# Patient Record
Sex: FEMALE | Race: WHITE | NOT HISPANIC OR LATINO | Employment: OTHER | URBAN - METROPOLITAN AREA
[De-identification: names, ages, dates, MRNs, and addresses within clinical notes are randomized per-mention and may not be internally consistent; named-entity substitution may affect disease eponyms.]

---

## 2017-05-16 ENCOUNTER — HOSPITAL ENCOUNTER (OUTPATIENT)
Dept: RADIOLOGY | Facility: HOSPITAL | Age: 62
Discharge: HOME | End: 2017-05-16
Attending: OBSTETRICS & GYNECOLOGY
Payer: COMMERCIAL

## 2017-05-16 ENCOUNTER — OFFICE VISIT (OUTPATIENT)
Dept: OBSTETRICS AND GYNECOLOGY | Facility: CLINIC | Age: 62
End: 2017-05-16
Payer: COMMERCIAL

## 2017-05-16 VITALS
SYSTOLIC BLOOD PRESSURE: 136 MMHG | WEIGHT: 293 LBS | BODY MASS INDEX: 44.41 KG/M2 | HEIGHT: 68 IN | DIASTOLIC BLOOD PRESSURE: 76 MMHG

## 2017-05-16 DIAGNOSIS — Z12.31 VISIT FOR SCREENING MAMMOGRAM: ICD-10-CM

## 2017-05-16 DIAGNOSIS — Z12.31 VISIT FOR SCREENING MAMMOGRAM: Primary | ICD-10-CM

## 2017-05-16 PROCEDURE — 99396 PREV VISIT EST AGE 40-64: CPT | Mod: S$GLB,,, | Performed by: OBSTETRICS & GYNECOLOGY

## 2017-05-16 PROCEDURE — 77067 SCR MAMMO BI INCL CAD: CPT | Mod: TC

## 2017-05-16 PROCEDURE — 99999 PR PBB SHADOW E&M-EST. PATIENT-LVL III: CPT | Mod: PBBFAC,,, | Performed by: OBSTETRICS & GYNECOLOGY

## 2017-05-16 PROCEDURE — 77067 SCR MAMMO BI INCL CAD: CPT | Mod: 26,,, | Performed by: RADIOLOGY

## 2017-05-16 PROCEDURE — 77063 BREAST TOMOSYNTHESIS BI: CPT | Mod: 26,,, | Performed by: RADIOLOGY

## 2017-05-16 RX ORDER — DULAGLUTIDE 1.5 MG/.5ML
INJECTION, SOLUTION SUBCUTANEOUS
COMMUNITY
Start: 2017-04-05 | End: 2022-12-15 | Stop reason: ALTCHOICE

## 2017-05-16 RX ORDER — PERMETHRIN 50 MG/G
CREAM TOPICAL
COMMUNITY
Start: 2017-04-25 | End: 2018-09-17

## 2017-05-16 NOTE — PROGRESS NOTES
Chief Complaint   Patient presents with    Well Woman       History and Physical:  No LMP recorded. Patient has had a hysterectomy.       Gail Temple is a 62 y.o.  female who presents today for her routine annual GYN exam. The patient has no Gynecology complaints today. Moderate stress urinary incontinence - worse on fluid pills, counseled. Recommended kegals and follow. 40# weight loss reported, encouraged.      Allergies: Review of patient's allergies indicates:  No Known Allergies    Past Medical History:   Diagnosis Date    Angio-edema     Asthma     Eczema     Obesity     Urticaria        Past Surgical History:   Procedure Laterality Date    ADENOIDECTOMY      BONY PELVIS SURGERY       SECTION      HYSTERECTOMY      KNEE CARTILAGE SURGERY      TONSILLECTOMY      VAGINOPLASTY         MEDS:   Current Outpatient Prescriptions on File Prior to Visit   Medication Sig Dispense Refill    ferrous sulfate 325 (65 FE) MG EC tablet Take 325 mg by mouth 2 (two) times daily.      losartan (COZAAR) 50 MG tablet       meloxicam (MOBIC) 15 MG tablet       metformin (GLUCOPHAGE) 500 MG tablet       nystatin (MYCOSTATIN) powder       SYNTHROID 175 mcg tablet       epinephrine (EPIPEN) 0.3 mg/0.3 mL (1:1,000) AtIn Inject 0.6 mLs (0.6 mg total) into the muscle once. 2 each 0    triamterene-hydrochlorothiazide 37.5-25 mg (DYAZIDE) 37.5-25 mg per capsule        No current facility-administered medications on file prior to visit.        OB History      Para Term  AB TAB SAB Ectopic Multiple Living    2 2                  Social History     Social History    Marital status:      Spouse name: N/A    Number of children: N/A    Years of education: N/A     Occupational History    Not on file.     Social History Main Topics    Smoking status: Never Smoker    Smokeless tobacco: Not on file    Alcohol use Yes      Comment: seldom    Drug use: Not on file    Sexual  "activity: Not Currently     Partners: Male     Birth control/ protection: None     Other Topics Concern    Not on file     Social History Narrative       Family History   Problem Relation Age of Onset    Breast cancer Mother 44      at 59yo from breast ca    Asthma Maternal Uncle     Allergies Son     Allergic rhinitis Son     Eczema Son     Angioedema Daughter     Allergies Daughter     Immunodeficiency Neg Hx     Ovarian cancer Neg Hx          Past medical and surgical history reviewed.   I have reviewed the patient's medical history in detail and updated the computerized patient record.        Review of System:   General: no chills, fever, night sweats, weight gain or weight loss  Psychological: no depression or suicidal ideation  Breasts: no new or changing breast lumps, nipple discharge or masses.  Respiratory: no cough, shortness of breath, or wheezing  Cardiovascular: no chest pain or dyspnea on exertion  Gastrointestinal: no abdominal pain, change in bowel habits, or black or bloody stools  Genito-Urinary: no incontinence, urinary frequency/urgency or vulvar/vaginal symptoms, pelvic pain or abnormal vaginal bleeding.  Musculoskeletal: no gait disturbance or muscular weakness      Physical Exam:     /76  Ht 5' 8" (1.727 m)  Wt (!) 148.3 kg (326 lb 15.1 oz)  BMI 49.71 kg/m2  Constitutional: She is oriented to person, place, and time. She appears well-developed and well-nourished. No distress. Overweight.  HENT:   Head: Normocephalic and atraumatic.   Eyes: Conjunctivae and EOM are normal. No scleral icterus.   Neck: Normal range of motion. Neck supple. No tracheal deviation present.   Cardiovascular: Normal rate.    Pulmonary/Chest: Effort normal. No respiratory distress. She exhibits no tenderness.  Breasts: are symmetrical.   Right breast exhibits no inverted nipple, no mass, no nipple discharge, no skin change and no tenderness.   Left breast exhibits no inverted nipple, no mass, no " nipple discharge, no skin change and no tenderness.  Abdominal: Soft. She exhibits no distension and no mass. There is no tenderness. There is no rebound and no guarding.   Genitourinary:    External rectal exam shows no thrombosed external hemorrhoids.    Pelvic exam was performed with patient supine.   No labial fusion.   There is no rash, lesion or injury on the right labia.   There is no rash, lesion or injury on the left labia.   No bleeding and no signs of injury around the vaginal introitus, urethra is without lesions and well supported.    No vaginal discharge found.   No significant Cystocele, Enterocele or rectocele, and cuff well supported.   Bimanual exam:   The urethra and vagina are without palpable masses or tenderness.   Uterus and cervix are surgically absents, vaginal cuff is intact and well supported.   Right adnexum displays no mass and no tenderness.   Left adnexum displays no mass and no tenderness.  Musculoskeletal: Normal range of motion.   Lymphadenopathy: No inguinal adenopathy present.   Neurological: She is alert and oriented to person, place, and time. Coordination normal.   Skin: Skin is warm and dry. She is not diaphoretic.   Psychiatric: She has a normal mood and affect.        Assessment:   Normal annual GYN exam  1. Visit for screening mammogram  CANCELED: Mammo Digital Screening Bilat With CAD   mild stress urinary incontinence - counseled.   Weight loss, encouraged.     Plan:   PAP NOT NEEDED  Mammogram  Follow up in 1 year.

## 2017-05-16 NOTE — MR AVS SNAPSHOT
[facility]  [address], [address]  [place] [address]  Phone: [phone]  Fax: [phone]                  Gail Temple   2017 11:00 AM   Office Visit    Description:  Female : 1955   Provider:  Bernard Spence MD   Department:  [facility]           Reason for Visit     Well Woman           Diagnoses this Visit        Comments    Visit for screening mammogram    -  Primary            To Do List           Future Appointments        Provider Department Dept Phone    2017 11:00 AM Bernard Spence MD [facility] [phone]      Goals (5 Years of Data)     None      [facility]     [facility] Nurse Care Line -  Assistance  Unless otherwise directed by your provider, please contact [facility], our nurse care line that is available for  assistance.     Registered nurses in the [facility] provide: appointment scheduling, clinical advisement, health education, and other advisory services.  Call: [phone] (toll free)               Medications                Verify that the below list of medications is an accurate representation of the medications you are currently taking.  If none reported, the list may be blank. If incorrect, please contact your healthcare provider. Carry this list with you in case of emergency.           Current Medications     ferrous sulfate 325 (65 FE) MG EC tablet Take 325 mg by mouth 2 (two) times daily.    losartan (COZAAR) 50 MG tablet     meloxicam (MOBIC) 15 MG tablet     metformin (GLUCOPHAGE) 500 MG tablet     nystatin (MYCOSTATIN) powder     permethrin (ELIMITE) 5 % cream     SYNTHROID 175 mcg tablet     TRULICITY 1.5 mg/0.5 mL PnIj     epinephrine (EPIPEN) 0.3 mg/0.3 mL (1:1,000) AtIn Inject 0.6 mLs (0.6 mg total) into the muscle once.    triamterene-hydrochlorothiazide 37.5-25 mg (DYAZIDE) 37.5-25 mg per capsule            Clinical Reference  "Information           Your Vitals Were     BP Height Weight BMI       136/76 5' 8" (1.727 m) 148.3 kg (326 lb 15.1 oz) 49.71 kg/m2       Blood Pressure          Most Recent Value    BP  136/76      Allergies as of 5/16/2017     No Known Allergies      Immunizations Administered on Date of Encounter - 5/16/2017     None      Orders Placed During Today's Visit     Future Labs/Procedures Expected by Expires    Mammo Digital Screening Bilat With CAD  5/16/2017 7/16/2018      Language Assistance Services     ATTENTION: Language assistance services are available, free of charge. Please call [phone].      ATENCIÓN: Si habla español, tiene a wilder disposición servicios gratuitos de asistencia lingüística. Llame al [phone].     LEONIDES Ý: N?u b?n nói Ti?ng Vi?t, có các d?ch v? h? tr? ngôn ng? mi?n phí dành cho b?n. G?i s? [phone].         [facility] complies with applicable Federal civil rights laws and does not discriminate on the basis of race, color, national origin, age, disability, or sex.        "

## 2018-03-05 ENCOUNTER — OFFICE VISIT (OUTPATIENT)
Dept: ORTHOPEDICS | Facility: CLINIC | Age: 63
End: 2018-03-05
Payer: COMMERCIAL

## 2018-03-05 ENCOUNTER — HOSPITAL ENCOUNTER (OUTPATIENT)
Dept: RADIOLOGY | Facility: HOSPITAL | Age: 63
Discharge: HOME | End: 2018-03-05
Attending: ORTHOPAEDIC SURGERY
Payer: COMMERCIAL

## 2018-03-05 VITALS — BODY MASS INDEX: 44.41 KG/M2 | WEIGHT: 293 LBS | HEIGHT: 68 IN

## 2018-03-05 DIAGNOSIS — M25.562 LEFT KNEE PAIN, UNSPECIFIED CHRONICITY: ICD-10-CM

## 2018-03-05 DIAGNOSIS — M17.0 PRIMARY OSTEOARTHRITIS OF BOTH KNEES: Primary | ICD-10-CM

## 2018-03-05 DIAGNOSIS — M25.562 LEFT KNEE PAIN, UNSPECIFIED CHRONICITY: Primary | ICD-10-CM

## 2018-03-05 PROCEDURE — 73564 X-RAY EXAM KNEE 4 OR MORE: CPT | Mod: 26,LT,, | Performed by: RADIOLOGY

## 2018-03-05 PROCEDURE — 99203 OFFICE O/P NEW LOW 30 MIN: CPT | Mod: 25,S$GLB,, | Performed by: ORTHOPAEDIC SURGERY

## 2018-03-05 PROCEDURE — 99999 PR PBB SHADOW E&M-EST. PATIENT-LVL II: CPT | Mod: PBBFAC,,, | Performed by: ORTHOPAEDIC SURGERY

## 2018-03-05 PROCEDURE — 20610 DRAIN/INJ JOINT/BURSA W/O US: CPT | Mod: RT,S$GLB,, | Performed by: ORTHOPAEDIC SURGERY

## 2018-03-05 PROCEDURE — 73562 X-RAY EXAM OF KNEE 3: CPT | Mod: TC,PN,RT

## 2018-03-05 PROCEDURE — 73562 X-RAY EXAM OF KNEE 3: CPT | Mod: 26,59,RT, | Performed by: RADIOLOGY

## 2018-03-05 PROCEDURE — 20610 DRAIN/INJ JOINT/BURSA W/O US: CPT | Mod: 59,LT,S$GLB, | Performed by: ORTHOPAEDIC SURGERY

## 2018-03-05 RX ORDER — TRIAMCINOLONE ACETONIDE 40 MG/ML
40 INJECTION, SUSPENSION INTRA-ARTICULAR; INTRAMUSCULAR
Status: DISCONTINUED | OUTPATIENT
Start: 2018-03-05 | End: 2018-03-05 | Stop reason: HOSPADM

## 2018-03-05 RX ADMIN — TRIAMCINOLONE ACETONIDE 40 MG: 40 INJECTION, SUSPENSION INTRA-ARTICULAR; INTRAMUSCULAR at 01:03

## 2018-03-05 NOTE — PROGRESS NOTES
Past Medical History:   Diagnosis Date    Angio-edema     Asthma     Eczema     Obesity     Urticaria        Past Surgical History:   Procedure Laterality Date    ADENOIDECTOMY      BONY PELVIS SURGERY       SECTION      HYSTERECTOMY      KNEE CARTILAGE SURGERY      TONSILLECTOMY      VAGINOPLASTY         Current Outpatient Prescriptions   Medication Sig    ferrous sulfate 325 (65 FE) MG EC tablet Take 325 mg by mouth 2 (two) times daily.    losartan (COZAAR) 50 MG tablet     meloxicam (MOBIC) 15 MG tablet     metformin (GLUCOPHAGE) 500 MG tablet     nystatin (MYCOSTATIN) powder     permethrin (ELIMITE) 5 % cream     SYNTHROID 175 mcg tablet     triamterene-hydrochlorothiazide 37.5-25 mg (DYAZIDE) 37.5-25 mg per capsule     TRULICITY 1.5 mg/0.5 mL PnIj     epinephrine (EPIPEN) 0.3 mg/0.3 mL (1:1,000) AtIn Inject 0.6 mLs (0.6 mg total) into the muscle once.     No current facility-administered medications for this visit.        Review of patient's allergies indicates:  No Known Allergies    Family History   Problem Relation Age of Onset    Breast cancer Mother 44      at 59yo from breast ca    Asthma Maternal Uncle     Allergies Son     Allergic rhinitis Son     Eczema Son     Angioedema Daughter     Allergies Daughter     Immunodeficiency Neg Hx     Ovarian cancer Neg Hx        Social History     Social History    Marital status:      Spouse name: N/A    Number of children: N/A    Years of education: N/A     Occupational History    Not on file.     Social History Main Topics    Smoking status: Never Smoker    Smokeless tobacco: Not on file    Alcohol use Yes      Comment: seldom    Drug use: Unknown    Sexual activity: Not Currently     Partners: Male     Birth control/ protection: None     Other Topics Concern    Not on file     Social History Narrative    No narrative on file       Chief Complaint:   Chief Complaint   Patient presents with    Left Knee  - Pain       History of present illness: Is a 63-year-old female seen for bilateral knee pain.  Patient's had pain for years.  Patient's had injections physical therapy and even meniscal surgery.  No treatment over the last year.  Left knee is the worst of the 2.  Pain with walking standing or getting up from sitting.  Pain as a 4 out of 10.  Currently on meloxicam and Osteo Bi-Flex.      Review of Systems:    Constitution: Negative for chills, fever, and sweats.  Negative for unexplained weight loss.    HENT:  Negative for headaches and blurry vision.    Cardiovascular:Negative for chest pain or irregular heart beat. Negative for hypertension.    Respiratory:  Negative for cough and shortness of breath.    Gastrointestinal: Negative for abdominal pain, heartburn, melena, nausea, and vomitting.    Genitourinary:  Negative bladder incontinence and dysuria.    Musculoskeletal:  See HPI    Neurological: Negative for numbness.    Psychiatric/Behavioral: Negative for depression.  The patient is not nervous/anxious.      Endocrine: Negative for polyuria    Hematologic/Lymphatic: Negative for bleeding problem.  Does not bruise/bleed easily.    Skin: Negative for poor would healing and rash      Physical Examination:    Vital Signs:  There were no vitals filed for this visit.    Body mass index is 49.57 kg/m².    This a well-developed, well nourished patient in no acute distress.  They are alert and oriented and cooperative to examination.  Pt. walks without an antalgic gait.      Examination of bilateral knees shows no rashes or erythema. There are no masses ecchymosis or effusion. Patient has full range of motion from 0-130°. Patient is nontender to palpation over lateral joint line and mildly tender to palpation over the medial joint line. Patient has a - Lachman exam, - anterior drawer exam, and - posterior drawer exam. - Kay's exam. Knee is stable to varus and valgus stress. 5 out of 5 motor strength. Palpable  distal pulses. Intact light touch sensation.  Moderate Patellofemoral crepitus      X-rays: X-rays left knee are ordered and reviewed which show some moderate left knee arthritis and more mild to moderate right knee arthritis     Assessment:: Bilateral knee arthritis    Plan:  I reviewed the findings with her today.  Patient may she needs to lose some weight.  We talked about treatment options and she agreed to try cortisone injections today.  She is about to go on a cruise soon.  We will get authorization and Euflexxa after she returns.    This note was created using Dragon voice recognition software that occasionally misinterpreted phrases or words.    Consult note is delivered via Epic messaging service.

## 2018-03-05 NOTE — PROCEDURES
Large Joint Aspiration/Injection  Date/Time: 3/5/2018 1:23 PM  Performed by: BISMARK GENTILE  Authorized by: BISMARK GENTILE     Consent Done?:  Yes (Verbal)  Indications:  Pain  Procedure site marked: Yes    Timeout: Prior to procedure the correct patient, procedure, and site was verified      Location:  Knee  Site:  R knee and L knee  Prep: Patient was prepped and draped in usual sterile fashion    Needle size:  20 G  Approach:  Anterolateral  Medications:  40 mg triamcinolone acetonide 40 mg/mL; 40 mg triamcinolone acetonide 40 mg/mL  Patient tolerance:  Patient tolerated the procedure well with no immediate complications

## 2018-03-06 DIAGNOSIS — M17.0 BILATERAL PRIMARY OSTEOARTHRITIS OF KNEE: Primary | ICD-10-CM

## 2018-03-26 ENCOUNTER — OFFICE VISIT (OUTPATIENT)
Dept: ORTHOPEDICS | Facility: CLINIC | Age: 63
End: 2018-03-26
Payer: COMMERCIAL

## 2018-03-26 VITALS — HEIGHT: 68 IN | WEIGHT: 293 LBS | BODY MASS INDEX: 44.41 KG/M2

## 2018-03-26 DIAGNOSIS — M17.0 PRIMARY OSTEOARTHRITIS OF BOTH KNEES: Primary | ICD-10-CM

## 2018-03-26 PROCEDURE — 99499 UNLISTED E&M SERVICE: CPT | Mod: S$GLB,,, | Performed by: ORTHOPAEDIC SURGERY

## 2018-03-26 PROCEDURE — 20610 DRAIN/INJ JOINT/BURSA W/O US: CPT | Mod: LT,S$GLB,, | Performed by: ORTHOPAEDIC SURGERY

## 2018-03-26 PROCEDURE — 20610 DRAIN/INJ JOINT/BURSA W/O US: CPT | Mod: 51,RT,S$GLB, | Performed by: ORTHOPAEDIC SURGERY

## 2018-03-26 PROCEDURE — 99999 PR PBB SHADOW E&M-EST. PATIENT-LVL II: CPT | Mod: PBBFAC,,, | Performed by: ORTHOPAEDIC SURGERY

## 2018-03-26 RX ORDER — HYALURONATE SODIUM 20 MG/2 ML
20 SYRINGE (ML) INTRAARTICULAR
Status: DISCONTINUED | OUTPATIENT
Start: 2018-03-26 | End: 2018-03-26 | Stop reason: HOSPADM

## 2018-03-26 RX ADMIN — Medication 20 MG: at 12:03

## 2018-03-26 NOTE — PROCEDURES
Large Joint Aspiration/Injection  Date/Time: 3/26/2018 12:52 PM  Performed by: BISMARK GENTILE  Authorized by: BISMARK GENTILE     Consent Done?:  Yes (Verbal)  Indications:  Pain  Procedure site marked: Yes    Timeout: Prior to procedure the correct patient, procedure, and site was verified      Location:  Knee  Site:  R knee and L knee  Prep: Patient was prepped and draped in usual sterile fashion    Needle size:  20 G  Approach:  Anterolateral  Medications:  20 mg EUFLEXXA 10 mg/mL(mw 2.4 -3.6 million); 20 mg EUFLEXXA 10 mg/mL(mw 2.4 -3.6 million)  Patient tolerance:  Patient tolerated the procedure well with no immediate complications

## 2018-03-26 NOTE — PROGRESS NOTES
Past Medical History:   Diagnosis Date    Angio-edema     Asthma     Eczema     Obesity     Urticaria        Past Surgical History:   Procedure Laterality Date    ADENOIDECTOMY      BONY PELVIS SURGERY       SECTION      HYSTERECTOMY      KNEE CARTILAGE SURGERY      TONSILLECTOMY      VAGINOPLASTY         Current Outpatient Prescriptions   Medication Sig    ferrous sulfate 325 (65 FE) MG EC tablet Take 325 mg by mouth 2 (two) times daily.    losartan (COZAAR) 50 MG tablet     meloxicam (MOBIC) 15 MG tablet     metformin (GLUCOPHAGE) 500 MG tablet     nystatin (MYCOSTATIN) powder     permethrin (ELIMITE) 5 % cream     SYNTHROID 175 mcg tablet     triamterene-hydrochlorothiazide 37.5-25 mg (DYAZIDE) 37.5-25 mg per capsule     TRULICITY 1.5 mg/0.5 mL PnIj     epinephrine (EPIPEN) 0.3 mg/0.3 mL (1:1,000) AtIn Inject 0.6 mLs (0.6 mg total) into the muscle once.     No current facility-administered medications for this visit.        Review of patient's allergies indicates:  No Known Allergies    Family History   Problem Relation Age of Onset    Breast cancer Mother 44      at 61yo from breast ca    Asthma Maternal Uncle     Allergies Son     Allergic rhinitis Son     Eczema Son     Angioedema Daughter     Allergies Daughter     Immunodeficiency Neg Hx     Ovarian cancer Neg Hx        Social History     Social History    Marital status:      Spouse name: N/A    Number of children: N/A    Years of education: N/A     Occupational History    Not on file.     Social History Main Topics    Smoking status: Never Smoker    Smokeless tobacco: Not on file    Alcohol use Yes      Comment: seldom    Drug use: Unknown    Sexual activity: Not Currently     Partners: Male     Birth control/ protection: None     Other Topics Concern    Not on file     Social History Narrative    No narrative on file       Chief Complaint:   Chief Complaint   Patient presents with    Knee Pain      bilateral knee-Euflexxa 1/3        History of present illness: Is a 63-year-old female seen for bilateral knee pain.  Patient's had pain for years.  Patient's had injections physical therapy and even meniscal surgery.  No treatment over the last year.  Left knee is the worst of the 2.  Pain with walking standing or getting up from sitting.  Pain as a 4 out of 10.  Currently on meloxicam and Osteo Bi-Flex.      Review of Systems:    Constitution: Negative for chills, fever, and sweats.  Negative for unexplained weight loss.    HENT:  Negative for headaches and blurry vision.    Cardiovascular:Negative for chest pain or irregular heart beat. Negative for hypertension.    Respiratory:  Negative for cough and shortness of breath.    Gastrointestinal: Negative for abdominal pain, heartburn, melena, nausea, and vomitting.    Genitourinary:  Negative bladder incontinence and dysuria.    Musculoskeletal:  See HPI    Neurological: Negative for numbness.    Psychiatric/Behavioral: Negative for depression.  The patient is not nervous/anxious.      Endocrine: Negative for polyuria    Hematologic/Lymphatic: Negative for bleeding problem.  Does not bruise/bleed easily.    Skin: Negative for poor would healing and rash      Physical Examination:    Vital Signs:  There were no vitals filed for this visit.    Body mass index is 49.57 kg/m².    This a well-developed, well nourished patient in no acute distress.  They are alert and oriented and cooperative to examination.  Pt. walks without an antalgic gait.      Examination of bilateral knees shows no rashes or erythema. There are no masses ecchymosis or effusion. Patient has full range of motion from 0-130°. Patient is nontender to palpation over lateral joint line and mildly tender to palpation over the medial joint line. Patient has a - Lachman exam, - anterior drawer exam, and - posterior drawer exam. - Kay's exam. Knee is stable to varus and valgus stress. 5 out of 5  motor strength. Palpable distal pulses. Intact light touch sensation.  Moderate Patellofemoral crepitus      X-rays: X-rays left knee are reviewed which show some moderate left knee arthritis and more mild to moderate right knee arthritis     Assessment:: Bilateral knee arthritis    Plan:  I injected both knees with Euflexxa 1 of 3.  Follow-up next week.    This note was created using Dragon voice recognition software that occasionally misinterpreted phrases or words.    Consult note is delivered via Epic messaging service.

## 2018-04-09 ENCOUNTER — OFFICE VISIT (OUTPATIENT)
Dept: ORTHOPEDICS | Facility: CLINIC | Age: 63
End: 2018-04-09
Payer: COMMERCIAL

## 2018-04-09 DIAGNOSIS — M17.0 PRIMARY OSTEOARTHRITIS OF BOTH KNEES: Primary | ICD-10-CM

## 2018-04-09 PROCEDURE — 20610 DRAIN/INJ JOINT/BURSA W/O US: CPT | Mod: LT,S$GLB,, | Performed by: ORTHOPAEDIC SURGERY

## 2018-04-09 PROCEDURE — 99499 UNLISTED E&M SERVICE: CPT | Mod: S$GLB,,, | Performed by: ORTHOPAEDIC SURGERY

## 2018-04-09 PROCEDURE — 20610 DRAIN/INJ JOINT/BURSA W/O US: CPT | Mod: 51,RT,S$GLB, | Performed by: ORTHOPAEDIC SURGERY

## 2018-04-09 PROCEDURE — 99999 PR PBB SHADOW E&M-EST. PATIENT-LVL II: CPT | Mod: PBBFAC,,, | Performed by: ORTHOPAEDIC SURGERY

## 2018-04-09 RX ORDER — HYALURONATE SODIUM 20 MG/2 ML
20 SYRINGE (ML) INTRAARTICULAR
Status: DISCONTINUED | OUTPATIENT
Start: 2018-04-09 | End: 2018-04-09 | Stop reason: HOSPADM

## 2018-04-09 RX ADMIN — Medication 20 MG: at 12:04

## 2018-04-09 NOTE — PROCEDURES
Large Joint Aspiration/Injection  Date/Time: 4/9/2018 12:44 PM  Performed by: BISMARK GENTILE  Authorized by: BISMARK GENTILE     Consent Done?:  Yes (Verbal)  Indications:  Pain  Procedure site marked: Yes    Timeout: Prior to procedure the correct patient, procedure, and site was verified      Location:  Knee  Site:  R knee and L knee  Prep: Patient was prepped and draped in usual sterile fashion    Needle size:  20 G  Approach:  Anterolateral  Medications:  20 mg EUFLEXXA 10 mg/mL(mw 2.4 -3.6 million); 20 mg EUFLEXXA 10 mg/mL(mw 2.4 -3.6 million)  Patient tolerance:  Patient tolerated the procedure well with no immediate complications

## 2018-04-09 NOTE — PROGRESS NOTES
Past Medical History:   Diagnosis Date    Angio-edema     Asthma     Eczema     Obesity     Urticaria        Past Surgical History:   Procedure Laterality Date    ADENOIDECTOMY      BONY PELVIS SURGERY       SECTION      HYSTERECTOMY      KNEE CARTILAGE SURGERY      TONSILLECTOMY      VAGINOPLASTY         Current Outpatient Prescriptions   Medication Sig    ferrous sulfate 325 (65 FE) MG EC tablet Take 325 mg by mouth 2 (two) times daily.    losartan (COZAAR) 50 MG tablet     meloxicam (MOBIC) 15 MG tablet     metformin (GLUCOPHAGE) 500 MG tablet     nystatin (MYCOSTATIN) powder     permethrin (ELIMITE) 5 % cream     SYNTHROID 175 mcg tablet     triamterene-hydrochlorothiazide 37.5-25 mg (DYAZIDE) 37.5-25 mg per capsule     TRULICITY 1.5 mg/0.5 mL PnIj     epinephrine (EPIPEN) 0.3 mg/0.3 mL (1:1,000) AtIn Inject 0.6 mLs (0.6 mg total) into the muscle once.     No current facility-administered medications for this visit.        Review of patient's allergies indicates:  No Known Allergies    Family History   Problem Relation Age of Onset    Breast cancer Mother 44      at 59yo from breast ca    Asthma Maternal Uncle     Allergies Son     Allergic rhinitis Son     Eczema Son     Angioedema Daughter     Allergies Daughter     Immunodeficiency Neg Hx     Ovarian cancer Neg Hx        Social History     Social History    Marital status:      Spouse name: N/A    Number of children: N/A    Years of education: N/A     Occupational History    Not on file.     Social History Main Topics    Smoking status: Never Smoker    Smokeless tobacco: Not on file    Alcohol use Yes      Comment: seldom    Drug use: Unknown    Sexual activity: Not Currently     Partners: Male     Birth control/ protection: None     Other Topics Concern    Not on file     Social History Narrative    No narrative on file       Chief Complaint:   Chief Complaint   Patient presents with    Knee Pain      B euflexxa 2/3       History of present illness: Is a 63-year-old female seen for bilateral knee pain.  Patient's had pain for years.  Patient's had injections physical therapy and even meniscal surgery.  No treatment over the last year.  Left knee is the worst of the 2.  Pain with walking standing or getting up from sitting.  Pain as a 4 out of 10.  Currently on meloxicam and Osteo Bi-Flex.      Review of Systems:    Constitution: Negative for chills, fever, and sweats.  Negative for unexplained weight loss.    HENT:  Negative for headaches and blurry vision.    Cardiovascular:Negative for chest pain or irregular heart beat. Negative for hypertension.    Respiratory:  Negative for cough and shortness of breath.    Gastrointestinal: Negative for abdominal pain, heartburn, melena, nausea, and vomitting.    Genitourinary:  Negative bladder incontinence and dysuria.    Musculoskeletal:  See HPI    Neurological: Negative for numbness.    Psychiatric/Behavioral: Negative for depression.  The patient is not nervous/anxious.      Endocrine: Negative for polyuria    Hematologic/Lymphatic: Negative for bleeding problem.  Does not bruise/bleed easily.    Skin: Negative for poor would healing and rash      Physical Examination:    Vital Signs:  There were no vitals filed for this visit.    There is no height or weight on file to calculate BMI.    This a well-developed, well nourished patient in no acute distress.  They are alert and oriented and cooperative to examination.  Pt. walks without an antalgic gait.      Examination of bilateral knees shows no rashes or erythema. There are no masses ecchymosis or effusion. Patient has full range of motion from 0-130°. Patient is nontender to palpation over lateral joint line and mildly tender to palpation over the medial joint line. Patient has a - Lachman exam, - anterior drawer exam, and - posterior drawer exam. - Kay's exam. Knee is stable to varus and valgus stress. 5  out of 5 motor strength. Palpable distal pulses. Intact light touch sensation.  Moderate Patellofemoral crepitus      X-rays: X-rays left knee are reviewed which show some moderate left knee arthritis and more mild to moderate right knee arthritis     Assessment:: Bilateral knee arthritis    Plan:  I injected both knees with Euflexxa 2 of 3.  Follow-up next week.    This note was created using Dragon voice recognition software that occasionally misinterpreted phrases or words.    Consult note is delivered via Epic messaging service.

## 2018-04-16 ENCOUNTER — OFFICE VISIT (OUTPATIENT)
Dept: ORTHOPEDICS | Facility: CLINIC | Age: 63
End: 2018-04-16
Payer: COMMERCIAL

## 2018-04-16 DIAGNOSIS — M17.0 PRIMARY OSTEOARTHRITIS OF BOTH KNEES: Primary | ICD-10-CM

## 2018-04-16 PROCEDURE — 20610 DRAIN/INJ JOINT/BURSA W/O US: CPT | Mod: LT,S$GLB,, | Performed by: ORTHOPAEDIC SURGERY

## 2018-04-16 PROCEDURE — 99999 PR PBB SHADOW E&M-EST. PATIENT-LVL II: CPT | Mod: PBBFAC,,, | Performed by: ORTHOPAEDIC SURGERY

## 2018-04-16 PROCEDURE — 99499 UNLISTED E&M SERVICE: CPT | Mod: S$GLB,,, | Performed by: ORTHOPAEDIC SURGERY

## 2018-04-16 PROCEDURE — 20610 DRAIN/INJ JOINT/BURSA W/O US: CPT | Mod: 51,RT,S$GLB, | Performed by: ORTHOPAEDIC SURGERY

## 2018-04-16 RX ORDER — HYALURONATE SODIUM 20 MG/2 ML
20 SYRINGE (ML) INTRAARTICULAR
Status: DISCONTINUED | OUTPATIENT
Start: 2018-04-16 | End: 2018-04-16 | Stop reason: HOSPADM

## 2018-04-16 RX ADMIN — Medication 20 MG: at 11:04

## 2018-04-16 NOTE — PROGRESS NOTES
Past Medical History:   Diagnosis Date    Angio-edema     Asthma     Eczema     Obesity     Urticaria        Past Surgical History:   Procedure Laterality Date    ADENOIDECTOMY      BONY PELVIS SURGERY       SECTION      HYSTERECTOMY      KNEE CARTILAGE SURGERY      TONSILLECTOMY      VAGINOPLASTY         Current Outpatient Prescriptions   Medication Sig    ferrous sulfate 325 (65 FE) MG EC tablet Take 325 mg by mouth 2 (two) times daily.    losartan (COZAAR) 50 MG tablet     meloxicam (MOBIC) 15 MG tablet     metformin (GLUCOPHAGE) 500 MG tablet     nystatin (MYCOSTATIN) powder     permethrin (ELIMITE) 5 % cream     SYNTHROID 175 mcg tablet     triamterene-hydrochlorothiazide 37.5-25 mg (DYAZIDE) 37.5-25 mg per capsule     TRULICITY 1.5 mg/0.5 mL PnIj     epinephrine (EPIPEN) 0.3 mg/0.3 mL (1:1,000) AtIn Inject 0.6 mLs (0.6 mg total) into the muscle once.     No current facility-administered medications for this visit.        Review of patient's allergies indicates:  No Known Allergies    Family History   Problem Relation Age of Onset    Breast cancer Mother 44      at 61yo from breast ca    Asthma Maternal Uncle     Allergies Son     Allergic rhinitis Son     Eczema Son     Angioedema Daughter     Allergies Daughter     Immunodeficiency Neg Hx     Ovarian cancer Neg Hx        Social History     Social History    Marital status:      Spouse name: N/A    Number of children: N/A    Years of education: N/A     Occupational History    Not on file.     Social History Main Topics    Smoking status: Never Smoker    Smokeless tobacco: Not on file    Alcohol use Yes      Comment: seldom    Drug use: Unknown    Sexual activity: Not Currently     Partners: Male     Birth control/ protection: None     Other Topics Concern    Not on file     Social History Narrative    No narrative on file       Chief Complaint:   Chief Complaint   Patient presents with    Knee Pain      B euflexxa 3/3       History of present illness: Is a 63-year-old female seen for bilateral knee pain.  Patient's had pain for years.  Patient's had injections physical therapy and even meniscal surgery.  No treatment over the last year.  Left knee is the worst of the 2.  Pain with walking standing or getting up from sitting.  Pain as a 4 out of 10.  Currently on meloxicam and Osteo Bi-Flex.      Review of Systems:    Constitution: Negative for chills, fever, and sweats.  Negative for unexplained weight loss.    HENT:  Negative for headaches and blurry vision.    Cardiovascular:Negative for chest pain or irregular heart beat. Negative for hypertension.    Respiratory:  Negative for cough and shortness of breath.    Gastrointestinal: Negative for abdominal pain, heartburn, melena, nausea, and vomitting.    Genitourinary:  Negative bladder incontinence and dysuria.    Musculoskeletal:  See HPI    Neurological: Negative for numbness.    Psychiatric/Behavioral: Negative for depression.  The patient is not nervous/anxious.      Endocrine: Negative for polyuria    Hematologic/Lymphatic: Negative for bleeding problem.  Does not bruise/bleed easily.    Skin: Negative for poor would healing and rash      Physical Examination:    Vital Signs:  There were no vitals filed for this visit.    There is no height or weight on file to calculate BMI.    This a well-developed, well nourished patient in no acute distress.  They are alert and oriented and cooperative to examination.  Pt. walks without an antalgic gait.      Examination of bilateral knees shows no rashes or erythema. There are no masses ecchymosis or effusion. Patient has full range of motion from 0-130°. Patient is nontender to palpation over lateral joint line and mildly tender to palpation over the medial joint line.  Knee is stable to varus and valgus stress. 5 out of 5 motor strength. Palpable distal pulses. Intact light touch sensation.  Moderate Patellofemoral  crepitus      X-rays: X-rays left knee are reviewed which show some moderate left knee arthritis and more mild to moderate right knee arthritis     Assessment:: Bilateral knee arthritis    Plan:  I injected both knees with Euflexxa 3 of 3.  Follow-up in about 6 weeks.    This note was created using Dragon voice recognition software that occasionally misinterpreted phrases or words.    Consult note is delivered via Epic messaging service.

## 2018-04-16 NOTE — PROCEDURES
Large Joint Aspiration/Injection  Date/Time: 4/16/2018 11:42 AM  Performed by: BISMARK GENTILE  Authorized by: BISMARK GENTILE     Consent Done?:  Yes (Verbal)  Indications:  Pain  Procedure site marked: Yes    Timeout: Prior to procedure the correct patient, procedure, and site was verified      Location:  Knee  Site:  L knee and R knee  Prep: Patient was prepped and draped in usual sterile fashion    Needle size:  20 G  Approach:  Anterolateral  Medications:  20 mg EUFLEXXA 10 mg/mL(mw 2.4 -3.6 million); 20 mg EUFLEXXA 10 mg/mL(mw 2.4 -3.6 million)  Patient tolerance:  Patient tolerated the procedure well with no immediate complications

## 2018-08-29 ENCOUNTER — HOSPITAL ENCOUNTER (OUTPATIENT)
Dept: RADIOLOGY | Facility: HOSPITAL | Age: 63
Discharge: HOME | End: 2018-08-29
Attending: OBSTETRICS & GYNECOLOGY
Payer: COMMERCIAL

## 2018-08-29 ENCOUNTER — OFFICE VISIT (OUTPATIENT)
Dept: OBSTETRICS AND GYNECOLOGY | Facility: CLINIC | Age: 63
End: 2018-08-29
Payer: COMMERCIAL

## 2018-08-29 VITALS — HEIGHT: 68 IN | BODY MASS INDEX: 44.41 KG/M2 | WEIGHT: 293 LBS

## 2018-08-29 VITALS — DIASTOLIC BLOOD PRESSURE: 88 MMHG | SYSTOLIC BLOOD PRESSURE: 138 MMHG | BODY MASS INDEX: 49.28 KG/M2 | WEIGHT: 293 LBS

## 2018-08-29 DIAGNOSIS — Z12.39 SCREENING FOR MALIGNANT NEOPLASM OF BREAST: Primary | ICD-10-CM

## 2018-08-29 DIAGNOSIS — Z12.39 SCREENING FOR MALIGNANT NEOPLASM OF BREAST: ICD-10-CM

## 2018-08-29 DIAGNOSIS — Z01.419 VISIT FOR GYNECOLOGIC EXAMINATION: ICD-10-CM

## 2018-08-29 PROCEDURE — 77067 SCR MAMMO BI INCL CAD: CPT | Mod: 26,,, | Performed by: RADIOLOGY

## 2018-08-29 PROCEDURE — 99999 PR PBB SHADOW E&M-EST. PATIENT-LVL III: CPT | Mod: PBBFAC,,, | Performed by: OBSTETRICS & GYNECOLOGY

## 2018-08-29 PROCEDURE — 77063 BREAST TOMOSYNTHESIS BI: CPT | Mod: 26,,, | Performed by: RADIOLOGY

## 2018-08-29 PROCEDURE — 77067 SCR MAMMO BI INCL CAD: CPT | Mod: TC,PN

## 2018-08-29 PROCEDURE — 99396 PREV VISIT EST AGE 40-64: CPT | Mod: S$GLB,,, | Performed by: OBSTETRICS & GYNECOLOGY

## 2018-08-29 NOTE — PROGRESS NOTES
Chief Complaint   Patient presents with    Well Woman       History and Physical:  No LMP recorded. Patient has had a hysterectomy.       Gail Temple is a 63 y.o.  female who presents today for her routine annual GYN exam. The patient has no Gynecology complaints today. Leaking urine , with full bladder- none with cough / sneeze.       Allergies:   Review of patient's allergies indicates:   Allergen Reactions    Levothyroxine      GENERIC ONLY / PT CAN TAKE SYNTHROID       Past Medical History:   Diagnosis Date    Angio-edema     Asthma     Eczema     Obesity     Urticaria        Past Surgical History:   Procedure Laterality Date    ADENOIDECTOMY      BONY PELVIS SURGERY       SECTION      HYSTERECTOMY      KNEE CARTILAGE SURGERY      TONSILLECTOMY      VAGINOPLASTY         MEDS:   Current Outpatient Medications on File Prior to Visit   Medication Sig Dispense Refill    losartan (COZAAR) 50 MG tablet       meloxicam (MOBIC) 15 MG tablet       metformin (GLUCOPHAGE) 500 MG tablet       SYNTHROID 175 mcg tablet       triamterene-hydrochlorothiazide 37.5-25 mg (DYAZIDE) 37.5-25 mg per capsule       TRULICITY 1.5 mg/0.5 mL PnIj       epinephrine (EPIPEN) 0.3 mg/0.3 mL (1:1,000) AtIn Inject 0.6 mLs (0.6 mg total) into the muscle once. 2 each 0    ferrous sulfate 325 (65 FE) MG EC tablet Take 325 mg by mouth 2 (two) times daily.      nystatin (MYCOSTATIN) powder       permethrin (ELIMITE) 5 % cream        No current facility-administered medications on file prior to visit.        OB History      Para Term  AB Living    2 2            SAB TAB Ectopic Multiple Live Births                       Social History     Socioeconomic History    Marital status:      Spouse name: Not on file    Number of children: Not on file    Years of education: Not on file    Highest education level: Not on file   Social Needs    Financial resource strain: Not on file     Food insecurity - worry: Not on file    Food insecurity - inability: Not on file    Transportation needs - medical: Not on file    Transportation needs - non-medical: Not on file   Occupational History    Not on file   Tobacco Use    Smoking status: Never Smoker   Substance and Sexual Activity    Alcohol use: Yes     Comment: seldom    Drug use: Not on file    Sexual activity: Not Currently     Partners: Male     Birth control/protection: None   Other Topics Concern    Not on file   Social History Narrative    Not on file       Family History   Problem Relation Age of Onset    Breast cancer Mother 44         at 61yo from breast ca    Asthma Maternal Uncle     Allergies Son     Allergic rhinitis Son     Eczema Son     Angioedema Daughter     Allergies Daughter     Immunodeficiency Neg Hx     Ovarian cancer Neg Hx          Past medical and surgical history reviewed.   I have reviewed the patient's medical history in detail and updated the computerized patient record.        Review of System:  General: no chills, fever, night sweats, weight gain or weight loss  Psychological: no depression or suicidal ideation  Breasts: no new or changing breast lumps, nipple discharge or masses.  Respiratory: no cough, shortness of breath, or wheezing  Cardiovascular: no chest pain or dyspnea on exertion  Gastrointestinal: no abdominal pain, change in bowel habits, or black or bloody stools  Genito-Urinary: no incontinence, urinary frequency/urgency or vulvar/vaginal symptoms, pelvic pain or abnormal vaginal bleeding.  Musculoskeletal: no gait disturbance or muscular weakness      Physical Exam:   /88   Wt (!) 147 kg (324 lb 1.2 oz)   BMI 49.28 kg/m²   Constitutional: She is oriented to person, place, and time. She appears well-developed and well-nourished. No distress.   HENT:   Head: Normocephalic and atraumatic.   Eyes: Conjunctivae and EOM are normal. No scleral icterus.   Neck: Normal range of  motion. Neck supple. No tracheal deviation present.   Cardiovascular: Normal rate.    Pulmonary/Chest: Effort normal. No respiratory distress. She exhibits no tenderness.  Breasts: are symmetrical.   Right breast exhibits no inverted nipple, no mass, no nipple discharge, no skin change and no tenderness.   Left breast exhibits no inverted nipple, no mass, no nipple discharge, no skin change and no tenderness.  Abdominal: Soft. She exhibits no distension and no mass. There is no tenderness. There is no rebound and no guarding.   Genitourinary:    External rectal exam shows no thrombosed external hemorrhoids.    Pelvic exam was performed with patient supine.   No labial fusion.   There is no rash, lesion or injury on the right labia.   There is no rash, lesion or injury on the left labia.   No bleeding and no signs of injury around the vaginal introitus, urethra is without lesions and well supported.    No vaginal discharge found.    No significant Cystocele, Enterocele or rectocele, and cuff well supported.   Bimanual exam:   The urethra and vagina are without palpable masses or tenderness.   Uterus and cervix are surgically absents, vaginal cuff is intact and well supported.   Right adnexum displays no mass and no tenderness.   Left adnexum displays no mass and no tenderness.  Musculoskeletal: Normal range of motion.   Lymphadenopathy: No inguinal adenopathy present.   Neurological: She is alert and oriented to person, place, and time. Coordination normal.   Skin: Skin is warm and dry. She is not diaphoretic.   Psychiatric: She has a normal mood and affect.        Assessment:   Normal annual GYN exam  1. Screening for malignant neoplasm of breast  CANCELED: Mammo Digital Screening Bilat With CAD   2. Visit for gynecologic examination         Plan:   PAP not needed  Mammogram  Texxiiq 50qd  Follow up in 1 year.

## 2018-09-06 ENCOUNTER — PATIENT MESSAGE (OUTPATIENT)
Dept: OBSTETRICS AND GYNECOLOGY | Facility: CLINIC | Age: 63
End: 2018-09-06

## 2018-09-06 DIAGNOSIS — N39.41 URGE INCONTINENCE: Primary | ICD-10-CM

## 2018-09-06 NOTE — TELEPHONE ENCOUNTER
Please call mybetriq  RX into [facility] pharmacy. Reports good results and would like a prescription.

## 2018-09-17 ENCOUNTER — INITIAL CONSULT (OUTPATIENT)
Dept: SPINE | Facility: CLINIC | Age: 63
End: 2018-09-17
Payer: COMMERCIAL

## 2018-09-17 ENCOUNTER — HOSPITAL ENCOUNTER (OUTPATIENT)
Dept: RADIOLOGY | Facility: HOSPITAL | Age: 63
Discharge: HOME | End: 2018-09-17
Attending: PHYSICAL MEDICINE & REHABILITATION
Payer: COMMERCIAL

## 2018-09-17 ENCOUNTER — TELEPHONE (OUTPATIENT)
Dept: ORTHOPEDICS | Facility: CLINIC | Age: 63
End: 2018-09-17

## 2018-09-17 VITALS — HEIGHT: 68 IN | BODY MASS INDEX: 44.41 KG/M2 | WEIGHT: 293 LBS

## 2018-09-17 DIAGNOSIS — M54.50 ACUTE MIDLINE LOW BACK PAIN WITHOUT SCIATICA: Primary | ICD-10-CM

## 2018-09-17 DIAGNOSIS — M54.50 ACUTE MIDLINE LOW BACK PAIN WITHOUT SCIATICA: ICD-10-CM

## 2018-09-17 PROCEDURE — 99204 OFFICE O/P NEW MOD 45 MIN: CPT | Mod: 25,,, | Performed by: PHYSICAL MEDICINE & REHABILITATION

## 2018-09-17 PROCEDURE — 72110 X-RAY EXAM L-2 SPINE 4/>VWS: CPT | Mod: 26,,, | Performed by: RADIOLOGY

## 2018-09-17 PROCEDURE — 3008F BODY MASS INDEX DOCD: CPT | Mod: ,,, | Performed by: PHYSICAL MEDICINE & REHABILITATION

## 2018-09-17 PROCEDURE — 72110 X-RAY EXAM L-2 SPINE 4/>VWS: CPT | Mod: TC,FY

## 2018-09-17 PROCEDURE — 96372 THER/PROPH/DIAG INJ SC/IM: CPT | Mod: ,,, | Performed by: PHYSICAL MEDICINE & REHABILITATION

## 2018-09-17 RX ORDER — TRAMADOL HYDROCHLORIDE 50 MG/1
50 TABLET ORAL EVERY 6 HOURS PRN
Qty: 21 TABLET | Refills: 0 | Status: SHIPPED | OUTPATIENT
Start: 2018-09-17 | End: 2018-09-26 | Stop reason: ALTCHOICE

## 2018-09-17 RX ORDER — KETOROLAC TROMETHAMINE 30 MG/ML
30 INJECTION, SOLUTION INTRAMUSCULAR; INTRAVENOUS ONCE
Status: COMPLETED | OUTPATIENT
Start: 2018-09-17 | End: 2018-09-17

## 2018-09-17 RX ORDER — KETOROLAC TROMETHAMINE 30 MG/ML
30 INJECTION, SOLUTION INTRAMUSCULAR; INTRAVENOUS ONCE
Status: DISCONTINUED | OUTPATIENT
Start: 2018-09-17 | End: 2018-09-17

## 2018-09-17 RX ORDER — METHYLPREDNISOLONE 4 MG/1
TABLET ORAL
Qty: 1 PACKAGE | Refills: 0 | Status: SHIPPED | OUTPATIENT
Start: 2018-09-17 | End: 2018-09-27

## 2018-09-17 RX ORDER — METHYLPREDNISOLONE ACETATE 40 MG/ML
40 INJECTION, SUSPENSION INTRA-ARTICULAR; INTRALESIONAL; INTRAMUSCULAR; SOFT TISSUE
Status: COMPLETED | OUTPATIENT
Start: 2018-09-17 | End: 2018-09-17

## 2018-09-17 RX ADMIN — KETOROLAC TROMETHAMINE 30 MG: 30 INJECTION, SOLUTION INTRAMUSCULAR; INTRAVENOUS at 02:09

## 2018-09-17 RX ADMIN — METHYLPREDNISOLONE ACETATE 40 MG: 40 INJECTION, SUSPENSION INTRA-ARTICULAR; INTRALESIONAL; INTRAMUSCULAR; SOFT TISSUE at 02:09

## 2018-09-17 NOTE — H&P (VIEW-ONLY)
SUBJECTIVE:    Patient ID: Gail Temple is a 63 y.o. female.    Chief Complaint: Back Pain (lowe back pain due fall on 9/13/18)    This is a 63-year-old woman who sees Darby Mckoy nurse practitioner for her primary care. She has a history of hypertension and mild diabetes mellitus but no other chronic major medical problems.  No history of cancer and no known history of osteoporosis.  No prior history of back problems.  She slipped on a child stool way on laminate vickie last Thursday a few days ago landing pretty much flat on her back and since then has had rather severe centralized low back pain at the lumbosacral junction.  The pain is worse on transitional movements especially going from sitting to standing and transitioning from lying in bed to standing.  She denies any leg pain or weakness.  She denies bowel or bladder dysfunction fever chills sweats or unexpected weight loss.  She has been taking ibuprofen 800 mg for pain and that seems to take the edge off.  Her current pain level is 7/10 but gets as bad as 10/10 at times.          Past Medical History:   Diagnosis Date    Angio-edema     Asthma     Eczema     Obesity     Urticaria      Social History     Socioeconomic History    Marital status:      Spouse name: Not on file    Number of children: Not on file    Years of education: Not on file    Highest education level: Not on file   Social Needs    Financial resource strain: Not on file    Food insecurity - worry: Not on file    Food insecurity - inability: Not on file    Transportation needs - medical: Not on file    Transportation needs - non-medical: Not on file   Occupational History    Not on file   Tobacco Use    Smoking status: Never Smoker   Substance and Sexual Activity    Alcohol use: Yes     Comment: seldom    Drug use: Not on file    Sexual activity: Not Currently     Partners: Male     Birth control/protection: None   Other Topics Concern    Not on  "file   Social History Narrative    Not on file     Past Surgical History:   Procedure Laterality Date    ADENOIDECTOMY      BONY PELVIS SURGERY       SECTION      HYSTERECTOMY      KNEE CARTILAGE SURGERY      TONSILLECTOMY      VAGINOPLASTY       Family History   Problem Relation Age of Onset    Breast cancer Mother 44         at 59yo from breast ca    Asthma Maternal Uncle     Allergies Son     Allergic rhinitis Son     Eczema Son     Angioedema Daughter     Allergies Daughter     Immunodeficiency Neg Hx     Ovarian cancer Neg Hx      Vitals:    18 1330   Weight: (!) 147 kg (324 lb 1.2 oz)   Height: 5' 8" (1.727 m)       Review of Systems   Constitutional: Negative for chills, diaphoresis, fatigue, fever and unexpected weight change.   HENT: Negative for trouble swallowing.    Eyes: Negative for visual disturbance.   Respiratory: Negative for shortness of breath.    Cardiovascular: Negative for chest pain.   Gastrointestinal: Negative for abdominal pain, constipation, nausea and vomiting.   Genitourinary: Negative for difficulty urinating.   Musculoskeletal: Negative for arthralgias, back pain, gait problem, joint swelling, myalgias, neck pain and neck stiffness.   Neurological: Negative for dizziness, speech difficulty, weakness, light-headedness, numbness and headaches.          Objective:      Physical Exam   Constitutional: She is oriented to person, place, and time. She appears well-developed and well-nourished.   Neurological: She is alert and oriented to person, place, and time.   She is awake and in no acute distress  No point tenderness to palpation or percussion over the lumbar spine.  No palpable masses  Deep tendon reflexes are trace at both knees and both ankles  Strength is normal in both lower extremities  Straight leg raising is negative bilaterally  GALDINO testing reproduces low back pain bilaterally right worse than left           Assessment:       1. Acute " midline low back pain without sciatica           Plan:     she has a nonfocal examination from a neurological standpoint and no historical red flags.  With her history of trauma we need to make sure she does not have a fracture.  I have a low index of that clinically.  We are going to give her shot of Depo-Medrol and Toradol here in the office and then provided she does not have a fracture she can start on a Medrol Dosepak tomorrow.  I gave her tramadol for pain as needed.  I can follow up with her in about 1 week or after she finishes the Medrol the      Acute midline low back pain without sciatica  -     X-Ray Lumbar Complete With Flex And Ext; Future; Expected date: 09/17/2018    Other orders  -     methylPREDNISolone acetate injection 40 mg; Inject 1 mL (40 mg total) into the muscle one time.  -     ketorolac injection 30 mg; Inject 1 mL (30 mg total) into the vein once.  -     methylPREDNISolone (MEDROL DOSEPACK) 4 mg tablet; use as directed  Dispense: 1 Package; Refill: 0  -     traMADol (ULTRAM) 50 mg tablet; Take 1 tablet (50 mg total) by mouth every 6 (six) hours as needed for Pain.  Dispense: 21 tablet; Refill: 0

## 2018-09-17 NOTE — TELEPHONE ENCOUNTER
----- Message from Sharita Dong sent at 9/17/2018  8:22 AM CDT -----  Type: Needs Medical Advice    Who Called:  Patient  Best Call Back Number: [phone]  Additional Information: Patient slipped on toy/stated flipped, fell and hurt back/needs advice/please call back to advise.

## 2018-09-18 ENCOUNTER — TELEPHONE (OUTPATIENT)
Dept: SPINE | Facility: CLINIC | Age: 63
End: 2018-09-18

## 2018-09-18 DIAGNOSIS — S32.009A LUMBAR VERTERBRAL FRACTURE, TRAUMATIC: ICD-10-CM

## 2018-09-18 DIAGNOSIS — S32.010A CLOSED COMPRESSION FRACTURE OF FIRST LUMBAR VERTEBRA, INITIAL ENCOUNTER: Primary | ICD-10-CM

## 2018-09-18 NOTE — PROGRESS NOTES
Has a mild compression fracture (broken bone). Not an emergency. She should get an MRI then follow up with me. Activity as tolerated. Hold the steroids

## 2018-09-18 NOTE — TELEPHONE ENCOUNTER
----- Message from Primo Vazquez MD sent at 9/18/2018  8:01 AM CDT -----  Has a mild compression fracture (broken bone). Not an emergency. She should get an MRI then follow up with me. Activity as tolerated. Hold the steroids

## 2018-09-18 NOTE — TELEPHONE ENCOUNTER
Spoke to pt. Informed her of results and to not take steroids. Informed pt MRI has been ordered. Pt stated she was nervous about MRI. Informed her I would send order to [facility] imaging since they have a bigger MRI machine. Pt v/u and agreement. MRI order sent via [facility] to [facility] imaging center.

## 2018-09-20 ENCOUNTER — TELEPHONE (OUTPATIENT)
Dept: SPINE | Facility: CLINIC | Age: 63
End: 2018-09-20

## 2018-09-20 NOTE — TELEPHONE ENCOUNTER
----- Message from Stacey M Lefort sent at 9/20/2018  2:11 PM CDT -----  Pt called and would like Dr Vazquez to call in some 800mg ibuprofen. She had some from a previous doctor and Dr Vazquez told her to go ahead and use those in conjunction with the tramadol he prescribed. The tramadol does not help but the ibuprofen does - and she is down to her last 2 pills. She would like a call back at [phone].  Thank you.

## 2018-09-21 ENCOUNTER — TELEPHONE (OUTPATIENT)
Dept: SPINE | Facility: CLINIC | Age: 63
End: 2018-09-21

## 2018-09-21 ENCOUNTER — PATIENT MESSAGE (OUTPATIENT)
Dept: SPINE | Facility: CLINIC | Age: 63
End: 2018-09-21

## 2018-09-21 RX ORDER — IBUPROFEN 800 MG/1
800 TABLET ORAL 3 TIMES DAILY
Qty: 60 TABLET | Refills: 1 | Status: SHIPPED | OUTPATIENT
Start: 2018-09-21 | End: 2021-01-28 | Stop reason: ALTCHOICE

## 2018-09-26 ENCOUNTER — TELEPHONE (OUTPATIENT)
Dept: PAIN MEDICINE | Facility: CLINIC | Age: 63
End: 2018-09-26

## 2018-09-26 ENCOUNTER — OFFICE VISIT (OUTPATIENT)
Dept: SPINE | Facility: CLINIC | Age: 63
End: 2018-09-26
Payer: COMMERCIAL

## 2018-09-26 VITALS — BODY MASS INDEX: 44.41 KG/M2 | HEIGHT: 68 IN | WEIGHT: 293 LBS

## 2018-09-26 DIAGNOSIS — S32.009A LUMBAR VERTERBRAL FRACTURE, TRAUMATIC: Primary | ICD-10-CM

## 2018-09-26 PROCEDURE — 99213 OFFICE O/P EST LOW 20 MIN: CPT | Mod: ,,, | Performed by: PHYSICAL MEDICINE & REHABILITATION

## 2018-09-26 PROCEDURE — 3008F BODY MASS INDEX DOCD: CPT | Mod: ,,, | Performed by: PHYSICAL MEDICINE & REHABILITATION

## 2018-09-26 RX ORDER — HYDROCODONE BITARTRATE AND ACETAMINOPHEN 7.5; 325 MG/1; MG/1
1 TABLET ORAL EVERY 6 HOURS PRN
Qty: 30 TABLET | Refills: 0 | Status: SHIPPED | OUTPATIENT
Start: 2018-09-26 | End: 2021-01-28 | Stop reason: ALTCHOICE

## 2018-09-26 NOTE — PROGRESS NOTES
SUBJECTIVE:    Patient ID: Gail Temple is a 63 y.o. female.    Chief Complaint: Results (MRI )    She is here to review her lumbar MRI which was done to evaluate her complaint of low back pain following a fall.  Initial plain films suggested an L1 compression fracture.  The MRI confirms the presence of an acute L1 compression fracture about 20% loss of height.  She also has multilevel degenerative disc disease.  Clinically she continues to complain of pain at the lumbosacral junction.  Tramadol did not help for pain.  She has no new or progressive problems          Past Medical History:   Diagnosis Date    Angio-edema     Asthma     Eczema     Obesity     Urticaria      Social History     Socioeconomic History    Marital status:      Spouse name: Not on file    Number of children: Not on file    Years of education: Not on file    Highest education level: Not on file   Social Needs    Financial resource strain: Not on file    Food insecurity - worry: Not on file    Food insecurity - inability: Not on file    Transportation needs - medical: Not on file    Transportation needs - non-medical: Not on file   Occupational History    Not on file   Tobacco Use    Smoking status: Never Smoker   Substance and Sexual Activity    Alcohol use: Yes     Comment: seldom    Drug use: Not on file    Sexual activity: Not Currently     Partners: Male     Birth control/protection: None   Other Topics Concern    Not on file   Social History Narrative    Not on file     Past Surgical History:   Procedure Laterality Date    ADENOIDECTOMY      BONY PELVIS SURGERY       SECTION      HYSTERECTOMY      KNEE CARTILAGE SURGERY      TONSILLECTOMY      VAGINOPLASTY       Family History   Problem Relation Age of Onset    Breast cancer Mother 44         at 59yo from breast ca    Asthma Maternal Uncle     Allergies Son     Allergic rhinitis Son     Eczema Son     Angioedema Daughter      "Allergies Daughter     Immunodeficiency Neg Hx     Ovarian cancer Neg Hx      Vitals:    09/26/18 1024   Weight: (!) 147 kg (324 lb 1.2 oz)   Height: 5' 8" (1.727 m)       Review of Systems   Constitutional: Negative for chills, diaphoresis, fatigue, fever and unexpected weight change.   HENT: Negative for trouble swallowing.    Eyes: Negative for visual disturbance.   Respiratory: Negative for shortness of breath.    Cardiovascular: Negative for chest pain.   Gastrointestinal: Negative for abdominal pain, constipation, nausea and vomiting.   Genitourinary: Negative for difficulty urinating.   Musculoskeletal: Negative for arthralgias, back pain, gait problem, joint swelling, myalgias, neck pain and neck stiffness.   Neurological: Negative for dizziness, speech difficulty, weakness, light-headedness, numbness and headaches.          Objective:      Physical Exam   Constitutional: She appears well-developed and well-nourished.   No change           Assessment:       1. Lumbar verterbral fracture, traumatic           Plan:     will schedule for kyphoplasty at L1.  [place] 7.5 for pain.  Follow up with me after the procedure is done.  I did advise her that because of her degenerative disc disease she may continue to have discomfort following the kyphoplasty and we may need to address that with epidural steroid injection      Lumbar verterbral fracture, traumatic        "

## 2018-09-26 NOTE — TELEPHONE ENCOUNTER
----- Message from Primo Vazquez MD sent at 9/26/2018 10:38 AM CDT -----  Please schedule for L1 kyphoplasty

## 2018-09-27 ENCOUNTER — ANESTHESIA EVENT (OUTPATIENT)
Dept: SURGERY | Facility: HOSPITAL | Age: 63
End: 2018-09-27
Payer: COMMERCIAL

## 2018-09-28 ENCOUNTER — ANESTHESIA (OUTPATIENT)
Dept: SURGERY | Facility: HOSPITAL | Age: 63
End: 2018-09-28
Payer: COMMERCIAL

## 2018-09-28 ENCOUNTER — HOSPITAL ENCOUNTER (OUTPATIENT)
Facility: HOSPITAL | Age: 63
Discharge: HOME | End: 2018-09-28
Attending: ANESTHESIOLOGY | Admitting: ANESTHESIOLOGY
Payer: COMMERCIAL

## 2018-09-28 DIAGNOSIS — S22.000A COMPRESSION FRACTURE OF BODY OF THORACIC VERTEBRA: ICD-10-CM

## 2018-09-28 DIAGNOSIS — Z01.818 PRE-OP EXAM: ICD-10-CM

## 2018-09-28 DIAGNOSIS — S32.009A LUMBAR VERTERBRAL FRACTURE, TRAUMATIC: Primary | ICD-10-CM

## 2018-09-28 LAB
ALBUMIN SERPL BCP-MCNC: 3.8 G/DL
ALP SERPL-CCNC: 92 U/L
ALT SERPL W/O P-5'-P-CCNC: 14 U/L
ANION GAP SERPL CALC-SCNC: 11 MMOL/L
AST SERPL-CCNC: 12 U/L
BASOPHILS # BLD AUTO: 0.1 K/UL
BASOPHILS NFR BLD: 0.7 %
BILIRUB SERPL-MCNC: 0.3 MG/DL
BUN SERPL-MCNC: 24 MG/DL
CALCIUM SERPL-MCNC: 9.6 MG/DL
CHLORIDE SERPL-SCNC: 99 MMOL/L
CO2 SERPL-SCNC: 28 MMOL/L
CREAT SERPL-MCNC: 1 MG/DL
DIFFERENTIAL METHOD: ABNORMAL
EOSINOPHIL # BLD AUTO: 0.3 K/UL
EOSINOPHIL NFR BLD: 3.4 %
ERYTHROCYTE [DISTWIDTH] IN BLOOD BY AUTOMATED COUNT: 15.5 %
EST. GFR  (AFRICAN AMERICAN): >60 ML/MIN/1.73 M^2
EST. GFR  (NON AFRICAN AMERICAN): >60 ML/MIN/1.73 M^2
GLUCOSE SERPL-MCNC: 97 MG/DL
HCT VFR BLD AUTO: 35.8 %
HGB BLD-MCNC: 11.7 G/DL
LYMPHOCYTES # BLD AUTO: 2.5 K/UL
LYMPHOCYTES NFR BLD: 24 %
MCH RBC QN AUTO: 27.3 PG
MCHC RBC AUTO-ENTMCNC: 32.8 G/DL
MCV RBC AUTO: 83 FL
MONOCYTES # BLD AUTO: 0.5 K/UL
MONOCYTES NFR BLD: 4.8 %
NEUTROPHILS # BLD AUTO: 7 K/UL
NEUTROPHILS NFR BLD: 67.1 %
PLATELET # BLD AUTO: 346 K/UL
PMV BLD AUTO: 8.3 FL
POTASSIUM SERPL-SCNC: 3.9 MMOL/L
PROT SERPL-MCNC: 7.9 G/DL
RBC # BLD AUTO: 4.29 M/UL
SODIUM SERPL-SCNC: 138 MMOL/L
WBC # BLD AUTO: 10.4 K/UL

## 2018-09-28 PROCEDURE — 71000033 HC RECOVERY, INTIAL HOUR: Performed by: ANESTHESIOLOGY

## 2018-09-28 PROCEDURE — 36000706: Performed by: ANESTHESIOLOGY

## 2018-09-28 PROCEDURE — 36000707: Performed by: ANESTHESIOLOGY

## 2018-09-28 PROCEDURE — 71000015 HC POSTOP RECOV 1ST HR: Performed by: ANESTHESIOLOGY

## 2018-09-28 PROCEDURE — 25000003 PHARM REV CODE 250: Performed by: NURSE ANESTHETIST, CERTIFIED REGISTERED

## 2018-09-28 PROCEDURE — 27201423 OPTIME MED/SURG SUP & DEVICES STERILE SUPPLY: Performed by: ANESTHESIOLOGY

## 2018-09-28 PROCEDURE — 37000009 HC ANESTHESIA EA ADD 15 MINS: Performed by: ANESTHESIOLOGY

## 2018-09-28 PROCEDURE — 85025 COMPLETE CBC W/AUTO DIFF WBC: CPT

## 2018-09-28 PROCEDURE — 25000003 PHARM REV CODE 250: Performed by: ANESTHESIOLOGY

## 2018-09-28 PROCEDURE — 63600175 PHARM REV CODE 636 W HCPCS: Performed by: NURSE ANESTHETIST, CERTIFIED REGISTERED

## 2018-09-28 PROCEDURE — 71000016 HC POSTOP RECOV ADDL HR: Performed by: ANESTHESIOLOGY

## 2018-09-28 PROCEDURE — 63600175 PHARM REV CODE 636 W HCPCS: Performed by: ANESTHESIOLOGY

## 2018-09-28 PROCEDURE — 93010 ELECTROCARDIOGRAM REPORT: CPT | Mod: ,,, | Performed by: INTERNAL MEDICINE

## 2018-09-28 PROCEDURE — D9220A PRA ANESTHESIA: Mod: ANES,,, | Performed by: ANESTHESIOLOGY

## 2018-09-28 PROCEDURE — 22514 PERQ VERTEBRAL AUGMENTATION: CPT | Mod: ,,, | Performed by: ANESTHESIOLOGY

## 2018-09-28 PROCEDURE — 93005 ELECTROCARDIOGRAM TRACING: CPT | Mod: 59

## 2018-09-28 PROCEDURE — 25500020 PHARM REV CODE 255: Performed by: ANESTHESIOLOGY

## 2018-09-28 PROCEDURE — 37000008 HC ANESTHESIA 1ST 15 MINUTES: Performed by: ANESTHESIOLOGY

## 2018-09-28 PROCEDURE — 80053 COMPREHEN METABOLIC PANEL: CPT

## 2018-09-28 PROCEDURE — 99900103 DSU ONLY-NO CHARGE-INITIAL HR (STAT): Performed by: ANESTHESIOLOGY

## 2018-09-28 PROCEDURE — D9220A PRA ANESTHESIA: Mod: CRNA,,, | Performed by: NURSE ANESTHETIST, CERTIFIED REGISTERED

## 2018-09-28 PROCEDURE — 99900104 DSU ONLY-NO CHARGE-EA ADD'L HR (STAT): Performed by: ANESTHESIOLOGY

## 2018-09-28 PROCEDURE — 27800903 OPTIME MED/SURG SUP & DEVICES OTHER IMPLANTS: Performed by: ANESTHESIOLOGY

## 2018-09-28 PROCEDURE — 36415 COLL VENOUS BLD VENIPUNCTURE: CPT

## 2018-09-28 RX ORDER — FENTANYL CITRATE 50 UG/ML
25 INJECTION, SOLUTION INTRAMUSCULAR; INTRAVENOUS EVERY 5 MIN PRN
Status: DISCONTINUED | OUTPATIENT
Start: 2018-09-28 | End: 2018-09-28 | Stop reason: HOSPADM

## 2018-09-28 RX ORDER — DIPHENHYDRAMINE HYDROCHLORIDE 50 MG/ML
25 INJECTION INTRAMUSCULAR; INTRAVENOUS EVERY 6 HOURS PRN
Status: DISCONTINUED | OUTPATIENT
Start: 2018-09-28 | End: 2018-09-28 | Stop reason: HOSPADM

## 2018-09-28 RX ORDER — SCOLOPAMINE TRANSDERMAL SYSTEM 1 MG/1
PATCH, EXTENDED RELEASE TRANSDERMAL
Status: DISCONTINUED
Start: 2018-09-28 | End: 2018-09-28 | Stop reason: HOSPADM

## 2018-09-28 RX ORDER — SODIUM CHLORIDE, SODIUM LACTATE, POTASSIUM CHLORIDE, CALCIUM CHLORIDE 600; 310; 30; 20 MG/100ML; MG/100ML; MG/100ML; MG/100ML
INJECTION, SOLUTION INTRAVENOUS CONTINUOUS
Status: DISCONTINUED | OUTPATIENT
Start: 2018-09-28 | End: 2018-09-28 | Stop reason: HOSPADM

## 2018-09-28 RX ORDER — SODIUM CHLORIDE 0.9 % (FLUSH) 0.9 %
3 SYRINGE (ML) INJECTION
Status: DISCONTINUED | OUTPATIENT
Start: 2018-09-28 | End: 2018-09-28 | Stop reason: HOSPADM

## 2018-09-28 RX ORDER — KETAMINE HYDROCHLORIDE 100 MG/ML
INJECTION, SOLUTION INTRAMUSCULAR; INTRAVENOUS
Status: DISCONTINUED | OUTPATIENT
Start: 2018-09-28 | End: 2018-09-28

## 2018-09-28 RX ORDER — LIDOCAINE HYDROCHLORIDE 10 MG/ML
INJECTION, SOLUTION EPIDURAL; INFILTRATION; INTRACAUDAL; PERINEURAL
Status: DISCONTINUED | OUTPATIENT
Start: 2018-09-28 | End: 2018-09-28 | Stop reason: HOSPADM

## 2018-09-28 RX ORDER — OXYCODONE HYDROCHLORIDE 5 MG/1
5 TABLET ORAL
Status: DISCONTINUED | OUTPATIENT
Start: 2018-09-28 | End: 2018-09-28 | Stop reason: HOSPADM

## 2018-09-28 RX ORDER — MIDAZOLAM HYDROCHLORIDE 1 MG/ML
INJECTION, SOLUTION INTRAMUSCULAR; INTRAVENOUS
Status: DISCONTINUED | OUTPATIENT
Start: 2018-09-28 | End: 2018-09-28

## 2018-09-28 RX ORDER — BUPIVACAINE HYDROCHLORIDE AND EPINEPHRINE 2.5; 5 MG/ML; UG/ML
INJECTION, SOLUTION EPIDURAL; INFILTRATION; INTRACAUDAL; PERINEURAL
Status: DISCONTINUED | OUTPATIENT
Start: 2018-09-28 | End: 2018-09-28 | Stop reason: HOSPADM

## 2018-09-28 RX ORDER — SODIUM CHLORIDE, SODIUM LACTATE, POTASSIUM CHLORIDE, CALCIUM CHLORIDE 600; 310; 30; 20 MG/100ML; MG/100ML; MG/100ML; MG/100ML
75 INJECTION, SOLUTION INTRAVENOUS CONTINUOUS
Status: DISCONTINUED | OUTPATIENT
Start: 2018-09-28 | End: 2018-09-28 | Stop reason: HOSPADM

## 2018-09-28 RX ORDER — ATORVASTATIN CALCIUM 10 MG/1
10 TABLET, FILM COATED ORAL DAILY
COMMUNITY

## 2018-09-28 RX ORDER — CEFAZOLIN SODIUM 2 G/50ML
2 SOLUTION INTRAVENOUS
Status: COMPLETED | OUTPATIENT
Start: 2018-09-28 | End: 2018-09-28

## 2018-09-28 RX ORDER — SCOLOPAMINE TRANSDERMAL SYSTEM 1 MG/1
1 PATCH, EXTENDED RELEASE TRANSDERMAL
Status: DISCONTINUED | OUTPATIENT
Start: 2018-09-28 | End: 2018-09-28 | Stop reason: HOSPADM

## 2018-09-28 RX ORDER — PROPOFOL 10 MG/ML
VIAL (ML) INTRAVENOUS CONTINUOUS PRN
Status: DISCONTINUED | OUTPATIENT
Start: 2018-09-28 | End: 2018-09-28

## 2018-09-28 RX ORDER — LIDOCAINE HYDROCHLORIDE 10 MG/ML
0.5 INJECTION, SOLUTION EPIDURAL; INFILTRATION; INTRACAUDAL; PERINEURAL ONCE
Status: COMPLETED | OUTPATIENT
Start: 2018-09-28 | End: 2018-09-28

## 2018-09-28 RX ORDER — ONDANSETRON 2 MG/ML
4 INJECTION INTRAMUSCULAR; INTRAVENOUS ONCE
Status: COMPLETED | OUTPATIENT
Start: 2018-09-28 | End: 2018-09-28

## 2018-09-28 RX ORDER — HYDROMORPHONE HYDROCHLORIDE 2 MG/ML
0.2 INJECTION, SOLUTION INTRAMUSCULAR; INTRAVENOUS; SUBCUTANEOUS EVERY 5 MIN PRN
Status: DISCONTINUED | OUTPATIENT
Start: 2018-09-28 | End: 2018-09-28 | Stop reason: HOSPADM

## 2018-09-28 RX ORDER — MEPERIDINE HYDROCHLORIDE 50 MG/ML
12.5 INJECTION INTRAMUSCULAR; INTRAVENOUS; SUBCUTANEOUS ONCE AS NEEDED
Status: DISCONTINUED | OUTPATIENT
Start: 2018-09-28 | End: 2018-09-28 | Stop reason: HOSPADM

## 2018-09-28 RX ORDER — PROPOFOL 10 MG/ML
VIAL (ML) INTRAVENOUS
Status: DISCONTINUED | OUTPATIENT
Start: 2018-09-28 | End: 2018-09-28

## 2018-09-28 RX ADMIN — PROPOFOL 20 MG: 10 INJECTION, EMULSION INTRAVENOUS at 07:09

## 2018-09-28 RX ADMIN — ONDANSETRON 4 MG: 2 INJECTION INTRAMUSCULAR; INTRAVENOUS at 09:09

## 2018-09-28 RX ADMIN — OXYCODONE HYDROCHLORIDE 5 MG: 5 TABLET ORAL at 08:09

## 2018-09-28 RX ADMIN — LIDOCAINE HYDROCHLORIDE: 10 INJECTION, SOLUTION EPIDURAL; INFILTRATION; INTRACAUDAL; PERINEURAL at 06:09

## 2018-09-28 RX ADMIN — CEFAZOLIN SODIUM 2 G: 2 SOLUTION INTRAVENOUS at 07:09

## 2018-09-28 RX ADMIN — MIDAZOLAM 2 MG: 1 INJECTION INTRAMUSCULAR; INTRAVENOUS at 06:09

## 2018-09-28 RX ADMIN — SCOPALAMINE 1 PATCH: 1 PATCH, EXTENDED RELEASE TRANSDERMAL at 06:09

## 2018-09-28 RX ADMIN — SODIUM CHLORIDE, SODIUM LACTATE, POTASSIUM CHLORIDE, AND CALCIUM CHLORIDE: .6; .31; .03; .02 INJECTION, SOLUTION INTRAVENOUS at 06:09

## 2018-09-28 RX ADMIN — KETAMINE HYDROCHLORIDE 20 MG: 100 INJECTION, SOLUTION, CONCENTRATE INTRAMUSCULAR; INTRAVENOUS at 07:09

## 2018-09-28 RX ADMIN — KETAMINE HYDROCHLORIDE 30 MG: 100 INJECTION, SOLUTION, CONCENTRATE INTRAMUSCULAR; INTRAVENOUS at 07:09

## 2018-09-28 RX ADMIN — FENTANYL CITRATE 25 MCG: 50 INJECTION, SOLUTION INTRAMUSCULAR; INTRAVENOUS at 08:09

## 2018-09-28 RX ADMIN — PROPOFOL 50 MCG/KG/MIN: 10 INJECTION, EMULSION INTRAVENOUS at 07:09

## 2018-09-28 NOTE — INTERVAL H&P NOTE
The patient has been examined and the H&P has been reviewed:    I concur with the findings and no changes have occurred since H&P was written.   This patient has been cleared for surgery in an ambulatory surgical facility    ASA 3,  Mallampatti Score 3  No history of anesthetic complications  Plan for RN IV sedation      Anesthesia/Surgery risks, benefits and alternative options discussed and understood by patient/family.          Active Hospital Problems    Diagnosis  POA    Compression fracture of body of thoracic vertebra [M48.54XA]  Yes      Resolved Hospital Problems   No resolved problems to display.

## 2018-09-28 NOTE — PLAN OF CARE
Meets criteria for discharge. Discharged to home with  and daughter. Denies nausea. Tolerating fluids. Mild pain. Steady gait. Voiding without difficulty. Discharge instructions reviewed and printed handout given. Verbalized understanding.

## 2018-09-28 NOTE — ANESTHESIA PREPROCEDURE EVALUATION
09/28/2018  Gail Temple is a 63 y.o., female.    Anesthesia Evaluation    I have reviewed the Patient Summary Reports.    I have reviewed the Nursing Notes.   I have reviewed the Medications.     Review of Systems  Anesthesia Hx:  No problems with previous Anesthesia    Musculoskeletal:   Arthritis         Physical Exam  General:  Morbid Obesity    Airway/Jaw/Neck:  Airway Findings: Mouth Opening: Normal Tongue: Normal  General Airway Assessment: Adult, Good  Mallampati: II  Improves to II with phonation.  TM Distance: 4-6 cm      Dental:  Dental Findings: In tact   Chest/Lungs:  Chest/Lungs Findings: Clear to auscultation, Normal Respiratory Rate     Heart/Vascular:  Heart Findings: Rate: Normal  Rhythm: Regular Rhythm  Sounds: Normal  Heart murmur: negative       Mental Status:  Mental Status Findings:  Cooperative, Alert and Oriented         Anesthesia Plan  Type of Anesthesia, risks & benefits discussed:  Anesthesia Type:  MAC  Patient's Preference:   Intra-op Monitoring Plan: standard ASA monitors  Intra-op Monitoring Plan Comments:   Post Op Pain Control Plan:   Post Op Pain Control Plan Comments:   Induction:    Beta Blocker:  Patient is not currently on a Beta-Blocker (No further documentation required).       Informed Consent: Patient understands risks and agrees with Anesthesia plan.  Questions answered. Anesthesia consent signed with patient.  ASA Score: 3     Day of Surgery Review of History & Physical: I have interviewed and examined the patient. I have reviewed the patient's H&P dated:  There are no significant changes.          Ready For Surgery From Anesthesia Perspective.

## 2018-09-28 NOTE — ANESTHESIA POSTPROCEDURE EVALUATION
"Anesthesia Post Evaluation    Patient: Gail Temple    Procedure(s) Performed: Procedure(s) (LRB):  Kyphoplasty (N/A)    Final Anesthesia Type: general  Patient location during evaluation: PACU  Patient participation: Yes- Able to Participate  Level of consciousness: awake and alert and oriented  Post-procedure vital signs: reviewed and stable  Pain management: adequate  Airway patency: patent  PONV status at discharge: No PONV  Anesthetic complications: no      Cardiovascular status: blood pressure returned to baseline  Respiratory status: unassisted, spontaneous ventilation and room air  Hydration status: euvolemic  Follow-up not needed.        Visit Vitals  /69   Pulse 66   Temp 36.3 °C (97.3 °F) (Skin)   Resp 17   Ht 5' 8" (1.727 m)   Wt (!) 147 kg (324 lb)   SpO2 100%   Breastfeeding? No   BMI 49.26 kg/m²       Pain/Moon Score: Pain Assessment Performed: Yes (9/28/2018  8:05 AM)  Presence of Pain: complains of pain/discomfort (9/28/2018  8:05 AM)  Pain Rating Prior to Med Admin: 7 (9/28/2018  8:07 AM)  Moon Score: 10 (9/28/2018  8:05 AM)        "

## 2018-09-28 NOTE — TRANSFER OF CARE
"Anesthesia Transfer of Care Note    Patient: Gail Temple    Procedure(s) Performed: Procedure(s) (LRB):  Kyphoplasty (N/A)    Patient location: PACU    Anesthesia Type: MAC    Transport from OR: Transported from OR on 2-3 L/min O2 by NC with adequate spontaneous ventilation    Post pain: adequate analgesia    Post assessment: no apparent anesthetic complications    Post vital signs: stable    Level of consciousness: awake    Nausea/Vomiting: no nausea/vomiting    Complications: none    Transfer of care protocol was followed      Last vitals:   Visit Vitals  BP (!) 143/89 (BP Location: Left arm, Patient Position: Lying)   Pulse 70   Temp 36.2 °C (97.2 °F) (Skin)   Resp 18   Ht 5' 8" (1.727 m)   Wt (!) 147 kg (324 lb)   SpO2 99%   Breastfeeding? No   BMI 49.26 kg/m²     "

## 2018-09-28 NOTE — DISCHARGE INSTRUCTIONS
Discharge Instructions for Kyphoplasty  Fractures in the bones of the spine (vertebrae) can cause severe back pain and loss of movement. You had a procedure called kyphoplasty to cement the fractures in your spine, restore the height of the vertebrae, and help relieve pain. Using image-guided X-rays, your doctor made 1 or 2 small cuts (incisions) in your back for each vertebra treated. The doctor put a balloon on each side of the broken vertebra and inflated the balloons until they expanded the right amount. Then the balloons were removed. The spaces created by the balloons were filled with orthopedic cement. This gave strength and stability to your vertebra. The following are instructions to help you care for your back when you are at home.  Home care  · Take your medicine exactly as directed.  · Remove the small bandages on your incision 24 to 48 hours after the surgery.  · Dont shower or soak in a bathtub for 1 to 2 days after the surgery.  · Use an ice pack or bag of frozen peas--or something similar--wrapped in a thin towel to reduce the swelling and pain around incision sites. Put the ice pack on the area for 20 minutes, then remove it for 20 minutes. Repeat as needed.  · Wear your brace, if you were told to do so by your doctor. And to help stay flexible, bend as much as the brace allows you to.  · For the first 1 to 2 days after the surgery, keep your head raised up when you are lying down.  · Take short walks. Start by walking for 5 minutes at a time. Then gradually build up your time and distance.  · Dont drive for 2 days after surgery. And never drive while taking opioid pain medicine.  · Ask your healthcare provider when you can begin lifting objects again. Ask him or her about any weight limits for lifting.  Follow-up  Make a follow-up appointment as directed by your doctor.     When to seek medical attention  Call 911 right away if you have any of the following:  · Chest pain  · Shortness of  breath  Otherwise, call your doctor right away if you have any of the following:  · Increased redness, swelling, drainage, or warmth around the incision sites  · Severe pain at the incision site  · Weakness, numbness, or tingling in your legs  · Fever above 100.4°F  (38.0°C) or shaking chills   Date Last Reviewed: 11/16/2015 © 2000-2017 [email or web address] [address] [place] [address], [place] [address]. All rights reserved. This information is not intended as a substitute for professional medical care. Always follow your healthcare professional's instructions.          General Information:    1.  Do not drink alcoholic beverages including beer for 24 hours or as long as you are on pain medication..  2.  Do not drive a motor vehicle, operate machinery or power tools, or signs legal papers for 24 hours or as long as you are on pain medication.   3.  You may experience light-headedness, dizziness, and sleepiness following surgery. Please do not stay alone. A responsible adult should be with you for this 24 hour period.  4.  Go home and rest.    5. Progress slowly to a normal diet unless instructed.  Otherwise, begin with liquids such as soft drinks, then soup and crackers working up to solid foods. Drink plenty of nonalcoholic fluids.  6.  Certain anesthetics and pain medications produce nausea and vomiting in certain       individuals. If nausea becomes a problem at home, call you doctor.    7. A nurse will be calling you sometime after surgery. Do not be alarmed. This is our way of finding out how you are doing.    8. Several times every hour while you are awake, take 2-3 deep breaths and cough. If you had stomach surgery hold a pillow or rolled towel firmly against your stomach before you cough. This will help with any pain the cough might cause.  9. Several times every hour while you are awake, pump and flex your feet 5-6 times and do foot circles. This will help prevent blood clots.    10.Call your doctor for  severe pain, bleeding, fever, or signs or symptoms of infection (pain, swelling, redness, foul odor, drainage).    Post op instructions for prevention of DVT  What is deep vein thrombosis?  Deep vein thrombosis (DVT) is the medical term for blood clots in the deep veins of the leg.  These blood clots can be dangerous.  A DVT can block a blood vessel and keep blood from getting where it needs to go.  Another problem is that the clot can travel to other parts of the body such as the lungs.  A clot that travels to the lungs is called a pulmonary embolus (PE) and can cause serious problems with breathing which can lead to death.  Am I at risk for DVT/PE?  If you are not very active, you are at risk of DVT.  Anyone confined to bed, sitting for long periods of time, recovering from surgery, etc. increases the risk of DVT.  Other risk factors are cancer diagnosis, certain medications, estrogen replacement in any form,older age, obesity, pregnancy, smoking, history of clotting disorders, and dehydration.  How will I know if I have a DVT?   Swelling in the lower leg   Pain   Warmth, redness, hardness or bulging of the vein  If you have any of these symptoms, call your doctors office right away.  Some people will not have any symptoms until the clot moves to the lungs.  What are the symptoms of a PE?   Panting, shortness of breath, or trouble breathing   Sharp, knife-like chest pain when you breathe   Coughing or coughing up blood   Rapid heartbeat  If you have any of these symptoms or get worse quickly, call 911 for emergency treatment.  How can I prevent a DVT?   Avoid long periods of inactivity and dont cross your legs--get up and walk around every hour or so.   Stay active--walking after surgery is highly encouraged.  This means you should get out of the house and walk in the neighborhood.  Going up and down stairs will not impair healing (unless advised against such activity by your doctor).     Drink plenty  "of noncaffeinated, nonalcoholic fluids each day to prevent dehydration.   Wear special support stockings, if they have been advised by your doctor.   If you travel, stop at least once an hour and walk around.   Avoid smoking (assistance with stopping is available through your healthcare provider)  Always notify your doctor if you are not able to follow the post operative instructions that are given to you at the time of discharge.  It may be necessary to prescribe one of the medications available to prevent DVT.    Discharge Instructions: After Your Surgery/Procedure  Youve just had surgery. During surgery you were given medicine called anesthesia to keep you relaxed and free of pain. After surgery you may have some pain or nausea. This is common. Here are some tips for feeling better and getting well after surgery.     Stay on schedule with your medication.   Going home  Your doctor or nurse will show you how to take care of yourself when you go home. He or she will also answer your questions. Have an adult family member or friend drive you home.      For your safety we recommend these precaution for the first 24 hours after your procedure:  · Do not drive or use heavy equipment.  · Do not make important decisions or sign legal papers.  · Do not drink alcohol.  · Have someone stay with you, if needed. He or she can watch for problems and help keep you safe.  · Your concentration, balance, coordination, and judgement may be impaired for many hours after anesthesia.  Use caution when ambulating or standing up.     · You may feel weak and "washed out" after anesthesia and surgery.      Subtle residual effects of general anesthesia or sedation with regional / local anesthesia can last more than 24 hours.  Rest for the remainder of the day or longer if your Doctor/Surgeon has advised you to do so.  Although you may feel normal within the first 24 hours, your reflexes and mental ability may be impaired without you " realizing it.  You may feel dizzy, lightheaded or sleepy for 24 hours or longer.      Be sure to go to all follow-up visits with your doctor. And rest after your surgery for as long as your doctor tells you to.  Coping with pain  If you have pain after surgery, pain medicine will help you feel better. Take it as told, before pain becomes severe. Also, ask your doctor or pharmacist about other ways to control pain. This might be with heat, ice, or relaxation. And follow any other instructions your surgeon or nurse gives you.  Tips for taking pain medicine  To get the best relief possible, remember these points:  · Pain medicines can upset your stomach. Taking them with a little food may help.  · Most pain relievers taken by mouth need at least 20 to 30 minutes to start to work.  · Taking medicine on a schedule can help you remember to take it. Try to time your medicine so that you can take it before starting an activity. This might be before you get dressed, go for a walk, or sit down for dinner.  · Constipation is a common side effect of pain medicines. Call your doctor before taking any medicines such as laxatives or stool softeners to help ease constipation. Also ask if you should skip any foods. Drinking lots of fluids and eating foods such as fruits and vegetables that are high in fiber can also help. Remember, do not take laxatives unless your surgeon has prescribed them.  · Drinking alcohol and taking pain medicine can cause dizziness and slow your breathing. It can even be deadly. Do not drink alcohol while taking pain medicine.  · Pain medicine can make you react more slowly to things. Do not drive or run machinery while taking pain medicine.  Your health care provider may tell you to take acetaminophen to help ease your pain. Ask him or her how much you are supposed to take each day. Acetaminophen or other pain relievers may interact with your prescription medicines or other over-the-counter (OTC) drugs.  Some prescription medicines have acetaminophen and other ingredients. Using both prescription and OTC acetaminophen for pain can cause you to overdose. Read the labels on your OTC medicines with care. This will help you to clearly know the list of ingredients, how much to take, and any warnings. It may also help you not take too much acetaminophen. If you have questions or do not understand the information, ask your pharmacist or health care provider to explain it to you before you take the OTC medicine.  Managing nausea  Some people have an upset stomach after surgery. This is often because of anesthesia, pain, or pain medicine, or the stress of surgery. These tips will help you handle nausea and eat healthy foods as you get better. If you were on a special food plan before surgery, ask your doctor if you should follow it while you get better. These tips may help:  · Do not push yourself to eat. Your body will tell you when to eat and how much.  · Start off with clear liquids and soup. They are easier to digest.  · Next try semi-solid foods, such as mashed potatoes, applesauce, and gelatin, as you feel ready.  · Slowly move to solid foods. Dont eat fatty, rich, or spicy foods at first.  · Do not force yourself to have 3 large meals a day. Instead eat smaller amounts more often.  · Take pain medicines with a small amount of solid food, such as crackers or toast, to avoid nausea.     Call your surgeon if  · You still have pain an hour after taking medicine. The medicine may not be strong enough.  · You feel too sleepy, dizzy, or groggy. The medicine may be too strong.  · You have side effects like nausea, vomiting, or skin changes, such as rash, itching, or hives.       If you have obstructive sleep apnea  You were given anesthesia medicine during surgery to keep you comfortable and free of pain. After surgery, you may have more apnea spells because of this medicine and other medicines you were given. The spells  may last longer than usual.   At home:  · Keep using the continuous positive airway pressure (CPAP) device when you sleep. Unless your health care provider tells you not to, use it when you sleep, day or night. CPAP is a common device used to treat obstructive sleep apnea.  · Talk with your provider before taking any pain medicine, muscle relaxants, or sedatives. Your provider will tell you about the possible dangers of taking these medicines.  © 2181-9034 [employer]. [address] [place] [address]. All rights reserved. This information is not intended as a substitute for professional medical care. Always follow your healthcare professional's instructions.      Using Opioids for Pain Management     Your doctor has given instructions for you to take an opioid.  This is a drug for bad pain.  It helps control pain without causing bleeding and kidney problems.  Common opioid names are morphine, hydromorphone, oxycodone, and methadone. These drugs are called narcotics.    There are several safety concerns you need to know.     · It is against the law to give or sell this drug to another person.  You must keep this medicine safely locked.    · You may have side effects from taking this medication.  These include nausea, itching, sweating, sleepiness, a change in your ability to breathe, and depression.  · Do not take alcohol or sleeping pills opioids.    · Long-term opoid use may no longer giver you relief from pain.  It can cause you stomach pain, mental anxiety, and headaches.  Long-term opoid use can potentially lead to unlawful street drug abuse and reduce your ability to stay employed.    · Your body may become opioid tolerant if you need to take more to get relief.    · You must stop taking opioids if you begin having more pain as a result of the medicine.    · Opioid withdrawal occurs when you have to stop taking the drug.  It can cause you to have nausea, vomiting, diarrhea, stomach pain,  anxiety, and dilated pupils in your eyes. This condition means you are opioid dependent.    · Addiction is a drug induced brain disease. It means there are changes in how your brain is working.  Children, teens, and young adults under 25 years old are more likely to get addicted to opioids.      · Addiction can happen with repeated opioid use.  It does not happen with short-term use of two weeks or less.       For more information, please speak with your doctor or pharmacist.

## 2018-09-28 NOTE — OP NOTE
DATE: 9/28/2018      PROCEDURE PERFORMED: Transpedicular Kyphoplasty at the L1 level(s)                                                                               PREPROCEDURE DIAGNOSIS:                                                      1.  Compression fracture of the lumbar vertebra at the L1 level(s).         2.  Pain at these levels of compression fracture.                            3.  No myelopathy or retropulsed fragments.                                                                                                             POSTPROCEDURE DIAGNOSIS:   Same    SURGEON:  Martinez Morejon MD     Assistant:  None                                                                                                                                                                               Anesthesia: Local MAC per anesthesia                                                                                                              LOCAL ANESTHETIC USED:  Lidocaine 1% at each side of vertebra, 5 ml at each level                                                                               ESTIMATED BLOOD LOSS: 50ml                                                                                                                        COMPLICATIONS: None                                                                                                                                 BONE MARROW BIOPSY:  N/A                                                                               TECHNIQUE: A time out was taken to identify patient, procedure and side, and allergies were reviewed. With the patient lying in the prone position and after receiving the intravenous sedation, the area was prepped and draped using the usual sterile fashion, using ChloraPrep and a body fenestrated drape. The area of the compression fracture(s) was determined under fluoroscopic guidance using the AP and lateral views.  Local anesthetic was  given with a 25-gauge 1.5 inch needle.  That was followed with completing the local anesthetic injection with a 3.5inch 22-gauge spinal needle going down all the way to the periosteum of the desired pedicle. A 2-mm longitudinal incision was made around the 22-gauge spinal needle to allow introduction of the 10 gauge trocar and cannula to the vertebral body.  Through a transpedicular approach, the trocar and cannula were introduced and advanced slowly.  Once in the posterior one-third of the vertebral body, a curve trocar was placed and advanced to the anterior one-third of the vertebral body. This was performed on the right at L1 level.  Kyphon balloons were then placed in each trochar and inflated under live fluoroscopy until noted to be filling the vertebral body, no height change noted, no movement of the end plates noted. The methylmethacrylate resin was mixed and approximately 5ml. Minimal escape was observed while introducing the cement and this was done under live fluoroscopy.  The cannula was applied to each trocar under live fluoroscopy as well.  Each trocar and cannula was removed under live fluoroscopy in a very slow fashion to observe the contrast-impregnated cement. After the trocars were removed, the sites were cleaned and dried. Dermabond was then used to close the incisions.                                                                                The patient layed supine for 60min after the procedure. The patient was monitored after the procedure with no change in neuromuscular status. The patient was given post-procedure and discharge instructions at home.  Instruction regarding bandage were given.  The patient was advised to call if developing any severe pain neurologic changes. Otherwise the patient will follow up with us in 1 week at our clinic.

## 2018-09-28 NOTE — DISCHARGE SUMMARY
[facility]  Discharge Note  Short Stay    Admit Date: 9/28/2018    Discharge Date and Time: 9/28/2018    Attending Physician: Martinez Morejon MD     Discharge Provider: Martinez Morejon    Diagnoses:  Active Hospital Problems    Diagnosis  POA    *Compression fracture of body of thoracic vertebra [M48.54XA]  Yes      Resolved Hospital Problems   No resolved problems to display.       Hospital Course: Kyphoplasty  Discharged Condition: Good    Final Diagnoses:   Active Hospital Problems    Diagnosis  POA    *Compression fracture of body of thoracic vertebra [M48.54XA]  Yes      Resolved Hospital Problems   No resolved problems to display.       Disposition: Home or Self Care    Follow up/Patient Instructions:    Medications:  Reconciled Home Medications:      Medication List      CONTINUE taking these medications    atorvastatin 10 MG tablet  Commonly known as:  LIPITOR  Take 10 mg by mouth once daily.     EPINEPHrine 0.3 mg/0.3 mL Atin  Commonly known as:  EPIPEN  Inject 0.6 mLs (0.6 mg total) into the muscle once.     HYDROcodone-acetaminophen 7.5-325 mg per tablet  Commonly known as:  NORCO  Take 1 tablet by mouth every 6 (six) hours as needed for Pain.     ibuprofen 800 MG tablet  Commonly known as:  ADVIL,MOTRIN  Take 1 tablet (800 mg total) by mouth 3 (three) times daily.     losartan 50 MG tablet  Commonly known as:  COZAAR     metFORMIN 500 MG tablet  Commonly known as:  GLUCOPHAGE     mirabegron 25 mg Tb24 ER tablet  Commonly known as:  MYRBETRIQ  Take 1 tablet (25 mg total) by mouth once daily.     nystatin powder  Commonly known as:  MYCOSTATIN     SYNTHROID 175 MCG tablet  Generic drug:  levothyroxine     triamterene-hydrochlorothiazide 37.5-25 mg 37.5-25 mg per capsule  Commonly known as:  DYAZIDE     TRULICITY 1.5 mg/0.5 mL Pnij  Generic drug:  dulaglutide          Discharge Procedure Orders   Call MD for:  temperature >100.4     Call MD for:  persistent nausea and vomiting or diarrhea     Call MD  for:  severe uncontrolled pain     Call MD for:  redness, tenderness, or signs of infection (pain, swelling, redness, odor or green/yellow discharge around incision site)     Call MD for:  difficulty breathing or increased cough     Call MD for:  severe persistent headache        Follow up with MD in 2-3 weeks    Discharge Procedure Orders (must include Diet, Follow-up, Activity):   Discharge Procedure Orders (must include Diet, Follow-up, Activity)   Call MD for:  temperature >100.4     Call MD for:  persistent nausea and vomiting or diarrhea     Call MD for:  severe uncontrolled pain     Call MD for:  redness, tenderness, or signs of infection (pain, swelling, redness, odor or green/yellow discharge around incision site)     Call MD for:  difficulty breathing or increased cough     Call MD for:  severe persistent headache

## 2018-09-28 NOTE — OR NURSING
Pt states that she is a little queasy but doesn't want anything to make her sleepy. States she does feel like she needs to belch. She does not feel that she is ready to stand.

## 2018-10-01 VITALS
HEART RATE: 68 BPM | RESPIRATION RATE: 18 BRPM | SYSTOLIC BLOOD PRESSURE: 115 MMHG | HEIGHT: 68 IN | WEIGHT: 293 LBS | BODY MASS INDEX: 44.41 KG/M2 | OXYGEN SATURATION: 100 % | TEMPERATURE: 98 F | DIASTOLIC BLOOD PRESSURE: 65 MMHG

## 2018-10-05 ENCOUNTER — PATIENT MESSAGE (OUTPATIENT)
Dept: SPINE | Facility: CLINIC | Age: 63
End: 2018-10-05

## 2018-10-08 NOTE — TELEPHONE ENCOUNTER
Spoke to pt informed her pain is to be expected post procedure. Pt v/u and asked to reschedule appt to a earlier time. Provided pt with appt of 10/11/18 @ 10:30 per her schedule.

## 2018-10-08 NOTE — TELEPHONE ENCOUNTER
I agree this is probably expected post-procedure pain but I'll be glad to see her earlier if she's concerned

## 2018-10-11 ENCOUNTER — OFFICE VISIT (OUTPATIENT)
Dept: SPINE | Facility: CLINIC | Age: 63
End: 2018-10-11
Payer: COMMERCIAL

## 2018-10-11 VITALS — HEIGHT: 68 IN | BODY MASS INDEX: 44.41 KG/M2 | WEIGHT: 293 LBS

## 2018-10-11 DIAGNOSIS — M54.50 ACUTE MIDLINE LOW BACK PAIN WITHOUT SCIATICA: Primary | ICD-10-CM

## 2018-10-11 PROCEDURE — 3008F BODY MASS INDEX DOCD: CPT | Mod: ,,, | Performed by: PHYSICAL MEDICINE & REHABILITATION

## 2018-10-11 PROCEDURE — 99213 OFFICE O/P EST LOW 20 MIN: CPT | Mod: ,,, | Performed by: PHYSICAL MEDICINE & REHABILITATION

## 2018-10-11 RX ORDER — ALBUTEROL SULFATE 90 UG/1
AEROSOL, METERED RESPIRATORY (INHALATION)
COMMUNITY
Start: 2018-10-10 | End: 2021-01-28 | Stop reason: ALTCHOICE

## 2018-10-11 RX ORDER — MELOXICAM 15 MG/1
TABLET ORAL
COMMUNITY
End: 2021-01-28 | Stop reason: ALTCHOICE

## 2018-10-11 RX ORDER — AZITHROMYCIN 500 MG/1
TABLET, FILM COATED ORAL
COMMUNITY
Start: 2018-10-10 | End: 2021-01-28 | Stop reason: ALTCHOICE

## 2018-10-11 NOTE — PROGRESS NOTES
SUBJECTIVE:    Patient ID: Gail Temple is a 63 y.o. female.    Chief Complaint: Follow-up (kypho with )    She is here to follow-up status post L1 kyphoplasty on 2018 by Dr. Morejon.  She was concerned because after the procedure she developed a sharp shooting discomfort on the right side at the lumbosacral junction.  There were no distal radicular symptoms or weakness.  Bowel and bladder remain intact.  And actually she has been getting better for the past couple of days.  Her primary care provider noticed a slight decrease in her GFR they think may be related to ibuprofen use.  I reviewed those labs and AR not too far off of her last reading.  Plan is to just follow that.  In the meantime the patient can avoid nonsteroidal drugs in favor of Tylenol          Past Medical History:   Diagnosis Date    Angio-edema     Asthma     Diabetes mellitus     Eczema     Obesity     Urticaria      Social History     Socioeconomic History    Marital status:      Spouse name: Not on file    Number of children: Not on file    Years of education: Not on file    Highest education level: Not on file   Social Needs    Financial resource strain: Not on file    Food insecurity - worry: Not on file    Food insecurity - inability: Not on file    Transportation needs - medical: Not on file    Transportation needs - non-medical: Not on file   Occupational History    Not on file   Tobacco Use    Smoking status: Never Smoker   Substance and Sexual Activity    Alcohol use: Yes     Comment: seldom    Drug use: Not on file    Sexual activity: Not Currently     Partners: Male     Birth control/protection: None   Other Topics Concern    Not on file   Social History Narrative    Not on file     Past Surgical History:   Procedure Laterality Date    ADENOIDECTOMY      BONY PELVIS SURGERY       SECTION      FIXATION KYPHOPLASTY N/A 2018    Procedure: Kyphoplasty;  Surgeon: Martinez Morejon MD;   "Location: [facility] OR;  Service: Pain Management;  Laterality: N/A;  L1     HYSTERECTOMY      KNEE CARTILAGE SURGERY      Kyphoplasty N/A 2018    Performed by Martinez Morejon MD at [facility] OR    TONSILLECTOMY      VAGINOPLASTY       Family History   Problem Relation Age of Onset    Breast cancer Mother 44         at 59yo from breast ca    Asthma Maternal Uncle     Allergies Son     Allergic rhinitis Son     Eczema Son     Angioedema Daughter     Allergies Daughter     Immunodeficiency Neg Hx     Ovarian cancer Neg Hx      Vitals:    10/11/18 1037   Weight: (!) 147 kg (324 lb 1.2 oz)   Height: 5' 8" (1.727 m)       Review of Systems   Constitutional: Negative for chills, diaphoresis, fatigue, fever and unexpected weight change.   HENT: Negative for trouble swallowing.    Eyes: Negative for visual disturbance.   Respiratory: Negative for shortness of breath.    Cardiovascular: Negative for chest pain.   Gastrointestinal: Negative for abdominal pain, constipation, nausea and vomiting.   Genitourinary: Negative for difficulty urinating.   Musculoskeletal: Negative for arthralgias, back pain, gait problem, joint swelling, myalgias, neck pain and neck stiffness.   Neurological: Negative for dizziness, speech difficulty, weakness, light-headedness, numbness and headaches.          Objective:      Physical Exam   Constitutional: She appears well-developed and well-nourished.   Examination today is benign.  She has no point tenderness about the lumbar spine.  Range of motion is good with minimal pain.  GALDINO testing is negative bilaterally    We reviewed her lumbar MRI and I did point out to her again that she does have degeneration of the lower lumbar intervertebral discs and that may be cause of recurrence or persistent discomfort           Assessment:       1. Acute midline low back pain without sciatica           Plan:     I think she is recovering as expected status post L1 kyphoplasty.  I have no reason to " suspect a new acute injury.  I told her that I expect for symptoms to continue to resolve over the next 6-8 weeks.  If she gets to that point and is not satisfied with quality of life we need to consider physical therapy or epidural steroid injections.  She can follow up with me at that time by phone or as needed      Acute midline low back pain without sciatica

## 2018-11-07 ENCOUNTER — PATIENT MESSAGE (OUTPATIENT)
Dept: SPINE | Facility: CLINIC | Age: 63
End: 2018-11-07

## 2018-11-15 ENCOUNTER — TELEPHONE (OUTPATIENT)
Dept: OBSTETRICS AND GYNECOLOGY | Facility: CLINIC | Age: 63
End: 2018-11-15

## 2018-11-15 DIAGNOSIS — N39.41 URGE INCONTINENCE: ICD-10-CM

## 2018-11-15 NOTE — TELEPHONE ENCOUNTER
Pt requesting new Rx for 50 mg Myrbetriq to be sent to [facility] pharmacy.  Pt states she spoke with you regarding this at her daughters OB appointment.

## 2018-11-15 NOTE — TELEPHONE ENCOUNTER
----- Message from Taqueria Subramanian sent at 11/15/2018 10:39 AM CST -----  Contact: Pt   States she's calling rg myrbetriq 50mg were to be called in and is following up on that and can be reached at [phone]     [facility] - [place] - [id number]  [address]  [place] [address]  Phone: [phone] Fax: [phone]

## 2018-11-19 ENCOUNTER — OFFICE VISIT (OUTPATIENT)
Dept: ORTHOPEDICS | Facility: CLINIC | Age: 63
End: 2018-11-19
Payer: COMMERCIAL

## 2018-11-19 VITALS
SYSTOLIC BLOOD PRESSURE: 156 MMHG | WEIGHT: 293 LBS | HEIGHT: 68 IN | HEART RATE: 73 BPM | BODY MASS INDEX: 44.41 KG/M2 | DIASTOLIC BLOOD PRESSURE: 69 MMHG

## 2018-11-19 DIAGNOSIS — M17.0 PRIMARY OSTEOARTHRITIS OF BOTH KNEES: Primary | ICD-10-CM

## 2018-11-19 PROCEDURE — 99999 PR PBB SHADOW E&M-EST. PATIENT-LVL III: CPT | Mod: PBBFAC,,, | Performed by: ORTHOPAEDIC SURGERY

## 2018-11-19 PROCEDURE — 99213 OFFICE O/P EST LOW 20 MIN: CPT | Mod: 25,S$GLB,, | Performed by: ORTHOPAEDIC SURGERY

## 2018-11-19 PROCEDURE — 20610 DRAIN/INJ JOINT/BURSA W/O US: CPT | Mod: 51,LT,S$GLB, | Performed by: ORTHOPAEDIC SURGERY

## 2018-11-19 PROCEDURE — 20610 DRAIN/INJ JOINT/BURSA W/O US: CPT | Mod: RT,S$GLB,, | Performed by: ORTHOPAEDIC SURGERY

## 2018-11-19 PROCEDURE — 3008F BODY MASS INDEX DOCD: CPT | Mod: CPTII,S$GLB,, | Performed by: ORTHOPAEDIC SURGERY

## 2018-11-19 RX ORDER — TRIAMCINOLONE ACETONIDE 40 MG/ML
40 INJECTION, SUSPENSION INTRA-ARTICULAR; INTRAMUSCULAR
Status: DISCONTINUED | OUTPATIENT
Start: 2018-11-19 | End: 2018-11-19 | Stop reason: HOSPADM

## 2018-11-19 RX ADMIN — TRIAMCINOLONE ACETONIDE 40 MG: 40 INJECTION, SUSPENSION INTRA-ARTICULAR; INTRAMUSCULAR at 01:11

## 2018-11-19 NOTE — PROCEDURES
Large Joint Aspiration/Injection: R knee, L knee  Date/Time: 11/19/2018 1:12 PM  Performed by: Estevan Monteiro MD  Authorized by: Estevan Monteiro MD     Consent Done?:  Yes (Verbal)  Indications:  Pain  Procedure site marked: Yes    Timeout: Prior to procedure the correct patient, procedure, and site was verified      Location:  Knee  Site:  R knee and L knee  Prep: Patient was prepped and draped in usual sterile fashion    Needle size:  20 G  Approach:  Anterolateral  Medications:  40 mg triamcinolone acetonide 40 mg/mL; 40 mg triamcinolone acetonide 40 mg/mL  Patient tolerance:  Patient tolerated the procedure well with no immediate complications

## 2018-11-19 NOTE — PROGRESS NOTES
Past Medical History:   Diagnosis Date    Angio-edema     Asthma     Diabetes mellitus     Eczema     Obesity     Urticaria        Past Surgical History:   Procedure Laterality Date    ADENOIDECTOMY      BONY PELVIS SURGERY       SECTION      FIXATION KYPHOPLASTY N/A 2018    Procedure: Kyphoplasty;  Surgeon: Martinez Morejon MD;  Location: [facility] OR;  Service: Pain Management;  Laterality: N/A;  L1     HYSTERECTOMY      KNEE CARTILAGE SURGERY      Kyphoplasty N/A 2018    Performed by Martinez Morejon MD at [facility] OR    TONSILLECTOMY      VAGINOPLASTY         Current Outpatient Medications   Medication Sig    atorvastatin (LIPITOR) 10 MG tablet Take 10 mg by mouth once daily.    azithromycin (ZITHROMAX) 500 MG tablet     CHERATUSSIN AC  mg/5 mL syrup     HYDROcodone-acetaminophen (NORCO) 7.5-325 mg per tablet Take 1 tablet by mouth every 6 (six) hours as needed for Pain.    ibuprofen (ADVIL,MOTRIN) 800 MG tablet Take 1 tablet (800 mg total) by mouth 3 (three) times daily.    losartan (COZAAR) 50 MG tablet     meloxicam (MOBIC) 15 MG tablet Mobic 15 mg tablet   Take 1 tablet every day by oral route.    metformin (GLUCOPHAGE) 500 MG tablet     mirabegron (MYRBETRIQ) 25 mg Tb24 ER tablet Take 1 tablet (25 mg total) by mouth once daily.    mirabegron (MYRBETRIQ) 50 mg Tb24 Take 1 tablet (50 mg total) by mouth once daily.    nystatin (MYCOSTATIN) powder     PROAIR HFA 90 mcg/actuation inhaler     SYNTHROID 175 mcg tablet     triamterene-hydrochlorothiazide 37.5-25 mg (DYAZIDE) 37.5-25 mg per capsule     TRULICITY 1.5 mg/0.5 mL PnIj     epinephrine (EPIPEN) 0.3 mg/0.3 mL (1:1,000) AtIn Inject 0.6 mLs (0.6 mg total) into the muscle once.     No current facility-administered medications for this visit.        Review of patient's allergies indicates:  No Known Allergies    Family History   Problem Relation Age of Onset    Breast cancer Mother 44         at 61yo from breast ca     Asthma Maternal Uncle     Allergies Son     Allergic rhinitis Son     Eczema Son     Angioedema Daughter     Allergies Daughter     Immunodeficiency Neg Hx     Ovarian cancer Neg Hx        Social History     Socioeconomic History    Marital status:      Spouse name: Not on file    Number of children: Not on file    Years of education: Not on file    Highest education level: Not on file   Social Needs    Financial resource strain: Not on file    Food insecurity - worry: Not on file    Food insecurity - inability: Not on file    Transportation needs - medical: Not on file    Transportation needs - non-medical: Not on file   Occupational History    Not on file   Tobacco Use    Smoking status: Never Smoker   Substance and Sexual Activity    Alcohol use: Yes     Comment: seldom    Drug use: Not on file    Sexual activity: Not Currently     Partners: Male     Birth control/protection: None   Other Topics Concern    Not on file   Social History Narrative    Not on file       Chief Complaint:   Chief Complaint   Patient presents with    Knee Pain     B knee       History of present illness: Is a 63-year-old female seen for bilateral knee pain.  Patient's had pain for years.  Patient's had injections physical therapy and even meniscal surgery.  No treatment over the last year.  Left knee is the worst of the 2.  Pain with walking standing or getting up from sitting.  Pain as a 4 out of 10.  Currently on meloxicam and Osteo Bi-Flex.  I injected her with Euflexxa back in April.      Review of Systems:  Musculoskeletal:  See HPI    Physical Examination:    Vital Signs:  There were no vitals filed for this visit.    Body mass index is 49.26 kg/m².    This a well-developed, well nourished patient in no acute distress.  They are alert and oriented and cooperative to examination.  Pt. walks without an antalgic gait.      Examination of bilateral knees shows no rashes or erythema. There are no masses  ecchymosis or effusion. Patient has full range of motion from 0-130°. Patient is nontender to palpation over lateral joint line and mildly tender to palpation over the medial joint line.  Knee is stable to varus and valgus stress. 5 out of 5 motor strength. Palpable distal pulses. Intact light touch sensation.  Moderate Patellofemoral crepitus      X-rays: X-rays left knee are reviewed which show some moderate left knee arthritis and more mild to moderate right knee arthritis     Assessment:: Bilateral knee arthritis    Plan:  We reviewed the findings.  Patient did great from her prior Euflexxa injections.  We agreed to inject both knees with cortisone today in follow-up in 2 weeks with another Euflexxa series.    This note was created using M [employer] voice recognition software that occasionally misinterpreted phrases or words.    Consult note is delivered via Epic messaging service.

## 2018-11-20 DIAGNOSIS — M17.0 BILATERAL PRIMARY OSTEOARTHRITIS OF KNEE: Primary | ICD-10-CM

## 2018-12-03 ENCOUNTER — OFFICE VISIT (OUTPATIENT)
Dept: ORTHOPEDICS | Facility: CLINIC | Age: 63
End: 2018-12-03
Payer: COMMERCIAL

## 2018-12-03 DIAGNOSIS — M17.0 PRIMARY OSTEOARTHRITIS OF BOTH KNEES: Primary | ICD-10-CM

## 2018-12-03 PROCEDURE — 99999 PR PBB SHADOW E&M-EST. PATIENT-LVL II: CPT | Mod: PBBFAC,,, | Performed by: ORTHOPAEDIC SURGERY

## 2018-12-03 PROCEDURE — 20610 DRAIN/INJ JOINT/BURSA W/O US: CPT | Mod: RT,S$GLB,, | Performed by: ORTHOPAEDIC SURGERY

## 2018-12-03 PROCEDURE — 20610 DRAIN/INJ JOINT/BURSA W/O US: CPT | Mod: 59,LT,S$GLB, | Performed by: ORTHOPAEDIC SURGERY

## 2018-12-03 PROCEDURE — 99499 UNLISTED E&M SERVICE: CPT | Mod: S$GLB,,, | Performed by: ORTHOPAEDIC SURGERY

## 2018-12-03 NOTE — PROGRESS NOTES
Past Medical History:   Diagnosis Date    Angio-edema     Asthma     Diabetes mellitus     Eczema     Obesity     Urticaria        Past Surgical History:   Procedure Laterality Date    ADENOIDECTOMY      BONY PELVIS SURGERY       SECTION      FIXATION KYPHOPLASTY N/A 2018    Procedure: Kyphoplasty;  Surgeon: Martinez Morejon MD;  Location: [facility] OR;  Service: Pain Management;  Laterality: N/A;  L1     HYSTERECTOMY      KNEE CARTILAGE SURGERY      Kyphoplasty N/A 2018    Performed by Martinez Morejon MD at [facility] OR    TONSILLECTOMY      VAGINOPLASTY         Current Outpatient Medications   Medication Sig    atorvastatin (LIPITOR) 10 MG tablet Take 10 mg by mouth once daily.    azithromycin (ZITHROMAX) 500 MG tablet     CHERATUSSIN AC  mg/5 mL syrup     HYDROcodone-acetaminophen (NORCO) 7.5-325 mg per tablet Take 1 tablet by mouth every 6 (six) hours as needed for Pain.    ibuprofen (ADVIL,MOTRIN) 800 MG tablet Take 1 tablet (800 mg total) by mouth 3 (three) times daily.    losartan (COZAAR) 50 MG tablet     meloxicam (MOBIC) 15 MG tablet Mobic 15 mg tablet   Take 1 tablet every day by oral route.    metformin (GLUCOPHAGE) 500 MG tablet     mirabegron (MYRBETRIQ) 25 mg Tb24 ER tablet Take 1 tablet (25 mg total) by mouth once daily.    mirabegron (MYRBETRIQ) 50 mg Tb24 Take 1 tablet (50 mg total) by mouth once daily.    nystatin (MYCOSTATIN) powder     PROAIR HFA 90 mcg/actuation inhaler     SYNTHROID 175 mcg tablet     triamterene-hydrochlorothiazide 37.5-25 mg (DYAZIDE) 37.5-25 mg per capsule     TRULICITY 1.5 mg/0.5 mL PnIj     epinephrine (EPIPEN) 0.3 mg/0.3 mL (1:1,000) AtIn Inject 0.6 mLs (0.6 mg total) into the muscle once.     No current facility-administered medications for this visit.        Review of patient's allergies indicates:  No Known Allergies    Family History   Problem Relation Age of Onset    Breast cancer Mother 44         at 61yo from breast ca     Asthma Maternal Uncle     Allergies Son     Allergic rhinitis Son     Eczema Son     Angioedema Daughter     Allergies Daughter     Immunodeficiency Neg Hx     Ovarian cancer Neg Hx        Social History     Socioeconomic History    Marital status:      Spouse name: Not on file    Number of children: Not on file    Years of education: Not on file    Highest education level: Not on file   Social Needs    Financial resource strain: Not on file    Food insecurity - worry: Not on file    Food insecurity - inability: Not on file    Transportation needs - medical: Not on file    Transportation needs - non-medical: Not on file   Occupational History    Not on file   Tobacco Use    Smoking status: Never Smoker   Substance and Sexual Activity    Alcohol use: Yes     Comment: seldom    Drug use: Not on file    Sexual activity: Not Currently     Partners: Male     Birth control/protection: None   Other Topics Concern    Not on file   Social History Narrative    Not on file       Chief Complaint:   Chief Complaint   Patient presents with    Knee Pain     B euflexxa 1/3       History of present illness: Is a 63-year-old female seen for bilateral knee pain.  Patient's had pain for years.  Patient's had injections physical therapy and even meniscal surgery.  No treatment over the last year.  Left knee is the worst of the 2.  Pain with walking standing or getting up from sitting.  Pain as a 4 out of 10.  Currently on meloxicam and Osteo Bi-Flex.  I injected her with Euflexxa back in April.      Review of Systems:  Musculoskeletal:  See HPI    Physical Examination:    Vital Signs:  There were no vitals filed for this visit.    There is no height or weight on file to calculate BMI.    This a well-developed, well nourished patient in no acute distress.  They are alert and oriented and cooperative to examination.  Pt. walks without an antalgic gait.      Examination of bilateral knees shows no rashes or  erythema. There are no masses ecchymosis or effusion. Patient has full range of motion from 0-130°. Patient is nontender to palpation over lateral joint line and mildly tender to palpation over the medial joint line.  Knee is stable to varus and valgus stress. 5 out of 5 motor strength. Palpable distal pulses. Intact light touch sensation.  Moderate Patellofemoral crepitus      X-rays: X-rays left knee are reviewed which show some moderate left knee arthritis and more mild to moderate right knee arthritis     Assessment:: Bilateral knee arthritis    Plan:  I injected both knees with Euflexxa 1 of 3.  Follow up next week.    This note was created using [employer] voice recognition software that occasionally misinterpreted phrases or words.    Consult note is delivered via Epic messaging service.

## 2018-12-03 NOTE — PROCEDURES
Large Joint Aspiration/Injection: R knee, L knee  Date/Time: 12/3/2018 3:00 PM  Performed by: Estevan Monteiro MD  Authorized by: Estevan Monteiro MD     Consent Done?:  Yes (Verbal)  Indications:  Pain  Procedure site marked: Yes    Timeout: Prior to procedure the correct patient, procedure, and site was verified      Location:  Knee  Site:  R knee and L knee  Prep: Patient was prepped and draped in usual sterile fashion    Needle size:  20 G  Approach:  Anterolateral  Medications:  20 mg sodium hyaluronate (EUFLEXXA) 10 mg/mL(mw 2.4 -3.6 million); 20 mg sodium hyaluronate (EUFLEXXA) 10 mg/mL(mw 2.4 -3.6 million)  Patient tolerance:  Patient tolerated the procedure well with no immediate complications

## 2018-12-17 ENCOUNTER — OFFICE VISIT (OUTPATIENT)
Dept: ORTHOPEDICS | Facility: CLINIC | Age: 63
End: 2018-12-17
Payer: COMMERCIAL

## 2018-12-17 VITALS — WEIGHT: 293 LBS | BODY MASS INDEX: 44.41 KG/M2 | HEIGHT: 68 IN

## 2018-12-17 DIAGNOSIS — M17.0 PRIMARY OSTEOARTHRITIS OF BOTH KNEES: Primary | ICD-10-CM

## 2018-12-17 PROCEDURE — 99999 PR PBB SHADOW E&M-EST. PATIENT-LVL II: CPT | Mod: PBBFAC,,, | Performed by: ORTHOPAEDIC SURGERY

## 2018-12-17 PROCEDURE — 20610 DRAIN/INJ JOINT/BURSA W/O US: CPT | Mod: 59,LT,S$GLB, | Performed by: ORTHOPAEDIC SURGERY

## 2018-12-17 PROCEDURE — 20610 DRAIN/INJ JOINT/BURSA W/O US: CPT | Mod: RT,S$GLB,, | Performed by: ORTHOPAEDIC SURGERY

## 2018-12-17 PROCEDURE — 99499 UNLISTED E&M SERVICE: CPT | Mod: S$GLB,,, | Performed by: ORTHOPAEDIC SURGERY

## 2018-12-18 ENCOUNTER — TELEPHONE (OUTPATIENT)
Dept: ORTHOPEDICS | Facility: CLINIC | Age: 63
End: 2018-12-18

## 2018-12-18 NOTE — TELEPHONE ENCOUNTER
----- Message from Laura Osei sent at 12/18/2018  2:50 PM CST -----  Contact: Patient  Type: Needs Medical Advice    Who Called: Patient   Symptoms (please be specific):  na  How long has patient had these symptoms:  yarelis  Pharmacy name and phone #:  na  Best Call Back Number:   Additional Information: Calling to reschedule her appt on 12/26/18 with Dr Panda. She only wants to see Dr Monteiro. Please advise.

## 2018-12-27 ENCOUNTER — OFFICE VISIT (OUTPATIENT)
Dept: ORTHOPEDICS | Facility: CLINIC | Age: 63
End: 2018-12-27
Payer: COMMERCIAL

## 2018-12-27 DIAGNOSIS — M17.0 PRIMARY OSTEOARTHRITIS OF BOTH KNEES: Primary | ICD-10-CM

## 2018-12-27 PROCEDURE — 99999 PR PBB SHADOW E&M-EST. PATIENT-LVL II: CPT | Mod: PBBFAC,,, | Performed by: ORTHOPAEDIC SURGERY

## 2018-12-27 PROCEDURE — 20610 DRAIN/INJ JOINT/BURSA W/O US: CPT | Mod: RT,S$GLB,, | Performed by: ORTHOPAEDIC SURGERY

## 2018-12-27 PROCEDURE — 20610 DRAIN/INJ JOINT/BURSA W/O US: CPT | Mod: 59,LT,S$GLB, | Performed by: ORTHOPAEDIC SURGERY

## 2018-12-27 PROCEDURE — 99499 UNLISTED E&M SERVICE: CPT | Mod: S$GLB,,, | Performed by: ORTHOPAEDIC SURGERY

## 2018-12-27 NOTE — PROGRESS NOTES
Past Medical History:   Diagnosis Date    Angio-edema     Asthma     Diabetes mellitus     Eczema     Obesity     Urticaria        Past Surgical History:   Procedure Laterality Date    ADENOIDECTOMY      BONY PELVIS SURGERY       SECTION      HYSTERECTOMY      KNEE CARTILAGE SURGERY      Kyphoplasty N/A 2018    [facility] by [facility]    TONSILLECTOMY      VAGINOPLASTY         Current Outpatient Medications   Medication Sig    atorvastatin (LIPITOR) 10 MG tablet Take 10 mg by mouth once daily.    azithromycin (ZITHROMAX) 500 MG tablet     CHERATUSSIN AC  mg/5 mL syrup     HYDROcodone-acetaminophen (NORCO) 7.5-325 mg per tablet Take 1 tablet by mouth every 6 (six) hours as needed for Pain.    ibuprofen (ADVIL,MOTRIN) 800 MG tablet Take 1 tablet (800 mg total) by mouth 3 (three) times daily.    losartan (COZAAR) 50 MG tablet     meloxicam (MOBIC) 15 MG tablet Mobic 15 mg tablet   Take 1 tablet every day by oral route.    metformin (GLUCOPHAGE) 500 MG tablet     mirabegron (MYRBETRIQ) 25 mg Tb24 ER tablet Take 1 tablet (25 mg total) by mouth once daily.    mirabegron (MYRBETRIQ) 50 mg Tb24 Take 1 tablet (50 mg total) by mouth once daily.    nystatin (MYCOSTATIN) powder     PROAIR HFA 90 mcg/actuation inhaler     SYNTHROID 175 mcg tablet     triamterene-hydrochlorothiazide 37.5-25 mg (DYAZIDE) 37.5-25 mg per capsule     TRULICITY 1.5 mg/0.5 mL PnIj     epinephrine (EPIPEN) 0.3 mg/0.3 mL (1:1,000) AtIn Inject 0.6 mLs (0.6 mg total) into the muscle once.     No current facility-administered medications for this visit.        Review of patient's allergies indicates:  No Known Allergies    Family History   Problem Relation Age of Onset    Breast cancer Mother 44         at 61yo from breast ca    Asthma Maternal Uncle     Allergies Son     Allergic rhinitis Son     Eczema Son     Angioedema Daughter     Allergies Daughter     Immunodeficiency Neg Hx      Ovarian cancer Neg Hx        Social History     Socioeconomic History    Marital status:      Spouse name: Not on file    Number of children: Not on file    Years of education: Not on file    Highest education level: Not on file   Social Needs    Financial resource strain: Not on file    Food insecurity - worry: Not on file    Food insecurity - inability: Not on file    Transportation needs - medical: Not on file    Transportation needs - non-medical: Not on file   Occupational History    Not on file   Tobacco Use    Smoking status: Never Smoker   Substance and Sexual Activity    Alcohol use: Yes     Comment: seldom    Drug use: Not on file    Sexual activity: Not Currently     Partners: Male     Birth control/protection: None   Other Topics Concern    Not on file   Social History Narrative    Not on file       Chief Complaint:   Chief Complaint   Patient presents with    Knee Pain     bilateral euflexxa 3/3       History of present illness: Is a 63-year-old female seen for bilateral knee pain.  Patient's had pain for years.  Patient's had injections physical therapy and even meniscal surgery.  No treatment over the last year.  Left knee is the worst of the 2.  Pain with walking standing or getting up from sitting.  Pain as a 4 out of 10.  Currently on meloxicam and Osteo Bi-Flex.  I injected her with Euflexxa back in April.      Review of Systems:  Musculoskeletal:  See HPI    Physical Examination:    Vital Signs:  There were no vitals filed for this visit.    There is no height or weight on file to calculate BMI.    This a well-developed, well nourished patient in no acute distress.  They are alert and oriented and cooperative to examination.  Pt. walks without an antalgic gait.      Examination of bilateral knees shows no rashes or erythema. There are no masses ecchymosis or effusion. Patient has full range of motion from 0-130°. Patient is nontender to palpation over lateral joint  line and mildly tender to palpation over the medial joint line.  Knee is stable to varus and valgus stress. 5 out of 5 motor strength. Palpable distal pulses. Intact light touch sensation.  Moderate Patellofemoral crepitus      X-rays: X-rays left knee are reviewed which show some moderate left knee arthritis and more mild to moderate right knee arthritis     Assessment:: Bilateral knee arthritis    Plan:  I injected both knees with Euflexxa 3 of 3.  Follow up in 6-12 weeks.    This note was created using [employer] voice recognition software that occasionally misinterpreted phrases or words.    Consult note is delivered via Epic messaging service.

## 2018-12-27 NOTE — PROCEDURES
Large Joint Aspiration/Injection: R knee, L knee  Date/Time: 12/27/2018 10:53 AM  Performed by: Estevan Monteiro MD  Authorized by: Estevan Monteiro MD     Consent Done?:  Yes (Verbal)  Indications:  Pain  Procedure site marked: Yes    Timeout: Prior to procedure the correct patient, procedure, and site was verified      Location:  Knee  Site:  R knee and L knee  Prep: Patient was prepped and draped in usual sterile fashion    Needle size:  20 G  Approach:  Anterolateral  Medications:  20 mg sodium hyaluronate (EUFLEXXA) 10 mg/mL(mw 2.4 -3.6 million); 20 mg sodium hyaluronate (EUFLEXXA) 10 mg/mL(mw 2.4 -3.6 million)  Patient tolerance:  Patient tolerated the procedure well with no immediate complications

## 2018-12-27 NOTE — PROCEDURES
Large Joint Aspiration/Injection: R knee, L knee  Date/Time: 12/17/2018 9:13 PM  Performed by: Ori Panda MD  Authorized by: Ori Panda MD     Consent Done?:  Yes (Verbal)  Indications:  Pain  Timeout: Prior to procedure the correct patient, procedure, and site was verified      Location:  Knee  Site:  R knee and L knee  Prep: Patient was prepped and draped in usual sterile fashion    Approach:  Anteromedial  Medications:  20 mg sodium hyaluronate (EUFLEXXA) 10 mg/mL(mw 2.4 -3.6 million); 20 mg sodium hyaluronate (EUFLEXXA) 10 mg/mL(mw 2.4 -3.6 million)

## 2018-12-27 NOTE — PROGRESS NOTES
Past Medical History:   Diagnosis Date    Angio-edema     Asthma     Diabetes mellitus     Eczema     Obesity     Urticaria        Past Surgical History:   Procedure Laterality Date    ADENOIDECTOMY      BONY PELVIS SURGERY       SECTION      HYSTERECTOMY      KNEE CARTILAGE SURGERY      Kyphoplasty N/A 2018    [facility]    TONSILLECTOMY      VAGINOPLASTY         Current Outpatient Medications   Medication Sig    atorvastatin (LIPITOR) 10 MG tablet Take 10 mg by mouth once daily.    azithromycin (ZITHROMAX) 500 MG tablet     CHERATUSSIN AC  mg/5 mL syrup     epinephrine (EPIPEN) 0.3 mg/0.3 mL (1:1,000) AtIn Inject 0.6 mLs (0.6 mg total) into the muscle once.    HYDROcodone-acetaminophen (NORCO) 7.5-325 mg per tablet Take 1 tablet by mouth every 6 (six) hours as needed for Pain.    ibuprofen (ADVIL,MOTRIN) 800 MG tablet Take 1 tablet (800 mg total) by mouth 3 (three) times daily.    losartan (COZAAR) 50 MG tablet     meloxicam (MOBIC) 15 MG tablet Mobic 15 mg tablet   Take 1 tablet every day by oral route.    metformin (GLUCOPHAGE) 500 MG tablet     mirabegron (MYRBETRIQ) 25 mg Tb24 ER tablet Take 1 tablet (25 mg total) by mouth once daily.    mirabegron (MYRBETRIQ) 50 mg Tb24 Take 1 tablet (50 mg total) by mouth once daily.    nystatin (MYCOSTATIN) powder     PROAIR HFA 90 mcg/actuation inhaler     SYNTHROID 175 mcg tablet     triamterene-hydrochlorothiazide 37.5-25 mg (DYAZIDE) 37.5-25 mg per capsule     TRULICITY 1.5 mg/0.5 mL PnIj      No current facility-administered medications for this visit.        Review of patient's allergies indicates:  No Known Allergies    Family History   Problem Relation Age of Onset    Breast cancer Mother 44         at 59yo from breast ca    Asthma Maternal Uncle     Allergies Son     Allergic rhinitis Son     Eczema Son     Angioedema Daughter     Allergies Daughter     Immunodeficiency Neg Hx      Ovarian cancer Neg Hx        Social History     Socioeconomic History    Marital status:      Spouse name: Not on file    Number of children: Not on file    Years of education: Not on file    Highest education level: Not on file   Social Needs    Financial resource strain: Not on file    Food insecurity - worry: Not on file    Food insecurity - inability: Not on file    Transportation needs - medical: Not on file    Transportation needs - non-medical: Not on file   Occupational History    Not on file   Tobacco Use    Smoking status: Never Smoker   Substance and Sexual Activity    Alcohol use: Yes     Comment: seldom    Drug use: Not on file    Sexual activity: Not Currently     Partners: Male     Birth control/protection: None   Other Topics Concern    Not on file   Social History Narrative    Not on file       Chief Complaint:   Chief Complaint   Patient presents with    Knee Pain     bilat Euflexxa 2/3       History of present illness: Is a 63-year-old female seen for bilateral knee pain.  Patient's had pain for years.  Patient's had injections physical therapy and even meniscal surgery.  No treatment over the last year.  Left knee is the worst of the 2.  Pain with walking standing or getting up from sitting.  Pain as a 4 out of 10.  Currently on meloxicam and Osteo Bi-Flex.  I injected her with Euflexxa back in April.      Review of Systems:  Musculoskeletal:  See HPI    Physical Examination:    Vital Signs:  There were no vitals filed for this visit.    Body mass index is 49.28 kg/m².    This a well-developed, well nourished patient in no acute distress.  They are alert and oriented and cooperative to examination.  Pt. walks without an antalgic gait.      Examination of bilateral knees shows no rashes or erythema. There are no masses ecchymosis or effusion. Patient has full range of motion from 0-130°. Patient is nontender to palpation over lateral joint line and mildly tender to  palpation over the medial joint line.  Knee is stable to varus and valgus stress. 5 out of 5 motor strength. Palpable distal pulses. Intact light touch sensation.  Moderate Patellofemoral crepitus      X-rays: X-rays left knee are reviewed which show some moderate left knee arthritis and more mild to moderate right knee arthritis     Assessment:: Bilateral knee arthritis    Plan:  Euflexxa    This note was created using [employer] voice recognition software that occasionally misinterpreted phrases or words.    Consult note is delivered via Epic messaging service.

## 2019-08-02 DIAGNOSIS — M17.0 BILATERAL PRIMARY OSTEOARTHRITIS OF KNEE: Primary | ICD-10-CM

## 2019-08-22 ENCOUNTER — OFFICE VISIT (OUTPATIENT)
Dept: ORTHOPEDICS | Facility: CLINIC | Age: 64
End: 2019-08-22
Payer: COMMERCIAL

## 2019-08-22 DIAGNOSIS — M17.0 PRIMARY OSTEOARTHRITIS OF BOTH KNEES: Primary | ICD-10-CM

## 2019-08-22 PROCEDURE — 20610 DRAIN/INJ JOINT/BURSA W/O US: CPT | Mod: RT,S$GLB,, | Performed by: ORTHOPAEDIC SURGERY

## 2019-08-22 PROCEDURE — 99999 PR PBB SHADOW E&M-EST. PATIENT-LVL II: ICD-10-PCS | Mod: PBBFAC,,, | Performed by: ORTHOPAEDIC SURGERY

## 2019-08-22 PROCEDURE — 99999 PR PBB SHADOW E&M-EST. PATIENT-LVL II: CPT | Mod: PBBFAC,,, | Performed by: ORTHOPAEDIC SURGERY

## 2019-08-22 PROCEDURE — 20610 DRAIN/INJ JOINT/BURSA W/O US: CPT | Mod: 51,LT,S$GLB, | Performed by: ORTHOPAEDIC SURGERY

## 2019-08-22 PROCEDURE — 99499 NO LOS: ICD-10-PCS | Mod: S$GLB,,, | Performed by: ORTHOPAEDIC SURGERY

## 2019-08-22 PROCEDURE — 20610 LARGE JOINT ASPIRATION/INJECTION: R KNEE, L KNEE: ICD-10-PCS | Mod: RT,S$GLB,, | Performed by: ORTHOPAEDIC SURGERY

## 2019-08-22 PROCEDURE — 99499 UNLISTED E&M SERVICE: CPT | Mod: S$GLB,,, | Performed by: ORTHOPAEDIC SURGERY

## 2019-08-26 NOTE — PROCEDURES
Large Joint Aspiration/Injection: R knee, L knee  Date/Time: 8/22/2019 7:23 AM  Performed by: Estevan Monteiro MD  Authorized by: Estevan Monteiro MD     Consent Done?:  Yes (Verbal)  Indications:  Pain  Procedure site marked: Yes    Timeout: Prior to procedure the correct patient, procedure, and site was verified      Location:  Knee  Site:  R knee and L knee  Prep: Patient was prepped and draped in usual sterile fashion    Needle size:  20 G  Approach:  Anterolateral  Medications:  20 mg sodium hyaluronate (EUFLEXXA) 10 mg/mL(mw 2.4 -3.6 million); 20 mg sodium hyaluronate (EUFLEXXA) 10 mg/mL(mw 2.4 -3.6 million)  Patient tolerance:  Patient tolerated the procedure well with no immediate complications

## 2019-08-26 NOTE — PROGRESS NOTES
Past Medical History:   Diagnosis Date    Angio-edema     Asthma     Diabetes mellitus     Eczema     Obesity     Urticaria        Past Surgical History:   Procedure Laterality Date    ADENOIDECTOMY      BONY PELVIS SURGERY       SECTION      HYSTERECTOMY      KNEE CARTILAGE SURGERY      Kyphoplasty N/A 2018    [facility] OR    TONSILLECTOMY      VAGINOPLASTY         Current Outpatient Medications   Medication Sig    atorvastatin (LIPITOR) 10 MG tablet Take 10 mg by mouth once daily.    azithromycin (ZITHROMAX) 500 MG tablet     CHERATUSSIN AC  mg/5 mL syrup     HYDROcodone-acetaminophen (NORCO) 7.5-325 mg per tablet Take 1 tablet by mouth every 6 (six) hours as needed for Pain.    ibuprofen (ADVIL,MOTRIN) 800 MG tablet Take 1 tablet (800 mg total) by mouth 3 (three) times daily.    losartan (COZAAR) 50 MG tablet     meloxicam (MOBIC) 15 MG tablet Mobic 15 mg tablet   Take 1 tablet every day by oral route.    metformin (GLUCOPHAGE) 500 MG tablet     mirabegron (MYRBETRIQ) 25 mg Tb24 ER tablet Take 1 tablet (25 mg total) by mouth once daily.    mirabegron (MYRBETRIQ) 50 mg Tb24 Take 1 tablet (50 mg total) by mouth once daily.    nystatin (MYCOSTATIN) powder     PROAIR HFA 90 mcg/actuation inhaler     SYNTHROID 175 mcg tablet     triamterene-hydrochlorothiazide 37.5-25 mg (DYAZIDE) 37.5-25 mg per capsule     TRULICITY 1.5 mg/0.5 mL PnIj     epinephrine (EPIPEN) 0.3 mg/0.3 mL (1:1,000) AtIn Inject 0.6 mLs (0.6 mg total) into the muscle once.     No current facility-administered medications for this visit.        Review of patient's allergies indicates:  No Known Allergies    Family History   Problem Relation Age of Onset    Breast cancer Mother 44         at 61yo from breast ca    Asthma Maternal Uncle     Allergies Son     Allergic rhinitis Son     Eczema Son     Angioedema Daughter     Allergies Daughter     Immunodeficiency Neg Hx      Ovarian cancer Neg Hx        Social History     Socioeconomic History    Marital status:      Spouse name: Not on file    Number of children: Not on file    Years of education: Not on file    Highest education level: Not on file   Occupational History    Not on file   Social Needs    Financial resource strain: Not on file    Food insecurity:     Worry: Not on file     Inability: Not on file    Transportation needs:     Medical: Not on file     Non-medical: Not on file   Tobacco Use    Smoking status: Never Smoker   Substance and Sexual Activity    Alcohol use: Yes     Comment: seldom    Drug use: Not on file    Sexual activity: Not Currently     Partners: Male     Birth control/protection: None   Lifestyle    Physical activity:     Days per week: Not on file     Minutes per session: Not on file    Stress: Not on file   Relationships    Social connections:     Talks on phone: Not on file     Gets together: Not on file     Attends Zoroastrianism service: Not on file     Active member of club or organization: Not on file     Attends meetings of clubs or organizations: Not on file     Relationship status: Not on file   Other Topics Concern    Not on file   Social History Narrative    Not on file       Chief Complaint:   Chief Complaint   Patient presents with    Knee Pain     B eufelxxa 1/3       History of present illness: Is a 63-year-old female seen for bilateral knee pain.  Patient's had pain for years.  Patient's had injections physical therapy and even meniscal surgery.  No treatment over the last year.  Left knee is the worst of the 2.  Pain with walking standing or getting up from sitting.  Pain as a 4 out of 10.  Currently on meloxicam and Osteo Bi-Flex.  I injected her with Euflexxa back in April.      Review of Systems:  Musculoskeletal:  See HPI    Physical Examination:    Vital Signs:  There were no vitals filed for this visit.    There is no height or weight on file to calculate  BMI.    This a well-developed, well nourished patient in no acute distress.  They are alert and oriented and cooperative to examination.  Pt. walks without an antalgic gait.      Examination of bilateral knees shows no rashes or erythema. There are no masses ecchymosis or effusion. Patient has full range of motion from 0-130°. Patient is nontender to palpation over lateral joint line and mildly tender to palpation over the medial joint line.  Knee is stable to varus and valgus stress. 5 out of 5 motor strength. Palpable distal pulses. Intact light touch sensation.  Moderate Patellofemoral crepitus      X-rays: X-rays left knee are reviewed which show some moderate left knee arthritis and more mild to moderate right knee arthritis     Assessment:: Bilateral knee arthritis    Plan:  I injected both knees with Euflexxa 1 of 3.  Follow up in 1 weeks.    This note was created using [employer] voice recognition software that occasionally misinterpreted phrases or words.    Consult note is delivered via Epic messaging service.

## 2019-08-29 ENCOUNTER — OFFICE VISIT (OUTPATIENT)
Dept: ORTHOPEDICS | Facility: CLINIC | Age: 64
End: 2019-08-29
Payer: COMMERCIAL

## 2019-08-29 VITALS — BODY MASS INDEX: 44.41 KG/M2 | WEIGHT: 293 LBS | HEIGHT: 68 IN

## 2019-08-29 DIAGNOSIS — M17.0 PRIMARY OSTEOARTHRITIS OF BOTH KNEES: Primary | ICD-10-CM

## 2019-08-29 PROCEDURE — 99499 UNLISTED E&M SERVICE: CPT | Mod: S$GLB,,, | Performed by: ORTHOPAEDIC SURGERY

## 2019-08-29 PROCEDURE — 20610 DRAIN/INJ JOINT/BURSA W/O US: CPT | Mod: RT,S$GLB,, | Performed by: ORTHOPAEDIC SURGERY

## 2019-08-29 PROCEDURE — 20610 LARGE JOINT ASPIRATION/INJECTION: R KNEE, L KNEE: ICD-10-PCS | Mod: RT,S$GLB,, | Performed by: ORTHOPAEDIC SURGERY

## 2019-08-29 PROCEDURE — 99499 NO LOS: ICD-10-PCS | Mod: S$GLB,,, | Performed by: ORTHOPAEDIC SURGERY

## 2019-08-29 PROCEDURE — 99999 PR PBB SHADOW E&M-EST. PATIENT-LVL II: ICD-10-PCS | Mod: PBBFAC,,, | Performed by: ORTHOPAEDIC SURGERY

## 2019-08-29 PROCEDURE — 20610 DRAIN/INJ JOINT/BURSA W/O US: CPT | Mod: 51,LT,S$GLB, | Performed by: ORTHOPAEDIC SURGERY

## 2019-08-29 PROCEDURE — 99999 PR PBB SHADOW E&M-EST. PATIENT-LVL II: CPT | Mod: PBBFAC,,, | Performed by: ORTHOPAEDIC SURGERY

## 2019-08-29 NOTE — PROGRESS NOTES
Past Medical History:   Diagnosis Date    Angio-edema     Asthma     Diabetes mellitus     Eczema     Obesity     Urticaria        Past Surgical History:   Procedure Laterality Date    ADENOIDECTOMY      BONY PELVIS SURGERY       SECTION      HYSTERECTOMY      KNEE CARTILAGE SURGERY      Kyphoplasty N/A 2018    [facility]    TONSILLECTOMY      VAGINOPLASTY         Current Outpatient Medications   Medication Sig    atorvastatin (LIPITOR) 10 MG tablet Take 10 mg by mouth once daily.    azithromycin (ZITHROMAX) 500 MG tablet     CHERATUSSIN AC  mg/5 mL syrup     HYDROcodone-acetaminophen (NORCO) 7.5-325 mg per tablet Take 1 tablet by mouth every 6 (six) hours as needed for Pain.    ibuprofen (ADVIL,MOTRIN) 800 MG tablet Take 1 tablet (800 mg total) by mouth 3 (three) times daily.    losartan (COZAAR) 50 MG tablet     meloxicam (MOBIC) 15 MG tablet Mobic 15 mg tablet   Take 1 tablet every day by oral route.    metformin (GLUCOPHAGE) 500 MG tablet     mirabegron (MYRBETRIQ) 25 mg Tb24 ER tablet Take 1 tablet (25 mg total) by mouth once daily.    mirabegron (MYRBETRIQ) 50 mg Tb24 Take 1 tablet (50 mg total) by mouth once daily.    nystatin (MYCOSTATIN) powder     PROAIR HFA 90 mcg/actuation inhaler     SYNTHROID 175 mcg tablet     triamterene-hydrochlorothiazide 37.5-25 mg (DYAZIDE) 37.5-25 mg per capsule     TRULICITY 1.5 mg/0.5 mL PnIj     epinephrine (EPIPEN) 0.3 mg/0.3 mL (1:1,000) AtIn Inject 0.6 mLs (0.6 mg total) into the muscle once.     No current facility-administered medications for this visit.        Review of patient's allergies indicates:  No Known Allergies    Family History   Problem Relation Age of Onset    Breast cancer Mother 44         at 61yo from breast ca    Asthma Maternal Uncle     Allergies Son     Allergic rhinitis Son     Eczema Son     Angioedema Daughter     Allergies Daughter     Immunodeficiency Neg Hx      Ovarian cancer Neg Hx        Social History     Socioeconomic History    Marital status:      Spouse name: Not on file    Number of children: Not on file    Years of education: Not on file    Highest education level: Not on file   Occupational History    Not on file   Social Needs    Financial resource strain: Not on file    Food insecurity:     Worry: Not on file     Inability: Not on file    Transportation needs:     Medical: Not on file     Non-medical: Not on file   Tobacco Use    Smoking status: Never Smoker   Substance and Sexual Activity    Alcohol use: Yes     Comment: seldom    Drug use: Not on file    Sexual activity: Not Currently     Partners: Male     Birth control/protection: None   Lifestyle    Physical activity:     Days per week: Not on file     Minutes per session: Not on file    Stress: Not on file   Relationships    Social connections:     Talks on phone: Not on file     Gets together: Not on file     Attends [employer] service: Not on file     Active member of club or organization: Not on file     Attends meetings of clubs or organizations: Not on file     Relationship status: Not on file   Other Topics Concern    Not on file   Social History Narrative    Not on file       Chief Complaint:   Chief Complaint   Patient presents with    Knee Pain     bilateral knee-Euflexxa 2/3        History of present illness: Is a 63-year-old female seen for bilateral knee pain.  Patient's had pain for years.  Patient's had injections physical therapy and even meniscal surgery.  No treatment over the last year.  Left knee is the worst of the 2.  Pain with walking standing or getting up from sitting.  Pain as a 4 out of 10.  Currently on meloxicam and Osteo Bi-Flex.  I injected her with Euflexxa back in April.      Review of Systems:  Musculoskeletal:  See HPI    Physical Examination:    Vital Signs:  There were no vitals filed for this visit.    Body mass index is 49.26 kg/m².    This  a well-developed, well nourished patient in no acute distress.  They are alert and oriented and cooperative to examination.  Pt. walks without an antalgic gait.      Examination of bilateral knees shows no rashes or erythema. There are no masses ecchymosis or effusion. Patient has full range of motion from 0-130°. Patient is nontender to palpation over lateral joint line and mildly tender to palpation over the medial joint line.  Knee is stable to varus and valgus stress. 5 out of 5 motor strength. Palpable distal pulses. Intact light touch sensation.  Moderate Patellofemoral crepitus      X-rays: X-rays left knee are reviewed which show some moderate left knee arthritis and more mild to moderate right knee arthritis     Assessment:: Bilateral knee arthritis    Plan:  I injected both knees with Euflexxa 2 of 3.  Follow up in 1 weeks.    This note was created using [employer] voice recognition software that occasionally misinterpreted phrases or words.    Consult note is delivered via Epic messaging service.

## 2019-08-29 NOTE — PROCEDURES
Large Joint Aspiration/Injection: R knee, L knee  Date/Time: 8/29/2019 1:02 PM  Performed by: Estevan Monteiro MD  Authorized by: Estevan Monteiro MD     Consent Done?:  Yes (Verbal)  Indications:  Pain  Procedure site marked: Yes    Timeout: Prior to procedure the correct patient, procedure, and site was verified      Location:  Knee  Site:  R knee and L knee  Prep: Patient was prepped and draped in usual sterile fashion    Needle size:  20 G  Approach:  Anterolateral  Medications:  20 mg sodium hyaluronate (EUFLEXXA) 10 mg/mL(mw 2.4 -3.6 million); 20 mg sodium hyaluronate (EUFLEXXA) 10 mg/mL(mw 2.4 -3.6 million)  Patient tolerance:  Patient tolerated the procedure well with no immediate complications

## 2019-09-05 ENCOUNTER — OFFICE VISIT (OUTPATIENT)
Dept: ORTHOPEDICS | Facility: CLINIC | Age: 64
End: 2019-09-05
Payer: COMMERCIAL

## 2019-09-05 DIAGNOSIS — M17.0 PRIMARY OSTEOARTHRITIS OF BOTH KNEES: Primary | ICD-10-CM

## 2019-09-05 PROCEDURE — 99499 NO LOS: ICD-10-PCS | Mod: S$GLB,,, | Performed by: ORTHOPAEDIC SURGERY

## 2019-09-05 PROCEDURE — 99499 UNLISTED E&M SERVICE: CPT | Mod: S$GLB,,, | Performed by: ORTHOPAEDIC SURGERY

## 2019-09-05 PROCEDURE — 20610 DRAIN/INJ JOINT/BURSA W/O US: CPT | Mod: LT,S$GLB,, | Performed by: ORTHOPAEDIC SURGERY

## 2019-09-05 PROCEDURE — 20610 LARGE JOINT ASPIRATION/INJECTION: R KNEE, L KNEE: ICD-10-PCS | Mod: LT,S$GLB,, | Performed by: ORTHOPAEDIC SURGERY

## 2019-09-05 PROCEDURE — 99999 PR PBB SHADOW E&M-EST. PATIENT-LVL II: CPT | Mod: PBBFAC,,, | Performed by: ORTHOPAEDIC SURGERY

## 2019-09-05 PROCEDURE — 20610 DRAIN/INJ JOINT/BURSA W/O US: CPT | Mod: 51,RT,S$GLB, | Performed by: ORTHOPAEDIC SURGERY

## 2019-09-05 PROCEDURE — 99999 PR PBB SHADOW E&M-EST. PATIENT-LVL II: ICD-10-PCS | Mod: PBBFAC,,, | Performed by: ORTHOPAEDIC SURGERY

## 2019-09-05 NOTE — PROCEDURES
Large Joint Aspiration/Injection: R knee, L knee  Date/Time: 9/5/2019 10:30 AM  Performed by: Estevan Monteior MD  Authorized by: Estevan Monteiro MD     Consent Done?:  Yes (Verbal)  Indications:  Pain  Procedure site marked: Yes    Timeout: Prior to procedure the correct patient, procedure, and site was verified      Location:  Knee  Site:  R knee and L knee  Prep: Patient was prepped and draped in usual sterile fashion    Needle size:  20 G  Approach:  Anterolateral  Medications:  20 mg sodium hyaluronate (EUFLEXXA) 10 mg/mL(mw 2.4 -3.6 million); 20 mg sodium hyaluronate (EUFLEXXA) 10 mg/mL(mw 2.4 -3.6 million)  Patient tolerance:  Patient tolerated the procedure well with no immediate complications

## 2019-09-05 NOTE — PROGRESS NOTES
Past Medical History:   Diagnosis Date    Angio-edema     Asthma     Diabetes mellitus     Eczema     Obesity     Urticaria        Past Surgical History:   Procedure Laterality Date    ADENOIDECTOMY      BONY PELVIS SURGERY       SECTION      HYSTERECTOMY      KNEE CARTILAGE SURGERY      Kyphoplasty N/A 2018    Performed by Martinez Morejon MD at [facility] OR    TONSILLECTOMY      VAGINOPLASTY         Current Outpatient Medications   Medication Sig    atorvastatin (LIPITOR) 10 MG tablet Take 10 mg by mouth once daily.    azithromycin (ZITHROMAX) 500 MG tablet     CHERATUSSIN AC  mg/5 mL syrup     HYDROcodone-acetaminophen (NORCO) 7.5-325 mg per tablet Take 1 tablet by mouth every 6 (six) hours as needed for Pain.    ibuprofen (ADVIL,MOTRIN) 800 MG tablet Take 1 tablet (800 mg total) by mouth 3 (three) times daily.    losartan (COZAAR) 50 MG tablet     meloxicam (MOBIC) 15 MG tablet Mobic 15 mg tablet   Take 1 tablet every day by oral route.    metformin (GLUCOPHAGE) 500 MG tablet     mirabegron (MYRBETRIQ) 25 mg Tb24 ER tablet Take 1 tablet (25 mg total) by mouth once daily.    mirabegron (MYRBETRIQ) 50 mg Tb24 Take 1 tablet (50 mg total) by mouth once daily.    nystatin (MYCOSTATIN) powder     PROAIR HFA 90 mcg/actuation inhaler     SYNTHROID 175 mcg tablet     triamterene-hydrochlorothiazide 37.5-25 mg (DYAZIDE) 37.5-25 mg per capsule     TRULICITY 1.5 mg/0.5 mL PnIj     epinephrine (EPIPEN) 0.3 mg/0.3 mL (1:1,000) AtIn Inject 0.6 mLs (0.6 mg total) into the muscle once.     No current facility-administered medications for this visit.        Review of patient's allergies indicates:  No Known Allergies    Family History   Problem Relation Age of Onset    Breast cancer Mother 44         at 59yo from breast ca    Asthma Maternal Uncle     Allergies Son     Allergic rhinitis Son     Eczema Son     Angioedema Daughter     Allergies Daughter     Immunodeficiency Neg Hx      Ovarian cancer Neg Hx        Social History     Socioeconomic History    Marital status:      Spouse name: Not on file    Number of children: Not on file    Years of education: Not on file    Highest education level: Not on file   Occupational History    Not on file   Social Needs    Financial resource strain: Not on file    Food insecurity:     Worry: Not on file     Inability: Not on file    Transportation needs:     Medical: Not on file     Non-medical: Not on file   Tobacco Use    Smoking status: Never Smoker   Substance and Sexual Activity    Alcohol use: Yes     Comment: seldom    Drug use: Not on file    Sexual activity: Not Currently     Partners: Male     Birth control/protection: None   Lifestyle    Physical activity:     Days per week: Not on file     Minutes per session: Not on file    Stress: Not on file   Relationships    Social connections:     Talks on phone: Not on file     Gets together: Not on file     Attends Oriental orthodox service: Not on file     Active member of club or organization: Not on file     Attends meetings of clubs or organizations: Not on file     Relationship status: Not on file   Other Topics Concern    Not on file   Social History Narrative    Not on file       Chief Complaint:   Chief Complaint   Patient presents with    Knee Pain     B euflexxa 3/3       History of present illness: Is a 63-year-old female seen for bilateral knee pain.  Patient's had pain for years.  Patient's had injections physical therapy and even meniscal surgery.  No treatment over the last year.  Left knee is the worst of the 2.  Pain with walking standing or getting up from sitting.  Pain as a 4 out of 10.  Currently on meloxicam and Osteo Bi-Flex.  I injected her with Euflexxa back in April.      Review of Systems:  Musculoskeletal:  See HPI    Physical Examination:    Vital Signs:  There were no vitals filed for this visit.    There is no height or weight on file to calculate  BMI.    This a well-developed, well nourished patient in no acute distress.  They are alert and oriented and cooperative to examination.  Pt. walks without an antalgic gait.      Examination of bilateral knees shows no rashes or erythema. There are no masses ecchymosis or effusion. Patient has full range of motion from 0-130°. Patient is nontender to palpation over lateral joint line and mildly tender to palpation over the medial joint line.  Knee is stable to varus and valgus stress. 5 out of 5 motor strength. Palpable distal pulses. Intact light touch sensation.  Moderate Patellofemoral crepitus      X-rays: X-rays left knee are reviewed which show some moderate left knee arthritis and more mild to moderate right knee arthritis     Assessment:: Bilateral knee arthritis    Plan:  I injected both knees with Euflexxa 3 of 3.  Follow up in 6 weeks.    This note was created using M modal voice recognition software that occasionally misinterpreted phrases or words.    Consult note is delivered via Epic messaging service.

## 2019-10-10 ENCOUNTER — HOSPITAL ENCOUNTER (OUTPATIENT)
Dept: RADIOLOGY | Facility: HOSPITAL | Age: 64
Discharge: HOME | End: 2019-10-10
Attending: ORTHOPAEDIC SURGERY
Payer: COMMERCIAL

## 2019-10-10 ENCOUNTER — OFFICE VISIT (OUTPATIENT)
Dept: ORTHOPEDICS | Facility: CLINIC | Age: 64
End: 2019-10-10
Payer: COMMERCIAL

## 2019-10-10 VITALS
DIASTOLIC BLOOD PRESSURE: 77 MMHG | SYSTOLIC BLOOD PRESSURE: 138 MMHG | HEIGHT: 68 IN | BODY MASS INDEX: 44.41 KG/M2 | WEIGHT: 293 LBS | HEART RATE: 81 BPM

## 2019-10-10 DIAGNOSIS — M25.562 PAIN IN BOTH KNEES, UNSPECIFIED CHRONICITY: ICD-10-CM

## 2019-10-10 DIAGNOSIS — M25.561 PAIN IN BOTH KNEES, UNSPECIFIED CHRONICITY: ICD-10-CM

## 2019-10-10 DIAGNOSIS — M25.561 PAIN IN BOTH KNEES, UNSPECIFIED CHRONICITY: Primary | ICD-10-CM

## 2019-10-10 DIAGNOSIS — M25.562 PAIN IN BOTH KNEES, UNSPECIFIED CHRONICITY: Primary | ICD-10-CM

## 2019-10-10 DIAGNOSIS — M17.0 PRIMARY OSTEOARTHRITIS OF BOTH KNEES: Primary | ICD-10-CM

## 2019-10-10 PROCEDURE — 20610 DRAIN/INJ JOINT/BURSA W/O US: CPT | Mod: LT,S$GLB,, | Performed by: ORTHOPAEDIC SURGERY

## 2019-10-10 PROCEDURE — 99999 PR PBB SHADOW E&M-EST. PATIENT-LVL III: ICD-10-PCS | Mod: PBBFAC,,, | Performed by: ORTHOPAEDIC SURGERY

## 2019-10-10 PROCEDURE — 99213 OFFICE O/P EST LOW 20 MIN: CPT | Mod: 25,S$GLB,, | Performed by: ORTHOPAEDIC SURGERY

## 2019-10-10 PROCEDURE — 20610 DRAIN/INJ JOINT/BURSA W/O US: CPT | Mod: 51,RT,S$GLB, | Performed by: ORTHOPAEDIC SURGERY

## 2019-10-10 PROCEDURE — 3008F BODY MASS INDEX DOCD: CPT | Mod: CPTII,S$GLB,, | Performed by: ORTHOPAEDIC SURGERY

## 2019-10-10 PROCEDURE — 3008F PR BODY MASS INDEX (BMI) DOCUMENTED: ICD-10-PCS | Mod: CPTII,S$GLB,, | Performed by: ORTHOPAEDIC SURGERY

## 2019-10-10 PROCEDURE — 20610 PR DRAIN/INJECT LARGE JOINT/BURSA: ICD-10-PCS | Mod: 51,RT,S$GLB, | Performed by: ORTHOPAEDIC SURGERY

## 2019-10-10 PROCEDURE — 99213 PR OFFICE/OUTPT VISIT, EST, LEVL III, 20-29 MIN: ICD-10-PCS | Mod: 25,S$GLB,, | Performed by: ORTHOPAEDIC SURGERY

## 2019-10-10 PROCEDURE — 99999 PR PBB SHADOW E&M-EST. PATIENT-LVL III: CPT | Mod: PBBFAC,,, | Performed by: ORTHOPAEDIC SURGERY

## 2019-10-10 RX ORDER — TRIAMCINOLONE ACETONIDE 40 MG/ML
40 INJECTION, SUSPENSION INTRA-ARTICULAR; INTRAMUSCULAR
Status: DISCONTINUED | OUTPATIENT
Start: 2019-10-10 | End: 2019-10-10 | Stop reason: HOSPADM

## 2019-10-10 RX ADMIN — TRIAMCINOLONE ACETONIDE 40 MG: 40 INJECTION, SUSPENSION INTRA-ARTICULAR; INTRAMUSCULAR at 03:10

## 2019-10-10 NOTE — PROGRESS NOTES
Past Medical History:   Diagnosis Date    Angio-edema     Asthma     Diabetes mellitus     Eczema     Obesity     Urticaria        Past Surgical History:   Procedure Laterality Date    ADENOIDECTOMY      BONY PELVIS SURGERY       SECTION      FIXATION KYPHOPLASTY N/A 2018    Procedure: Kyphoplasty;  Surgeon: Martinez Morejon MD;  Location: [facility];  Service: Pain Management;  Laterality: N/A;  L1     HYSTERECTOMY      KNEE CARTILAGE SURGERY      TONSILLECTOMY      VAGINOPLASTY         Current Outpatient Medications   Medication Sig    atorvastatin (LIPITOR) 10 MG tablet Take 10 mg by mouth once daily.    azithromycin (ZITHROMAX) 500 MG tablet     CHERATUSSIN AC  mg/5 mL syrup     HYDROcodone-acetaminophen (NORCO) 7.5-325 mg per tablet Take 1 tablet by mouth every 6 (six) hours as needed for Pain.    ibuprofen (ADVIL,MOTRIN) 800 MG tablet Take 1 tablet (800 mg total) by mouth 3 (three) times daily.    losartan (COZAAR) 50 MG tablet     meloxicam (MOBIC) 15 MG tablet Mobic 15 mg tablet   Take 1 tablet every day by oral route.    metformin (GLUCOPHAGE) 500 MG tablet     mirabegron (MYRBETRIQ) 50 mg Tb24 Take 1 tablet (50 mg total) by mouth once daily.    nystatin (MYCOSTATIN) powder     PROAIR HFA 90 mcg/actuation inhaler     SYNTHROID 175 mcg tablet     triamterene-hydrochlorothiazide 37.5-25 mg (DYAZIDE) 37.5-25 mg per capsule     TRULICITY 1.5 mg/0.5 mL PnIj     epinephrine (EPIPEN) 0.3 mg/0.3 mL (1:1,000) AtIn Inject 0.6 mLs (0.6 mg total) into the muscle once.     No current facility-administered medications for this visit.        Review of patient's allergies indicates:  No Known Allergies    Family History   Problem Relation Age of Onset    Breast cancer Mother 44         at 59yo from breast ca    Asthma Maternal Uncle     Allergies Son     Allergic rhinitis Son     Eczema Son     Angioedema Daughter     Allergies Daughter     Immunodeficiency Neg Hx      Ovarian cancer Neg Hx        Social History     Socioeconomic History    Marital status:      Spouse name: Not on file    Number of children: Not on file    Years of education: Not on file    Highest education level: Not on file   Occupational History    Not on file   Social Needs    Financial resource strain: Not on file    Food insecurity:     Worry: Not on file     Inability: Not on file    Transportation needs:     Medical: Not on file     Non-medical: Not on file   Tobacco Use    Smoking status: Never Smoker   Substance and Sexual Activity    Alcohol use: Yes     Comment: seldom    Drug use: Not on file    Sexual activity: Not Currently     Partners: Male     Birth control/protection: None   Lifestyle    Physical activity:     Days per week: Not on file     Minutes per session: Not on file    Stress: Not on file   Relationships    Social connections:     Talks on phone: Not on file     Gets together: Not on file     Attends Hinduism service: Not on file     Active member of club or organization: Not on file     Attends meetings of clubs or organizations: Not on file     Relationship status: Not on file   Other Topics Concern    Not on file   Social History Narrative    Not on file       Chief Complaint:   Chief Complaint   Patient presents with    Knee Pain     bilateral knee pain last inj 9/519 euflexxa 3/3       History of present illness: Is a 64-year-old female seen for bilateral knee pain.  Patient's had pain for years.  Patient's had injections, physical therapy, and even meniscal surgery. Left knee is the worst of the 2.  Pain with walking standing or getting up from sitting.  We tried to repeat the Euflexxa series but it did not seem to help as much.  Pain is an 8/10 today.  Hurts getting up from a chair.  The right is really bothering her lot at night.  They both her very stiff.    Review of Systems:  Musculoskeletal:  See HPI    Physical Examination:    Vital Signs:  There  were no vitals filed for this visit.    Body mass index is 49.26 kg/m².    This a well-developed, well nourished patient in no acute distress.  They are alert and oriented and cooperative to examination.  Pt. walks without an antalgic gait.      Examination of bilateral knees shows no rashes or erythema. There are no masses ecchymosis or effusion. Patient has full range of motion from 0-130°. Patient is nontender to palpation over lateral joint line and mildly tender to palpation over the medial joint line.  Knee is stable to varus and valgus stress. 5 out of 5 motor strength. Palpable distal pulses. Intact light touch sensation.  Moderate Patellofemoral crepitus      X-rays: X-rays left knee are reviewed which show some moderate left knee arthritis and more mild to moderate right knee arthritis     Assessment:: Bilateral knee arthritis    Plan:  I reviewed the findings with her today.  We talked about treatment options. She agreed to just try some cortisone injections today.  Hopefully this will calm the knees down and left the Euflexxa work.    This note was created using MeMeMe voice recognition software that occasionally misinterpreted phrases or words.    Consult note is delivered via Epic messaging service.

## 2019-10-10 NOTE — PROCEDURES
Large Joint Aspiration/Injection: R knee, L knee  Date/Time: 10/10/2019 3:45 PM  Performed by: Estevan Monteiro MD  Authorized by: Estevan Monteiro MD     Consent Done?:  Yes (Verbal)  Indications:  Pain  Procedure site marked: Yes    Timeout: Prior to procedure the correct patient, procedure, and site was verified    Anesthesia    Anesthetic: lidocaine 1% without epinephrine and bupivacaine 0.25% without epinephrine  Anesthetic total: 6mL    Location:  Knee  Site:  R knee and L knee  Prep: Patient was prepped and draped in usual sterile fashion    Needle size:  20 G  Approach:  Anterolateral  Medications:  40 mg triamcinolone acetonide 40 mg/mL; 40 mg triamcinolone acetonide 40 mg/mL  Patient tolerance:  Patient tolerated the procedure well with no immediate complications

## 2019-11-04 ENCOUNTER — HOSPITAL ENCOUNTER (OUTPATIENT)
Dept: RADIOLOGY | Facility: HOSPITAL | Age: 64
Discharge: HOME | End: 2019-11-04
Attending: OBSTETRICS & GYNECOLOGY
Payer: COMMERCIAL

## 2019-11-04 ENCOUNTER — OFFICE VISIT (OUTPATIENT)
Dept: OBSTETRICS AND GYNECOLOGY | Facility: CLINIC | Age: 64
End: 2019-11-04
Payer: COMMERCIAL

## 2019-11-04 VITALS — BODY MASS INDEX: 44.41 KG/M2 | WEIGHT: 293 LBS | HEIGHT: 68 IN

## 2019-11-04 VITALS
SYSTOLIC BLOOD PRESSURE: 126 MMHG | DIASTOLIC BLOOD PRESSURE: 76 MMHG | HEIGHT: 68 IN | BODY MASS INDEX: 44.41 KG/M2 | RESPIRATION RATE: 18 BRPM | WEIGHT: 293 LBS

## 2019-11-04 DIAGNOSIS — Z12.31 ENCOUNTER FOR SCREENING MAMMOGRAM FOR BREAST CANCER: ICD-10-CM

## 2019-11-04 DIAGNOSIS — Z12.31 ENCOUNTER FOR SCREENING MAMMOGRAM FOR BREAST CANCER: Primary | ICD-10-CM

## 2019-11-04 PROCEDURE — 77063 BREAST TOMOSYNTHESIS BI: CPT | Mod: 26,,, | Performed by: RADIOLOGY

## 2019-11-04 PROCEDURE — 99999 PR PBB SHADOW E&M-EST. PATIENT-LVL III: CPT | Mod: PBBFAC,,, | Performed by: OBSTETRICS & GYNECOLOGY

## 2019-11-04 PROCEDURE — 77067 MAMMO DIGITAL SCREENING BILAT WITH TOMOSYNTHESIS_CAD: ICD-10-PCS | Mod: 26,,, | Performed by: RADIOLOGY

## 2019-11-04 PROCEDURE — 99396 PREV VISIT EST AGE 40-64: CPT | Mod: S$GLB,,, | Performed by: OBSTETRICS & GYNECOLOGY

## 2019-11-04 PROCEDURE — 99396 PR PREVENTIVE VISIT,EST,40-64: ICD-10-PCS | Mod: S$GLB,,, | Performed by: OBSTETRICS & GYNECOLOGY

## 2019-11-04 PROCEDURE — 77067 SCR MAMMO BI INCL CAD: CPT | Mod: TC,PN

## 2019-11-04 PROCEDURE — 77063 MAMMO DIGITAL SCREENING BILAT WITH TOMOSYNTHESIS_CAD: ICD-10-PCS | Mod: 26,,, | Performed by: RADIOLOGY

## 2019-11-04 PROCEDURE — 99999 PR PBB SHADOW E&M-EST. PATIENT-LVL III: ICD-10-PCS | Mod: PBBFAC,,, | Performed by: OBSTETRICS & GYNECOLOGY

## 2019-11-04 PROCEDURE — 77067 SCR MAMMO BI INCL CAD: CPT | Mod: 26,,, | Performed by: RADIOLOGY

## 2019-11-04 RX ORDER — OLMESARTAN MEDOXOMIL 20 MG/1
TABLET ORAL
COMMUNITY
Start: 2019-10-04

## 2019-11-04 NOTE — PROGRESS NOTES
Chief Complaint   Patient presents with    Well Woman       History and Physical:  No LMP recorded. Patient has had a hysterectomy.       Gail Temple is a 64 y.o.  female who presents today for her routine annual GYN exam. The patient has no Gynecology complaints today.no bowel or bladder complaints.        Allergies:   Review of patient's allergies indicates:   Allergen Reactions    Levothyroxine      GENERIC ONLY / PT CAN TAKE SYNTHROID       Past Medical History:   Diagnosis Date    Angio-edema     Asthma     Diabetes mellitus     Eczema     Obesity     Urticaria        Past Surgical History:   Procedure Laterality Date    ADENOIDECTOMY      BONY PELVIS SURGERY       SECTION      FIXATION KYPHOPLASTY N/A 2018    Procedure: Kyphoplasty;  Surgeon: Martinez Morejon MD;  Location: [facility];  Service: Pain Management;  Laterality: N/A;  L1     HYSTERECTOMY      KNEE CARTILAGE SURGERY      TONSILLECTOMY      VAGINOPLASTY         MEDS:   Current Outpatient Medications on File Prior to Visit   Medication Sig Dispense Refill    atorvastatin (LIPITOR) 10 MG tablet Take 10 mg by mouth once daily.      azithromycin (ZITHROMAX) 500 MG tablet       CHERATUSSIN AC  mg/5 mL syrup       epinephrine (EPIPEN) 0.3 mg/0.3 mL (1:1,000) AtIn Inject 0.6 mLs (0.6 mg total) into the muscle once. 2 each 0    ferrous sulfate 134 mg (27 mg iron) Tab ferrous sulfate      HYDROcodone-acetaminophen (NORCO) 7.5-325 mg per tablet Take 1 tablet by mouth every 6 (six) hours as needed for Pain. 30 tablet 0    ibuprofen (ADVIL,MOTRIN) 800 MG tablet Take 1 tablet (800 mg total) by mouth 3 (three) times daily. 60 tablet 1    losartan (COZAAR) 50 MG tablet       meloxicam (MOBIC) 15 MG tablet Mobic 15 mg tablet   Take 1 tablet every day by oral route.      metformin (GLUCOPHAGE) 500 MG tablet       mirabegron (MYRBETRIQ) 50 mg Tb24 Take 1 tablet (50 mg total) by mouth once daily. 30 tablet 6     nystatin (MYCOSTATIN) powder       olmesartan (BENICAR) 20 MG tablet       PROAIR HFA 90 mcg/actuation inhaler       SYNTHROID 175 mcg tablet       triamterene-hydrochlorothiazide 37.5-25 mg (DYAZIDE) 37.5-25 mg per capsule       TRULICITY 1.5 mg/0.5 mL PnIj        No current facility-administered medications on file prior to visit.        OB History        4    Para   4    Term   2            AB        Living           SAB        TAB        Ectopic        Multiple        Live Births                     Social History     Socioeconomic History    Marital status:      Spouse name: Not on file    Number of children: Not on file    Years of education: Not on file    Highest education level: Not on file   Occupational History    Not on file   Social Needs    Financial resource strain: Not on file    Food insecurity:     Worry: Not on file     Inability: Not on file    Transportation needs:     Medical: Not on file     Non-medical: Not on file   Tobacco Use    Smoking status: Never Smoker   Substance and Sexual Activity    Alcohol use: Yes     Comment: seldom    Drug use: Not on file    Sexual activity: Not Currently     Partners: Male     Birth control/protection: None   Lifestyle    Physical activity:     Days per week: Not on file     Minutes per session: Not on file    Stress: Not on file   Relationships    Social connections:     Talks on phone: Not on file     Gets together: Not on file     Attends Cheondoism service: Not on file     Active member of club or organization: Not on file     Attends meetings of clubs or organizations: Not on file     Relationship status: Not on file   Other Topics Concern    Not on file   Social History Narrative    Not on file       Family History   Problem Relation Age of Onset    Breast cancer Mother 44         at 61yo from breast ca    Asthma Maternal Uncle     Allergies Son     Allergic rhinitis Son     Eczema Son     Angioedema  "Daughter     Allergies Daughter     Immunodeficiency Neg Hx     Ovarian cancer Neg Hx          Past medical and surgical history reviewed.   I have reviewed the patient's medical history in detail and updated the computerized patient record.        Review of System:  General: no chills, fever, night sweats, weight gain or weight loss  Psychological: no depression or suicidal ideation  Breasts: no new or changing breast lumps, nipple discharge or masses.  Respiratory: no cough, shortness of breath, or wheezing  Cardiovascular: no chest pain or dyspnea on exertion  Gastrointestinal: no abdominal pain, change in bowel habits, or black or bloody stools  Genito-Urinary: no incontinence, urinary frequency/urgency or vulvar/vaginal symptoms, pelvic pain or abnormal vaginal bleeding.  Musculoskeletal: no gait disturbance or muscular weakness      Physical Exam:   /76   Resp 18   Ht 5' 8" (1.727 m)   Wt (!) 140 kg (308 lb 10.3 oz)   BMI 46.93 kg/m²   Constitutional: She is oriented to person, place, and time. She appears well-developed and well-nourished. No distress.   HENT:   Head: Normocephalic and atraumatic.   Eyes: Conjunctivae and EOM are normal. No scleral icterus.   Neck: Normal range of motion. Neck supple. No tracheal deviation present.   Cardiovascular: Normal rate.    Pulmonary/Chest: Effort normal. No respiratory distress. She exhibits no tenderness.  Breasts: are symmetrical.   Right breast exhibits no inverted nipple, no mass, no nipple discharge, no skin change and no tenderness.   Left breast exhibits no inverted nipple, no mass, no nipple discharge, no skin change and no tenderness.  Abdominal: Soft. She exhibits no distension and no mass. There is no tenderness. There is no rebound and no guarding.   Genitourinary:    External rectal exam shows no thrombosed external hemorrhoids.    Pelvic exam was performed with patient supine.   No labial fusion.   There is no rash, lesion or injury on the " right labia.   There is no rash, lesion or injury on the left labia.   No bleeding and no signs of injury around the vaginal introitus, urethra is without lesions and well supported.    No vaginal discharge found.    No significant Cystocele, Enterocele or rectocele, and cuff well supported.   Bimanual exam:   The urethra and vagina are without palpable masses or tenderness.   Uterus and cervix are surgically absents, vaginal cuff is intact and well supported.   Right adnexum displays no mass and no tenderness.   Left adnexum displays no mass and no tenderness.  Musculoskeletal: Normal range of motion.   Lymphadenopathy: No inguinal adenopathy present.   Neurological: She is alert and oriented to person, place, and time. Coordination normal.   Skin: Skin is warm and dry. She is not diaphoretic.   Psychiatric: She has a normal mood and affect.        Assessment:   Normal annual GYN exam  1. Encounter for screening mammogram for breast cancer  CANCELED: Mammo Digital Screening Bilat With CAD       Plan:   PAP not needed  Mammogram  Follow up in 1 year.

## 2019-11-27 RX ORDER — MIRABEGRON 50 MG/1
TABLET, FILM COATED, EXTENDED RELEASE ORAL
Qty: 30 TABLET | Refills: 6 | Status: SHIPPED | OUTPATIENT
Start: 2019-11-27 | End: 2021-01-28 | Stop reason: ALTCHOICE

## 2020-01-15 ENCOUNTER — TELEPHONE (OUTPATIENT)
Dept: ORTHOPEDICS | Facility: CLINIC | Age: 65
End: 2020-01-15

## 2020-01-15 NOTE — TELEPHONE ENCOUNTER
----- Message from Stephani Coles sent at 1/15/2020  3:17 PM CST -----  Contact: self [phone]  Type:  Sooner Apoointment Request    Caller is requesting a sooner appointment.    Caller is requesting a message be sent to doctor.    Name of Caller:  KASSY REGALADO [[id number]]  When is the first available appointment? 1/27/2020  Symptoms: bilateral knee pain   Best Call Back Number: [phone]

## 2020-01-20 ENCOUNTER — OFFICE VISIT (OUTPATIENT)
Dept: ORTHOPEDICS | Facility: CLINIC | Age: 65
End: 2020-01-20
Payer: COMMERCIAL

## 2020-01-20 VITALS
RESPIRATION RATE: 13 BRPM | DIASTOLIC BLOOD PRESSURE: 60 MMHG | BODY MASS INDEX: 44.41 KG/M2 | HEIGHT: 68 IN | HEART RATE: 84 BPM | SYSTOLIC BLOOD PRESSURE: 131 MMHG | WEIGHT: 293 LBS

## 2020-01-20 DIAGNOSIS — M17.0 PRIMARY OSTEOARTHRITIS OF BOTH KNEES: Primary | ICD-10-CM

## 2020-01-20 PROCEDURE — 3008F BODY MASS INDEX DOCD: CPT | Mod: CPTII,S$GLB,, | Performed by: ORTHOPAEDIC SURGERY

## 2020-01-20 PROCEDURE — 20610 DRAIN/INJ JOINT/BURSA W/O US: CPT | Mod: 51,LT,S$GLB, | Performed by: ORTHOPAEDIC SURGERY

## 2020-01-20 PROCEDURE — 99213 OFFICE O/P EST LOW 20 MIN: CPT | Mod: 25,S$GLB,, | Performed by: ORTHOPAEDIC SURGERY

## 2020-01-20 PROCEDURE — 3008F PR BODY MASS INDEX (BMI) DOCUMENTED: ICD-10-PCS | Mod: CPTII,S$GLB,, | Performed by: ORTHOPAEDIC SURGERY

## 2020-01-20 PROCEDURE — 99999 PR PBB SHADOW E&M-EST. PATIENT-LVL III: ICD-10-PCS | Mod: PBBFAC,,, | Performed by: ORTHOPAEDIC SURGERY

## 2020-01-20 PROCEDURE — 20610 LARGE JOINT ASPIRATION/INJECTION: R KNEE, L KNEE: ICD-10-PCS | Mod: RT,S$GLB,, | Performed by: ORTHOPAEDIC SURGERY

## 2020-01-20 PROCEDURE — 99213 PR OFFICE/OUTPT VISIT, EST, LEVL III, 20-29 MIN: ICD-10-PCS | Mod: 25,S$GLB,, | Performed by: ORTHOPAEDIC SURGERY

## 2020-01-20 PROCEDURE — 99999 PR PBB SHADOW E&M-EST. PATIENT-LVL III: CPT | Mod: PBBFAC,,, | Performed by: ORTHOPAEDIC SURGERY

## 2020-01-20 PROCEDURE — 20610 DRAIN/INJ JOINT/BURSA W/O US: CPT | Mod: RT,S$GLB,, | Performed by: ORTHOPAEDIC SURGERY

## 2020-01-20 RX ORDER — TRIAMCINOLONE ACETONIDE 40 MG/ML
40 INJECTION, SUSPENSION INTRA-ARTICULAR; INTRAMUSCULAR
Status: DISCONTINUED | OUTPATIENT
Start: 2020-01-20 | End: 2020-01-20 | Stop reason: HOSPADM

## 2020-01-20 RX ADMIN — TRIAMCINOLONE ACETONIDE 40 MG: 40 INJECTION, SUSPENSION INTRA-ARTICULAR; INTRAMUSCULAR at 10:01

## 2020-01-20 NOTE — PROCEDURES
Large Joint Aspiration/Injection: R knee, L knee  Date/Time: 1/20/2020 10:45 AM  Performed by: Estevan Monteiro MD  Authorized by: Estevan Monteiro MD     Consent Done?:  Yes (Verbal)  Indications:  Pain  Procedure site marked: Yes    Timeout: Prior to procedure the correct patient, procedure, and site was verified    Anesthesia    Anesthetic: lidocaine 1% without epinephrine and bupivacaine 0.25% without epinephrine  Anesthetic total: 6mL    Location:  Knee  Site:  R knee and L knee  Prep: Patient was prepped and draped in usual sterile fashion    Needle size:  20 G  Approach:  Anterolateral  Medications:  40 mg triamcinolone acetonide 40 mg/mL; 40 mg triamcinolone acetonide 40 mg/mL  Patient tolerance:  Patient tolerated the procedure well with no immediate complications

## 2020-01-20 NOTE — PROGRESS NOTES
Past Medical History:   Diagnosis Date    Angio-edema     Asthma     Diabetes mellitus     Eczema     Obesity     Urticaria        Past Surgical History:   Procedure Laterality Date    ADENOIDECTOMY      BONY PELVIS SURGERY       SECTION      FIXATION KYPHOPLASTY N/A 2018    Procedure: Kyphoplasty;  Surgeon: Martinez Morejon MD;  Location: [facility];  Service: Pain Management;  Laterality: N/A;  L1     HYSTERECTOMY      KNEE CARTILAGE SURGERY      TONSILLECTOMY      VAGINOPLASTY         Current Outpatient Medications   Medication Sig    atorvastatin (LIPITOR) 10 MG tablet Take 10 mg by mouth once daily.    ferrous sulfate 134 mg (27 mg iron) Tab ferrous sulfate    metformin (GLUCOPHAGE) 500 MG tablet     MYRBETRIQ 50 mg Tb24 TAKE 1 TABLET BY MOUTH ONCE DAILY    olmesartan (BENICAR) 20 MG tablet     PROAIR HFA 90 mcg/actuation inhaler     SYNTHROID 175 mcg tablet     triamterene-hydrochlorothiazide 37.5-25 mg (DYAZIDE) 37.5-25 mg per capsule     TRULICITY 1.5 mg/0.5 mL PnIj     azithromycin (ZITHROMAX) 500 MG tablet     CHERATUSSIN AC  mg/5 mL syrup     epinephrine (EPIPEN) 0.3 mg/0.3 mL (1:1,000) AtIn Inject 0.6 mLs (0.6 mg total) into the muscle once.    HYDROcodone-acetaminophen (NORCO) 7.5-325 mg per tablet Take 1 tablet by mouth every 6 (six) hours as needed for Pain. (Patient not taking: Reported on 2020)    ibuprofen (ADVIL,MOTRIN) 800 MG tablet Take 1 tablet (800 mg total) by mouth 3 (three) times daily. (Patient not taking: Reported on 2020)    losartan (COZAAR) 50 MG tablet     meloxicam (MOBIC) 15 MG tablet Mobic 15 mg tablet   Take 1 tablet every day by oral route.    nystatin (MYCOSTATIN) powder      No current facility-administered medications for this visit.        Review of patient's allergies indicates:  No Known Allergies    Family History   Problem Relation Age of Onset    Breast cancer Mother 42         at 59yo from breast ca    Asthma  Maternal Uncle     Allergies Son     Allergic rhinitis Son     Eczema Son     Angioedema Daughter     Allergies Daughter     Immunodeficiency Neg Hx     Ovarian cancer Neg Hx        Social History     Socioeconomic History    Marital status:      Spouse name: Not on file    Number of children: Not on file    Years of education: Not on file    Highest education level: Not on file   Occupational History    Not on file   Social Needs    Financial resource strain: Not on file    Food insecurity:     Worry: Not on file     Inability: Not on file    Transportation needs:     Medical: Not on file     Non-medical: Not on file   Tobacco Use    Smoking status: Never Smoker   Substance and Sexual Activity    Alcohol use: Yes     Comment: seldom    Drug use: Not on file    Sexual activity: Not Currently     Partners: Male     Birth control/protection: None   Lifestyle    Physical activity:     Days per week: Not on file     Minutes per session: Not on file    Stress: Not on file   Relationships    Social connections:     Talks on phone: Not on file     Gets together: Not on file     Attends [employer] service: Not on file     Active member of club or organization: Not on file     Attends meetings of clubs or organizations: Not on file     Relationship status: Not on file   Other Topics Concern    Not on file   Social History Narrative    Not on file       Chief Complaint:   Chief Complaint   Patient presents with    Right Knee - Pain    Left Knee - Pain       History of present illness: Is a 64-year-old female seen for bilateral knee pain.  Patient's had pain for years.  Patient's had injections, physical therapy, and even meniscal surgery. Left knee is the worst of the 2.  Pain with walking standing or getting up from sitting.  We tried to repeat the Euflexxa series but it did not seem to help as much.  Pain is an 8/10 today.  Hurts getting up from a chair.  The right is really bothering her lot  at night.  They both her very stiff.  Last injection was about 3 months ago.    Review of Systems:  Musculoskeletal:  See HPI    Physical Examination:    Vital Signs:    Vitals:    01/20/20 1027   BP: 131/60   Pulse: 84   Resp: 13       Body mass index is 49.28 kg/m².    This a well-developed, well nourished patient in no acute distress.  They are alert and oriented and cooperative to examination.  Pt. walks without an antalgic gait.      Examination of bilateral knees shows no rashes or erythema. There are no masses ecchymosis or effusion. Patient has full range of motion from 0-130°. Patient is nontender to palpation over lateral joint line and mildly tender to palpation over the medial joint line.  Knee is stable to varus and valgus stress. 5 out of 5 motor strength. Palpable distal pulses. Intact light touch sensation.  Moderate Patellofemoral crepitus      X-rays: X-rays left knee are reviewed which show some moderate left knee arthritis and more mild to moderate right knee arthritis     Assessment:: Bilateral knee arthritis    Plan:  I reviewed the findings with her today.  We talked about treatment options. She agreed to just try some cortisone injections today.  Hopefully this will calm the knees down     This note was created using [employer] voice recognition software that occasionally misinterpreted phrases or words.    Consult note is delivered via Epic messaging service.

## 2020-02-28 DIAGNOSIS — M17.0 BILATERAL PRIMARY OSTEOARTHRITIS OF KNEE: Primary | ICD-10-CM

## 2020-03-02 ENCOUNTER — OFFICE VISIT (OUTPATIENT)
Dept: ORTHOPEDICS | Facility: CLINIC | Age: 65
End: 2020-03-02
Payer: COMMERCIAL

## 2020-03-02 DIAGNOSIS — M17.0 PRIMARY OSTEOARTHRITIS OF BOTH KNEES: Primary | ICD-10-CM

## 2020-03-02 PROCEDURE — 20610 PR DRAIN/INJECT LARGE JOINT/BURSA: ICD-10-PCS | Mod: 51,RT,S$GLB, | Performed by: ORTHOPAEDIC SURGERY

## 2020-03-02 PROCEDURE — 99499 NO LOS: ICD-10-PCS | Mod: S$GLB,,, | Performed by: ORTHOPAEDIC SURGERY

## 2020-03-02 PROCEDURE — 20610 DRAIN/INJ JOINT/BURSA W/O US: CPT | Mod: LT,S$GLB,, | Performed by: ORTHOPAEDIC SURGERY

## 2020-03-02 PROCEDURE — 20610 DRAIN/INJ JOINT/BURSA W/O US: CPT | Mod: 51,RT,S$GLB, | Performed by: ORTHOPAEDIC SURGERY

## 2020-03-02 PROCEDURE — 99999 PR PBB SHADOW E&M-EST. PATIENT-LVL II: CPT | Mod: PBBFAC,,, | Performed by: ORTHOPAEDIC SURGERY

## 2020-03-02 PROCEDURE — 99499 UNLISTED E&M SERVICE: CPT | Mod: S$GLB,,, | Performed by: ORTHOPAEDIC SURGERY

## 2020-03-02 PROCEDURE — 99999 PR PBB SHADOW E&M-EST. PATIENT-LVL II: ICD-10-PCS | Mod: PBBFAC,,, | Performed by: ORTHOPAEDIC SURGERY

## 2020-03-02 NOTE — PROCEDURES
Large Joint Aspiration/Injection: bilateral knee  Performed by: Estevan Monteiro MD  Authorized by: Estevan Monteiro MD  Date/Time: 3/2/2020 9:15 AM    Consent Done?:  Yes (Verbal)  Indications:  pain  Timeout: Immediately prior to procedure a time out was called to verify the correct patient, procedure, equipment, support staff and site/side marked as required.  Procedure site marked: Yes     Details:   Needle size: 20 G   Approach: anterolateral  Location:  Knee  Site:  Bilateral knee    Medications (Right): 20 mg sodium hyaluronate (EUFLEXXA) 10 mg/mL(mw 2.4 -3.6 million)  Medications (Left): 20 mg sodium hyaluronate (EUFLEXXA) 10 mg/mL(mw 2.4 -3.6 million)  Patient tolerance:  patient tolerated the procedure well with no immediate complications

## 2020-03-02 NOTE — PROGRESS NOTES
Past Medical History:   Diagnosis Date    Angio-edema     Asthma     Diabetes mellitus     Eczema     Obesity     Urticaria        Past Surgical History:   Procedure Laterality Date    ADENOIDECTOMY      BONY PELVIS SURGERY       SECTION      FIXATION KYPHOPLASTY N/A 2018    Procedure: Kyphoplasty;  Surgeon: Martinez Morejon MD;  Location: [facility];  Service: Pain Management;  Laterality: N/A;  L1     HYSTERECTOMY      KNEE CARTILAGE SURGERY      TONSILLECTOMY      VAGINOPLASTY         Current Outpatient Medications   Medication Sig    atorvastatin (LIPITOR) 10 MG tablet Take 10 mg by mouth once daily.    azithromycin (ZITHROMAX) 500 MG tablet     CHERATUSSIN AC  mg/5 mL syrup     ferrous sulfate 134 mg (27 mg iron) Tab ferrous sulfate    HYDROcodone-acetaminophen (NORCO) 7.5-325 mg per tablet Take 1 tablet by mouth every 6 (six) hours as needed for Pain.    ibuprofen (ADVIL,MOTRIN) 800 MG tablet Take 1 tablet (800 mg total) by mouth 3 (three) times daily.    losartan (COZAAR) 50 MG tablet     meloxicam (MOBIC) 15 MG tablet Mobic 15 mg tablet   Take 1 tablet every day by oral route.    metformin (GLUCOPHAGE) 500 MG tablet     MYRBETRIQ 50 mg Tb24 TAKE 1 TABLET BY MOUTH ONCE DAILY    nystatin (MYCOSTATIN) powder     olmesartan (BENICAR) 20 MG tablet     PROAIR HFA 90 mcg/actuation inhaler     SYNTHROID 175 mcg tablet     triamterene-hydrochlorothiazide 37.5-25 mg (DYAZIDE) 37.5-25 mg per capsule     TRULICITY 1.5 mg/0.5 mL PnIj     epinephrine (EPIPEN) 0.3 mg/0.3 mL (1:1,000) AtIn Inject 0.6 mLs (0.6 mg total) into the muscle once.     No current facility-administered medications for this visit.        Review of patient's allergies indicates:  No Known Allergies    Family History   Problem Relation Age of Onset    Breast cancer Mother 42         at 59yo from breast ca    Asthma Maternal Uncle     Allergies Son     Allergic rhinitis Son     Eczema Son      Angioedema Daughter     Allergies Daughter     Immunodeficiency Neg Hx     Ovarian cancer Neg Hx        Social History     Socioeconomic History    Marital status:      Spouse name: Not on file    Number of children: Not on file    Years of education: Not on file    Highest education level: Not on file   Occupational History    Not on file   Social Needs    Financial resource strain: Not on file    Food insecurity:     Worry: Not on file     Inability: Not on file    Transportation needs:     Medical: Not on file     Non-medical: Not on file   Tobacco Use    Smoking status: Never Smoker   Substance and Sexual Activity    Alcohol use: Yes     Comment: seldom    Drug use: Not on file    Sexual activity: Not Currently     Partners: Male     Birth control/protection: None   Lifestyle    Physical activity:     Days per week: Not on file     Minutes per session: Not on file    Stress: Not on file   Relationships    Social connections:     Talks on phone: Not on file     Gets together: Not on file     Attends [employer] service: Not on file     Active member of club or organization: Not on file     Attends meetings of clubs or organizations: Not on file     Relationship status: Not on file   Other Topics Concern    Not on file   Social History Narrative    Not on file       Chief Complaint:   Chief Complaint   Patient presents with    Knee Pain     B euflexxa 1/3       History of present illness: Is a 64-year-old female seen for bilateral knee pain.  Patient's had pain for years.  Patient's had injections, physical therapy, and even meniscal surgery. Left knee is the worst of the 2.  Pain with walking standing or getting up from sitting.  We tried to repeat the Euflexxa series but it did not seem to help as much.  Pain is an 8/10 today.  Hurts getting up from a chair.  The right is really bothering her lot at night.  They both her very stiff.  Last injection was about 3 months ago.    Review of  Systems:  Musculoskeletal:  See HPI    Physical Examination:    Vital Signs:    There were no vitals filed for this visit.    There is no height or weight on file to calculate BMI.    This a well-developed, well nourished patient in no acute distress.  They are alert and oriented and cooperative to examination.  Pt. walks without an antalgic gait.      Examination of bilateral knees shows no rashes or erythema. There are no masses ecchymosis or effusion. Patient has full range of motion from 0-130°. Patient is nontender to palpation over lateral joint line and mildly tender to palpation over the medial joint line.  Knee is stable to varus and valgus stress. 5 out of 5 motor strength. Palpable distal pulses. Intact light touch sensation.  Moderate Patellofemoral crepitus      X-rays: X-rays left knee are reviewed which show some moderate left knee arthritis and more mild to moderate right knee arthritis     Assessment:: Bilateral knee arthritis    Plan:  I reviewed the findings with her today. I injected both of her knees with Euflexxa 1/3. Follow up next week.      This note was created using [employer] voice recognition software that occasionally misinterpreted phrases or words.    Consult note is delivered via Epic messaging service.

## 2020-03-16 ENCOUNTER — OFFICE VISIT (OUTPATIENT)
Dept: ORTHOPEDICS | Facility: CLINIC | Age: 65
End: 2020-03-16
Payer: COMMERCIAL

## 2020-03-16 VITALS — RESPIRATION RATE: 17 BRPM | BODY MASS INDEX: 44.41 KG/M2 | HEIGHT: 68 IN | WEIGHT: 293 LBS

## 2020-03-16 DIAGNOSIS — M17.0 PRIMARY OSTEOARTHRITIS OF BOTH KNEES: Primary | ICD-10-CM

## 2020-03-16 PROCEDURE — 20610 DRAIN/INJ JOINT/BURSA W/O US: CPT | Mod: 51,RT,S$GLB, | Performed by: ORTHOPAEDIC SURGERY

## 2020-03-16 PROCEDURE — 20610 DRAIN/INJ JOINT/BURSA W/O US: CPT | Mod: LT,S$GLB,, | Performed by: ORTHOPAEDIC SURGERY

## 2020-03-16 PROCEDURE — 99999 PR PBB SHADOW E&M-EST. PATIENT-LVL III: ICD-10-PCS | Mod: PBBFAC,,, | Performed by: ORTHOPAEDIC SURGERY

## 2020-03-16 PROCEDURE — 99499 NO LOS: ICD-10-PCS | Mod: S$GLB,,, | Performed by: ORTHOPAEDIC SURGERY

## 2020-03-16 PROCEDURE — 20610 LARGE JOINT ASPIRATION/INJECTION: BILATERAL KNEE: ICD-10-PCS | Mod: LT,S$GLB,, | Performed by: ORTHOPAEDIC SURGERY

## 2020-03-16 PROCEDURE — 99999 PR PBB SHADOW E&M-EST. PATIENT-LVL III: CPT | Mod: PBBFAC,,, | Performed by: ORTHOPAEDIC SURGERY

## 2020-03-16 PROCEDURE — 99499 UNLISTED E&M SERVICE: CPT | Mod: S$GLB,,, | Performed by: ORTHOPAEDIC SURGERY

## 2020-03-16 NOTE — PROCEDURES
Large Joint Aspiration/Injection: bilateral knee  Performed by: Estevan Monteiro MD  Authorized by: Estevan Monteiro MD  Date/Time: 3/16/2020 9:45 AM    Consent Done?:  Yes (Verbal)  Indications:  pain  Timeout: Immediately prior to procedure a time out was called to verify the correct patient, procedure, equipment, support staff and site/side marked as required.  Procedure site marked: Yes     Details:   Needle size: 20 G   Approach: anterolateral  Location:  Knee  Site:  Bilateral knee    Medications (Right): 20 mg sodium hyaluronate (EUFLEXXA) 10 mg/mL(mw 2.4 -3.6 million)  Medications (Left): 20 mg sodium hyaluronate (EUFLEXXA) 10 mg/mL(mw 2.4 -3.6 million)  Patient tolerance:  patient tolerated the procedure well with no immediate complications

## 2020-03-16 NOTE — PROGRESS NOTES
Past Medical History:   Diagnosis Date    Angio-edema     Asthma     Diabetes mellitus     Eczema     Obesity     Urticaria        Past Surgical History:   Procedure Laterality Date    ADENOIDECTOMY      BONY PELVIS SURGERY       SECTION      FIXATION KYPHOPLASTY N/A 2018    Procedure: Kyphoplasty;  Surgeon: Martinez Morejon MD;  Location: [facility];  Service: Pain Management;  Laterality: N/A;  L1     HYSTERECTOMY      KNEE CARTILAGE SURGERY      TONSILLECTOMY      VAGINOPLASTY         Current Outpatient Medications   Medication Sig    atorvastatin (LIPITOR) 10 MG tablet Take 10 mg by mouth once daily.    azithromycin (ZITHROMAX) 500 MG tablet     CHERATUSSIN AC  mg/5 mL syrup     epinephrine (EPIPEN) 0.3 mg/0.3 mL (1:1,000) AtIn Inject 0.6 mLs (0.6 mg total) into the muscle once.    ferrous sulfate 134 mg (27 mg iron) Tab ferrous sulfate    HYDROcodone-acetaminophen (NORCO) 7.5-325 mg per tablet Take 1 tablet by mouth every 6 (six) hours as needed for Pain.    ibuprofen (ADVIL,MOTRIN) 800 MG tablet Take 1 tablet (800 mg total) by mouth 3 (three) times daily.    losartan (COZAAR) 50 MG tablet     meloxicam (MOBIC) 15 MG tablet Mobic 15 mg tablet   Take 1 tablet every day by oral route.    metformin (GLUCOPHAGE) 500 MG tablet     MYRBETRIQ 50 mg Tb24 TAKE 1 TABLET BY MOUTH ONCE DAILY    nystatin (MYCOSTATIN) powder     olmesartan (BENICAR) 20 MG tablet     PROAIR HFA 90 mcg/actuation inhaler     SYNTHROID 175 mcg tablet     triamterene-hydrochlorothiazide 37.5-25 mg (DYAZIDE) 37.5-25 mg per capsule     TRULICITY 1.5 mg/0.5 mL PnIj      No current facility-administered medications for this visit.        Review of patient's allergies indicates:  No Known Allergies    Family History   Problem Relation Age of Onset    Breast cancer Mother 42         at 61yo from breast ca    Asthma Maternal Uncle     Allergies Son     Allergic rhinitis Son     Eczema Son      Angioedema Daughter     Allergies Daughter     Immunodeficiency Neg Hx     Ovarian cancer Neg Hx        Social History     Socioeconomic History    Marital status:      Spouse name: Not on file    Number of children: Not on file    Years of education: Not on file    Highest education level: Not on file   Occupational History    Not on file   Social Needs    Financial resource strain: Not on file    Food insecurity:     Worry: Not on file     Inability: Not on file    Transportation needs:     Medical: Not on file     Non-medical: Not on file   Tobacco Use    Smoking status: Never Smoker   Substance and Sexual Activity    Alcohol use: Yes     Comment: seldom    Drug use: Not on file    Sexual activity: Not Currently     Partners: Male     Birth control/protection: None   Lifestyle    Physical activity:     Days per week: Not on file     Minutes per session: Not on file    Stress: Not on file   Relationships    Social connections:     Talks on phone: Not on file     Gets together: Not on file     Attends Orthodoxy service: Not on file     Active member of club or organization: Not on file     Attends meetings of clubs or organizations: Not on file     Relationship status: Not on file   Other Topics Concern    Not on file   Social History Narrative    Not on file       Chief Complaint:   Chief Complaint   Patient presents with    Left Knee - Injections, Pain    Right Knee - Injections, Pain       History of present illness: Is a 64-year-old female seen for bilateral knee pain.  Patient's had pain for years.  Patient's had injections, physical therapy, and even meniscal surgery. Left knee is the worst of the 2.  Pain with walking standing or getting up from sitting.  We tried to repeat the Euflexxa series but it did not seem to help as much.  Pain is an 8/10 today.  Hurts getting up from a chair.  The right is really bothering her lot at night.  They both her very stiff.  Last injection was  about 3 months ago.    Review of Systems:  Musculoskeletal:  See HPI    Physical Examination:    Vital Signs:    Vitals:    03/16/20 0952   Resp: 17       Body mass index is 49.28 kg/m².    This a well-developed, well nourished patient in no acute distress.  They are alert and oriented and cooperative to examination.  Pt. walks without an antalgic gait.      Examination of bilateral knees shows no rashes or erythema. There are no masses ecchymosis or effusion. Patient has full range of motion from 0-130°. Patient is nontender to palpation over lateral joint line and mildly tender to palpation over the medial joint line.  Knee is stable to varus and valgus stress. 5 out of 5 motor strength. Palpable distal pulses. Intact light touch sensation.  Moderate Patellofemoral crepitus      X-rays: X-rays left knee are reviewed which show some moderate left knee arthritis and more mild to moderate right knee arthritis     Assessment:: Bilateral knee arthritis    Plan:  I reviewed the findings with her today. I injected both of her knees with Euflexxa 2/3. Follow up next week.      This note was created using [employer] voice recognition software that occasionally misinterpreted phrases or words.    Consult note is delivered via Epic messaging service.

## 2020-03-23 ENCOUNTER — OFFICE VISIT (OUTPATIENT)
Dept: ORTHOPEDICS | Facility: CLINIC | Age: 65
End: 2020-03-23
Payer: COMMERCIAL

## 2020-03-23 DIAGNOSIS — M17.0 PRIMARY OSTEOARTHRITIS OF BOTH KNEES: Primary | ICD-10-CM

## 2020-03-23 PROCEDURE — 20610 LARGE JOINT ASPIRATION/INJECTION: BILATERAL KNEE: ICD-10-PCS | Mod: LT,S$GLB,, | Performed by: ORTHOPAEDIC SURGERY

## 2020-03-23 PROCEDURE — 99999 PR PBB SHADOW E&M-EST. PATIENT-LVL II: CPT | Mod: PBBFAC,,, | Performed by: ORTHOPAEDIC SURGERY

## 2020-03-23 PROCEDURE — 20610 DRAIN/INJ JOINT/BURSA W/O US: CPT | Mod: LT,S$GLB,, | Performed by: ORTHOPAEDIC SURGERY

## 2020-03-23 PROCEDURE — 99499 NO LOS: ICD-10-PCS | Mod: S$GLB,,, | Performed by: ORTHOPAEDIC SURGERY

## 2020-03-23 PROCEDURE — 99999 PR PBB SHADOW E&M-EST. PATIENT-LVL II: ICD-10-PCS | Mod: PBBFAC,,, | Performed by: ORTHOPAEDIC SURGERY

## 2020-03-23 PROCEDURE — 99499 UNLISTED E&M SERVICE: CPT | Mod: S$GLB,,, | Performed by: ORTHOPAEDIC SURGERY

## 2020-03-23 PROCEDURE — 20610 DRAIN/INJ JOINT/BURSA W/O US: CPT | Mod: 51,RT,S$GLB, | Performed by: ORTHOPAEDIC SURGERY

## 2020-03-23 NOTE — PROGRESS NOTES
Past Medical History:   Diagnosis Date    Angio-edema     Asthma     Diabetes mellitus     Eczema     Obesity     Urticaria        Past Surgical History:   Procedure Laterality Date    ADENOIDECTOMY      BONY PELVIS SURGERY       SECTION      FIXATION KYPHOPLASTY N/A 2018    Procedure: Kyphoplasty;  Surgeon: Martinez Morejon MD;  Location: [facility];  Service: Pain Management;  Laterality: N/A;  L1     HYSTERECTOMY      KNEE CARTILAGE SURGERY      TONSILLECTOMY      VAGINOPLASTY         Current Outpatient Medications   Medication Sig    atorvastatin (LIPITOR) 10 MG tablet Take 10 mg by mouth once daily.    azithromycin (ZITHROMAX) 500 MG tablet     CHERATUSSIN AC  mg/5 mL syrup     ferrous sulfate 134 mg (27 mg iron) Tab ferrous sulfate    HYDROcodone-acetaminophen (NORCO) 7.5-325 mg per tablet Take 1 tablet by mouth every 6 (six) hours as needed for Pain.    ibuprofen (ADVIL,MOTRIN) 800 MG tablet Take 1 tablet (800 mg total) by mouth 3 (three) times daily.    losartan (COZAAR) 50 MG tablet     meloxicam (MOBIC) 15 MG tablet Mobic 15 mg tablet   Take 1 tablet every day by oral route.    metformin (GLUCOPHAGE) 500 MG tablet     MYRBETRIQ 50 mg Tb24 TAKE 1 TABLET BY MOUTH ONCE DAILY    nystatin (MYCOSTATIN) powder     olmesartan (BENICAR) 20 MG tablet     PROAIR HFA 90 mcg/actuation inhaler     SYNTHROID 175 mcg tablet     triamterene-hydrochlorothiazide 37.5-25 mg (DYAZIDE) 37.5-25 mg per capsule     TRULICITY 1.5 mg/0.5 mL PnIj     epinephrine (EPIPEN) 0.3 mg/0.3 mL (1:1,000) AtIn Inject 0.6 mLs (0.6 mg total) into the muscle once.     No current facility-administered medications for this visit.        Review of patient's allergies indicates:  No Known Allergies    Family History   Problem Relation Age of Onset    Breast cancer Mother 42         at 61yo from breast ca    Asthma Maternal Uncle     Allergies Son     Allergic rhinitis Son     Eczema Son      Angioedema Daughter     Allergies Daughter     Immunodeficiency Neg Hx     Ovarian cancer Neg Hx        Social History     Socioeconomic History    Marital status:      Spouse name: Not on file    Number of children: Not on file    Years of education: Not on file    Highest education level: Not on file   Occupational History    Not on file   Social Needs    Financial resource strain: Not on file    Food insecurity:     Worry: Not on file     Inability: Not on file    Transportation needs:     Medical: Not on file     Non-medical: Not on file   Tobacco Use    Smoking status: Never Smoker   Substance and Sexual Activity    Alcohol use: Yes     Comment: seldom    Drug use: Not on file    Sexual activity: Not Currently     Partners: Male     Birth control/protection: None   Lifestyle    Physical activity:     Days per week: Not on file     Minutes per session: Not on file    Stress: Not on file   Relationships    Social connections:     Talks on phone: Not on file     Gets together: Not on file     Attends Pentecostal service: Not on file     Active member of club or organization: Not on file     Attends meetings of clubs or organizations: Not on file     Relationship status: Not on file   Other Topics Concern    Not on file   Social History Narrative    Not on file       Chief Complaint:   Chief Complaint   Patient presents with    Knee Pain     bilateral knee-Euflexxa 3/3        History of present illness: Is a 64-year-old female seen for bilateral knee pain.  Patient's had pain for years.  Patient's had injections, physical therapy, and even meniscal surgery. Left knee is the worst of the 2.  Pain with walking standing or getting up from sitting.  We tried to repeat the Euflexxa series but it did not seem to help as much.  Pain is an 8/10 today.  Hurts getting up from a chair.  The right is really bothering her lot at night.  They both her very stiff.  Last injection was about 3 months  ago.    Review of Systems:  Musculoskeletal:  See HPI    Physical Examination:    Vital Signs:    There were no vitals filed for this visit.    There is no height or weight on file to calculate BMI.    This a well-developed, well nourished patient in no acute distress.  They are alert and oriented and cooperative to examination.  Pt. walks without an antalgic gait.      Examination of bilateral knees shows no rashes or erythema. There are no masses ecchymosis or effusion. Patient has full range of motion from 0-130°. Patient is nontender to palpation over lateral joint line and mildly tender to palpation over the medial joint line.  Knee is stable to varus and valgus stress. 5 out of 5 motor strength. Palpable distal pulses. Intact light touch sensation.  Moderate Patellofemoral crepitus      X-rays: X-rays left knee are reviewed which show some moderate left knee arthritis and more mild to moderate right knee arthritis     Assessment:: Bilateral knee arthritis    Plan:  I reviewed the findings with her today. I injected both of her knees with Euflexxa 3/3. Follow up as needed.    This note was created using [employer] voice recognition software that occasionally misinterpreted phrases or words.    Consult note is delivered via Epic messaging service.

## 2020-03-23 NOTE — PROCEDURES
Large Joint Aspiration/Injection: bilateral knee  Date/Time: 3/23/2020 10:15 AM  Performed by: Estevan Monteiro MD  Authorized by: Estevan Monteiro MD     Consent Done?:  Yes (Verbal)  Indications:  Pain  Site marked: the procedure site was marked    Timeout: prior to procedure the correct patient, procedure, and site was verified      Details:  Needle Size:  20 G  Approach:  Anterolateral  Location:  Knee  Laterality:  Bilateral  Site:  Bilateral knee  Medications (Right):  20 mg sodium hyaluronate (EUFLEXXA) 10 mg/mL(mw 2.4 -3.6 million)  Medications (Left):  20 mg sodium hyaluronate (EUFLEXXA) 10 mg/mL(mw 2.4 -3.6 million)  Patient tolerance:  Patient tolerated the procedure well with no immediate complications

## 2020-05-21 ENCOUNTER — OFFICE VISIT (OUTPATIENT)
Dept: ORTHOPEDICS | Facility: CLINIC | Age: 65
End: 2020-05-21
Payer: COMMERCIAL

## 2020-05-21 VITALS — BODY MASS INDEX: 44.41 KG/M2 | WEIGHT: 293 LBS | HEIGHT: 68 IN | RESPIRATION RATE: 16 BRPM

## 2020-05-21 DIAGNOSIS — M17.0 PRIMARY OSTEOARTHRITIS OF BOTH KNEES: Primary | ICD-10-CM

## 2020-05-21 PROCEDURE — 99999 PR PBB SHADOW E&M-EST. PATIENT-LVL III: ICD-10-PCS | Mod: PBBFAC,,, | Performed by: ORTHOPAEDIC SURGERY

## 2020-05-21 PROCEDURE — 20610 DRAIN/INJ JOINT/BURSA W/O US: CPT | Mod: 51,LT,S$GLB, | Performed by: ORTHOPAEDIC SURGERY

## 2020-05-21 PROCEDURE — 20610 LARGE JOINT ASPIRATION/INJECTION: BILATERAL KNEE: ICD-10-PCS | Mod: RT,S$GLB,, | Performed by: ORTHOPAEDIC SURGERY

## 2020-05-21 PROCEDURE — 99999 PR PBB SHADOW E&M-EST. PATIENT-LVL III: CPT | Mod: PBBFAC,,, | Performed by: ORTHOPAEDIC SURGERY

## 2020-05-21 PROCEDURE — 3008F PR BODY MASS INDEX (BMI) DOCUMENTED: ICD-10-PCS | Mod: CPTII,S$GLB,, | Performed by: ORTHOPAEDIC SURGERY

## 2020-05-21 PROCEDURE — 99213 OFFICE O/P EST LOW 20 MIN: CPT | Mod: 25,S$GLB,, | Performed by: ORTHOPAEDIC SURGERY

## 2020-05-21 PROCEDURE — 3008F BODY MASS INDEX DOCD: CPT | Mod: CPTII,S$GLB,, | Performed by: ORTHOPAEDIC SURGERY

## 2020-05-21 PROCEDURE — 20610 DRAIN/INJ JOINT/BURSA W/O US: CPT | Mod: RT,S$GLB,, | Performed by: ORTHOPAEDIC SURGERY

## 2020-05-21 PROCEDURE — 99213 PR OFFICE/OUTPT VISIT, EST, LEVL III, 20-29 MIN: ICD-10-PCS | Mod: 25,S$GLB,, | Performed by: ORTHOPAEDIC SURGERY

## 2020-05-21 RX ORDER — TRIAMCINOLONE ACETONIDE 40 MG/ML
40 INJECTION, SUSPENSION INTRA-ARTICULAR; INTRAMUSCULAR
Status: DISCONTINUED | OUTPATIENT
Start: 2020-05-21 | End: 2020-05-21 | Stop reason: HOSPADM

## 2020-05-21 RX ORDER — DICLOFENAC SODIUM 10 MG/G
2 GEL TOPICAL 4 TIMES DAILY
Qty: 1 TUBE | Refills: 0 | Status: SHIPPED | OUTPATIENT
Start: 2020-05-21 | End: 2022-12-15

## 2020-05-21 RX ADMIN — TRIAMCINOLONE ACETONIDE 40 MG: 40 INJECTION, SUSPENSION INTRA-ARTICULAR; INTRAMUSCULAR at 08:05

## 2020-05-21 NOTE — PROCEDURES
Large Joint Aspiration/Injection: bilateral knee  Date/Time: 5/21/2020 8:00 AM  Performed by: Estevan Monteiro MD  Authorized by: Estevan Monteiro MD     Consent Done?:  Yes (Verbal)  Indications:  Pain  Site marked: the procedure site was marked    Timeout: prior to procedure the correct patient, procedure, and site was verified    Prep: patient was prepped and draped in usual sterile fashion      Local anesthesia used?: Yes    Anesthesia:  Local infiltration  Local anesthetic:  Bupivacaine 0.25% without epinephrine and lidocaine 2% without epinephrine  Anesthetic total (ml):  6      Details:  Needle Size:  22 G  Ultrasonic Guidance for needle placement?: No    Approach:  Anterolateral  Location:  Knee  Laterality:  Bilateral  Site:  Bilateral knee  Medications (Right):  40 mg triamcinolone acetonide 40 mg/mL  Medications (Left):  40 mg triamcinolone acetonide 40 mg/mL  Patient tolerance:  Patient tolerated the procedure well with no immediate complications

## 2020-05-21 NOTE — PROGRESS NOTES
Past Medical History:   Diagnosis Date    Angio-edema     Asthma     Diabetes mellitus     Eczema     Obesity     Urticaria        Past Surgical History:   Procedure Laterality Date    ADENOIDECTOMY      BONY PELVIS SURGERY       SECTION      FIXATION KYPHOPLASTY N/A 2018    Procedure: Kyphoplasty;  Surgeon: Martinez Morejon MD;  [facility];  Service: Pain Management;  Laterality: N/A;  L1     HYSTERECTOMY      KNEE CARTILAGE SURGERY      TONSILLECTOMY      VAGINOPLASTY         Current Outpatient Medications   Medication Sig    atorvastatin (LIPITOR) 10 MG tablet Take 10 mg by mouth once daily.    azithromycin (ZITHROMAX) 500 MG tablet     CHERATUSSIN AC  mg/5 mL syrup     epinephrine (EPIPEN) 0.3 mg/0.3 mL (1:1,000) AtIn Inject 0.6 mLs (0.6 mg total) into the muscle once.    ferrous sulfate 134 mg (27 mg iron) Tab ferrous sulfate    HYDROcodone-acetaminophen (NORCO) 7.5-325 mg per tablet Take 1 tablet by mouth every 6 (six) hours as needed for Pain.    ibuprofen (ADVIL,MOTRIN) 800 MG tablet Take 1 tablet (800 mg total) by mouth 3 (three) times daily.    losartan (COZAAR) 50 MG tablet     meloxicam (MOBIC) 15 MG tablet Mobic 15 mg tablet   Take 1 tablet every day by oral route.    metformin (GLUCOPHAGE) 500 MG tablet     MYRBETRIQ 50 mg Tb24 TAKE 1 TABLET BY MOUTH ONCE DAILY    nystatin (MYCOSTATIN) powder     olmesartan (BENICAR) 20 MG tablet     PROAIR HFA 90 mcg/actuation inhaler     SYNTHROID 175 mcg tablet     triamterene-hydrochlorothiazide 37.5-25 mg (DYAZIDE) 37.5-25 mg per capsule     TRULICITY 1.5 mg/0.5 mL PnIj      No current facility-administered medications for this visit.        Review of patient's allergies indicates:  No Known Allergies    Family History   Problem Relation Age of Onset    Breast cancer Mother 42         at 61yo from breast ca    Asthma Maternal Uncle     Allergies Son     Allergic rhinitis Son     Eczema Son      Angioedema Daughter     Allergies Daughter     Immunodeficiency Neg Hx     Ovarian cancer Neg Hx        Social History     Socioeconomic History    Marital status:      Spouse name: Not on file    Number of children: Not on file    Years of education: Not on file    Highest education level: Not on file   Occupational History    Not on file   Social Needs    Financial resource strain: Not on file    Food insecurity:     Worry: Not on file     Inability: Not on file    Transportation needs:     Medical: Not on file     Non-medical: Not on file   Tobacco Use    Smoking status: Never Smoker   Substance and Sexual Activity    Alcohol use: Yes     Comment: seldom    Drug use: Not on file    Sexual activity: Not Currently     Partners: Male     Birth control/protection: None   Lifestyle    Physical activity:     Days per week: Not on file     Minutes per session: Not on file    Stress: Not on file   Relationships    Social connections:     Talks on phone: Not on file     Gets together: Not on file     Attends Mandaen service: Not on file     Active member of club or organization: Not on file     Attends meetings of clubs or organizations: Not on file     Relationship status: Not on file   Other Topics Concern    Not on file   Social History Narrative    Not on file       Chief Complaint:   Chief Complaint   Patient presents with    Left Knee - Pain    Right Knee - Pain       History of present illness: Is a 64-year-old female seen for bilateral knee pain.  Patient's had pain for years.  Patient's had injections, physical therapy, and even meniscal surgery. Left knee is the worst of the 2.  Pain with walking standing or getting up from sitting.  Euflexxa works pretty well for her..  Pain is an 4/10 today.  Hurts getting up from a chair.  The right is really bothering her lot at night.  They both her very stiff.  Last injection was about 3 months ago.    Review of Systems:  Musculoskeletal:   See HPI    Physical Examination:    Vital Signs:    Vitals:    05/21/20 0811   Resp: 16       Body mass index is 49.28 kg/m².    This a well-developed, well nourished patient in no acute distress.  They are alert and oriented and cooperative to examination.  Pt. walks without an antalgic gait.      Examination of bilateral knees shows no rashes or erythema. There are no masses ecchymosis or effusion. Patient has full range of motion from 0-130°. Patient is nontender to palpation over lateral joint line and mildly tender to palpation over the medial joint line.  Knee is stable to varus and valgus stress. 5 out of 5 motor strength. Palpable distal pulses. Intact light touch sensation.  Moderate Patellofemoral crepitus      X-rays: X-rays left knee are reviewed which show some moderate left knee arthritis and more mild to moderate right knee arthritis     Assessment:: Bilateral knee arthritis    Plan:  I reviewed the findings with her today. I injected both of her knees with cortisone today.  She would like to repeat the Euflexxa in September.  We will also get her prescription of Voltaren gel since she cannot take NSAIDs.    This note was created using [employer] voice recognition software that occasionally misinterpreted phrases or words.    Consult note is delivered via Epic messaging service.

## 2020-09-16 DIAGNOSIS — M17.0 PRIMARY OSTEOARTHRITIS OF BOTH KNEES: Primary | ICD-10-CM

## 2020-09-21 ENCOUNTER — OFFICE VISIT (OUTPATIENT)
Dept: ORTHOPEDICS | Facility: CLINIC | Age: 65
End: 2020-09-21
Payer: COMMERCIAL

## 2020-09-21 VITALS — BODY MASS INDEX: 44.41 KG/M2 | WEIGHT: 293 LBS | RESPIRATION RATE: 16 BRPM | HEIGHT: 68 IN

## 2020-09-21 DIAGNOSIS — M17.0 PRIMARY OSTEOARTHRITIS OF BOTH KNEES: Primary | ICD-10-CM

## 2020-09-21 PROCEDURE — 99999 PR PBB SHADOW E&M-EST. PATIENT-LVL III: CPT | Mod: PBBFAC,,, | Performed by: ORTHOPAEDIC SURGERY

## 2020-09-21 PROCEDURE — 20610 PR DRAIN/INJECT LARGE JOINT/BURSA: ICD-10-PCS | Mod: RT,S$GLB,, | Performed by: ORTHOPAEDIC SURGERY

## 2020-09-21 PROCEDURE — 99499 LARGE JOINT ASPIRATION/INJECTION: BILATERAL KNEE: ICD-10-PCS | Mod: S$GLB,,, | Performed by: ORTHOPAEDIC SURGERY

## 2020-09-21 PROCEDURE — 20610 DRAIN/INJ JOINT/BURSA W/O US: CPT | Mod: RT,S$GLB,, | Performed by: ORTHOPAEDIC SURGERY

## 2020-09-21 PROCEDURE — 99499 UNLISTED E&M SERVICE: CPT | Mod: S$GLB,,, | Performed by: ORTHOPAEDIC SURGERY

## 2020-09-21 PROCEDURE — 99999 PR PBB SHADOW E&M-EST. PATIENT-LVL III: ICD-10-PCS | Mod: PBBFAC,,, | Performed by: ORTHOPAEDIC SURGERY

## 2020-09-21 NOTE — PROGRESS NOTES
Past Medical History:   Diagnosis Date    Angio-edema     Asthma     Diabetes mellitus     Eczema     Obesity     Urticaria        Past Surgical History:   Procedure Laterality Date    ADENOIDECTOMY      BONY PELVIS SURGERY       SECTION      FIXATION KYPHOPLASTY N/A 2018    Procedure: Kyphoplasty;  Surgeon: Martinez Morejon MD;  Location: [facility];  Service: Pain Management;  Laterality: N/A;  L1     HYSTERECTOMY      KNEE CARTILAGE SURGERY      TONSILLECTOMY      VAGINOPLASTY         Current Outpatient Medications   Medication Sig    atorvastatin (LIPITOR) 10 MG tablet Take 10 mg by mouth once daily.    azithromycin (ZITHROMAX) 500 MG tablet     CHERATUSSIN AC  mg/5 mL syrup     diclofenac sodium (VOLTAREN) 1 % Gel Apply 2 g topically 4 (four) times daily.    epinephrine (EPIPEN) 0.3 mg/0.3 mL (1:1,000) AtIn Inject 0.6 mLs (0.6 mg total) into the muscle once.    ferrous sulfate 134 mg (27 mg iron) Tab ferrous sulfate    HYDROcodone-acetaminophen (NORCO) 7.5-325 mg per tablet Take 1 tablet by mouth every 6 (six) hours as needed for Pain.    ibuprofen (ADVIL,MOTRIN) 800 MG tablet Take 1 tablet (800 mg total) by mouth 3 (three) times daily.    losartan (COZAAR) 50 MG tablet     meloxicam (MOBIC) 15 MG tablet Mobic 15 mg tablet   Take 1 tablet every day by oral route.    metformin (GLUCOPHAGE) 500 MG tablet     MYRBETRIQ 50 mg Tb24 TAKE 1 TABLET BY MOUTH ONCE DAILY    nystatin (MYCOSTATIN) powder     olmesartan (BENICAR) 20 MG tablet     PROAIR HFA 90 mcg/actuation inhaler     SYNTHROID 175 mcg tablet     triamterene-hydrochlorothiazide 37.5-25 mg (DYAZIDE) 37.5-25 mg per capsule     TRULICITY 1.5 mg/0.5 mL PnIj      No current facility-administered medications for this visit.        Review of patient's allergies indicates:  No Known Allergies    Family History   Problem Relation Age of Onset    Breast cancer Mother 42         at 59yo from breast ca    Asthma Maternal  Uncle     Allergies Son     Allergic rhinitis Son     Eczema Son     Angioedema Daughter     Allergies Daughter     Immunodeficiency Neg Hx     Ovarian cancer Neg Hx        Social History     Socioeconomic History    Marital status:      Spouse name: Not on file    Number of children: Not on file    Years of education: Not on file    Highest education level: Not on file   Occupational History    Not on file   Social Needs    Financial resource strain: Not on file    Food insecurity     Worry: Not on file     Inability: Not on file    Transportation needs     Medical: Not on file     Non-medical: Not on file   Tobacco Use    Smoking status: Never Smoker   Substance and Sexual Activity    Alcohol use: Yes     Comment: seldom    Drug use: Not on file    Sexual activity: Not Currently     Partners: Male     Birth control/protection: None   Lifestyle    Physical activity     Days per week: Not on file     Minutes per session: Not on file    Stress: Not on file   Relationships    Social connections     Talks on phone: Not on file     Gets together: Not on file     Attends [employer]an service: Not on file     Active member of club or organization: Not on file     Attends meetings of clubs or organizations: Not on file     Relationship status: Not on file   Other Topics Concern    Not on file   Social History Narrative    Not on file       Chief Complaint:   Chief Complaint   Patient presents with    Right Knee - Injections     EUFLEXXA 1/3 TO BILATERAL KNEES     Left Knee - Injections       History of present illness: Is a 64-year-old female seen for bilateral knee pain.  Patient's had pain for years.  Patient's had injections, physical therapy, and even meniscal surgery. Left knee is the worst of the 2.  Pain with walking standing or getting up from sitting.  Euflexxa works pretty well for her..  Pain is an 4/10 today.  Hurts getting up from a chair.  The right is really bothering her lot at  night.  They both her very stiff.  Last injection was about 3 months ago.    Review of Systems:  Musculoskeletal:  See HPI    Physical Examination:    Vital Signs:    Vitals:    09/21/20 1032   Resp: 16       Body mass index is 49.28 kg/m².    This a well-developed, well nourished patient in no acute distress.  They are alert and oriented and cooperative to examination.  Pt. walks without an antalgic gait.      Examination of bilateral knees shows no rashes or erythema. There are no masses ecchymosis or effusion. Patient has full range of motion from 0-130°. Patient is nontender to palpation over lateral joint line and mildly tender to palpation over the medial joint line.  Knee is stable to varus and valgus stress. 5 out of 5 motor strength. Palpable distal pulses. Intact light touch sensation.  Moderate Patellofemoral crepitus      X-rays: X-rays left knee are reviewed which show some moderate left knee arthritis and more mild to moderate right knee arthritis     Assessment:: Bilateral knee arthritis    Plan:  I injected both knees with Euflexxa 1 of 3.  Follow up next week.    This note was created using [employer] voice recognition software that occasionally misinterpreted phrases or words.    Consult note is delivered via Epic messaging service.

## 2020-09-21 NOTE — PROCEDURES
Large Joint Aspiration/Injection: bilateral knee    Date/Time: 9/21/2020 10:45 AM  Performed by: Estevan Monteiro MD  Authorized by: Estevan Monteiro MD     Consent Done?:  Yes (Verbal)  Indications:  Pain  Site marked: the procedure site was marked    Timeout: prior to procedure the correct patient, procedure, and site was verified      Details:  Needle Size:  20 G  Approach:  Anterolateral  Location:  Knee  Laterality:  Bilateral  Site:  Bilateral knee  Medications (Right):  20 mg sodium hyaluronate (EUFLEXXA) 10 mg/mL(mw 2.4 -3.6 million)  Medications (Left):  20 mg sodium hyaluronate (EUFLEXXA) 10 mg/mL(mw 2.4 -3.6 million)  Patient tolerance:  Patient tolerated the procedure well with no immediate complications

## 2020-09-28 ENCOUNTER — OFFICE VISIT (OUTPATIENT)
Dept: ORTHOPEDICS | Facility: CLINIC | Age: 65
End: 2020-09-28
Payer: COMMERCIAL

## 2020-09-28 VITALS — BODY MASS INDEX: 44.41 KG/M2 | WEIGHT: 293 LBS | HEIGHT: 68 IN | RESPIRATION RATE: 15 BRPM

## 2020-09-28 DIAGNOSIS — M17.0 PRIMARY OSTEOARTHRITIS OF BOTH KNEES: Primary | ICD-10-CM

## 2020-09-28 PROCEDURE — 99499 NO LOS: ICD-10-PCS | Mod: S$GLB,,, | Performed by: ORTHOPAEDIC SURGERY

## 2020-09-28 PROCEDURE — 99499 UNLISTED E&M SERVICE: CPT | Mod: S$GLB,,, | Performed by: ORTHOPAEDIC SURGERY

## 2020-09-28 PROCEDURE — 20610 DRAIN/INJ JOINT/BURSA W/O US: CPT | Mod: RT,S$GLB,, | Performed by: ORTHOPAEDIC SURGERY

## 2020-09-28 PROCEDURE — 99999 PR PBB SHADOW E&M-EST. PATIENT-LVL IV: CPT | Mod: PBBFAC,,, | Performed by: ORTHOPAEDIC SURGERY

## 2020-09-28 PROCEDURE — 20610 DRAIN/INJ JOINT/BURSA W/O US: CPT | Mod: 51,LT,S$GLB, | Performed by: ORTHOPAEDIC SURGERY

## 2020-09-28 PROCEDURE — 20610 PR DRAIN/INJECT LARGE JOINT/BURSA: ICD-10-PCS | Mod: 51,LT,S$GLB, | Performed by: ORTHOPAEDIC SURGERY

## 2020-09-28 PROCEDURE — 99999 PR PBB SHADOW E&M-EST. PATIENT-LVL IV: ICD-10-PCS | Mod: PBBFAC,,, | Performed by: ORTHOPAEDIC SURGERY

## 2020-09-28 RX ORDER — LEVOTHYROXINE SODIUM 150 MCG
TABLET ORAL
COMMUNITY
Start: 2020-08-24

## 2020-09-28 NOTE — PROGRESS NOTES
Past Medical History:   Diagnosis Date    Angio-edema     Asthma     Diabetes mellitus     Eczema     Obesity     Urticaria        Past Surgical History:   Procedure Laterality Date    ADENOIDECTOMY      BONY PELVIS SURGERY       SECTION      FIXATION KYPHOPLASTY N/A 2018    Procedure: Kyphoplasty;  Surgeon: Martinez Morejon MD;  Location: [facility];  Service: Pain Management;  Laterality: N/A;  L1     HYSTERECTOMY      KNEE CARTILAGE SURGERY      TONSILLECTOMY      VAGINOPLASTY         Current Outpatient Medications   Medication Sig    atorvastatin (LIPITOR) 10 MG tablet Take 10 mg by mouth once daily.    azithromycin (ZITHROMAX) 500 MG tablet     CHERATUSSIN AC  mg/5 mL syrup     diclofenac sodium (VOLTAREN) 1 % Gel Apply 2 g topically 4 (four) times daily.    epinephrine (EPIPEN) 0.3 mg/0.3 mL (1:1,000) AtIn Inject 0.6 mLs (0.6 mg total) into the muscle once.    ferrous sulfate 134 mg (27 mg iron) Tab ferrous sulfate    HYDROcodone-acetaminophen (NORCO) 7.5-325 mg per tablet Take 1 tablet by mouth every 6 (six) hours as needed for Pain.    ibuprofen (ADVIL,MOTRIN) 800 MG tablet Take 1 tablet (800 mg total) by mouth 3 (three) times daily.    losartan (COZAAR) 50 MG tablet     meloxicam (MOBIC) 15 MG tablet Mobic 15 mg tablet   Take 1 tablet every day by oral route.    metformin (GLUCOPHAGE) 500 MG tablet     MYRBETRIQ 50 mg Tb24 TAKE 1 TABLET BY MOUTH ONCE DAILY    nystatin (MYCOSTATIN) powder     olmesartan (BENICAR) 20 MG tablet     PROAIR HFA 90 mcg/actuation inhaler     SYNTHROID 150 mcg tablet TAKE 1 TABLET BY MOUTH ONCE DAILY ON AN EMPTY STOMACH IN THE MORNING FOR 90 DAYS    SYNTHROID 175 mcg tablet     triamterene-hydrochlorothiazide 37.5-25 mg (DYAZIDE) 37.5-25 mg per capsule     TRULICITY 1.5 mg/0.5 mL PnIj      No current facility-administered medications for this visit.        Review of patient's allergies indicates:  No Known Allergies    Family History    Problem Relation Age of Onset    Breast cancer Mother 42         at 61yo from breast ca    Asthma Maternal Uncle     Allergies Son     Allergic rhinitis Son     Eczema Son     Angioedema Daughter     Allergies Daughter     Immunodeficiency Neg Hx     Ovarian cancer Neg Hx        Social History     Socioeconomic History    Marital status:      Spouse name: Not on file    Number of children: Not on file    Years of education: Not on file    Highest education level: Not on file   Occupational History    Not on file   Social Needs    Financial resource strain: Not on file    Food insecurity     Worry: Not on file     Inability: Not on file    Transportation needs     Medical: Not on file     Non-medical: Not on file   Tobacco Use    Smoking status: Never Smoker   Substance and Sexual Activity    Alcohol use: Yes     Comment: seldom    Drug use: Not on file    Sexual activity: Not Currently     Partners: Male     Birth control/protection: None   Lifestyle    Physical activity     Days per week: Not on file     Minutes per session: Not on file    Stress: Not on file   Relationships    Social connections     Talks on phone: Not on file     Gets together: Not on file     Attends [employer] service: Not on file     Active member of club or organization: Not on file     Attends meetings of clubs or organizations: Not on file     Relationship status: Not on file   Other Topics Concern    Not on file   Social History Narrative    Not on file       Chief Complaint:   Chief Complaint   Patient presents with    Right Knee - Pain    Left Knee - Pain       History of present illness: Is a 64-year-old female seen for bilateral knee pain.  Patient's had pain for years.  Patient's had injections, physical therapy, and even meniscal surgery. Left knee is the worst of the 2.  Pain with walking standing or getting up from sitting.  Euflexxa works pretty well for her..  Pain is an 4/10 today.  Hurts  getting up from a chair.  The right is really bothering her lot at night.  They both her very stiff.  Last injection was about 3 months ago.    Review of Systems:  Musculoskeletal:  See HPI    Physical Examination:    Vital Signs:    Vitals:    09/28/20 1015   Resp: 15       Body mass index is 49.28 kg/m².    This a well-developed, well nourished patient in no acute distress.  They are alert and oriented and cooperative to examination.  Pt. walks without an antalgic gait.      Examination of bilateral knees shows no rashes or erythema. There are no masses ecchymosis or effusion. Patient has full range of motion from 0-130°. Patient is nontender to palpation over lateral joint line and mildly tender to palpation over the medial joint line.  Knee is stable to varus and valgus stress. 5 out of 5 motor strength. Palpable distal pulses. Intact light touch sensation.  Moderate Patellofemoral crepitus      X-rays: X-rays left knee are reviewed which show some moderate left knee arthritis and more mild to moderate right knee arthritis     Assessment:: Bilateral knee arthritis    Plan:  I injected both knees with Euflexxa 2 of 3.  Follow up next week.    This note was created using [employer] voice recognition software that occasionally misinterpreted phrases or words.    Consult note is delivered via Epic messaging service.

## 2020-09-28 NOTE — PROCEDURES
Large Joint Aspiration/Injection: bilateral knee    Date/Time: 9/28/2020 10:45 AM  Performed by: Estevan Monteiro MD  Authorized by: Estevan Monteiro MD     Consent Done?:  Yes (Verbal)  Indications:  Pain  Site marked: the procedure site was marked    Timeout: prior to procedure the correct patient, procedure, and site was verified      Details:  Needle Size:  20 G  Approach:  Anterolateral  Location:  Knee  Laterality:  Bilateral  Site:  Bilateral knee  Medications (Right):  20 mg sodium hyaluronate (EUFLEXXA) 10 mg/mL(mw 2.4 -3.6 million)  Medications (Left):  20 mg sodium hyaluronate (EUFLEXXA) 10 mg/mL(mw 2.4 -3.6 million)  Patient tolerance:  Patient tolerated the procedure well with no immediate complications

## 2020-10-05 ENCOUNTER — OFFICE VISIT (OUTPATIENT)
Dept: ORTHOPEDICS | Facility: CLINIC | Age: 65
End: 2020-10-05
Payer: COMMERCIAL

## 2020-10-05 VITALS — WEIGHT: 293 LBS | HEIGHT: 68 IN | BODY MASS INDEX: 44.41 KG/M2 | RESPIRATION RATE: 16 BRPM

## 2020-10-05 DIAGNOSIS — M17.0 PRIMARY OSTEOARTHRITIS OF BOTH KNEES: Primary | ICD-10-CM

## 2020-10-05 PROCEDURE — 99499 UNLISTED E&M SERVICE: CPT | Mod: S$GLB,,, | Performed by: ORTHOPAEDIC SURGERY

## 2020-10-05 PROCEDURE — 99999 PR PBB SHADOW E&M-EST. PATIENT-LVL IV: ICD-10-PCS | Mod: PBBFAC,,, | Performed by: ORTHOPAEDIC SURGERY

## 2020-10-05 PROCEDURE — 20610 DRAIN/INJ JOINT/BURSA W/O US: CPT | Mod: LT,S$GLB,, | Performed by: ORTHOPAEDIC SURGERY

## 2020-10-05 PROCEDURE — 99999 PR PBB SHADOW E&M-EST. PATIENT-LVL IV: CPT | Mod: PBBFAC,,, | Performed by: ORTHOPAEDIC SURGERY

## 2020-10-05 PROCEDURE — 99499 NO LOS: ICD-10-PCS | Mod: S$GLB,,, | Performed by: ORTHOPAEDIC SURGERY

## 2020-10-05 PROCEDURE — 20610 PR DRAIN/INJECT LARGE JOINT/BURSA: ICD-10-PCS | Mod: 51,RT,S$GLB, | Performed by: ORTHOPAEDIC SURGERY

## 2020-10-05 PROCEDURE — 20610 DRAIN/INJ JOINT/BURSA W/O US: CPT | Mod: 51,RT,S$GLB, | Performed by: ORTHOPAEDIC SURGERY

## 2020-10-05 NOTE — PROGRESS NOTES
Past Medical History:   Diagnosis Date    Angio-edema     Asthma     Diabetes mellitus     Eczema     Obesity     Urticaria        Past Surgical History:   Procedure Laterality Date    ADENOIDECTOMY      BONY PELVIS SURGERY       SECTION      FIXATION KYPHOPLASTY N/A 2018    Procedure: Kyphoplasty;  Surgeon: Martinez Morejon MD;  Location: [facility];  Service: Pain Management;  Laterality: N/A;  L1     HYSTERECTOMY      KNEE CARTILAGE SURGERY      TONSILLECTOMY      VAGINOPLASTY         Current Outpatient Medications   Medication Sig    atorvastatin (LIPITOR) 10 MG tablet Take 10 mg by mouth once daily.    azithromycin (ZITHROMAX) 500 MG tablet     CHERATUSSIN AC  mg/5 mL syrup     diclofenac sodium (VOLTAREN) 1 % Gel Apply 2 g topically 4 (four) times daily.    epinephrine (EPIPEN) 0.3 mg/0.3 mL (1:1,000) AtIn Inject 0.6 mLs (0.6 mg total) into the muscle once.    ferrous sulfate 134 mg (27 mg iron) Tab ferrous sulfate    HYDROcodone-acetaminophen (NORCO) 7.5-325 mg per tablet Take 1 tablet by mouth every 6 (six) hours as needed for Pain.    ibuprofen (ADVIL,MOTRIN) 800 MG tablet Take 1 tablet (800 mg total) by mouth 3 (three) times daily.    losartan (COZAAR) 50 MG tablet     meloxicam (MOBIC) 15 MG tablet Mobic 15 mg tablet   Take 1 tablet every day by oral route.    metformin (GLUCOPHAGE) 500 MG tablet     MYRBETRIQ 50 mg Tb24 TAKE 1 TABLET BY MOUTH ONCE DAILY    nystatin (MYCOSTATIN) powder     olmesartan (BENICAR) 20 MG tablet     PROAIR HFA 90 mcg/actuation inhaler     SYNTHROID 150 mcg tablet TAKE 1 TABLET BY MOUTH ONCE DAILY ON AN EMPTY STOMACH IN THE MORNING FOR 90 DAYS    SYNTHROID 175 mcg tablet     triamterene-hydrochlorothiazide 37.5-25 mg (DYAZIDE) 37.5-25 mg per capsule     TRULICITY 1.5 mg/0.5 mL PnIj      No current facility-administered medications for this visit.        Review of patient's allergies indicates:  No Known Allergies    Family History    Problem Relation Age of Onset    Breast cancer Mother 42         at 59yo from breast ca    Asthma Maternal Uncle     Allergies Son     Allergic rhinitis Son     Eczema Son     Angioedema Daughter     Allergies Daughter     Immunodeficiency Neg Hx     Ovarian cancer Neg Hx        Social History     Socioeconomic History    Marital status:      Spouse name: Not on file    Number of children: Not on file    Years of education: Not on file    Highest education level: Not on file   Occupational History    Not on file   Social Needs    Financial resource strain: Not on file    Food insecurity     Worry: Not on file     Inability: Not on file    Transportation needs     Medical: Not on file     Non-medical: Not on file   Tobacco Use    Smoking status: Never Smoker   Substance and Sexual Activity    Alcohol use: Yes     Comment: seldom    Drug use: Not on file    Sexual activity: Not Currently     Partners: Male     Birth control/protection: None   Lifestyle    Physical activity     Days per week: Not on file     Minutes per session: Not on file    Stress: Not on file   Relationships    Social connections     Talks on phone: Not on file     Gets together: Not on file     Attends Alevism service: Not on file     Active member of club or organization: Not on file     Attends meetings of clubs or organizations: Not on file     Relationship status: Not on file   Other Topics Concern    Not on file   Social History Narrative    Not on file       Chief Complaint:   No chief complaint on file.      History of present illness: Is a 64-year-old female seen for bilateral knee pain.  Patient's had pain for years.  Patient's had injections, physical therapy, and even meniscal surgery. Left knee is the worst of the 2.  Pain with walking standing or getting up from sitting.  Euflexxa works pretty well for her..  Pain is an 4/10 today.  Hurts getting up from a chair.  The right is really bothering  her lot at night.  They both her very stiff.  Last injection was about 3 months ago.    Review of Systems:  Musculoskeletal:  See HPI    Physical Examination:    Vital Signs:    There were no vitals filed for this visit.    There is no height or weight on file to calculate BMI.    This a well-developed, well nourished patient in no acute distress.  They are alert and oriented and cooperative to examination.  Pt. walks without an antalgic gait.      Examination of bilateral knees shows no rashes or erythema. There are no masses ecchymosis or effusion. Patient has full range of motion from 0-130°. Patient is nontender to palpation over lateral joint line and mildly tender to palpation over the medial joint line.  Knee is stable to varus and valgus stress. 5 out of 5 motor strength. Palpable distal pulses. Intact light touch sensation.  Moderate Patellofemoral crepitus      X-rays: X-rays left knee are reviewed which show some moderate left knee arthritis and more mild to moderate right knee arthritis     Assessment:: Bilateral knee arthritis    Plan:  I injected both knees with Euflexxa 3 of 3.  Follow up as needed.    This note was created using [employer] voice recognition software that occasionally misinterpreted phrases or words.    Consult note is delivered via Epic messaging service.

## 2020-10-05 NOTE — PROCEDURES
Large Joint Aspiration/Injection: bilateral knee    Date/Time: 10/5/2020 10:45 AM  Performed by: Estevan Monteiro MD  Authorized by: Estevan Monteiro MD     Consent Done?:  Yes (Verbal)  Indications:  Pain  Site marked: the procedure site was marked    Timeout: prior to procedure the correct patient, procedure, and site was verified      Details:  Needle Size:  20 G  Approach:  Anterolateral  Location:  Knee  Laterality:  Bilateral  Site:  Bilateral knee  Medications (Right):  20 mg sodium hyaluronate (EUFLEXXA) 10 mg/mL(mw 2.4 -3.6 million)  Medications (Left):  20 mg sodium hyaluronate (EUFLEXXA) 10 mg/mL(mw 2.4 -3.6 million)  Patient tolerance:  Patient tolerated the procedure well with no immediate complications

## 2020-10-16 DIAGNOSIS — M25.569 KNEE PAIN, UNSPECIFIED CHRONICITY, UNSPECIFIED LATERALITY: Primary | ICD-10-CM

## 2020-10-19 ENCOUNTER — OFFICE VISIT (OUTPATIENT)
Dept: ORTHOPEDICS | Facility: CLINIC | Age: 65
End: 2020-10-19
Payer: COMMERCIAL

## 2020-10-19 ENCOUNTER — HOSPITAL ENCOUNTER (OUTPATIENT)
Dept: RADIOLOGY | Facility: HOSPITAL | Age: 65
Discharge: HOME | End: 2020-10-19
Attending: ORTHOPAEDIC SURGERY
Payer: COMMERCIAL

## 2020-10-19 VITALS — BODY MASS INDEX: 44.41 KG/M2 | HEIGHT: 68 IN | RESPIRATION RATE: 16 BRPM | WEIGHT: 293 LBS

## 2020-10-19 DIAGNOSIS — M25.569 KNEE PAIN, UNSPECIFIED CHRONICITY, UNSPECIFIED LATERALITY: Primary | ICD-10-CM

## 2020-10-19 DIAGNOSIS — M17.0 PRIMARY OSTEOARTHRITIS OF BOTH KNEES: ICD-10-CM

## 2020-10-19 DIAGNOSIS — M25.569 KNEE PAIN, UNSPECIFIED CHRONICITY, UNSPECIFIED LATERALITY: ICD-10-CM

## 2020-10-19 PROCEDURE — 3008F PR BODY MASS INDEX (BMI) DOCUMENTED: ICD-10-PCS | Mod: CPTII,S$GLB,, | Performed by: ORTHOPAEDIC SURGERY

## 2020-10-19 PROCEDURE — 20610 DRAIN/INJ JOINT/BURSA W/O US: CPT | Mod: LT,S$GLB,, | Performed by: ORTHOPAEDIC SURGERY

## 2020-10-19 PROCEDURE — 99213 PR OFFICE/OUTPT VISIT, EST, LEVL III, 20-29 MIN: ICD-10-PCS | Mod: 25,S$GLB,, | Performed by: ORTHOPAEDIC SURGERY

## 2020-10-19 PROCEDURE — 3008F BODY MASS INDEX DOCD: CPT | Mod: CPTII,S$GLB,, | Performed by: ORTHOPAEDIC SURGERY

## 2020-10-19 PROCEDURE — 99999 PR PBB SHADOW E&M-EST. PATIENT-LVL IV: CPT | Mod: PBBFAC,,, | Performed by: ORTHOPAEDIC SURGERY

## 2020-10-19 PROCEDURE — 99999 PR PBB SHADOW E&M-EST. PATIENT-LVL IV: ICD-10-PCS | Mod: PBBFAC,,, | Performed by: ORTHOPAEDIC SURGERY

## 2020-10-19 PROCEDURE — 73564 X-RAY EXAM KNEE 4 OR MORE: CPT | Mod: 26,RT,, | Performed by: RADIOLOGY

## 2020-10-19 PROCEDURE — 20610 PR DRAIN/INJECT LARGE JOINT/BURSA: ICD-10-PCS | Mod: 51,RT,S$GLB, | Performed by: ORTHOPAEDIC SURGERY

## 2020-10-19 PROCEDURE — 73564 X-RAY EXAM KNEE 4 OR MORE: CPT | Mod: 26,LT,, | Performed by: RADIOLOGY

## 2020-10-19 PROCEDURE — 99213 OFFICE O/P EST LOW 20 MIN: CPT | Mod: 25,S$GLB,, | Performed by: ORTHOPAEDIC SURGERY

## 2020-10-19 PROCEDURE — 73564 XR KNEE ORTHO BILAT WITH FLEXION: ICD-10-PCS | Mod: 26,RT,, | Performed by: RADIOLOGY

## 2020-10-19 PROCEDURE — 73564 X-RAY EXAM KNEE 4 OR MORE: CPT | Mod: TC,50,PN

## 2020-10-19 PROCEDURE — 20610 DRAIN/INJ JOINT/BURSA W/O US: CPT | Mod: 51,RT,S$GLB, | Performed by: ORTHOPAEDIC SURGERY

## 2020-10-19 RX ORDER — TRIAMCINOLONE ACETONIDE 40 MG/ML
40 INJECTION, SUSPENSION INTRA-ARTICULAR; INTRAMUSCULAR
Status: DISCONTINUED | OUTPATIENT
Start: 2020-10-19 | End: 2020-10-19 | Stop reason: HOSPADM

## 2020-10-19 RX ADMIN — TRIAMCINOLONE ACETONIDE 40 MG: 40 INJECTION, SUSPENSION INTRA-ARTICULAR; INTRAMUSCULAR at 10:10

## 2020-10-19 NOTE — PROGRESS NOTES
Past Medical History:   Diagnosis Date    Angio-edema     Asthma     Diabetes mellitus     Eczema     Obesity     Urticaria        Past Surgical History:   Procedure Laterality Date    ADENOIDECTOMY      BONY PELVIS SURGERY       SECTION      FIXATION KYPHOPLASTY N/A 2018    Procedure: Kyphoplasty;  Surgeon: Martinez Morejon MD;  Location: [facility];  Service: Pain Management;  Laterality: N/A;  L1     HYSTERECTOMY      KNEE CARTILAGE SURGERY      TONSILLECTOMY      VAGINOPLASTY         Current Outpatient Medications   Medication Sig    atorvastatin (LIPITOR) 10 MG tablet Take 10 mg by mouth once daily.    azithromycin (ZITHROMAX) 500 MG tablet     CHERATUSSIN AC  mg/5 mL syrup     diclofenac sodium (VOLTAREN) 1 % Gel Apply 2 g topically 4 (four) times daily.    epinephrine (EPIPEN) 0.3 mg/0.3 mL (1:1,000) AtIn Inject 0.6 mLs (0.6 mg total) into the muscle once.    ferrous sulfate 134 mg (27 mg iron) Tab ferrous sulfate    HYDROcodone-acetaminophen (NORCO) 7.5-325 mg per tablet Take 1 tablet by mouth every 6 (six) hours as needed for Pain.    ibuprofen (ADVIL,MOTRIN) 800 MG tablet Take 1 tablet (800 mg total) by mouth 3 (three) times daily.    losartan (COZAAR) 50 MG tablet     meloxicam (MOBIC) 15 MG tablet Mobic 15 mg tablet   Take 1 tablet every day by oral route.    metformin (GLUCOPHAGE) 500 MG tablet     MYRBETRIQ 50 mg Tb24 TAKE 1 TABLET BY MOUTH ONCE DAILY    nystatin (MYCOSTATIN) powder     olmesartan (BENICAR) 20 MG tablet     PROAIR HFA 90 mcg/actuation inhaler     SYNTHROID 150 mcg tablet TAKE 1 TABLET BY MOUTH ONCE DAILY ON AN EMPTY STOMACH IN THE MORNING FOR 90 DAYS    SYNTHROID 175 mcg tablet     triamterene-hydrochlorothiazide 37.5-25 mg (DYAZIDE) 37.5-25 mg per capsule     TRULICITY 1.5 mg/0.5 mL PnIj      No current facility-administered medications for this visit.        Review of patient's allergies indicates:  No Known Allergies    Family History    Problem Relation Age of Onset    Breast cancer Mother 42         at 59yo from breast ca    Asthma Maternal Uncle     Allergies Son     Allergic rhinitis Son     Eczema Son     Angioedema Daughter     Allergies Daughter     Immunodeficiency Neg Hx     Ovarian cancer Neg Hx        Social History     Socioeconomic History    Marital status:      Spouse name: Not on file    Number of children: Not on file    Years of education: Not on file    Highest education level: Not on file   Occupational History    Not on file   Social Needs    Financial resource strain: Not on file    Food insecurity     Worry: Not on file     Inability: Not on file    Transportation needs     Medical: Not on file     Non-medical: Not on file   Tobacco Use    Smoking status: Never Smoker   Substance and Sexual Activity    Alcohol use: Yes     Comment: seldom    Drug use: Not on file    Sexual activity: Not Currently     Partners: Male     Birth control/protection: None   Lifestyle    Physical activity     Days per week: Not on file     Minutes per session: Not on file    Stress: Not on file   Relationships    Social connections     Talks on phone: Not on file     Gets together: Not on file     Attends Religion service: Not on file     Active member of club or organization: Not on file     Attends meetings of clubs or organizations: Not on file     Relationship status: Not on file   Other Topics Concern    Not on file   Social History Narrative    Not on file       Chief Complaint:   Chief Complaint   Patient presents with    Left Knee - Pain     COMPLETED EUFLEXXA BILATERAL ON 10-5-20 REQUESTING STEROID INJECTIONS TODAY    Right Knee - Pain       History of present illness: Is a 65-year-old female seen for bilateral knee pain.  Patient's had pain for years.  Patient's had injections, physical therapy, and even meniscal surgery. Left knee is the worst of the 2.  Pain with walking standing or getting up  from sitting.  Euflexxa works pretty well for her..  Pain is an 4/10 today.  Hurts getting up from a chair.  The right is really bothering her lot at night.  They both her very stiff.  Last injection was about 3 months ago.    Review of Systems:  Musculoskeletal:  See HPI    Physical Examination:    Vital Signs:    Vitals:    10/19/20 1046   Resp: 16       Body mass index is 49.28 kg/m².    This a well-developed, well nourished patient in no acute distress.  They are alert and oriented and cooperative to examination.  Pt. walks without an antalgic gait.      Examination of bilateral knees shows no rashes or erythema. There are no masses ecchymosis or effusion. Patient has full range of motion from 0-130°. Patient is nontender to palpation over lateral joint line and mildly tender to palpation over the medial joint line.  Knee is stable to varus and valgus stress. 5 out of 5 motor strength. Palpable distal pulses. Intact light touch sensation.  Moderate Patellofemoral crepitus      X-rays: X-rays left knee are ordered and reviewed which show some moderate to severe left knee arthritis with near complete medial joint space narrowing and  moderate right knee arthritis with more lateral joint space narrowing     Assessment:: Bilateral knee arthritis    Plan:  I injected both knees with cortisone today.  We talked about the knee progression.  Hopefully the steroid gives her some more substantial relief on top of the Euflexxa.  We talked about timing of knee replacement as well.  She would like to continue to lose more weight which I encouraged.    This note was created using [employer] voice recognition software that occasionally misinterpreted phrases or words.    Consult note is delivered via Epic messaging service.

## 2021-01-21 DIAGNOSIS — Z12.31 OTHER SCREENING MAMMOGRAM: Primary | ICD-10-CM

## 2021-01-22 ENCOUNTER — HOSPITAL ENCOUNTER (OUTPATIENT)
Dept: RADIOLOGY | Facility: HOSPITAL | Age: 66
Discharge: HOME | End: 2021-01-22
Attending: OBSTETRICS & GYNECOLOGY
Payer: COMMERCIAL

## 2021-01-22 ENCOUNTER — OFFICE VISIT (OUTPATIENT)
Dept: OBSTETRICS AND GYNECOLOGY | Facility: CLINIC | Age: 66
End: 2021-01-22
Payer: COMMERCIAL

## 2021-01-22 VITALS
HEIGHT: 68 IN | SYSTOLIC BLOOD PRESSURE: 128 MMHG | WEIGHT: 293 LBS | BODY MASS INDEX: 44.41 KG/M2 | DIASTOLIC BLOOD PRESSURE: 80 MMHG

## 2021-01-22 DIAGNOSIS — Z12.31 OTHER SCREENING MAMMOGRAM: ICD-10-CM

## 2021-01-22 DIAGNOSIS — Z01.419 ENCOUNTER FOR ANNUAL ROUTINE GYNECOLOGICAL EXAMINATION: Primary | ICD-10-CM

## 2021-01-22 PROCEDURE — 77063 BREAST TOMOSYNTHESIS BI: CPT | Mod: 26,,, | Performed by: RADIOLOGY

## 2021-01-22 PROCEDURE — 77067 SCR MAMMO BI INCL CAD: CPT | Mod: 26,,, | Performed by: RADIOLOGY

## 2021-01-22 PROCEDURE — 77063 MAMMO DIGITAL SCREENING BILAT WITH TOMO: ICD-10-PCS | Mod: 26,,, | Performed by: RADIOLOGY

## 2021-01-22 PROCEDURE — 99999 PR PBB SHADOW E&M-EST. PATIENT-LVL IV: ICD-10-PCS | Mod: PBBFAC,,, | Performed by: OBSTETRICS & GYNECOLOGY

## 2021-01-22 PROCEDURE — 3008F BODY MASS INDEX DOCD: CPT | Mod: CPTII,S$GLB,, | Performed by: OBSTETRICS & GYNECOLOGY

## 2021-01-22 PROCEDURE — 99999 PR PBB SHADOW E&M-EST. PATIENT-LVL IV: CPT | Mod: PBBFAC,,, | Performed by: OBSTETRICS & GYNECOLOGY

## 2021-01-22 PROCEDURE — 77067 SCR MAMMO BI INCL CAD: CPT | Mod: TC,PN

## 2021-01-22 PROCEDURE — 99396 PR PREVENTIVE VISIT,EST,40-64: ICD-10-PCS | Mod: S$GLB,,, | Performed by: OBSTETRICS & GYNECOLOGY

## 2021-01-22 PROCEDURE — 77067 MAMMO DIGITAL SCREENING BILAT WITH TOMO: ICD-10-PCS | Mod: 26,,, | Performed by: RADIOLOGY

## 2021-01-22 PROCEDURE — 1126F AMNT PAIN NOTED NONE PRSNT: CPT | Mod: S$GLB,,, | Performed by: OBSTETRICS & GYNECOLOGY

## 2021-01-22 PROCEDURE — 99396 PREV VISIT EST AGE 40-64: CPT | Mod: S$GLB,,, | Performed by: OBSTETRICS & GYNECOLOGY

## 2021-01-22 PROCEDURE — 3008F PR BODY MASS INDEX (BMI) DOCUMENTED: ICD-10-PCS | Mod: CPTII,S$GLB,, | Performed by: OBSTETRICS & GYNECOLOGY

## 2021-01-22 PROCEDURE — 1126F PR PAIN SEVERITY QUANTIFIED, NO PAIN PRESENT: ICD-10-PCS | Mod: S$GLB,,, | Performed by: OBSTETRICS & GYNECOLOGY

## 2021-01-28 ENCOUNTER — OFFICE VISIT (OUTPATIENT)
Dept: ORTHOPEDICS | Facility: CLINIC | Age: 66
End: 2021-01-28
Payer: COMMERCIAL

## 2021-01-28 VITALS — BODY MASS INDEX: 44.41 KG/M2 | HEIGHT: 68 IN | RESPIRATION RATE: 16 BRPM | WEIGHT: 293 LBS

## 2021-01-28 DIAGNOSIS — M17.0 PRIMARY OSTEOARTHRITIS OF BOTH KNEES: Primary | ICD-10-CM

## 2021-01-28 PROCEDURE — 99999 PR PBB SHADOW E&M-EST. PATIENT-LVL III: CPT | Mod: PBBFAC,,, | Performed by: ORTHOPAEDIC SURGERY

## 2021-01-28 PROCEDURE — 1125F AMNT PAIN NOTED PAIN PRSNT: CPT | Mod: S$GLB,,, | Performed by: ORTHOPAEDIC SURGERY

## 2021-01-28 PROCEDURE — 3008F BODY MASS INDEX DOCD: CPT | Mod: CPTII,S$GLB,, | Performed by: ORTHOPAEDIC SURGERY

## 2021-01-28 PROCEDURE — 99213 PR OFFICE/OUTPT VISIT, EST, LEVL III, 20-29 MIN: ICD-10-PCS | Mod: 25,S$GLB,, | Performed by: ORTHOPAEDIC SURGERY

## 2021-01-28 PROCEDURE — 20610 DRAIN/INJ JOINT/BURSA W/O US: CPT | Mod: LT,S$GLB,, | Performed by: ORTHOPAEDIC SURGERY

## 2021-01-28 PROCEDURE — 99999 PR PBB SHADOW E&M-EST. PATIENT-LVL III: ICD-10-PCS | Mod: PBBFAC,,, | Performed by: ORTHOPAEDIC SURGERY

## 2021-01-28 PROCEDURE — 20610 LARGE JOINT ASPIRATION/INJECTION: L KNEE: ICD-10-PCS | Mod: LT,S$GLB,, | Performed by: ORTHOPAEDIC SURGERY

## 2021-01-28 PROCEDURE — 1125F PR PAIN SEVERITY QUANTIFIED, PAIN PRESENT: ICD-10-PCS | Mod: S$GLB,,, | Performed by: ORTHOPAEDIC SURGERY

## 2021-01-28 PROCEDURE — 3008F PR BODY MASS INDEX (BMI) DOCUMENTED: ICD-10-PCS | Mod: CPTII,S$GLB,, | Performed by: ORTHOPAEDIC SURGERY

## 2021-01-28 PROCEDURE — 99213 OFFICE O/P EST LOW 20 MIN: CPT | Mod: 25,S$GLB,, | Performed by: ORTHOPAEDIC SURGERY

## 2021-01-28 RX ORDER — TRIAMCINOLONE ACETONIDE 40 MG/ML
40 INJECTION, SUSPENSION INTRA-ARTICULAR; INTRAMUSCULAR
Status: DISCONTINUED | OUTPATIENT
Start: 2021-01-28 | End: 2021-01-28 | Stop reason: HOSPADM

## 2021-01-28 RX ADMIN — TRIAMCINOLONE ACETONIDE 40 MG: 40 INJECTION, SUSPENSION INTRA-ARTICULAR; INTRAMUSCULAR at 11:01

## 2021-04-22 ENCOUNTER — OFFICE VISIT (OUTPATIENT)
Dept: ORTHOPEDICS | Facility: CLINIC | Age: 66
End: 2021-04-22
Payer: MEDICARE

## 2021-04-22 DIAGNOSIS — M17.0 PRIMARY OSTEOARTHRITIS OF BOTH KNEES: Primary | ICD-10-CM

## 2021-04-22 PROCEDURE — 99213 PR OFFICE/OUTPT VISIT, EST, LEVL III, 20-29 MIN: ICD-10-PCS | Mod: 25,S$PBB,, | Performed by: ORTHOPAEDIC SURGERY

## 2021-04-22 PROCEDURE — 99213 OFFICE O/P EST LOW 20 MIN: CPT | Mod: 25,S$PBB,, | Performed by: ORTHOPAEDIC SURGERY

## 2021-04-22 PROCEDURE — 99999 PR PBB SHADOW E&M-EST. PATIENT-LVL II: ICD-10-PCS | Mod: PBBFAC,,, | Performed by: ORTHOPAEDIC SURGERY

## 2021-04-22 PROCEDURE — 99999 PR PBB SHADOW E&M-EST. PATIENT-LVL II: CPT | Mod: PBBFAC,,, | Performed by: ORTHOPAEDIC SURGERY

## 2021-04-22 PROCEDURE — 20610 DRAIN/INJ JOINT/BURSA W/O US: CPT | Mod: 50,PBBFAC,PN | Performed by: ORTHOPAEDIC SURGERY

## 2021-04-22 PROCEDURE — 20610 LARGE JOINT ASPIRATION/INJECTION: BILATERAL KNEE: ICD-10-PCS | Mod: 50,S$PBB,, | Performed by: ORTHOPAEDIC SURGERY

## 2021-04-22 RX ADMIN — Medication 16 MG: at 04:04

## 2021-04-29 ENCOUNTER — OFFICE VISIT (OUTPATIENT)
Dept: ORTHOPEDICS | Facility: CLINIC | Age: 66
End: 2021-04-29
Payer: MEDICARE

## 2021-04-29 DIAGNOSIS — M17.0 PRIMARY OSTEOARTHRITIS OF BOTH KNEES: Primary | ICD-10-CM

## 2021-04-29 PROCEDURE — 20610 DRAIN/INJ JOINT/BURSA W/O US: CPT | Mod: 50,PBBFAC,PN | Performed by: ORTHOPAEDIC SURGERY

## 2021-04-29 PROCEDURE — 99999 PR PBB SHADOW E&M-EST. PATIENT-LVL I: CPT | Mod: PBBFAC,,, | Performed by: ORTHOPAEDIC SURGERY

## 2021-04-29 PROCEDURE — 99999 PR PBB SHADOW E&M-EST. PATIENT-LVL I: ICD-10-PCS | Mod: PBBFAC,,, | Performed by: ORTHOPAEDIC SURGERY

## 2021-04-29 PROCEDURE — 20610 LARGE JOINT ASPIRATION/INJECTION: BILATERAL KNEE: ICD-10-PCS | Mod: 50,S$PBB,, | Performed by: ORTHOPAEDIC SURGERY

## 2021-04-29 PROCEDURE — 99499 UNLISTED E&M SERVICE: CPT | Mod: S$PBB,,, | Performed by: ORTHOPAEDIC SURGERY

## 2021-04-29 PROCEDURE — 99499 NO LOS: ICD-10-PCS | Mod: S$PBB,,, | Performed by: ORTHOPAEDIC SURGERY

## 2021-04-29 RX ADMIN — Medication 16 MG: at 04:04

## 2021-05-06 ENCOUNTER — PATIENT MESSAGE (OUTPATIENT)
Dept: RESEARCH | Facility: HOSPITAL | Age: 66
End: 2021-05-06

## 2021-05-06 ENCOUNTER — OFFICE VISIT (OUTPATIENT)
Dept: ORTHOPEDICS | Facility: CLINIC | Age: 66
End: 2021-05-06
Payer: MEDICARE

## 2021-05-06 VITALS — HEIGHT: 68 IN | RESPIRATION RATE: 17 BRPM | BODY MASS INDEX: 44.41 KG/M2 | WEIGHT: 293 LBS

## 2021-05-06 DIAGNOSIS — M17.0 PRIMARY OSTEOARTHRITIS OF BOTH KNEES: Primary | ICD-10-CM

## 2021-05-06 PROCEDURE — 20610 LARGE JOINT ASPIRATION/INJECTION: BILATERAL KNEE: ICD-10-PCS | Mod: 50,S$PBB,, | Performed by: ORTHOPAEDIC SURGERY

## 2021-05-06 PROCEDURE — 99499 NO LOS: ICD-10-PCS | Mod: S$PBB,,, | Performed by: ORTHOPAEDIC SURGERY

## 2021-05-06 PROCEDURE — 99999 PR PBB SHADOW E&M-EST. PATIENT-LVL III: CPT | Mod: PBBFAC,,, | Performed by: ORTHOPAEDIC SURGERY

## 2021-05-06 PROCEDURE — 99499 UNLISTED E&M SERVICE: CPT | Mod: S$PBB,,, | Performed by: ORTHOPAEDIC SURGERY

## 2021-05-06 PROCEDURE — 99999 PR PBB SHADOW E&M-EST. PATIENT-LVL III: ICD-10-PCS | Mod: PBBFAC,,, | Performed by: ORTHOPAEDIC SURGERY

## 2021-05-06 PROCEDURE — 20610 DRAIN/INJ JOINT/BURSA W/O US: CPT | Mod: 50,PBBFAC,PN | Performed by: ORTHOPAEDIC SURGERY

## 2021-05-06 RX ADMIN — Medication 16 MG: at 08:05

## 2021-05-17 ENCOUNTER — OFFICE VISIT (OUTPATIENT)
Dept: ORTHOPEDICS | Facility: CLINIC | Age: 66
End: 2021-05-17
Payer: MEDICARE

## 2021-05-17 VITALS — WEIGHT: 293 LBS | RESPIRATION RATE: 17 BRPM | BODY MASS INDEX: 44.41 KG/M2 | HEIGHT: 68 IN

## 2021-05-17 DIAGNOSIS — M17.0 PRIMARY OSTEOARTHRITIS OF BOTH KNEES: Primary | ICD-10-CM

## 2021-05-17 PROCEDURE — 20610 LARGE JOINT ASPIRATION/INJECTION: BILATERAL KNEE: ICD-10-PCS | Mod: 50,S$PBB,, | Performed by: ORTHOPAEDIC SURGERY

## 2021-05-17 PROCEDURE — 99499 UNLISTED E&M SERVICE: CPT | Mod: S$PBB,,, | Performed by: ORTHOPAEDIC SURGERY

## 2021-05-17 PROCEDURE — 20610 DRAIN/INJ JOINT/BURSA W/O US: CPT | Mod: 50,PBBFAC,PN | Performed by: ORTHOPAEDIC SURGERY

## 2021-05-17 PROCEDURE — 99999 PR PBB SHADOW E&M-EST. PATIENT-LVL III: CPT | Mod: PBBFAC,,, | Performed by: ORTHOPAEDIC SURGERY

## 2021-05-17 PROCEDURE — 99213 OFFICE O/P EST LOW 20 MIN: CPT | Mod: PBBFAC,PN | Performed by: ORTHOPAEDIC SURGERY

## 2021-05-17 PROCEDURE — 99999 PR PBB SHADOW E&M-EST. PATIENT-LVL III: ICD-10-PCS | Mod: PBBFAC,,, | Performed by: ORTHOPAEDIC SURGERY

## 2021-05-17 PROCEDURE — 99499 NO LOS: ICD-10-PCS | Mod: S$PBB,,, | Performed by: ORTHOPAEDIC SURGERY

## 2021-05-17 RX ORDER — TRIAMCINOLONE ACETONIDE 40 MG/ML
40 INJECTION, SUSPENSION INTRA-ARTICULAR; INTRAMUSCULAR
Status: DISCONTINUED | OUTPATIENT
Start: 2021-05-17 | End: 2021-05-17 | Stop reason: HOSPADM

## 2021-05-17 RX ADMIN — TRIAMCINOLONE ACETONIDE 40 MG: 40 INJECTION, SUSPENSION INTRA-ARTICULAR; INTRAMUSCULAR at 03:05

## 2021-09-24 ENCOUNTER — TELEPHONE (OUTPATIENT)
Dept: ORTHOPEDICS | Facility: CLINIC | Age: 66
End: 2021-09-24

## 2021-09-30 ENCOUNTER — OFFICE VISIT (OUTPATIENT)
Dept: ORTHOPEDICS | Facility: CLINIC | Age: 66
End: 2021-09-30
Payer: MEDICARE

## 2021-09-30 DIAGNOSIS — M17.0 PRIMARY OSTEOARTHRITIS OF BOTH KNEES: Primary | ICD-10-CM

## 2021-09-30 PROCEDURE — 99499 UNLISTED E&M SERVICE: CPT | Mod: S$PBB,,, | Performed by: ORTHOPAEDIC SURGERY

## 2021-09-30 PROCEDURE — 20610 DRAIN/INJ JOINT/BURSA W/O US: CPT | Mod: 50,PBBFAC,PN | Performed by: ORTHOPAEDIC SURGERY

## 2021-09-30 PROCEDURE — 99999 PR PBB SHADOW E&M-EST. PATIENT-LVL I: ICD-10-PCS | Mod: PBBFAC,,, | Performed by: ORTHOPAEDIC SURGERY

## 2021-09-30 PROCEDURE — 20610 LARGE JOINT ASPIRATION/INJECTION: BILATERAL KNEE: ICD-10-PCS | Mod: 50,S$PBB,, | Performed by: ORTHOPAEDIC SURGERY

## 2021-09-30 PROCEDURE — 99499 NO LOS: ICD-10-PCS | Mod: S$PBB,,, | Performed by: ORTHOPAEDIC SURGERY

## 2021-09-30 PROCEDURE — 99999 PR PBB SHADOW E&M-EST. PATIENT-LVL I: CPT | Mod: PBBFAC,,, | Performed by: ORTHOPAEDIC SURGERY

## 2021-09-30 PROCEDURE — 99211 OFF/OP EST MAY X REQ PHY/QHP: CPT | Mod: PBBFAC,PN,25 | Performed by: ORTHOPAEDIC SURGERY

## 2021-09-30 RX ORDER — TRIAMCINOLONE ACETONIDE 40 MG/ML
40 INJECTION, SUSPENSION INTRA-ARTICULAR; INTRAMUSCULAR
Status: DISCONTINUED | OUTPATIENT
Start: 2021-09-30 | End: 2021-09-30 | Stop reason: HOSPADM

## 2021-09-30 RX ADMIN — TRIAMCINOLONE ACETONIDE 40 MG: 40 INJECTION, SUSPENSION INTRA-ARTICULAR; INTRAMUSCULAR at 11:09

## 2021-11-22 ENCOUNTER — OFFICE VISIT (OUTPATIENT)
Dept: ORTHOPEDICS | Facility: CLINIC | Age: 66
End: 2021-11-22
Payer: MEDICARE

## 2021-11-22 DIAGNOSIS — M17.0 PRIMARY OSTEOARTHRITIS OF BOTH KNEES: Primary | ICD-10-CM

## 2021-11-22 PROCEDURE — 99499 UNLISTED E&M SERVICE: CPT | Mod: S$PBB,,, | Performed by: ORTHOPAEDIC SURGERY

## 2021-11-22 PROCEDURE — 20610 LARGE JOINT ASPIRATION/INJECTION: BILATERAL KNEE: ICD-10-PCS | Mod: 50,S$PBB,, | Performed by: ORTHOPAEDIC SURGERY

## 2021-11-22 PROCEDURE — 99999 PR PBB SHADOW E&M-EST. PATIENT-LVL I: CPT | Mod: PBBFAC,,, | Performed by: ORTHOPAEDIC SURGERY

## 2021-11-22 PROCEDURE — 20610 DRAIN/INJ JOINT/BURSA W/O US: CPT | Mod: 50,PBBFAC,PN | Performed by: ORTHOPAEDIC SURGERY

## 2021-11-22 PROCEDURE — 99999 PR PBB SHADOW E&M-EST. PATIENT-LVL I: ICD-10-PCS | Mod: PBBFAC,,, | Performed by: ORTHOPAEDIC SURGERY

## 2021-11-22 PROCEDURE — 99499 NO LOS: ICD-10-PCS | Mod: S$PBB,,, | Performed by: ORTHOPAEDIC SURGERY

## 2021-11-22 RX ADMIN — Medication 16 MG: at 10:11

## 2021-11-29 ENCOUNTER — OFFICE VISIT (OUTPATIENT)
Dept: ORTHOPEDICS | Facility: CLINIC | Age: 66
End: 2021-11-29
Payer: MEDICARE

## 2021-11-29 VITALS — WEIGHT: 293 LBS | RESPIRATION RATE: 16 BRPM | BODY MASS INDEX: 44.41 KG/M2 | HEIGHT: 68 IN

## 2021-11-29 DIAGNOSIS — M17.0 PRIMARY OSTEOARTHRITIS OF BOTH KNEES: Primary | ICD-10-CM

## 2021-11-29 PROCEDURE — 99499 NO LOS: ICD-10-PCS | Mod: S$PBB,,, | Performed by: ORTHOPAEDIC SURGERY

## 2021-11-29 PROCEDURE — 99999 PR PBB SHADOW E&M-EST. PATIENT-LVL III: ICD-10-PCS | Mod: PBBFAC,,, | Performed by: ORTHOPAEDIC SURGERY

## 2021-11-29 PROCEDURE — 20610 DRAIN/INJ JOINT/BURSA W/O US: CPT | Mod: 50,PBBFAC,PN | Performed by: ORTHOPAEDIC SURGERY

## 2021-11-29 PROCEDURE — 99499 UNLISTED E&M SERVICE: CPT | Mod: S$PBB,,, | Performed by: ORTHOPAEDIC SURGERY

## 2021-11-29 PROCEDURE — 20610 LARGE JOINT ASPIRATION/INJECTION: BILATERAL KNEE: ICD-10-PCS | Mod: 50,S$PBB,, | Performed by: ORTHOPAEDIC SURGERY

## 2021-11-29 PROCEDURE — 99999 PR PBB SHADOW E&M-EST. PATIENT-LVL III: CPT | Mod: PBBFAC,,, | Performed by: ORTHOPAEDIC SURGERY

## 2021-11-29 RX ADMIN — Medication 16 MG: at 10:11

## 2021-12-13 ENCOUNTER — OFFICE VISIT (OUTPATIENT)
Dept: ORTHOPEDICS | Facility: CLINIC | Age: 66
End: 2021-12-13
Payer: MEDICARE

## 2021-12-13 VITALS — BODY MASS INDEX: 44.41 KG/M2 | HEIGHT: 68 IN | RESPIRATION RATE: 18 BRPM | WEIGHT: 293 LBS

## 2021-12-13 DIAGNOSIS — M17.0 PRIMARY OSTEOARTHRITIS OF BOTH KNEES: Primary | ICD-10-CM

## 2021-12-13 PROCEDURE — 99999 PR PBB SHADOW E&M-EST. PATIENT-LVL III: CPT | Mod: PBBFAC,,, | Performed by: ORTHOPAEDIC SURGERY

## 2021-12-13 PROCEDURE — 20610 DRAIN/INJ JOINT/BURSA W/O US: CPT | Mod: 50,PBBFAC,PN | Performed by: ORTHOPAEDIC SURGERY

## 2021-12-13 PROCEDURE — 99499 UNLISTED E&M SERVICE: CPT | Mod: S$PBB,,, | Performed by: ORTHOPAEDIC SURGERY

## 2021-12-13 PROCEDURE — 99999 PR PBB SHADOW E&M-EST. PATIENT-LVL III: ICD-10-PCS | Mod: PBBFAC,,, | Performed by: ORTHOPAEDIC SURGERY

## 2021-12-13 PROCEDURE — 20610 LARGE JOINT ASPIRATION/INJECTION: BILATERAL KNEE: ICD-10-PCS | Mod: 50,S$PBB,, | Performed by: ORTHOPAEDIC SURGERY

## 2021-12-13 PROCEDURE — 99499 NO LOS: ICD-10-PCS | Mod: S$PBB,,, | Performed by: ORTHOPAEDIC SURGERY

## 2021-12-13 RX ADMIN — Medication 16 MG: at 02:12

## 2022-01-05 ENCOUNTER — TELEPHONE (OUTPATIENT)
Dept: ORTHOPEDICS | Facility: CLINIC | Age: 67
End: 2022-01-05
Payer: MEDICARE

## 2022-01-05 DIAGNOSIS — M17.0 PRIMARY OSTEOARTHRITIS OF BOTH KNEES: Primary | ICD-10-CM

## 2022-01-05 NOTE — TELEPHONE ENCOUNTER
----- Message from Turner Modi MA sent at 1/5/2022 10:14 AM CST -----  Contact: Pt.  Patient doesn't want to get an X Ray, at her next appointment she just wants to get her injection.  Please call her back at [phone]

## 2022-01-05 NOTE — TELEPHONE ENCOUNTER
Pt advised that new xrays needed to reevaluate knees to make sure not hurting pt by doing requested injections. Pt last xrays 10/2020. Pt okayed xrays.

## 2022-01-10 ENCOUNTER — OFFICE VISIT (OUTPATIENT)
Dept: ORTHOPEDICS | Facility: CLINIC | Age: 67
End: 2022-01-10
Payer: MEDICARE

## 2022-01-10 ENCOUNTER — HOSPITAL ENCOUNTER (OUTPATIENT)
Dept: RADIOLOGY | Facility: HOSPITAL | Age: 67
Discharge: HOME | End: 2022-01-10
Attending: ORTHOPAEDIC SURGERY
Payer: MEDICARE

## 2022-01-10 VITALS — WEIGHT: 293 LBS | HEIGHT: 68 IN | BODY MASS INDEX: 44.41 KG/M2

## 2022-01-10 DIAGNOSIS — M17.0 PRIMARY OSTEOARTHRITIS OF BOTH KNEES: Primary | ICD-10-CM

## 2022-01-10 DIAGNOSIS — M17.0 PRIMARY OSTEOARTHRITIS OF BOTH KNEES: ICD-10-CM

## 2022-01-10 PROCEDURE — 73564 X-RAY EXAM KNEE 4 OR MORE: CPT | Mod: 26,50,, | Performed by: RADIOLOGY

## 2022-01-10 PROCEDURE — 73564 X-RAY EXAM KNEE 4 OR MORE: CPT | Mod: TC,50,PN

## 2022-01-10 PROCEDURE — 99213 OFFICE O/P EST LOW 20 MIN: CPT | Mod: PBBFAC,PN,25 | Performed by: ORTHOPAEDIC SURGERY

## 2022-01-10 PROCEDURE — 99999 PR PBB SHADOW E&M-EST. PATIENT-LVL III: CPT | Mod: PBBFAC,,, | Performed by: ORTHOPAEDIC SURGERY

## 2022-01-10 PROCEDURE — 99999 PR PBB SHADOW E&M-EST. PATIENT-LVL III: ICD-10-PCS | Mod: PBBFAC,,, | Performed by: ORTHOPAEDIC SURGERY

## 2022-01-10 PROCEDURE — 99213 PR OFFICE/OUTPT VISIT, EST, LEVL III, 20-29 MIN: ICD-10-PCS | Mod: 25,S$PBB,, | Performed by: ORTHOPAEDIC SURGERY

## 2022-01-10 PROCEDURE — 20610 DRAIN/INJ JOINT/BURSA W/O US: CPT | Mod: 50,PBBFAC,PN | Performed by: ORTHOPAEDIC SURGERY

## 2022-01-10 PROCEDURE — 73564 XR KNEE ORTHO BILAT WITH FLEXION: ICD-10-PCS | Mod: 26,50,, | Performed by: RADIOLOGY

## 2022-01-10 PROCEDURE — 99213 OFFICE O/P EST LOW 20 MIN: CPT | Mod: 25,S$PBB,, | Performed by: ORTHOPAEDIC SURGERY

## 2022-01-10 PROCEDURE — 20610 LARGE JOINT ASPIRATION/INJECTION: BILATERAL KNEE: ICD-10-PCS | Mod: 50,S$PBB,, | Performed by: ORTHOPAEDIC SURGERY

## 2022-01-10 RX ORDER — TRIAMCINOLONE ACETONIDE 40 MG/ML
40 INJECTION, SUSPENSION INTRA-ARTICULAR; INTRAMUSCULAR
Status: DISCONTINUED | OUTPATIENT
Start: 2022-01-10 | End: 2022-01-10 | Stop reason: HOSPADM

## 2022-01-10 RX ADMIN — TRIAMCINOLONE ACETONIDE 40 MG: 40 INJECTION, SUSPENSION INTRA-ARTICULAR; INTRAMUSCULAR at 03:01

## 2022-01-10 NOTE — PROGRESS NOTES
Past Medical History:   Diagnosis Date    Angio-edema     Asthma     Diabetes mellitus     Eczema     Obesity     Urticaria        Past Surgical History:   Procedure Laterality Date    ADENOIDECTOMY      BONY PELVIS SURGERY       SECTION      FIXATION KYPHOPLASTY N/A 2018    Procedure: Kyphoplasty;  Surgeon: Martinez Morejon MD;  Location: [facility];  Service: Pain Management;  Laterality: N/A;  L1     HYSTERECTOMY      KNEE CARTILAGE SURGERY      TONSILLECTOMY      VAGINOPLASTY         Current Outpatient Medications   Medication Sig    atorvastatin (LIPITOR) 10 MG tablet Take 10 mg by mouth once daily.    diclofenac sodium (VOLTAREN) 1 % Gel Apply 2 g topically 4 (four) times daily.    metformin (GLUCOPHAGE) 500 MG tablet     nystatin (MYCOSTATIN) powder     olmesartan (BENICAR) 20 MG tablet     SYNTHROID 150 mcg tablet TAKE 1 TABLET BY MOUTH ONCE DAILY ON AN EMPTY STOMACH IN THE MORNING FOR 90 DAYS    triamterene-hydrochlorothiazide 37.5-25 mg (DYAZIDE) 37.5-25 mg per capsule     TRULICITY 1.5 mg/0.5 mL PnIj      No current facility-administered medications for this visit.       Review of patient's allergies indicates:  No Known Allergies    Family History   Problem Relation Age of Onset    Breast cancer Mother 42         at 59yo from breast ca    Asthma Maternal Uncle     Allergies Son     Allergic rhinitis Son     Eczema Son     Angioedema Daughter     Allergies Daughter     Immunodeficiency Neg Hx     Ovarian cancer Neg Hx        Social History     Socioeconomic History    Marital status:    Tobacco Use    Smoking status: Never Smoker    Smokeless tobacco: Never Used   Substance and Sexual Activity    Alcohol use: Yes     Comment: seldom    Sexual activity: Not Currently     Partners: Male     Birth control/protection: None       Chief Complaint:   Chief Complaint   Patient presents with    Left Knee - Pain    Right Knee - Pain       History of present  illness: Is a 66-year-old female seen for bilateral knee pain.  Patient's had pain for years.  Patient's had injections, physical therapy, and even meniscal surgery. Left knee is the worst of the 2.  Pain with walking standing or getting up from sitting.  Euflexxa works pretty well for her..  Pain is an 7/10 today.  Hurts getting up from a chair.  We did Euflexxa on her about a month ago.    Review of Systems:  Musculoskeletal:  See HPI    Physical Examination:    Vital Signs:    There were no vitals filed for this visit.    Body mass index is 45.77 kg/m².    This a well-developed, well nourished patient in no acute distress.  They are alert and oriented and cooperative to examination.  Pt. walks without an antalgic gait.      Examination of bilateral knees shows no rashes or erythema. There are no masses ecchymosis or effusion. Patient has full range of motion from 0-130°. Patient is nontender to palpation over lateral joint line and mildly tender to palpation over the medial joint line.  Knee is stable to varus and valgus stress. 5 out of 5 motor strength. Palpable distal pulses. Intact light touch sensation.  Moderate Patellofemoral crepitus      X-rays: X-rays a both knees are ordered and  reviewed which show some moderate to severe left knee arthritis with near complete medial joint space narrowing and  moderate to severe right knee arthritis with more lateral joint space narrowing     Assessment:: Bilateral knee arthritis    Plan:  I reviewed the new x-rays with her today.  I agreed to give her steroid injections today to help for her trip.    This note was created using [employer] voice recognition software that occasionally misinterpreted phrases or words.    Consult note is delivered via Epic messaging service.

## 2022-01-10 NOTE — PROCEDURES
Large Joint Aspiration/Injection: bilateral knee    Date/Time: 1/10/2022 3:15 PM  Performed by: Estevan Monteiro MD  Authorized by: Estevan Monteiro MD     Consent Done?:  Yes (Verbal)  Indications:  Pain  Site marked: the procedure site was marked    Timeout: prior to procedure the correct patient, procedure, and site was verified    Prep: patient was prepped and draped in usual sterile fashion      Local anesthesia used?: Yes    Anesthesia:  Local infiltration  Local anesthetic:  Bupivacaine 0.25% without epinephrine and lidocaine 2% without epinephrine  Anesthetic total (ml):  6      Details:  Needle Size:  22 G  Ultrasonic Guidance for needle placement?: No    Approach:  Anterolateral  Location:  Knee  Laterality:  Bilateral  Site:  Bilateral knee  Medications (Right):  40 mg triamcinolone acetonide 40 mg/mL  Medications (Left):  40 mg triamcinolone acetonide 40 mg/mL  Patient tolerance:  Patient tolerated the procedure well with no immediate complications

## 2022-04-18 ENCOUNTER — TELEPHONE (OUTPATIENT)
Dept: ORTHOPEDICS | Facility: CLINIC | Age: 67
End: 2022-04-18
Payer: MEDICARE

## 2022-04-18 NOTE — TELEPHONE ENCOUNTER
----- Message from Caro Venegas sent at 4/18/2022 12:49 PM CDT -----  Contact: pt  Type:  Sooner Appointment Request    Caller is requesting a sooner appointment.  Caller declined first available appointment listed below.  Caller will not accept being placed on the waitlist and is requesting a message be sent to doctor.    Name of Caller:  pt  When is the first available appointment?  may  Symptoms:  steroids injections for knee  Best Call Back Number: [phone]   Additional Information:  needs to schedule appt for knee injections

## 2022-04-18 NOTE — TELEPHONE ENCOUNTER
LVM with scheduled appointment date and time. Also said we placed her on the wait list incase something sooner becomes available

## 2022-05-05 ENCOUNTER — OFFICE VISIT (OUTPATIENT)
Dept: ORTHOPEDICS | Facility: CLINIC | Age: 67
End: 2022-05-05
Payer: MEDICARE

## 2022-05-05 VITALS — HEIGHT: 68 IN | WEIGHT: 293 LBS | BODY MASS INDEX: 44.41 KG/M2 | RESPIRATION RATE: 18 BRPM

## 2022-05-05 DIAGNOSIS — M17.0 PRIMARY OSTEOARTHRITIS OF BOTH KNEES: Primary | ICD-10-CM

## 2022-05-05 PROCEDURE — 20610 DRAIN/INJ JOINT/BURSA W/O US: CPT | Mod: 50,PBBFAC,PN | Performed by: ORTHOPAEDIC SURGERY

## 2022-05-05 PROCEDURE — 99213 OFFICE O/P EST LOW 20 MIN: CPT | Mod: 25,S$PBB,, | Performed by: ORTHOPAEDIC SURGERY

## 2022-05-05 PROCEDURE — 99213 PR OFFICE/OUTPT VISIT, EST, LEVL III, 20-29 MIN: ICD-10-PCS | Mod: 25,S$PBB,, | Performed by: ORTHOPAEDIC SURGERY

## 2022-05-05 PROCEDURE — 99213 OFFICE O/P EST LOW 20 MIN: CPT | Mod: PBBFAC,PN,25 | Performed by: ORTHOPAEDIC SURGERY

## 2022-05-05 PROCEDURE — 20610 LARGE JOINT ASPIRATION/INJECTION: BILATERAL KNEE: ICD-10-PCS | Mod: 50,S$PBB,, | Performed by: ORTHOPAEDIC SURGERY

## 2022-05-05 PROCEDURE — 99999 PR PBB SHADOW E&M-EST. PATIENT-LVL III: ICD-10-PCS | Mod: PBBFAC,,, | Performed by: ORTHOPAEDIC SURGERY

## 2022-05-05 PROCEDURE — 99999 PR PBB SHADOW E&M-EST. PATIENT-LVL III: CPT | Mod: PBBFAC,,, | Performed by: ORTHOPAEDIC SURGERY

## 2022-05-05 RX ORDER — TRIAMCINOLONE ACETONIDE 40 MG/ML
40 INJECTION, SUSPENSION INTRA-ARTICULAR; INTRAMUSCULAR
Status: DISCONTINUED | OUTPATIENT
Start: 2022-05-05 | End: 2022-05-05 | Stop reason: HOSPADM

## 2022-05-05 RX ADMIN — TRIAMCINOLONE ACETONIDE 40 MG: 40 INJECTION, SUSPENSION INTRA-ARTICULAR; INTRAMUSCULAR at 01:05

## 2022-05-05 NOTE — PROGRESS NOTES
Past Medical History:   Diagnosis Date    Angio-edema     Asthma     Diabetes mellitus     Eczema     Obesity     Urticaria        Past Surgical History:   Procedure Laterality Date    ADENOIDECTOMY      BONY PELVIS SURGERY       SECTION      FIXATION KYPHOPLASTY N/A 2018    Procedure: Kyphoplasty;  Surgeon: Martinez Morejon MD;  Location: [facility];  Service: Pain Management;  Laterality: N/A;  L1     HYSTERECTOMY      KNEE CARTILAGE SURGERY      TONSILLECTOMY      VAGINOPLASTY         Current Outpatient Medications   Medication Sig    atorvastatin (LIPITOR) 10 MG tablet Take 10 mg by mouth once daily.    diclofenac sodium (VOLTAREN) 1 % Gel Apply 2 g topically 4 (four) times daily.    metformin (GLUCOPHAGE) 500 MG tablet     nystatin (MYCOSTATIN) powder     olmesartan (BENICAR) 20 MG tablet     SYNTHROID 150 mcg tablet TAKE 1 TABLET BY MOUTH ONCE DAILY ON AN EMPTY STOMACH IN THE MORNING FOR 90 DAYS    triamterene-hydrochlorothiazide 37.5-25 mg (DYAZIDE) 37.5-25 mg per capsule     TRULICITY 1.5 mg/0.5 mL PnIj      No current facility-administered medications for this visit.       Review of patient's allergies indicates:  No Known Allergies    Family History   Problem Relation Age of Onset    Breast cancer Mother 42         at 61yo from breast ca    Asthma Maternal Uncle     Allergies Son     Allergic rhinitis Son     Eczema Son     Angioedema Daughter     Allergies Daughter     Immunodeficiency Neg Hx     Ovarian cancer Neg Hx        Social History     Socioeconomic History    Marital status:    Tobacco Use    Smoking status: Never Smoker    Smokeless tobacco: Never Used   Substance and Sexual Activity    Alcohol use: Yes     Comment: seldom    Sexual activity: Not Currently     Partners: Male     Birth control/protection: None       Chief Complaint:   Chief Complaint   Patient presents with    Left Knee - Pain    Right Knee - Pain       History of present  illness: Is a 67-year-old female seen for bilateral knee pain.  Patient's had pain for years.  Patient's had injections, physical therapy, and even meniscal surgery. Left knee is the worst of the 2.  Pain with walking standing or getting up from sitting.  Euflexxa works pretty well for her..  Pain is an 7/10 today.  Hurts getting up from a chair.  We did Euflexxa on her a few months ago.    Review of Systems:  Musculoskeletal:  See HPI    Physical Examination:    Vital Signs:    Vitals:    05/05/22 1334   Resp: 18       Body mass index is 45.77 kg/m².    This a well-developed, well nourished patient in no acute distress.  They are alert and oriented and cooperative to examination.  Pt. walks without an antalgic gait.      Examination of bilateral knees shows no rashes or erythema. There are no masses ecchymosis or effusion. Patient has full range of motion from 0-130°. Patient is nontender to palpation over lateral joint line and mildly tender to palpation over the medial joint line.  Knee is stable to varus and valgus stress. 5 out of 5 motor strength. Palpable distal pulses. Intact light touch sensation.  Moderate Patellofemoral crepitus      X-rays: X-rays a both knees are  reviewed which show some moderate to severe left knee arthritis with near complete medial joint space narrowing and  moderate to severe right knee arthritis with more lateral joint space narrowing     Assessment:: Bilateral knee arthritis    Plan:  I reviewed the new x-rays with her today.  I agreed to give her steroid injections today to help for her trip.  We will also get her Euflexxa really authorized to start at the end of the month.    This note was created using [employer] voice recognition software that occasionally misinterpreted phrases or words.    Consult note is delivered via Epic messaging service.

## 2022-05-05 NOTE — PROCEDURES
Large Joint Aspiration/Injection: bilateral knee    Date/Time: 5/5/2022 1:30 PM  Performed by: Estevan Monteiro MD  Authorized by: Estevan Monteiro MD     Consent Done?:  Yes (Verbal)  Indications:  Pain  Site marked: the procedure site was marked    Timeout: prior to procedure the correct patient, procedure, and site was verified    Prep: patient was prepped and draped in usual sterile fashion      Local anesthesia used?: Yes    Anesthesia:  Local infiltration  Local anesthetic:  Bupivacaine 0.25% without epinephrine and lidocaine 2% without epinephrine  Anesthetic total (ml):  6      Details:  Needle Size:  22 G  Ultrasonic Guidance for needle placement?: No    Approach:  Anterolateral  Location:  Knee  Laterality:  Bilateral  Site:  Bilateral knee  Medications (Right):  40 mg triamcinolone acetonide 40 mg/mL  Medications (Left):  40 mg triamcinolone acetonide 40 mg/mL  Patient tolerance:  Patient tolerated the procedure well with no immediate complications

## 2022-06-09 ENCOUNTER — OFFICE VISIT (OUTPATIENT)
Dept: ORTHOPEDICS | Facility: CLINIC | Age: 67
End: 2022-06-09
Payer: MEDICARE

## 2022-06-09 VITALS — BODY MASS INDEX: 44.41 KG/M2 | WEIGHT: 293 LBS | HEIGHT: 68 IN | RESPIRATION RATE: 18 BRPM

## 2022-06-09 DIAGNOSIS — M17.0 PRIMARY OSTEOARTHRITIS OF BOTH KNEES: Primary | ICD-10-CM

## 2022-06-09 PROCEDURE — 99499 UNLISTED E&M SERVICE: CPT | Mod: S$PBB,,, | Performed by: ORTHOPAEDIC SURGERY

## 2022-06-09 PROCEDURE — 99213 OFFICE O/P EST LOW 20 MIN: CPT | Mod: PBBFAC,PN | Performed by: ORTHOPAEDIC SURGERY

## 2022-06-09 PROCEDURE — 99999 PR PBB SHADOW E&M-EST. PATIENT-LVL III: ICD-10-PCS | Mod: PBBFAC,,, | Performed by: ORTHOPAEDIC SURGERY

## 2022-06-09 PROCEDURE — 20610 DRAIN/INJ JOINT/BURSA W/O US: CPT | Mod: 50,PBBFAC,PN | Performed by: ORTHOPAEDIC SURGERY

## 2022-06-09 PROCEDURE — 99499 NO LOS: ICD-10-PCS | Mod: S$PBB,,, | Performed by: ORTHOPAEDIC SURGERY

## 2022-06-09 PROCEDURE — 20610 LARGE JOINT ASPIRATION/INJECTION: BILATERAL KNEE: ICD-10-PCS | Mod: 50,S$PBB,, | Performed by: ORTHOPAEDIC SURGERY

## 2022-06-09 PROCEDURE — 99999 PR PBB SHADOW E&M-EST. PATIENT-LVL III: CPT | Mod: PBBFAC,,, | Performed by: ORTHOPAEDIC SURGERY

## 2022-06-09 RX ADMIN — Medication 20 MG: at 01:06

## 2022-06-09 NOTE — PROGRESS NOTES
Past Medical History:   Diagnosis Date    Angio-edema     Asthma     Diabetes mellitus     Eczema     Obesity     Urticaria        Past Surgical History:   Procedure Laterality Date    ADENOIDECTOMY      BONY PELVIS SURGERY       SECTION      FIXATION KYPHOPLASTY N/A 2018    Procedure: Kyphoplasty;  Surgeon: Martinez Morejon MD;  Location: [facility];  Service: Pain Management;  Laterality: N/A;  L1     HYSTERECTOMY      KNEE CARTILAGE SURGERY      TONSILLECTOMY      VAGINOPLASTY         Current Outpatient Medications   Medication Sig    atorvastatin (LIPITOR) 10 MG tablet Take 10 mg by mouth once daily.    diclofenac sodium (VOLTAREN) 1 % Gel Apply 2 g topically 4 (four) times daily.    metformin (GLUCOPHAGE) 500 MG tablet     nystatin (MYCOSTATIN) powder     olmesartan (BENICAR) 20 MG tablet     SYNTHROID 150 mcg tablet TAKE 1 TABLET BY MOUTH ONCE DAILY ON AN EMPTY STOMACH IN THE MORNING FOR 90 DAYS    triamterene-hydrochlorothiazide 37.5-25 mg (DYAZIDE) 37.5-25 mg per capsule     TRULICITY 1.5 mg/0.5 mL PnIj      No current facility-administered medications for this visit.       Review of patient's allergies indicates:  No Known Allergies    Family History   Problem Relation Age of Onset    Breast cancer Mother 42         at 61yo from breast ca    Asthma Maternal Uncle     Allergies Son     Allergic rhinitis Son     Eczema Son     Angioedema Daughter     Allergies Daughter     Immunodeficiency Neg Hx     Ovarian cancer Neg Hx        Social History     Socioeconomic History    Marital status:    Tobacco Use    Smoking status: Never Smoker    Smokeless tobacco: Never Used   Substance and Sexual Activity    Alcohol use: Yes     Comment: seldom    Sexual activity: Not Currently     Partners: Male     Birth control/protection: None       Chief Complaint:   Chief Complaint   Patient presents with    Injections     bilatertal synvisc 1/3       History of present  illness: Is a 67-year-old female seen for bilateral knee pain.  Patient's had pain for years.  Patient's had injections, physical therapy, and even meniscal surgery. Left knee is the worst of the 2.  Pain with walking standing or getting up from sitting.  Euflexxa works pretty well for her..  Pain is an 7/10 today.  Hurts getting up from a chair.  We did Euflexxa on her a few months ago.    Review of Systems:  Musculoskeletal:  See HPI    Physical Examination:    Vital Signs:    Vitals:    06/09/22 1258   Resp: 18       Body mass index is 45.77 kg/m².    This a well-developed, well nourished patient in no acute distress.  They are alert and oriented and cooperative to examination.  Pt. walks without an antalgic gait.      Examination of bilateral knees shows no rashes or erythema. There are no masses ecchymosis or effusion. Patient has full range of motion from 0-130°. Patient is nontender to palpation over lateral joint line and mildly tender to palpation over the medial joint line.  Knee is stable to varus and valgus stress. 5 out of 5 motor strength. Palpable distal pulses. Intact light touch sensation.  Moderate Patellofemoral crepitus      X-rays: X-rays a both knees are  reviewed which show some moderate to severe left knee arthritis with near complete medial joint space narrowing and  moderate to severe right knee arthritis with more lateral joint space narrowing     Assessment:: Bilateral knee arthritis    Plan:  I reviewed the new x-rays with her today.  We injected both knees with Euflexxa 1 of 3.  Follow up next week.    This note was created using [employer] voice recognition software that occasionally misinterpreted phrases or words.    Consult note is delivered via Epic messaging service.

## 2022-06-16 ENCOUNTER — OFFICE VISIT (OUTPATIENT)
Dept: ORTHOPEDICS | Facility: CLINIC | Age: 67
End: 2022-06-16
Payer: MEDICARE

## 2022-06-16 VITALS — RESPIRATION RATE: 18 BRPM | HEIGHT: 68 IN | BODY MASS INDEX: 44.41 KG/M2 | WEIGHT: 293 LBS

## 2022-06-16 DIAGNOSIS — M17.0 PRIMARY OSTEOARTHRITIS OF BOTH KNEES: Primary | ICD-10-CM

## 2022-06-16 PROCEDURE — 99213 OFFICE O/P EST LOW 20 MIN: CPT | Mod: PBBFAC,PN | Performed by: ORTHOPAEDIC SURGERY

## 2022-06-16 PROCEDURE — 20610 DRAIN/INJ JOINT/BURSA W/O US: CPT | Mod: 50,PBBFAC,PN | Performed by: ORTHOPAEDIC SURGERY

## 2022-06-16 PROCEDURE — 99499 UNLISTED E&M SERVICE: CPT | Mod: S$PBB,,, | Performed by: ORTHOPAEDIC SURGERY

## 2022-06-16 PROCEDURE — 99999 PR PBB SHADOW E&M-EST. PATIENT-LVL III: ICD-10-PCS | Mod: PBBFAC,,, | Performed by: ORTHOPAEDIC SURGERY

## 2022-06-16 PROCEDURE — 99999 PR PBB SHADOW E&M-EST. PATIENT-LVL III: CPT | Mod: PBBFAC,,, | Performed by: ORTHOPAEDIC SURGERY

## 2022-06-16 PROCEDURE — 20610 LARGE JOINT ASPIRATION/INJECTION: BILATERAL KNEE: ICD-10-PCS | Mod: 50,S$PBB,, | Performed by: ORTHOPAEDIC SURGERY

## 2022-06-16 PROCEDURE — 99499 NO LOS: ICD-10-PCS | Mod: S$PBB,,, | Performed by: ORTHOPAEDIC SURGERY

## 2022-06-16 RX ADMIN — Medication 16 MG: at 11:06

## 2022-06-16 NOTE — PROCEDURES
Large Joint Aspiration/Injection: bilateral knee    Date/Time: 6/16/2022 11:30 AM  Performed by: Estevan Monteiro MD  Authorized by: Estevan Monteiro MD     Consent Done?:  Yes (Verbal)  Indications:  Pain  Site marked: the procedure site was marked    Timeout: prior to procedure the correct patient, procedure, and site was verified      Details:  Needle Size:  20 G  Approach:  Anterolateral  Location:  Knee  Laterality:  Bilateral  Site:  Bilateral knee  Medications (Right):  16 mg hyaluronate 16 mg/2 mL  Medications (Left):  16 mg hyaluronate 16 mg/2 mL  Patient tolerance:  Patient tolerated the procedure well with no immediate complications

## 2022-06-16 NOTE — PROGRESS NOTES
Past Medical History:   Diagnosis Date    Angio-edema     Asthma     Diabetes mellitus     Eczema     Obesity     Urticaria        Past Surgical History:   Procedure Laterality Date    ADENOIDECTOMY      BONY PELVIS SURGERY       SECTION      FIXATION KYPHOPLASTY N/A 2018    Procedure: Kyphoplasty;  Surgeon: Martinez Morejon MD;  Location: [facility];  Service: Pain Management;  Laterality: N/A;  L1     HYSTERECTOMY      KNEE CARTILAGE SURGERY      TONSILLECTOMY      VAGINOPLASTY         Current Outpatient Medications   Medication Sig    atorvastatin (LIPITOR) 10 MG tablet Take 10 mg by mouth once daily.    diclofenac sodium (VOLTAREN) 1 % Gel Apply 2 g topically 4 (four) times daily.    metformin (GLUCOPHAGE) 500 MG tablet     nystatin (MYCOSTATIN) powder     olmesartan (BENICAR) 20 MG tablet     SYNTHROID 150 mcg tablet TAKE 1 TABLET BY MOUTH ONCE DAILY ON AN EMPTY STOMACH IN THE MORNING FOR 90 DAYS    triamterene-hydrochlorothiazide 37.5-25 mg (DYAZIDE) 37.5-25 mg per capsule     TRULICITY 1.5 mg/0.5 mL PnIj      No current facility-administered medications for this visit.       Review of patient's allergies indicates:  No Known Allergies    Family History   Problem Relation Age of Onset    Breast cancer Mother 42         at 59yo from breast ca    Asthma Maternal Uncle     Allergies Son     Allergic rhinitis Son     Eczema Son     Angioedema Daughter     Allergies Daughter     Immunodeficiency Neg Hx     Ovarian cancer Neg Hx        Social History     Socioeconomic History    Marital status:    Tobacco Use    Smoking status: Never Smoker    Smokeless tobacco: Never Used   Substance and Sexual Activity    Alcohol use: Yes     Comment: seldom    Sexual activity: Not Currently     Partners: Male     Birth control/protection: None       Chief Complaint:   Chief Complaint   Patient presents with    Injections     bilateral synvisc 2/3       History of present  illness: Is a 67-year-old female seen for bilateral knee pain.  Patient's had pain for years.  Patient's had injections, physical therapy, and even meniscal surgery. Left knee is the worst of the 2.  Pain with walking standing or getting up from sitting.  Viscosupplementation works pretty well for her..  Pain is an 7/10 today.  Hurts getting up from a chair.  We did Synvisc on her a few months ago.    Review of Systems:  Musculoskeletal:  See HPI    Physical Examination:    Vital Signs:    Vitals:    06/16/22 1054   Resp: 18       Body mass index is 45.77 kg/m².    This a well-developed, well nourished patient in no acute distress.  They are alert and oriented and cooperative to examination.  Pt. walks without an antalgic gait.      Examination of bilateral knees shows no rashes or erythema. There are no masses ecchymosis or effusion. Patient has full range of motion from 0-130°. Patient is nontender to palpation over lateral joint line and mildly tender to palpation over the medial joint line.  Knee is stable to varus and valgus stress. 5 out of 5 motor strength. Palpable distal pulses. Intact light touch sensation.  Moderate Patellofemoral crepitus      X-rays: X-rays a both knees are  reviewed which show some moderate to severe left knee arthritis with near complete medial joint space narrowing and  moderate to severe right knee arthritis with more lateral joint space narrowing     Assessment:: Bilateral knee arthritis    Plan:  I reviewed the new x-rays with her today.  We injected both knees with Synvisc 2 of 3.  Follow up next week.    This note was created using [employer] voice recognition software that occasionally misinterpreted phrases or words.    Consult note is delivered via Epic messaging service.

## 2022-06-20 ENCOUNTER — OFFICE VISIT (OUTPATIENT)
Dept: ORTHOPEDICS | Facility: CLINIC | Age: 67
End: 2022-06-20
Payer: MEDICARE

## 2022-06-20 VITALS — BODY MASS INDEX: 44.41 KG/M2 | HEIGHT: 68 IN | WEIGHT: 293 LBS | RESPIRATION RATE: 18 BRPM

## 2022-06-20 DIAGNOSIS — M17.0 PRIMARY OSTEOARTHRITIS OF BOTH KNEES: Primary | ICD-10-CM

## 2022-06-20 PROCEDURE — 20610 LARGE JOINT ASPIRATION/INJECTION: BILATERAL KNEE: ICD-10-PCS | Mod: 50,S$PBB,, | Performed by: ORTHOPAEDIC SURGERY

## 2022-06-20 PROCEDURE — 99213 OFFICE O/P EST LOW 20 MIN: CPT | Mod: PBBFAC,PN | Performed by: ORTHOPAEDIC SURGERY

## 2022-06-20 PROCEDURE — 20610 DRAIN/INJ JOINT/BURSA W/O US: CPT | Mod: 50,PBBFAC,PN | Performed by: ORTHOPAEDIC SURGERY

## 2022-06-20 PROCEDURE — 99999 PR PBB SHADOW E&M-EST. PATIENT-LVL III: CPT | Mod: PBBFAC,,, | Performed by: ORTHOPAEDIC SURGERY

## 2022-06-20 PROCEDURE — 99499 UNLISTED E&M SERVICE: CPT | Mod: S$PBB,,, | Performed by: ORTHOPAEDIC SURGERY

## 2022-06-20 PROCEDURE — 99499 NO LOS: ICD-10-PCS | Mod: S$PBB,,, | Performed by: ORTHOPAEDIC SURGERY

## 2022-06-20 PROCEDURE — 99999 PR PBB SHADOW E&M-EST. PATIENT-LVL III: ICD-10-PCS | Mod: PBBFAC,,, | Performed by: ORTHOPAEDIC SURGERY

## 2022-06-20 RX ADMIN — Medication 16 MG: at 01:06

## 2022-06-20 NOTE — PROCEDURES
Large Joint Aspiration/Injection: bilateral knee    Date/Time: 6/20/2022 1:00 PM  Performed by: Estevan Monteiro MD  Authorized by: Estevan Monteiro MD     Consent Done?:  Yes (Verbal)  Indications:  Pain  Site marked: the procedure site was marked    Timeout: prior to procedure the correct patient, procedure, and site was verified      Details:  Needle Size:  20 G  Approach:  Anterolateral  Location:  Knee  Laterality:  Bilateral  Site:  Bilateral knee  Medications (Right):  16 mg hyaluronate 16 mg/2 mL  Medications (Left):  16 mg hyaluronate 16 mg/2 mL  Patient tolerance:  Patient tolerated the procedure well with no immediate complications

## 2022-06-20 NOTE — PROGRESS NOTES
Past Medical History:   Diagnosis Date    Angio-edema     Asthma     Diabetes mellitus     Eczema     Obesity     Urticaria        Past Surgical History:   Procedure Laterality Date    ADENOIDECTOMY      BONY PELVIS SURGERY       SECTION      FIXATION KYPHOPLASTY N/A 2018    Procedure: Kyphoplasty;  Surgeon: Martinez Morejon MD;  Location: [facility];  Service: Pain Management;  Laterality: N/A;  L1     HYSTERECTOMY      KNEE CARTILAGE SURGERY      TONSILLECTOMY      VAGINOPLASTY         Current Outpatient Medications   Medication Sig    atorvastatin (LIPITOR) 10 MG tablet Take 10 mg by mouth once daily.    diclofenac sodium (VOLTAREN) 1 % Gel Apply 2 g topically 4 (four) times daily.    metformin (GLUCOPHAGE) 500 MG tablet     nystatin (MYCOSTATIN) powder     olmesartan (BENICAR) 20 MG tablet     SYNTHROID 150 mcg tablet TAKE 1 TABLET BY MOUTH ONCE DAILY ON AN EMPTY STOMACH IN THE MORNING FOR 90 DAYS    triamterene-hydrochlorothiazide 37.5-25 mg (DYAZIDE) 37.5-25 mg per capsule     TRULICITY 1.5 mg/0.5 mL PnIj      No current facility-administered medications for this visit.       Review of patient's allergies indicates:  No Known Allergies    Family History   Problem Relation Age of Onset    Breast cancer Mother 42         at 61yo from breast ca    Asthma Maternal Uncle     Allergies Son     Allergic rhinitis Son     Eczema Son     Angioedema Daughter     Allergies Daughter     Immunodeficiency Neg Hx     Ovarian cancer Neg Hx        Social History     Socioeconomic History    Marital status:    Tobacco Use    Smoking status: Never Smoker    Smokeless tobacco: Never Used   Substance and Sexual Activity    Alcohol use: Yes     Comment: seldom    Sexual activity: Not Currently     Partners: Male     Birth control/protection: None       Chief Complaint:   Chief Complaint   Patient presents with    Injections     bilateral synvisc 3/3       History of present  illness: Is a 67-year-old female seen for bilateral knee pain.  Patient's had pain for years.  Patient's had injections, physical therapy, and even meniscal surgery. Left knee is the worst of the 2.  Pain with walking standing or getting up from sitting.  Viscosupplementation works pretty well for her..  Pain is an 7/10 today.  Hurts getting up from a chair.  We did Synvisc on her a few months ago.    Review of Systems:  Musculoskeletal:  See HPI    Physical Examination:    Vital Signs:    Vitals:    06/20/22 1302   Resp: 18       Body mass index is 45.77 kg/m².    This a well-developed, well nourished patient in no acute distress.  They are alert and oriented and cooperative to examination.  Pt. walks without an antalgic gait.      Examination of bilateral knees shows no rashes or erythema. There are no masses ecchymosis or effusion. Patient has full range of motion from 0-130°. Patient is nontender to palpation over lateral joint line and mildly tender to palpation over the medial joint line.  Knee is stable to varus and valgus stress. 5 out of 5 motor strength. Palpable distal pulses. Intact light touch sensation.  Moderate Patellofemoral crepitus      X-rays: X-rays a both knees are  reviewed which show some moderate to severe left knee arthritis with near complete medial joint space narrowing and  moderate to severe right knee arthritis with more lateral joint space narrowing     Assessment:: Bilateral knee arthritis    Plan:  I reviewed the new x-rays with her today.  We injected both knees with Synvisc 3 of 3.  Follow up as needed.    This note was created using [employer] voice recognition software that occasionally misinterpreted phrases or words.    Consult note is delivered via Epic messaging service.

## 2022-07-06 ENCOUNTER — TELEPHONE (OUTPATIENT)
Dept: ORTHOPEDICS | Facility: CLINIC | Age: 67
End: 2022-07-06
Payer: MEDICARE

## 2022-07-06 NOTE — TELEPHONE ENCOUNTER
----- Message from Syed Duran sent at 7/6/2022 10:56 AM CDT -----  Contact: self  Type: Sooner Appointment Request        Caller is requesting a sooner appointment. Caller declined first available appointment listed below. Caller will not accept being placed on the waitlist and is requesting a message be sent to doctor.        Name of Caller: patient   When is the first available appointment? 8/8/2022  Symptoms: pain and inflammation   Best Call Back Number: [phone] (home)   Additional Information: Patient called today still having pain and inflammation in both knees. Patient wants to be seen between today or 7/22 if possible. If  patient can be seen plz call to schedule appt. Patient made an appt for 8/8/2022 that was all that was available wants to be seen sooner leaving for a trip in june. Thanks      
Called and spoke to patient.  I can not move her appt to a sooner date as she had B knee cortisone in early may 2022.  She will keep her appt for early Aug to discuss tx options including repeat cortisone injections.     Asa   
B hips 1/2 AROM, B knee extenxion ~ - 15 degrees 2* hamstring tightness, B ankles DF to neutral

## 2022-07-29 ENCOUNTER — TELEPHONE (OUTPATIENT)
Dept: OBSTETRICS AND GYNECOLOGY | Facility: CLINIC | Age: 67
End: 2022-07-29
Payer: MEDICARE

## 2022-07-29 NOTE — TELEPHONE ENCOUNTER
----- Message from Anika Ace sent at 7/29/2022  4:43 PM CDT -----  Contact: Self  Type:  Sooner Appointment Request    Caller is requesting a sooner appointment.  Caller declined first available appointment listed below.  Caller will not accept being placed on the waitlist and is requesting a message be sent to doctor.    Name of Caller:  Patient  When is the first available appointment?  8/23  Symptoms:  Needs annual/mammo / bladder issues/ recurrent uti  Best Call Back Number:  [phone]  Additional Information:  Please call pt back to reschedule, stated she needs something sooner sonce she has put the appt off long enough. Thank you

## 2022-08-01 ENCOUNTER — OFFICE VISIT (OUTPATIENT)
Dept: OBSTETRICS AND GYNECOLOGY | Facility: CLINIC | Age: 67
End: 2022-08-01
Payer: MEDICARE

## 2022-08-01 ENCOUNTER — HOSPITAL ENCOUNTER (OUTPATIENT)
Dept: RADIOLOGY | Facility: HOSPITAL | Age: 67
Discharge: HOME | End: 2022-08-01
Attending: OBSTETRICS & GYNECOLOGY
Payer: MEDICARE

## 2022-08-01 VITALS
SYSTOLIC BLOOD PRESSURE: 154 MMHG | BODY MASS INDEX: 43.01 KG/M2 | RESPIRATION RATE: 16 BRPM | DIASTOLIC BLOOD PRESSURE: 88 MMHG | WEIGHT: 283.75 LBS | HEIGHT: 68 IN

## 2022-08-01 DIAGNOSIS — Z12.31 SCREENING MAMMOGRAM, ENCOUNTER FOR: Primary | ICD-10-CM

## 2022-08-01 DIAGNOSIS — R39.9 UTI SYMPTOMS: ICD-10-CM

## 2022-08-01 DIAGNOSIS — R31.9 HEMATURIA, UNSPECIFIED TYPE: ICD-10-CM

## 2022-08-01 DIAGNOSIS — Z12.31 SCREENING MAMMOGRAM, ENCOUNTER FOR: ICD-10-CM

## 2022-08-01 LAB
BILIRUB SERPL-MCNC: NEGATIVE MG/DL
BLOOD URINE, POC: NORMAL
CLARITY, POC UA: CLEAR
COLOR, POC UA: YELLOW
GLUCOSE UR QL STRIP: NORMAL
KETONES UR QL STRIP: NEGATIVE
LEUKOCYTE ESTERASE URINE, POC: NEGATIVE
NITRITE, POC UA: NEGATIVE
PH, POC UA: 5
PROTEIN, POC: NEGATIVE
SPECIFIC GRAVITY, POC UA: 1.02
UROBILINOGEN, POC UA: NORMAL

## 2022-08-01 PROCEDURE — G0101 CA SCREEN;PELVIC/BREAST EXAM: HCPCS | Mod: S$PBB,,, | Performed by: OBSTETRICS & GYNECOLOGY

## 2022-08-01 PROCEDURE — 81002 URINALYSIS NONAUTO W/O SCOPE: CPT | Mod: PBBFAC,PN | Performed by: OBSTETRICS & GYNECOLOGY

## 2022-08-01 PROCEDURE — 77063 MAMMO DIGITAL SCREENING BILAT WITH TOMO: ICD-10-PCS | Mod: 26,,, | Performed by: RADIOLOGY

## 2022-08-01 PROCEDURE — 99213 OFFICE O/P EST LOW 20 MIN: CPT | Mod: PBBFAC,PN | Performed by: OBSTETRICS & GYNECOLOGY

## 2022-08-01 PROCEDURE — 99999 PR PBB SHADOW E&M-EST. PATIENT-LVL III: ICD-10-PCS | Mod: PBBFAC,,, | Performed by: OBSTETRICS & GYNECOLOGY

## 2022-08-01 PROCEDURE — 77067 MAMMO DIGITAL SCREENING BILAT WITH TOMO: ICD-10-PCS | Mod: 26,,, | Performed by: RADIOLOGY

## 2022-08-01 PROCEDURE — G0101 PR CA SCREEN;PELVIC/BREAST EXAM: ICD-10-PCS | Mod: S$PBB,,, | Performed by: OBSTETRICS & GYNECOLOGY

## 2022-08-01 PROCEDURE — 77063 BREAST TOMOSYNTHESIS BI: CPT | Mod: 26,,, | Performed by: RADIOLOGY

## 2022-08-01 PROCEDURE — 77067 SCR MAMMO BI INCL CAD: CPT | Mod: 26,,, | Performed by: RADIOLOGY

## 2022-08-01 PROCEDURE — G0101 CA SCREEN;PELVIC/BREAST EXAM: HCPCS | Mod: PBBFAC,PN | Performed by: OBSTETRICS & GYNECOLOGY

## 2022-08-01 PROCEDURE — 87086 URINE CULTURE/COLONY COUNT: CPT | Performed by: OBSTETRICS & GYNECOLOGY

## 2022-08-01 PROCEDURE — 77067 SCR MAMMO BI INCL CAD: CPT | Mod: TC,PN

## 2022-08-01 PROCEDURE — 99999 PR PBB SHADOW E&M-EST. PATIENT-LVL III: CPT | Mod: PBBFAC,,, | Performed by: OBSTETRICS & GYNECOLOGY

## 2022-08-01 PROCEDURE — 77063 BREAST TOMOSYNTHESIS BI: CPT | Mod: TC,PN

## 2022-08-01 NOTE — PROGRESS NOTES
Chief Complaint   Patient presents with    Well Woman    Mammogram    Poss UTI again       History and Physical:  No LMP recorded. Patient has had a hysterectomy.       Gail Temple is a 67 y.o.  female who presents today for her routine annual GYN exam. The patient has no Gynecology complaints today. No bowel complaints. worsenin urge incontinence and recurrent UTI - rec urology referral.      Allergies:   Review of patient's allergies indicates:   Allergen Reactions    Levothyroxine      GENERIC ONLY / PT CAN TAKE SYNTHROID       Past Medical History:   Diagnosis Date    Angio-edema     Asthma     Diabetes mellitus     Eczema     Obesity     Urticaria        Past Surgical History:   Procedure Laterality Date    ADENOIDECTOMY      BONY PELVIS SURGERY       SECTION      FIXATION KYPHOPLASTY N/A 2018    Procedure: Kyphoplasty;  Surgeon: Martinez Morejon MD;  [facility]: [facility];  Service: Pain Management;  Laterality: N/A;  L1     HYSTERECTOMY      KNEE CARTILAGE SURGERY      TONSILLECTOMY      VAGINOPLASTY         MEDS:   Current Outpatient Medications on File Prior to Visit   Medication Sig Dispense Refill    atorvastatin (LIPITOR) 10 MG tablet Take 10 mg by mouth once daily.      diclofenac sodium (VOLTAREN) 1 % Gel Apply 2 g topically 4 (four) times daily. 1 Tube 0    metformin (GLUCOPHAGE) 500 MG tablet       nystatin (MYCOSTATIN) powder       olmesartan (BENICAR) 20 MG tablet       SYNTHROID 150 mcg tablet TAKE 1 TABLET BY MOUTH ONCE DAILY ON AN EMPTY STOMACH IN THE MORNING FOR 90 DAYS      triamterene-hydrochlorothiazide 37.5-25 mg (DYAZIDE) 37.5-25 mg per capsule       TRULICITY 1.5 mg/0.5 mL PnIj        No current facility-administered medications on file prior to visit.       OB History        4    Para   4    Term   2            AB        Living           SAB        IAB        Ectopic        Multiple        Live Births                     Social  "History     Socioeconomic History    Marital status:    Tobacco Use    Smoking status: Never Smoker    Smokeless tobacco: Never Used   Substance and Sexual Activity    Alcohol use: Yes     Comment: seldom    Sexual activity: Not Currently     Partners: Male     Birth control/protection: None       Family History   Problem Relation Age of Onset    Breast cancer Mother 42         at 59yo from breast ca    Asthma Maternal Uncle     Allergies Son     Allergic rhinitis Son     Eczema Son     Angioedema Daughter     Allergies Daughter     Immunodeficiency Neg Hx     Ovarian cancer Neg Hx          Past medical and surgical history reviewed.   I have reviewed the patient's medical history in detail and updated the computerized patient record.        Review of System:   General: no chills, fever, night sweats, weight gain or weight loss  Psychological: no depression or suicidal ideation  Breasts: no new or changing breast lumps, nipple discharge or masses.  Respiratory: no cough, shortness of breath, or wheezing  Cardiovascular: no chest pain or dyspnea on exertion  Gastrointestinal: no abdominal pain, change in bowel habits, or black or bloody stools  Genito-Urinary: no incontinence, urinary frequency/urgency or vulvar/vaginal symptoms, pelvic pain or abnormal vaginal bleeding.  Musculoskeletal: no gait disturbance or muscular weakness          Physical Exam:   BP (!) 154/88   Resp 16   Ht 5' 8" (1.727 m)   Wt 128.7 kg (283 lb 11.7 oz)   BMI 43.14 kg/m²   Constitutional: She is oriented to person, place, and time. She appears well-developed and well-nourished. No distress. OverWeight  HENT:   Head: Normocephalic and atraumatic.   Eyes: Conjunctivae and EOM are normal. No scleral icterus.   Neck: Normal range of motion. Neck supple. No tracheal deviation present.   Cardiovascular: Normal rate.    Pulmonary/Chest: Effort normal. No respiratory distress. She exhibits no tenderness.  Breasts: are " symmetrical.   Right breast exhibits no inverted nipple, no mass, no nipple discharge, no skin change and no tenderness.   Left breast exhibits no inverted nipple, no mass, no nipple discharge, no skin change and no tenderness.  Abdominal: Soft. She exhibits no distension and no mass. There is no tenderness. There is no rebound and no guarding.   Genitourinary: no masses, nontender, no lesions, no cystocele or rectocele.  Musculoskeletal: Normal range of motion.   Neurological: She is alert and oriented to person, place, and time. Coordination normal.   Skin: Skin is warm and dry. She is not diaphoretic.   Psychiatric: She has a normal mood and affect.        Assessment:   Normal annual GYN exam  Worsening bladder sx       Plan:   PAP Not Needed, Urine Culture   Mammogram  Urology referral  Follow up in 1 year.

## 2022-08-03 LAB — BACTERIA UR CULT: NORMAL

## 2022-08-08 ENCOUNTER — OFFICE VISIT (OUTPATIENT)
Dept: ORTHOPEDICS | Facility: CLINIC | Age: 67
End: 2022-08-08
Payer: MEDICARE

## 2022-08-08 VITALS — BODY MASS INDEX: 42.89 KG/M2 | RESPIRATION RATE: 18 BRPM | WEIGHT: 283 LBS | HEIGHT: 68 IN

## 2022-08-08 DIAGNOSIS — M17.0 PRIMARY OSTEOARTHRITIS OF BOTH KNEES: Primary | ICD-10-CM

## 2022-08-08 PROCEDURE — 99999 PR PBB SHADOW E&M-EST. PATIENT-LVL III: ICD-10-PCS | Mod: PBBFAC,,, | Performed by: ORTHOPAEDIC SURGERY

## 2022-08-08 PROCEDURE — 20610 DRAIN/INJ JOINT/BURSA W/O US: CPT | Mod: 50,PBBFAC,PN | Performed by: ORTHOPAEDIC SURGERY

## 2022-08-08 PROCEDURE — 99213 PR OFFICE/OUTPT VISIT, EST, LEVL III, 20-29 MIN: ICD-10-PCS | Mod: 25,S$PBB,, | Performed by: ORTHOPAEDIC SURGERY

## 2022-08-08 PROCEDURE — 99213 OFFICE O/P EST LOW 20 MIN: CPT | Mod: PBBFAC,PN,25 | Performed by: ORTHOPAEDIC SURGERY

## 2022-08-08 PROCEDURE — 99213 OFFICE O/P EST LOW 20 MIN: CPT | Mod: 25,S$PBB,, | Performed by: ORTHOPAEDIC SURGERY

## 2022-08-08 PROCEDURE — 99999 PR PBB SHADOW E&M-EST. PATIENT-LVL III: CPT | Mod: PBBFAC,,, | Performed by: ORTHOPAEDIC SURGERY

## 2022-08-08 PROCEDURE — 20610 LARGE JOINT ASPIRATION/INJECTION: BILATERAL KNEE: ICD-10-PCS | Mod: 50,S$PBB,, | Performed by: ORTHOPAEDIC SURGERY

## 2022-08-08 RX ORDER — TRIAMCINOLONE ACETONIDE 40 MG/ML
40 INJECTION, SUSPENSION INTRA-ARTICULAR; INTRAMUSCULAR
Status: DISCONTINUED | OUTPATIENT
Start: 2022-08-08 | End: 2022-08-08 | Stop reason: HOSPADM

## 2022-08-08 RX ORDER — MELOXICAM 15 MG/1
15 TABLET ORAL DAILY
Qty: 30 TABLET | Refills: 0 | Status: SHIPPED | OUTPATIENT
Start: 2022-08-08 | End: 2022-12-15 | Stop reason: ALTCHOICE

## 2022-08-08 RX ADMIN — TRIAMCINOLONE ACETONIDE 40 MG: 40 INJECTION, SUSPENSION INTRA-ARTICULAR; INTRAMUSCULAR at 11:08

## 2022-08-08 NOTE — PROGRESS NOTES
Past Medical History:   Diagnosis Date    Angio-edema     Asthma     Diabetes mellitus     Eczema     Obesity     Urticaria        Past Surgical History:   Procedure Laterality Date    ADENOIDECTOMY      BONY PELVIS SURGERY       SECTION      FIXATION KYPHOPLASTY N/A 2018    Procedure: Kyphoplasty;  Surgeon: Martinez Morejon MD;  Location: [facility];  Service: Pain Management;  Laterality: N/A;  L1     HYSTERECTOMY      KNEE CARTILAGE SURGERY      TONSILLECTOMY      VAGINOPLASTY         Current Outpatient Medications   Medication Sig    atorvastatin (LIPITOR) 10 MG tablet Take 10 mg by mouth once daily.    diclofenac sodium (VOLTAREN) 1 % Gel Apply 2 g topically 4 (four) times daily.    metformin (GLUCOPHAGE) 500 MG tablet     nystatin (MYCOSTATIN) powder     olmesartan (BENICAR) 20 MG tablet     SYNTHROID 150 mcg tablet TAKE 1 TABLET BY MOUTH ONCE DAILY ON AN EMPTY STOMACH IN THE MORNING FOR 90 DAYS    triamterene-hydrochlorothiazide 37.5-25 mg (DYAZIDE) 37.5-25 mg per capsule     TRULICITY 1.5 mg/0.5 mL PnIj      No current facility-administered medications for this visit.       Review of patient's allergies indicates:  No Known Allergies    Family History   Problem Relation Age of Onset    Breast cancer Mother 42         at 61yo from breast ca    Angioedema Daughter     Allergies Daughter     Asthma Maternal Uncle     Allergies Son     Allergic rhinitis Son     Eczema Son     Immunodeficiency Neg Hx     Ovarian cancer Neg Hx        Social History     Socioeconomic History    Marital status:    Tobacco Use    Smoking status: Never Smoker    Smokeless tobacco: Never Used   Substance and Sexual Activity    Alcohol use: Yes     Comment: seldom    Sexual activity: Not Currently     Partners: Male     Birth control/protection: None       Chief Complaint:   Chief Complaint   Patient presents with    Left Knee - Pain    Right Knee - Pain       History of present  illness: Is a 67-year-old female seen for bilateral knee pain.  Patient's had pain for years.  Patient's had injections, physical therapy, and even meniscal surgery. Left knee is the worst of the 2.  Pain with walking standing or getting up from sitting.  Viscosupplementation works pretty well for her..  Pain is an 7/10 today.  Hurts getting up from a chair.  We did Synvisc on her a few months ago.  It did not work as much as it normally does.    Review of Systems:  Musculoskeletal:  See HPI    Physical Examination:    Vital Signs:    Vitals:    08/08/22 1055   Resp: 18       Body mass index is 43.03 kg/m².    This a well-developed, well nourished patient in no acute distress.  They are alert and oriented and cooperative to examination.  Pt. walks without an antalgic gait.      Examination of bilateral knees shows no rashes or erythema. There are no masses ecchymosis or effusion. Patient has full range of motion from 0-130°. Patient is nontender to palpation over lateral joint line and mildly tender to palpation over the medial joint line.  Knee is stable to varus and valgus stress. 5 out of 5 motor strength. Palpable distal pulses. Intact light touch sensation.  Moderate Patellofemoral crepitus      X-rays: X-rays a both knees are  reviewed which show some moderate to severe left knee arthritis with near complete medial joint space narrowing and  moderate to severe right knee arthritis with more lateral joint space narrowing     Assessment:: Bilateral knee arthritis    Plan:  I reviewed the new x-rays with her today.  We injected both knees with cortisone today.  We will give her prescription of Mobic instead of the Advil.  Follow-up as needed for further treatment.    This note was created using [employer] voice recognition software that occasionally misinterpreted phrases or words.    Consult note is delivered via Epic messaging service.

## 2022-08-08 NOTE — PROCEDURES
Large Joint Aspiration/Injection: bilateral knee    Date/Time: 8/8/2022 11:15 AM  Performed by: Estevan Monteiro MD  Authorized by: Estevan Monteiro MD     Consent Done?:  Yes (Verbal)  Indications:  Pain  Site marked: the procedure site was marked    Timeout: prior to procedure the correct patient, procedure, and site was verified    Prep: patient was prepped and draped in usual sterile fashion      Local anesthesia used?: Yes    Anesthesia:  Local infiltration  Local anesthetic:  Bupivacaine 0.25% without epinephrine and lidocaine 2% without epinephrine  Anesthetic total (ml):  6      Details:  Needle Size:  22 G  Ultrasonic Guidance for needle placement?: No    Approach:  Anterolateral  Location:  Knee  Laterality:  Bilateral  Site:  Bilateral knee  Medications (Right):  40 mg triamcinolone acetonide 40 mg/mL  Medications (Left):  40 mg triamcinolone acetonide 40 mg/mL  Patient tolerance:  Patient tolerated the procedure well with no immediate complications

## 2022-08-09 ENCOUNTER — TELEPHONE (OUTPATIENT)
Dept: UROLOGY | Facility: CLINIC | Age: 67
End: 2022-08-09
Payer: MEDICARE

## 2022-08-09 NOTE — TELEPHONE ENCOUNTER
Spoke to the pt and she verbally understood that Dr Winkler is not with [facility] anymore and she was given his phone number to make an appt as requested by Dr Rodas.

## 2022-08-09 NOTE — TELEPHONE ENCOUNTER
----- Message from Lou Vora sent at 8/5/2022  3:53 PM CDT -----  The patient has been referred to your clinic for UTI symptoms and hematuria by Dr. Hamzah Rodas. Please call patient to get scheduled. Thanks so much!

## 2022-11-07 ENCOUNTER — OFFICE VISIT (OUTPATIENT)
Dept: ORTHOPEDICS | Facility: CLINIC | Age: 67
End: 2022-11-07
Payer: MEDICARE

## 2022-11-07 VITALS — RESPIRATION RATE: 18 BRPM | HEIGHT: 68 IN | BODY MASS INDEX: 42.89 KG/M2 | WEIGHT: 283 LBS

## 2022-11-07 DIAGNOSIS — M17.0 PRIMARY OSTEOARTHRITIS OF BOTH KNEES: Primary | ICD-10-CM

## 2022-11-07 PROCEDURE — 99999 PR PBB SHADOW E&M-EST. PATIENT-LVL III: ICD-10-PCS | Mod: PBBFAC,,, | Performed by: ORTHOPAEDIC SURGERY

## 2022-11-07 PROCEDURE — 99214 PR OFFICE/OUTPT VISIT, EST, LEVL IV, 30-39 MIN: ICD-10-PCS | Mod: S$PBB,,, | Performed by: ORTHOPAEDIC SURGERY

## 2022-11-07 PROCEDURE — 99214 OFFICE O/P EST MOD 30 MIN: CPT | Mod: S$PBB,,, | Performed by: ORTHOPAEDIC SURGERY

## 2022-11-07 PROCEDURE — 99213 OFFICE O/P EST LOW 20 MIN: CPT | Mod: PBBFAC,PN | Performed by: ORTHOPAEDIC SURGERY

## 2022-11-07 PROCEDURE — 99999 PR PBB SHADOW E&M-EST. PATIENT-LVL III: CPT | Mod: PBBFAC,,, | Performed by: ORTHOPAEDIC SURGERY

## 2022-11-07 NOTE — PROGRESS NOTES
Past Medical History:   Diagnosis Date    Angio-edema     Asthma     Diabetes mellitus     Eczema     Obesity     Urticaria        Past Surgical History:   Procedure Laterality Date    ADENOIDECTOMY      BONY PELVIS SURGERY       SECTION      FIXATION KYPHOPLASTY N/A 2018    Procedure: Kyphoplasty;  Surgeon: Martinez Morejon MD;  Location: [facility];  Service: Pain Management;  Laterality: N/A;  L1     HYSTERECTOMY      KNEE CARTILAGE SURGERY      TONSILLECTOMY      VAGINOPLASTY         Current Outpatient Medications   Medication Sig    atorvastatin (LIPITOR) 10 MG tablet Take 10 mg by mouth once daily.    diclofenac sodium (VOLTAREN) 1 % Gel Apply 2 g topically 4 (four) times daily.    meloxicam (MOBIC) 15 MG tablet Take 1 tablet (15 mg total) by mouth once daily.    metformin (GLUCOPHAGE) 500 MG tablet     nystatin (MYCOSTATIN) powder     olmesartan (BENICAR) 20 MG tablet     SYNTHROID 150 mcg tablet TAKE 1 TABLET BY MOUTH ONCE DAILY ON AN EMPTY STOMACH IN THE MORNING FOR 90 DAYS    triamterene-hydrochlorothiazide 37.5-25 mg (DYAZIDE) 37.5-25 mg per capsule     TRULICITY 1.5 mg/0.5 mL PnIj      No current facility-administered medications for this visit.       Review of patient's allergies indicates:  No Known Allergies    Family History   Problem Relation Age of Onset    Breast cancer Mother 42         at 59yo from breast ca    Angioedema Daughter     Allergies Daughter     Asthma Maternal Uncle     Allergies Son     Allergic rhinitis Son     Eczema Son     Immunodeficiency Neg Hx     Ovarian cancer Neg Hx        Social History     Socioeconomic History    Marital status:    Tobacco Use    Smoking status: Never    Smokeless tobacco: Never   Substance and Sexual Activity    Alcohol use: Yes     Comment: seldom    Sexual activity: Not Currently     Partners: Male     Birth control/protection: None       Chief Complaint:   Chief Complaint   Patient presents with    Follow-up     follow up left knee  OA. discuss TKA        History of present illness: Is a 67-year-old female seen for bilateral knee pain.  Patient's had pain for years.  Patient's had injections, physical therapy, and even meniscal surgery.   Pain with walking standing or getting up from sitting.  Viscosupplementation is no longer working well for her. Hurts getting up from a chair.  Pain is 8/10.  Acting her quality of life significantly.    Review of Systems:  Musculoskeletal:  See HPI    Physical Examination:    Vital Signs:    There were no vitals filed for this visit.      There is no height or weight on file to calculate BMI.    This a well-developed, well nourished patient in no acute distress.  They are alert and oriented and cooperative to examination.  Pt. walks without an antalgic gait.      Examination of bilateral knees shows no rashes or erythema. There are no masses ecchymosis or effusion. Patient has full range of motion from 0-130°. Patient is nontender to palpation over lateral joint line and mildly tender to palpation over the medial joint line.  Knee is stable to varus and valgus stress. 5 out of 5 motor strength. Palpable distal pulses. Intact light touch sensation.  Moderate Patellofemoral crepitus    Heart is regular rate without obvious murmurs   Normal respiratory effort without audible wheezing  Abdomen is soft and nontender       X-rays: X-rays a both knees are  reviewed which show some moderate to severe left knee arthritis with near complete medial joint space narrowing and  moderate to severe right knee arthritis with more lateral joint space narrowing     Assessment:: Bilateral knee arthritis.  Left greater than right    Plan:  I reviewed the x-rays with her today.  Discuss knee replacement in great detail today.  We discussed the recovery.  Plan is for left robotic assisted total knee arthroplasty.  Risks, benefits, and alternatives to the procedure were explained to the patient including but not limited to damage to  nerves, arteries, blood vessels, bones, tendons, ligaments, stiffness, instability, infection, DVT, PE, as well as general anesthetic complications including seizure, stroke, heart attack and even death. The patient understood these risks and wished to proceed and signed the informed consent.       This note was created using [employer]dom[employer] voice recognition software that occasionally misinterpreted phrases or words.    Consult note is delivered via Epic messaging service.

## 2022-11-09 ENCOUNTER — HOSPITAL ENCOUNTER (OUTPATIENT)
Dept: RADIOLOGY | Facility: HOSPITAL | Age: 67
Discharge: HOME | End: 2022-11-09
Attending: FAMILY MEDICINE
Payer: MEDICARE

## 2022-11-09 ENCOUNTER — HOSPITAL ENCOUNTER (OUTPATIENT)
Dept: CARDIOLOGY | Facility: HOSPITAL | Age: 67
Discharge: HOME | End: 2022-11-09
Attending: FAMILY MEDICINE
Payer: MEDICARE

## 2022-11-09 DIAGNOSIS — I10 ESSENTIAL HYPERTENSION, MALIGNANT: ICD-10-CM

## 2022-11-09 PROCEDURE — 93005 ELECTROCARDIOGRAM TRACING: CPT

## 2022-11-09 PROCEDURE — 71046 X-RAY EXAM CHEST 2 VIEWS: CPT | Mod: 26,,, | Performed by: RADIOLOGY

## 2022-11-09 PROCEDURE — 71046 X-RAY EXAM CHEST 2 VIEWS: CPT | Mod: TC,FY

## 2022-11-09 PROCEDURE — 93010 EKG 12-LEAD: ICD-10-PCS | Mod: ,,, | Performed by: GENERAL PRACTICE

## 2022-11-09 PROCEDURE — 93010 ELECTROCARDIOGRAM REPORT: CPT | Mod: ,,, | Performed by: GENERAL PRACTICE

## 2022-11-09 PROCEDURE — 71046 XR CHEST PA AND LATERAL: ICD-10-PCS | Mod: 26,,, | Performed by: RADIOLOGY

## 2022-11-15 DIAGNOSIS — M17.0 PRIMARY OSTEOARTHRITIS OF BOTH KNEES: Primary | ICD-10-CM

## 2022-11-16 ENCOUNTER — PATIENT MESSAGE (OUTPATIENT)
Dept: ORTHOPEDICS | Facility: CLINIC | Age: 67
End: 2022-11-16
Payer: MEDICARE

## 2022-11-16 DIAGNOSIS — Z01.818 PREOP TESTING: ICD-10-CM

## 2022-11-16 DIAGNOSIS — M17.12 OSTEOARTHRITIS OF LEFT KNEE, UNSPECIFIED OSTEOARTHRITIS TYPE: Primary | ICD-10-CM

## 2022-11-16 RX ORDER — MUPIROCIN 20 MG/G
OINTMENT TOPICAL
Status: CANCELLED | OUTPATIENT
Start: 2022-11-16

## 2022-12-15 ENCOUNTER — HOSPITAL ENCOUNTER (OUTPATIENT)
Dept: RADIOLOGY | Facility: HOSPITAL | Age: 67
Discharge: HOME | End: 2022-12-15
Attending: ORTHOPAEDIC SURGERY
Payer: MEDICARE

## 2022-12-15 ENCOUNTER — HOSPITAL ENCOUNTER (OUTPATIENT)
Dept: PREADMISSION TESTING | Facility: HOSPITAL | Age: 67
Discharge: HOME | End: 2022-12-15
Attending: ORTHOPAEDIC SURGERY
Payer: MEDICARE

## 2022-12-15 VITALS — BODY MASS INDEX: 40.62 KG/M2 | HEIGHT: 68 IN | WEIGHT: 268 LBS

## 2022-12-15 DIAGNOSIS — M17.12 OSTEOARTHRITIS OF LEFT KNEE, UNSPECIFIED OSTEOARTHRITIS TYPE: ICD-10-CM

## 2022-12-15 DIAGNOSIS — M17.0 PRIMARY OSTEOARTHRITIS OF BOTH KNEES: ICD-10-CM

## 2022-12-15 DIAGNOSIS — M17.12 PRIMARY OSTEOARTHRITIS OF LEFT KNEE: Primary | ICD-10-CM

## 2022-12-15 DIAGNOSIS — Z01.818 PREOP TESTING: ICD-10-CM

## 2022-12-15 LAB
ABO + RH BLD: NORMAL
BLD GP AB SCN CELLS X3 SERPL QL: NORMAL

## 2022-12-15 PROCEDURE — 86850 RBC ANTIBODY SCREEN: CPT | Performed by: ORTHOPAEDIC SURGERY

## 2022-12-15 PROCEDURE — 36415 COLL VENOUS BLD VENIPUNCTURE: CPT | Performed by: ORTHOPAEDIC SURGERY

## 2022-12-15 PROCEDURE — 73700 CT KNEE WITHOUT CONTRAST LEFT W/MAKO PROTOCOL: ICD-10-PCS | Mod: 26,LT,, | Performed by: RADIOLOGY

## 2022-12-15 PROCEDURE — 73700 CT LOWER EXTREMITY W/O DYE: CPT | Mod: TC,LT

## 2022-12-15 PROCEDURE — 99900104 DSU ONLY-NO CHARGE-EA ADD'L HR (STAT)

## 2022-12-15 PROCEDURE — 87081 CULTURE SCREEN ONLY: CPT | Performed by: ORTHOPAEDIC SURGERY

## 2022-12-15 PROCEDURE — 99900103 DSU ONLY-NO CHARGE-INITIAL HR (STAT)

## 2022-12-15 PROCEDURE — 73700 CT LOWER EXTREMITY W/O DYE: CPT | Mod: 26,LT,, | Performed by: RADIOLOGY

## 2022-12-15 RX ORDER — TIRZEPATIDE 2.5 MG/.5ML
1 INJECTION, SOLUTION SUBCUTANEOUS
COMMUNITY

## 2022-12-15 NOTE — PRE ADMISSION SCREENING
JOINT CAMP ASSESSMENT    Name Gail Temple   MRN [id number]    Age/Sex 67 y.o. female    Surgeon Dr. Estevan Monteiro   Joint Camp Date 12/15/2022   Surgery Date 2022   Procedure Left Knee Arthroplasty   Insurance Payor: MEDICARE / Plan: MEDICARE PART A & B / Product Type: Government /    Care Team Patient Care Team:  Darby Mckoy NP as PCP - General (Internal Medicine)    Pharmacy   [facility] [address] [place] [id number] [address]  [address] [address] [facility] [address]  Phone: [phone] Fax: [phone]     AM-PAC Score   23   Risk Assessment Score 4     Past Medical History:   Diagnosis Date    Angio-edema     Asthma     Diabetes mellitus     Eczema     Obesity     PONV (postoperative nausea and vomiting)     Urticaria        Past Surgical History:   Procedure Laterality Date    ADENOIDECTOMY      BONY PELVIS SURGERY       SECTION      X 2    FIXATION KYPHOPLASTY N/A 2018    Procedure: Kyphoplasty;  Surgeon: Martinez Morejon MD;  Location: [facility];  Service: Pain Management;  Laterality: N/A;  L1     HYSTERECTOMY      KNEE CARTILAGE SURGERY Left     TONSILLECTOMY      VAGINOPLASTY           Home Enviroment     Living Arrangement: Lives with spouse  Home Environment: 1-story house/ trailer, number of outside stairs: 3 with a railing, tub-shower  Home Safety Concerns: Pets in the home: dogs (1).    DISCHARGE CAREGIVER/SUPPORT SYSTEM     Identified post-op caregiver: Patient has spouse / significant other.  Patient's caregiver(s) will be able to provide physical assistance. Patient will have someone to assist overnight.      Caregiver present at pre-op interview:  Yes      PRE-OPERATIVE FUNCTIONAL STATUS     Employment: Retired    Pre-op Functional Status: Patient is independent with mobility/ambulation, transfers, ADL's, IADL's.    Use of assistive device for ambulation: straight cane  ADL: self care  ADL Limitations: difficulty with walking  Medical Restrictions: Unstable  ambulation and Decreased range of motions in extremities    POTENTIAL BARRIERS TO DISCHARGE/POTENTIAL POST-OP COMPLICATIONS     Patient with hx of asthma, diabetes mellitus, obesity, angIo-edema. POSSIBLE SAME DAY DISCHARGE.    DISCHARGE PLAN     Expected LOS of 1 days or less for joint replacement discussed with patient. We also discussed a discharge path of HH for approximately two weeks with a transition to outpatient PT on the third week given no post-op complications.      Patient in agreement with discharge plan: Yes    Discharge to: Discharge home with home health (PT/OT) x2 weeks with transition to outpatient PT     HH:  [facility] ([place], [place]).  Patient disclosure form completed and sent to case management for upload to the medical record.      OP PT: [facility] (Requests [facility] for PT).     Home DME: single point cane and tub transfer bench    Needed DME at D/C: rolling walker and bedside commode     Rx: Per Dr. Monteiro at discharge     Meds to Beds: Yes  Patient expected to discharge on Aspirin 81mg by mouth twice daily for DVT prophylaxis.

## 2022-12-15 NOTE — PRE ADMISSION SCREENING
Patient Name: Gail Temple  YOB: 1955   MRN: [id number]     [facility]   Basic Mobility Inpatient Short Form 6 Clicks         How much difficulty does the patient currently have  Unable  A Lot  A Little  None      1. Turning over in bed (including adjusting bedclothes, sheets and blankets)?     1 []    2 []    3 []    4 [x]        2. Sitting down on and standing up from a chair with arms (e.g., wheelchair, bedside commode, etc.)     1 []  2 []  3 [x]     4 []      3. Moving from lying on back to sitting on the side of the bed?     1 []  2 []  3 []    4 [x]    How much help from another person does the patient currently need  Total  A Lot  A Little  None      4. Moving to and from a bed to a chair (including a wheelchair)?    1 []  2 []  3 []    4 [x]      5. Need to walk in hospital room?    1 []  2 []  3 []    4 [x]      6. Climbing 3-5 steps with a railing?    1 []  2 []  3 []    4 [x]       Raw Score:        23          CMS 0-100% Score:     11.20       %   Standardized Score:     56.93          CMS Modifier:       CI                                   [facility]   Basic Mobility Inpatient Short Form 6 Clicks Score Conversion Table*         *Use this form to convert -PAC Basic Mobility Inpatient Raw Scores.   [facility] Inpatient Basic Mobility Short Form Scoring Example   1. Add the number values associated with the response to each item. For example, items totals yield a Raw Score of 21.   2. Match the raw score to the t-Scale scores (t-Scale score = 50.25, SE = 4.69).   3. Find the associated CMS % (CMS % = 28.97%).   4. Locate the correct CMS Functional Modifier Code, or G Code (G code = CJ)     NOTE: Each -PAC Short Form has a separate conversion table. Make sure that you use the correct conversion table.       Instruction Manual - page 45 contains conversion table

## 2022-12-15 NOTE — DISCHARGE INSTRUCTIONS
To confirm, Your doctor has instructed you that surgery is scheduled for: 12/27/22    Please report to [facility], Registration the morning of surgery. You must check-in and receive a wristband before going to your procedure.    Pre-Op will call the afternoon prior to surgery between 1:00 and 6:00 PM with the final arrival time.  Phone number: [phone]    PLEASE NOTE:  The surgery schedule has many variables which may affect the time of your surgery case.  Family members should be available if your surgery time changes.  Plan to be here the day of your procedure between 4-6 hours.    MEDICATIONS:  TAKE ONLY THESE MEDICATIONS WITH A SMALL SIP OF WATER THE MORNING OF YOUR PROCEDURE:      SYNTHROID      DO NOT TAKE THESE MEDICATIONS 5-7 DAYS PRIOR to your procedure or per your surgeon's request:   ASPIRIN, ALEVE, ADVIL, IBUPROFEN, FISH OIL VITAMIN E, HERBALS  (May take Tylenol)    ONLY if you are prescribed any types of blood thinners such as:  Aspirin, Coumadin, Plavix, Pradaxa, Xarelto, Aggrenox, Effient, Eliquis, Savasya, Brilinta, or any other, ask your surgeon whether you should stop taking them and how long before surgery you should stop.  You may also need to verify with the prescribing physician if it is ok to stop your medication.      INSTRUCTIONS IMPORTANT!!  Do not eat or drink anything between midnight and the time of your procedure- this includes gum, mints, and candy.  Do not smoke or drink alcoholic beverages 24 hours prior to your procedure.  Shower the night before AND the morning of your procedure with a Chlorhexidine wash such as Hibiclens or Dial antibacterial soap from the neck down.  Do not get it on your face or in your eyes.  You may use your own shampoo and face wash. This helps your skin to be as bacteria free as possible.    If you wear contact lenses, dentures, hearing aids or glasses, bring a container to put them in during surgery and give to a family member for  safe keeping.  Please leave all jewelry, piercing's and valuables at home.   DO NOT remove hair from the surgery site.  Do not shave the incision site unless you are given specific instructions to do so.    ONLY if you have been diagnosed with sleep apnea please bring your C-PAP machine.  ONLY if you wear home oxygen please bring your portable oxygen tank the day of your procedure.  ONLY if you have a history of OPEN HEART SURGERY you will need a clearance from your Cardiologist per Anesthesia.      ONLY for patients requiring bowel prep, written instructions will be given by your doctor's office.  ONLY if you have a neuro stimulator, please bring the controller with you the morning of surgery  ONLY if a type and screen test is needed before surgery, please return:  If your doctor has scheduled you for an overnight stay, bring a small overnight bag with any personal items you need.  Make arrangements in advance for transportation home by a responsible adult.  It is not safe to drive a vehicle during the 24 hours after anesthesia.      [facility] Visitor Policy    Effective September 26, 2022    [facility] will resume routine visitation for COVID-19 negative patients, including inpatients, outpatients, and procedural areas, in accordance with local [place] procedures.    All [facility] facilities and properties are tobacco free.  Smoking is NOT allowed.   If you have any questions about these instructions, call Pre-Op Admit  Nursing at [phone] or the Pre-Op Day Surgery Unit at [phone].

## 2022-12-18 LAB — MRSA SPEC QL CULT: NORMAL

## 2022-12-21 DIAGNOSIS — M17.12 PRIMARY OSTEOARTHRITIS OF LEFT KNEE: Primary | ICD-10-CM

## 2022-12-28 DIAGNOSIS — M17.12 PRIMARY OSTEOARTHRITIS OF LEFT KNEE: Primary | ICD-10-CM

## 2022-12-30 ENCOUNTER — ANESTHESIA EVENT (OUTPATIENT)
Dept: SURGERY | Facility: HOSPITAL | Age: 67
End: 2022-12-30
Payer: MEDICARE

## 2022-12-30 DIAGNOSIS — M17.12 PRIMARY OSTEOARTHRITIS OF LEFT KNEE: Primary | ICD-10-CM

## 2022-12-30 RX ORDER — ASPIRIN 81 MG/1
81 TABLET ORAL 2 TIMES DAILY
Qty: 56 TABLET | Refills: 0 | Status: SHIPPED | OUTPATIENT
Start: 2022-12-30 | End: 2023-01-27

## 2022-12-30 RX ORDER — OXYCODONE AND ACETAMINOPHEN 5; 325 MG/1; MG/1
1 TABLET ORAL EVERY 4 HOURS PRN
Qty: 28 TABLET | Refills: 0 | Status: SHIPPED | OUTPATIENT
Start: 2022-12-30 | End: 2023-07-03

## 2022-12-30 RX ORDER — IBUPROFEN 600 MG/1
600 TABLET ORAL 3 TIMES DAILY PRN
Qty: 30 TABLET | Refills: 0 | Status: SHIPPED | OUTPATIENT
Start: 2022-12-30 | End: 2023-07-03

## 2022-12-30 RX ORDER — ONDANSETRON 4 MG/1
4 TABLET, FILM COATED ORAL EVERY 6 HOURS PRN
Qty: 30 TABLET | Refills: 0 | Status: SHIPPED | OUTPATIENT
Start: 2022-12-30 | End: 2023-07-03

## 2022-12-30 RX ORDER — ACETAMINOPHEN 500 MG
1000 TABLET ORAL EVERY 8 HOURS PRN
Qty: 30 TABLET | Refills: 0 | Status: SHIPPED | OUTPATIENT
Start: 2022-12-30 | End: 2023-07-03

## 2022-12-30 RX ORDER — CYCLOBENZAPRINE HCL 10 MG
10 TABLET ORAL 3 TIMES DAILY PRN
Qty: 30 TABLET | Refills: 0 | Status: SHIPPED | OUTPATIENT
Start: 2022-12-30 | End: 2023-01-09

## 2023-01-03 ENCOUNTER — HOSPITAL ENCOUNTER (OUTPATIENT)
Facility: HOSPITAL | Age: 68
Discharge: HOME | End: 2023-01-04
Attending: ORTHOPAEDIC SURGERY | Admitting: ORTHOPAEDIC SURGERY
Payer: MEDICARE

## 2023-01-03 ENCOUNTER — ANESTHESIA (OUTPATIENT)
Dept: SURGERY | Facility: HOSPITAL | Age: 68
End: 2023-01-03
Payer: MEDICARE

## 2023-01-03 DIAGNOSIS — M17.12 OSTEOARTHRITIS OF LEFT KNEE, UNSPECIFIED OSTEOARTHRITIS TYPE: ICD-10-CM

## 2023-01-03 DIAGNOSIS — Z01.818 PREOP TESTING: ICD-10-CM

## 2023-01-03 LAB
ABO + RH BLD: NORMAL
BLD GP AB SCN CELLS X3 SERPL QL: NORMAL
ESTIMATED AVG GLUCOSE: 111 MG/DL (ref 68–131)
HBA1C MFR BLD: 5.5 % (ref 4–5.6)
POCT GLUCOSE: 206 MG/DL (ref 70–110)

## 2023-01-03 PROCEDURE — 63600175 PHARM REV CODE 636 W HCPCS: Performed by: ORTHOPAEDIC SURGERY

## 2023-01-03 PROCEDURE — 63600175 PHARM REV CODE 636 W HCPCS: Performed by: NURSE ANESTHETIST, CERTIFIED REGISTERED

## 2023-01-03 PROCEDURE — 99900035 HC TECH TIME PER 15 MIN (STAT)

## 2023-01-03 PROCEDURE — 25000003 PHARM REV CODE 250: Performed by: ANESTHESIOLOGY

## 2023-01-03 PROCEDURE — C1713 ANCHOR/SCREW BN/BN,TIS/BN: HCPCS | Performed by: ORTHOPAEDIC SURGERY

## 2023-01-03 PROCEDURE — D9220A PRA ANESTHESIA: Mod: ANES,,, | Performed by: ANESTHESIOLOGY

## 2023-01-03 PROCEDURE — 25000003 PHARM REV CODE 250: Performed by: ORTHOPAEDIC SURGERY

## 2023-01-03 PROCEDURE — 97162 PT EVAL MOD COMPLEX 30 MIN: CPT

## 2023-01-03 PROCEDURE — 99900104 DSU ONLY-NO CHARGE-EA ADD'L HR (STAT): Performed by: ORTHOPAEDIC SURGERY

## 2023-01-03 PROCEDURE — 94799 UNLISTED PULMONARY SVC/PX: CPT

## 2023-01-03 PROCEDURE — 27447 PR TOTAL KNEE ARTHROPLASTY: ICD-10-PCS | Mod: LT,,, | Performed by: ORTHOPAEDIC SURGERY

## 2023-01-03 PROCEDURE — 76942 ECHO GUIDE FOR BIOPSY: CPT | Performed by: ANESTHESIOLOGY

## 2023-01-03 PROCEDURE — 63600175 PHARM REV CODE 636 W HCPCS

## 2023-01-03 PROCEDURE — 36415 COLL VENOUS BLD VENIPUNCTURE: CPT | Performed by: INTERNAL MEDICINE

## 2023-01-03 PROCEDURE — D9220A PRA ANESTHESIA: ICD-10-PCS | Mod: ANES,,, | Performed by: ANESTHESIOLOGY

## 2023-01-03 PROCEDURE — 37000008 HC ANESTHESIA 1ST 15 MINUTES: Performed by: ORTHOPAEDIC SURGERY

## 2023-01-03 PROCEDURE — 97530 THERAPEUTIC ACTIVITIES: CPT

## 2023-01-03 PROCEDURE — 97116 GAIT TRAINING THERAPY: CPT

## 2023-01-03 PROCEDURE — 27447 TOTAL KNEE ARTHROPLASTY: CPT | Mod: LT,,, | Performed by: ORTHOPAEDIC SURGERY

## 2023-01-03 PROCEDURE — 83036 HEMOGLOBIN GLYCOSYLATED A1C: CPT | Performed by: INTERNAL MEDICINE

## 2023-01-03 PROCEDURE — D9220A PRA ANESTHESIA: ICD-10-PCS | Mod: CRNA,,, | Performed by: NURSE ANESTHETIST, CERTIFIED REGISTERED

## 2023-01-03 PROCEDURE — 37000009 HC ANESTHESIA EA ADD 15 MINS: Performed by: ORTHOPAEDIC SURGERY

## 2023-01-03 PROCEDURE — 71000016 HC POSTOP RECOV ADDL HR: Performed by: ORTHOPAEDIC SURGERY

## 2023-01-03 PROCEDURE — 71000015 HC POSTOP RECOV 1ST HR: Performed by: ORTHOPAEDIC SURGERY

## 2023-01-03 PROCEDURE — 63600175 PHARM REV CODE 636 W HCPCS: Performed by: ANESTHESIOLOGY

## 2023-01-03 PROCEDURE — 94760 N-INVAS EAR/PLS OXIMETRY 1: CPT

## 2023-01-03 PROCEDURE — 97110 THERAPEUTIC EXERCISES: CPT

## 2023-01-03 PROCEDURE — 36000712 HC OR TIME LEV V 1ST 15 MIN: Performed by: ORTHOPAEDIC SURGERY

## 2023-01-03 PROCEDURE — 71000033 HC RECOVERY, INTIAL HOUR: Performed by: ORTHOPAEDIC SURGERY

## 2023-01-03 PROCEDURE — 99900103 DSU ONLY-NO CHARGE-INITIAL HR (STAT): Performed by: ORTHOPAEDIC SURGERY

## 2023-01-03 PROCEDURE — 71000039 HC RECOVERY, EACH ADD'L HOUR: Performed by: ORTHOPAEDIC SURGERY

## 2023-01-03 PROCEDURE — 20985 CPTR-ASST DIR MS PX: CPT | Mod: LT,,, | Performed by: ORTHOPAEDIC SURGERY

## 2023-01-03 PROCEDURE — 20985 PR CPTR-ASST SURGICAL NAVIGATION IMAGE-LESS: ICD-10-PCS | Mod: LT,,, | Performed by: ORTHOPAEDIC SURGERY

## 2023-01-03 PROCEDURE — 27201423 OPTIME MED/SURG SUP & DEVICES STERILE SUPPLY: Performed by: ORTHOPAEDIC SURGERY

## 2023-01-03 PROCEDURE — 64447 NJX AA&/STRD FEMORAL NRV IMG: CPT | Mod: 59,LT,, | Performed by: ANESTHESIOLOGY

## 2023-01-03 PROCEDURE — C9290 INJ, BUPIVACAINE LIPOSOME: HCPCS | Performed by: ANESTHESIOLOGY

## 2023-01-03 PROCEDURE — 36415 COLL VENOUS BLD VENIPUNCTURE: CPT | Performed by: ORTHOPAEDIC SURGERY

## 2023-01-03 PROCEDURE — 64447 PERIPHERAL BLOCK: ICD-10-PCS | Mod: 59,LT,, | Performed by: ANESTHESIOLOGY

## 2023-01-03 PROCEDURE — 27200688 HC TRAY, SPINAL-HYPER/ ISOBARIC: Performed by: ANESTHESIOLOGY

## 2023-01-03 PROCEDURE — 86900 BLOOD TYPING SEROLOGIC ABO: CPT | Performed by: ORTHOPAEDIC SURGERY

## 2023-01-03 PROCEDURE — 27200750 HC INSULATED NEEDLE/ STIMUPLEX: Performed by: ANESTHESIOLOGY

## 2023-01-03 PROCEDURE — 25000003 PHARM REV CODE 250: Performed by: NURSE ANESTHETIST, CERTIFIED REGISTERED

## 2023-01-03 PROCEDURE — C1776 JOINT DEVICE (IMPLANTABLE): HCPCS | Performed by: ORTHOPAEDIC SURGERY

## 2023-01-03 PROCEDURE — D9220A PRA ANESTHESIA: Mod: CRNA,,, | Performed by: NURSE ANESTHETIST, CERTIFIED REGISTERED

## 2023-01-03 PROCEDURE — 36000713 HC OR TIME LEV V EA ADD 15 MIN: Performed by: ORTHOPAEDIC SURGERY

## 2023-01-03 DEVICE — PATELLA
Type: IMPLANTABLE DEVICE | Site: KNEE | Status: FUNCTIONAL
Brand: TRIATHLON

## 2023-01-03 DEVICE — CRUCIATE RETAINING FEMORAL
Type: IMPLANTABLE DEVICE | Site: KNEE | Status: FUNCTIONAL
Brand: TRIATHLON

## 2023-01-03 DEVICE — PRIMARY TIBIAL BASEPLATE
Type: IMPLANTABLE DEVICE | Site: KNEE | Status: FUNCTIONAL
Brand: TRIATHLON

## 2023-01-03 DEVICE — TIBIAL BEARING INSERT
Type: IMPLANTABLE DEVICE | Site: KNEE | Status: FUNCTIONAL
Brand: TRIATHLON

## 2023-01-03 DEVICE — CEMENT BONE ANTIBIO SIMPLEX P: Type: IMPLANTABLE DEVICE | Site: KNEE | Status: FUNCTIONAL

## 2023-01-03 RX ORDER — FENTANYL CITRATE 50 UG/ML
INJECTION, SOLUTION INTRAMUSCULAR; INTRAVENOUS
Status: DISCONTINUED | OUTPATIENT
Start: 2023-01-03 | End: 2023-01-03

## 2023-01-03 RX ORDER — CELECOXIB 100 MG/1
200 CAPSULE ORAL ONCE
Status: COMPLETED | OUTPATIENT
Start: 2023-01-03 | End: 2023-01-03

## 2023-01-03 RX ORDER — SODIUM CHLORIDE 9 MG/ML
INJECTION, SOLUTION INTRAVENOUS CONTINUOUS
Status: DISCONTINUED | OUTPATIENT
Start: 2023-01-03 | End: 2023-01-04 | Stop reason: HOSPADM

## 2023-01-03 RX ORDER — GLUCAGON 1 MG
1 KIT INJECTION
Status: DISCONTINUED | OUTPATIENT
Start: 2023-01-03 | End: 2023-01-04 | Stop reason: HOSPADM

## 2023-01-03 RX ORDER — DIPHENHYDRAMINE HYDROCHLORIDE 50 MG/ML
25 INJECTION INTRAMUSCULAR; INTRAVENOUS EVERY 6 HOURS PRN
Status: DISCONTINUED | OUTPATIENT
Start: 2023-01-03 | End: 2023-01-03 | Stop reason: HOSPADM

## 2023-01-03 RX ORDER — MUPIROCIN 20 MG/G
OINTMENT TOPICAL
Status: DISCONTINUED | OUTPATIENT
Start: 2023-01-03 | End: 2023-01-03 | Stop reason: HOSPADM

## 2023-01-03 RX ORDER — DIPHENHYDRAMINE HYDROCHLORIDE 50 MG/ML
12.5 INJECTION INTRAMUSCULAR; INTRAVENOUS
Status: DISCONTINUED | OUTPATIENT
Start: 2023-01-03 | End: 2023-01-04 | Stop reason: HOSPADM

## 2023-01-03 RX ORDER — SODIUM CHLORIDE, SODIUM LACTATE, POTASSIUM CHLORIDE, CALCIUM CHLORIDE 600; 310; 30; 20 MG/100ML; MG/100ML; MG/100ML; MG/100ML
INJECTION, SOLUTION INTRAVENOUS CONTINUOUS
Status: DISCONTINUED | OUTPATIENT
Start: 2023-01-03 | End: 2023-01-04 | Stop reason: HOSPADM

## 2023-01-03 RX ORDER — IBUPROFEN 200 MG
24 TABLET ORAL
Status: DISCONTINUED | OUTPATIENT
Start: 2023-01-03 | End: 2023-01-04 | Stop reason: HOSPADM

## 2023-01-03 RX ORDER — MIDAZOLAM HYDROCHLORIDE 1 MG/ML
INJECTION, SOLUTION INTRAMUSCULAR; INTRAVENOUS
Status: DISCONTINUED | OUTPATIENT
Start: 2023-01-03 | End: 2023-01-03

## 2023-01-03 RX ORDER — INSULIN ASPART 100 [IU]/ML
0-5 INJECTION, SOLUTION INTRAVENOUS; SUBCUTANEOUS
Status: DISCONTINUED | OUTPATIENT
Start: 2023-01-03 | End: 2023-01-04 | Stop reason: HOSPADM

## 2023-01-03 RX ORDER — TRANEXAMIC ACID 100 MG/ML
INJECTION, SOLUTION INTRAVENOUS
Status: DISCONTINUED | OUTPATIENT
Start: 2023-01-03 | End: 2023-01-03

## 2023-01-03 RX ORDER — ACETAMINOPHEN 10 MG/ML
INJECTION, SOLUTION INTRAVENOUS
Status: DISCONTINUED | OUTPATIENT
Start: 2023-01-03 | End: 2023-01-03

## 2023-01-03 RX ORDER — ONDANSETRON 2 MG/ML
INJECTION INTRAMUSCULAR; INTRAVENOUS
Status: DISCONTINUED | OUTPATIENT
Start: 2023-01-03 | End: 2023-01-03

## 2023-01-03 RX ORDER — BUPIVACAINE HYDROCHLORIDE 5 MG/ML
INJECTION, SOLUTION EPIDURAL; INTRACAUDAL
Status: DISCONTINUED | OUTPATIENT
Start: 2023-01-03 | End: 2023-01-03

## 2023-01-03 RX ORDER — NAPROXEN SODIUM 220 MG/1
81 TABLET, FILM COATED ORAL 2 TIMES DAILY
Status: DISCONTINUED | OUTPATIENT
Start: 2023-01-03 | End: 2023-01-04 | Stop reason: HOSPADM

## 2023-01-03 RX ORDER — ONDANSETRON 2 MG/ML
4 INJECTION INTRAMUSCULAR; INTRAVENOUS ONCE
Status: COMPLETED | OUTPATIENT
Start: 2023-01-03 | End: 2023-01-03

## 2023-01-03 RX ORDER — MEPERIDINE HYDROCHLORIDE 50 MG/ML
12.5 INJECTION INTRAMUSCULAR; INTRAVENOUS; SUBCUTANEOUS ONCE
Status: DISCONTINUED | OUTPATIENT
Start: 2023-01-03 | End: 2023-01-03 | Stop reason: HOSPADM

## 2023-01-03 RX ORDER — LIDOCAINE HYDROCHLORIDE 20 MG/ML
INJECTION INTRAVENOUS
Status: DISCONTINUED | OUTPATIENT
Start: 2023-01-03 | End: 2023-01-03

## 2023-01-03 RX ORDER — CELECOXIB 100 MG/1
100 CAPSULE ORAL DAILY
Status: DISCONTINUED | OUTPATIENT
Start: 2023-01-04 | End: 2023-01-03 | Stop reason: HOSPADM

## 2023-01-03 RX ORDER — PREGABALIN 75 MG/1
75 CAPSULE ORAL ONCE
Status: COMPLETED | OUTPATIENT
Start: 2023-01-03 | End: 2023-01-03

## 2023-01-03 RX ORDER — BUPIVACAINE HYDROCHLORIDE 7.5 MG/ML
INJECTION, SOLUTION EPIDURAL; RETROBULBAR
Status: DISCONTINUED | OUTPATIENT
Start: 2023-01-03 | End: 2023-01-03

## 2023-01-03 RX ORDER — SCOLOPAMINE TRANSDERMAL SYSTEM 1 MG/1
1 PATCH, EXTENDED RELEASE TRANSDERMAL
Status: DISCONTINUED | OUTPATIENT
Start: 2023-01-03 | End: 2023-01-04 | Stop reason: HOSPADM

## 2023-01-03 RX ORDER — PROPOFOL 10 MG/ML
VIAL (ML) INTRAVENOUS CONTINUOUS PRN
Status: DISCONTINUED | OUTPATIENT
Start: 2023-01-03 | End: 2023-01-03

## 2023-01-03 RX ORDER — ZOLPIDEM TARTRATE 5 MG/1
5 TABLET ORAL NIGHTLY PRN
Status: DISCONTINUED | OUTPATIENT
Start: 2023-01-03 | End: 2023-01-04 | Stop reason: HOSPADM

## 2023-01-03 RX ORDER — DEXAMETHASONE SODIUM PHOSPHATE 4 MG/ML
INJECTION, SOLUTION INTRA-ARTICULAR; INTRALESIONAL; INTRAMUSCULAR; INTRAVENOUS; SOFT TISSUE
Status: DISCONTINUED | OUTPATIENT
Start: 2023-01-03 | End: 2023-01-03

## 2023-01-03 RX ORDER — IBUPROFEN 200 MG
16 TABLET ORAL
Status: DISCONTINUED | OUTPATIENT
Start: 2023-01-03 | End: 2023-01-04 | Stop reason: HOSPADM

## 2023-01-03 RX ORDER — PHENYLEPHRINE HYDROCHLORIDE 10 MG/ML
INJECTION INTRAVENOUS
Status: DISCONTINUED | OUTPATIENT
Start: 2023-01-03 | End: 2023-01-03

## 2023-01-03 RX ORDER — LIDOCAINE HYDROCHLORIDE 10 MG/ML
1 INJECTION, SOLUTION EPIDURAL; INFILTRATION; INTRACAUDAL; PERINEURAL ONCE
Status: DISCONTINUED | OUTPATIENT
Start: 2023-01-03 | End: 2023-01-03 | Stop reason: HOSPADM

## 2023-01-03 RX ORDER — PREGABALIN 75 MG/1
75 CAPSULE ORAL 2 TIMES DAILY
Status: DISCONTINUED | OUTPATIENT
Start: 2023-01-03 | End: 2023-01-04 | Stop reason: HOSPADM

## 2023-01-03 RX ORDER — OXYCODONE HCL 10 MG/1
10 TABLET, FILM COATED, EXTENDED RELEASE ORAL EVERY 12 HOURS
Status: DISCONTINUED | OUTPATIENT
Start: 2023-01-03 | End: 2023-01-04 | Stop reason: HOSPADM

## 2023-01-03 RX ORDER — LIDOCAINE HYDROCHLORIDE 10 MG/ML
0.5 INJECTION, SOLUTION EPIDURAL; INFILTRATION; INTRACAUDAL; PERINEURAL ONCE
Status: COMPLETED | OUTPATIENT
Start: 2023-01-03 | End: 2023-01-03

## 2023-01-03 RX ORDER — ONDANSETRON 2 MG/ML
4 INJECTION INTRAMUSCULAR; INTRAVENOUS EVERY 12 HOURS PRN
Status: DISCONTINUED | OUTPATIENT
Start: 2023-01-03 | End: 2023-01-04 | Stop reason: HOSPADM

## 2023-01-03 RX ORDER — FENTANYL CITRATE 50 UG/ML
25 INJECTION, SOLUTION INTRAMUSCULAR; INTRAVENOUS EVERY 5 MIN PRN
Status: DISCONTINUED | OUTPATIENT
Start: 2023-01-03 | End: 2023-01-03 | Stop reason: HOSPADM

## 2023-01-03 RX ORDER — OXYCODONE HCL 10 MG/1
10 TABLET, FILM COATED, EXTENDED RELEASE ORAL ONCE
Status: COMPLETED | OUTPATIENT
Start: 2023-01-03 | End: 2023-01-03

## 2023-01-03 RX ORDER — OXYCODONE HYDROCHLORIDE 5 MG/1
5 TABLET ORAL
Status: DISCONTINUED | OUTPATIENT
Start: 2023-01-03 | End: 2023-01-03 | Stop reason: HOSPADM

## 2023-01-03 RX ORDER — HYDROMORPHONE HYDROCHLORIDE 2 MG/ML
0.2 INJECTION, SOLUTION INTRAMUSCULAR; INTRAVENOUS; SUBCUTANEOUS EVERY 5 MIN PRN
Status: DISCONTINUED | OUTPATIENT
Start: 2023-01-03 | End: 2023-01-03 | Stop reason: HOSPADM

## 2023-01-03 RX ADMIN — OXYCODONE 5 MG: 5 TABLET ORAL at 10:01

## 2023-01-03 RX ADMIN — KETOROLAC TROMETHAMINE: 30 INJECTION, SOLUTION INTRAMUSCULAR; INTRAVENOUS at 07:01

## 2023-01-03 RX ADMIN — TRANEXAMIC ACID 1000 MG: 100 INJECTION, SOLUTION INTRAVENOUS at 08:01

## 2023-01-03 RX ADMIN — PHENYLEPHRINE HYDROCHLORIDE 200 MCG: 10 INJECTION INTRAVENOUS at 07:01

## 2023-01-03 RX ADMIN — TRANEXAMIC ACID 1000 MG: 100 INJECTION, SOLUTION INTRAVENOUS at 07:01

## 2023-01-03 RX ADMIN — OXYCODONE HYDROCHLORIDE 10 MG: 10 TABLET, FILM COATED, EXTENDED RELEASE ORAL at 09:01

## 2023-01-03 RX ADMIN — CELECOXIB 200 MG: 100 CAPSULE ORAL at 06:01

## 2023-01-03 RX ADMIN — PHENYLEPHRINE HYDROCHLORIDE 100 MCG: 10 INJECTION INTRAVENOUS at 07:01

## 2023-01-03 RX ADMIN — MIDAZOLAM 2 MG: 1 INJECTION INTRAMUSCULAR; INTRAVENOUS at 06:01

## 2023-01-03 RX ADMIN — SODIUM CHLORIDE, SODIUM GLUCONATE, SODIUM ACETATE, POTASSIUM CHLORIDE, MAGNESIUM CHLORIDE, SODIUM PHOSPHATE, DIBASIC, AND POTASSIUM PHOSPHATE: .53; .5; .37; .037; .03; .012; .00082 INJECTION, SOLUTION INTRAVENOUS at 07:01

## 2023-01-03 RX ADMIN — FENTANYL CITRATE 50 MCG: 50 INJECTION, SOLUTION INTRAMUSCULAR; INTRAVENOUS at 07:01

## 2023-01-03 RX ADMIN — LIDOCAINE HYDROCHLORIDE 5 MG: 10 INJECTION, SOLUTION EPIDURAL; INFILTRATION; INTRACAUDAL; PERINEURAL at 06:01

## 2023-01-03 RX ADMIN — MUPIROCIN: 20 OINTMENT TOPICAL at 06:01

## 2023-01-03 RX ADMIN — ACETAMINOPHEN 1000 MG: 10 INJECTION, SOLUTION INTRAVENOUS at 07:01

## 2023-01-03 RX ADMIN — SODIUM CHLORIDE, SODIUM GLUCONATE, SODIUM ACETATE, POTASSIUM CHLORIDE, MAGNESIUM CHLORIDE, SODIUM PHOSPHATE, DIBASIC, AND POTASSIUM PHOSPHATE: .53; .5; .37; .037; .03; .012; .00082 INJECTION, SOLUTION INTRAVENOUS at 06:01

## 2023-01-03 RX ADMIN — BUPIVACAINE HYDROCHLORIDE 1.4 ML: 7.5 INJECTION, SOLUTION EPIDURAL; RETROBULBAR at 07:01

## 2023-01-03 RX ADMIN — DEXAMETHASONE SODIUM PHOSPHATE 8 MG: 4 INJECTION, SOLUTION INTRA-ARTICULAR; INTRALESIONAL; INTRAMUSCULAR; INTRAVENOUS; SOFT TISSUE at 07:01

## 2023-01-03 RX ADMIN — BUPIVACAINE 20 ML: 13.3 INJECTION, SUSPENSION, LIPOSOMAL INFILTRATION at 06:01

## 2023-01-03 RX ADMIN — PHENYLEPHRINE HYDROCHLORIDE 100 MCG: 10 INJECTION INTRAVENOUS at 08:01

## 2023-01-03 RX ADMIN — LIDOCAINE HYDROCHLORIDE 20 MG: 20 INJECTION, SOLUTION INTRAVENOUS at 07:01

## 2023-01-03 RX ADMIN — PREGABALIN 75 MG: 75 CAPSULE ORAL at 06:01

## 2023-01-03 RX ADMIN — CELECOXIB 200 MG: 100 CAPSULE ORAL at 10:01

## 2023-01-03 RX ADMIN — PROMETHAZINE HYDROCHLORIDE 6.25 MG: 25 INJECTION INTRAMUSCULAR; INTRAVENOUS at 11:01

## 2023-01-03 RX ADMIN — SODIUM CHLORIDE, SODIUM GLUCONATE, SODIUM ACETATE, POTASSIUM CHLORIDE, MAGNESIUM CHLORIDE, SODIUM PHOSPHATE, DIBASIC, AND POTASSIUM PHOSPHATE: .53; .5; .37; .037; .03; .012; .00082 INJECTION, SOLUTION INTRAVENOUS at 08:01

## 2023-01-03 RX ADMIN — ZOLPIDEM TARTRATE 5 MG: 5 TABLET ORAL at 09:01

## 2023-01-03 RX ADMIN — PROPOFOL 50 MCG/KG/MIN: 10 INJECTION, EMULSION INTRAVENOUS at 07:01

## 2023-01-03 RX ADMIN — SODIUM CHLORIDE: 9 INJECTION, SOLUTION INTRAVENOUS at 09:01

## 2023-01-03 RX ADMIN — DEXTROSE MONOHYDRATE 3 G: 50 INJECTION, SOLUTION INTRAVENOUS at 07:01

## 2023-01-03 RX ADMIN — SCOPALAMINE 1 PATCH: 1 PATCH, EXTENDED RELEASE TRANSDERMAL at 06:01

## 2023-01-03 RX ADMIN — OXYCODONE HYDROCHLORIDE 10 MG: 10 TABLET, FILM COATED, EXTENDED RELEASE ORAL at 06:01

## 2023-01-03 RX ADMIN — PREGABALIN 75 MG: 75 CAPSULE ORAL at 09:01

## 2023-01-03 RX ADMIN — ONDANSETRON 4 MG: 2 INJECTION INTRAMUSCULAR; INTRAVENOUS at 09:01

## 2023-01-03 RX ADMIN — BUPIVACAINE HYDROCHLORIDE 10 ML: 5 INJECTION, SOLUTION EPIDURAL; INTRACAUDAL; PERINEURAL at 06:01

## 2023-01-03 RX ADMIN — ASPIRIN 81 MG CHEWABLE TABLET 81 MG: 81 TABLET CHEWABLE at 09:01

## 2023-01-03 RX ADMIN — FENTANYL CITRATE 50 MCG: 50 INJECTION, SOLUTION INTRAMUSCULAR; INTRAVENOUS at 06:01

## 2023-01-03 RX ADMIN — ONDANSETRON 4 MG: 2 INJECTION INTRAMUSCULAR; INTRAVENOUS at 07:01

## 2023-01-03 NOTE — OP NOTE
[facility]  Orthopedic Surgery  Operative Note    SUMMARY     Date of Procedure: 1/3/2023     Procedure: Procedure(s) (LRB):  ROBOTIC ARTHROPLASTY, KNEE, TOTAL (Left)       Surgeon(s) and Role:     * Estevan Monteiro MD - Primary    Assistant: Jesús CORDERO    Pre-Operative Diagnosis: Osteoarthritis of left knee, unspecified osteoarthritis type [M17.12]  Preop testing [Z01.818]    Post-Operative Diagnosis: Post-Op Diagnosis Codes:     * Osteoarthritis of left knee, unspecified osteoarthritis type [M17.12]     * Preop testing [Z01.818]    Anesthesia: General    Complications: No    Estimated Blood Loss (EBL): 30ml           Implants:   Implant Name Type Inv. Item Serial No.  Lot No. LRB No. Used Action   PIN BONE 4 X 140MM STERILE - [id number]  PIN BONE 4 X 140MM STERILE  [employer] [id number] Left 1 Implanted and Explanted   CEMENT BONE ANTIBIO SIMPLEX P - [id number]  CEMENT BONE ANTIBIO SIMPLEX P  [employer] ALHAJI. [id number] Left 2 Implanted   PATELLA TRIATHLON 29X8 SYMTRC - [id number]  PATELLA TRIATHLON 29X8 SYMTRC  [employer]IR[employer] ALHAJI. [id number] Left 1 Implanted   COMPONENT FEM CR TRI SZ3 L - [id number]  COMPONENT FEM CR TRI SZ3 L  [employer]IR[employer] ALHAJI. [id number] Left 1 Implanted   BASEPLATE TIB BOBO PRIM SZ 4 - [id number]  BASEPLATE TIB BOBO PRIM SZ 4  [employer]IR[employer] ALHAJI. [id number] Left 1 Implanted   INSERT TRIATHLON CS TIB 9MM 4 - [id number]  INSERT TRIATHLON CS TIB 9MM 4  [employer]IR[employer]ES ALHAJI. [id number] Left 1 Implanted       Tourniquet time: 52min at 300mmHg    Specimens:   Specimen (24h ago, onward)      None                    Condition: Good    Disposition: PACU - hemodynamically stable.    Attestation: I was present and scrubbed for the entire procedure.    INDICATIONS FOR THE PROCEDURE: A 67F with a history of chronic   Left knee arthritis, had failed all conservative measures including cortisone   injections, PT, viscosupplementation and activity modification. After a long    discussion, the patient wished to proceed with the procedure above.     PROCEDURE IN DETAIL: Risks, benefits and alternatives of the procedure were   explained to the patient including, but not limited to damage to nerves,   arteries or blood vessels. Also explained risk of infection, stiffness, DVT, PE,   polyethylene wear as well as anesthetic complications including seizure, stroke,   heart attack and death, understood this and signed informed consent. The   patient's Left knee was marked prior to coming to the Operating Room. The patient was brought to the operating room, placed on the operating table in a supine position.A  formal timeout was done in which correct patient, procedure and op site were all   correctly identified and confirmed by the entire operating team.  2gm Ancef was given prior to surgical incision. Spinal anesthesia was induced. The patient'sLeft  lower extremity was prepped and   draped in normal sterile fashion. TheLeft  leg was exsanguinated with an   Esmarch. Tourniquet was inflated up 300 mmHg. Standard anterior approach to   the knee was made. Medial parapatellar arthrotomy was made, leaving about 1/8   inch of tissue for later repair. Proximal medial tibial release was performed,   making sure not to release any of the MCL. We then   everted the patella and reflexed the knee. Fat pad was excised as well as some   of the ACL. Soft tissue off the distal anterior femur was removed for   visualization, so as to help prevent notching.  We started off by placing our femoral pins.  Two pins were placed about 2 centimeters proximal and 1 centimeter anterior to the medial epicondyle.  We then measured 4 finger breaths below the tibial tubercle and identified the tibial crest.  We made an incision and blunt dissection with a hemostat.  Two pins were placed just short of bicortically in the tibia.  We then hooked up our arays to our pins.  We placed 2 checkpoints.  One in the tibia and 1 in the  femur.  We then took the leg through range of motion.  We then began by marking off our bony points.  We did this 1st on the femur and then on the tibia.  After this we did ligament balancing of the knee 1st extension and then in flexion by using some spoons and an osteotome.  We then adjusted our bone cuts on the computer and once we liked our plan began by making our cuts.  The [employer] robotic assisted cutting arm was then brought in and then we made our femoral cuts and then turned our attention to the tibia.  Tibial cuts were made.  All of bone was removed as well as excess soft tissue.  Posterior osteophytes removed as well.  We then began to trial.  We placed a size 3 femur and a size 4 tibia with a size 9 polyethylene.  We were within 1 millimeter between our medial and lateral compartments in both flexion and extension.  Robot and pins were all removed.      We turned our attention to patella, caliper was used on the patella where it   measured 22. We set guide at 14 and made our 8-mm cut for polyethylene component, 29 was selected. Then drill holes were placed and the patellar button was placed.   This had good tracking. No need for a lateral release and with no hand tests,   it stayed centered the whole time. We then marked our tibial rotation. We then drilled our femoral lugs.  We then   removed everything except the size 4 tibial tray. We then checked our tibial tray   with a drop fabrice again and then marked our rotation and pinned it into place then   drilled, and then punched for our keel. We then started preparing final   components on the back table. Anterior, medial and lateral structures were injected with our local   Cocktail. Bony surfaces   copiously irrigated with Pulsavac and then thoroughly dried. Once the cement   was the appropriate consistency, first the tibia and then the femur were   cemented into place, removing excess cement. A 9 trial was placed. The knee   was held in compression and then  the patella was placed. Cement was allowed to   cure. Once the cement was cured, we then removed excess cement. Again trialed   one final time, again seeing a 9. We then tapped into place the   final 9 meniscal bearing into place. After this was done, we then did our   diluted iodine soak for 3 minutes and then proceeded with closing.  Arthrotomy was closed using our StrataFix.   Subcutaneous tissue was closed using 2-0 Vicryl and skin was using a running 3-0   StrataFix and Dermabond.Instrument, sponge, and needle counts were correct prior to wound closure and at the conclusion of the case.  Sterile dressing was applied including a Cryo/Cuff.   They were extubated, awakened and transferred from the Operating Room to the   Recovery Room in stable condition.

## 2023-01-03 NOTE — DISCHARGE SUMMARY
[facility]  Discharge Note  Short Stay    Procedure(s) (LRB):  ROBOTIC ARTHROPLASTY, KNEE, TOTAL (Left)      OUTCOME: Patient tolerated treatment/procedure well without complication and is now ready for discharge.    DISPOSITION: Home or Self Care    FINAL DIAGNOSIS:  <principal problem not specified>    FOLLOWUP: In clinic    DISCHARGE INSTRUCTIONS:    Discharge Procedure Orders   Diet general     Leave dressing on - Keep it clean, dry, and intact until clinic visit     Change dressing (specify)   Order Comments: Dressing change: If dressing loses its seal, change daily.     Call MD for:  temperature >100.4     Call MD for:  persistent nausea and vomiting     Call MD for:  severe uncontrolled pain     Call MD for:  difficulty breathing, headache or visual disturbances     Call MD for:  redness, tenderness, or signs of infection (pain, swelling, redness, odor or green/yellow discharge around incision site)     Call MD for:  hives     Call MD for:  persistent dizziness or light-headedness     Call MD for:  extreme fatigue     Weight bearing as tolerated        TIME SPENT ON DISCHARGE: 5 minutes

## 2023-01-03 NOTE — H&P
Past Medical History:   Diagnosis Date    Angio-edema      Asthma      Diabetes mellitus      Eczema      Obesity      Urticaria                 Past Surgical History:   Procedure Laterality Date    ADENOIDECTOMY        BONY PELVIS SURGERY         SECTION        FIXATION KYPHOPLASTY N/A 2018     Procedure: Kyphoplasty;  Surgeon: Martinez Morejon MD;  Location: [facility];  Service: Pain Management;  Laterality: N/A;  L1     HYSTERECTOMY        KNEE CARTILAGE SURGERY        TONSILLECTOMY        VAGINOPLASTY                  Current Outpatient Medications   Medication Sig    atorvastatin (LIPITOR) 10 MG tablet Take 10 mg by mouth once daily.    diclofenac sodium (VOLTAREN) 1 % Gel Apply 2 g topically 4 (four) times daily.    meloxicam (MOBIC) 15 MG tablet Take 1 tablet (15 mg total) by mouth once daily.    metformin (GLUCOPHAGE) 500 MG tablet      nystatin (MYCOSTATIN) powder      olmesartan (BENICAR) 20 MG tablet      SYNTHROID 150 mcg tablet TAKE 1 TABLET BY MOUTH ONCE DAILY ON AN EMPTY STOMACH IN THE MORNING FOR 90 DAYS    triamterene-hydrochlorothiazide 37.5-25 mg (DYAZIDE) 37.5-25 mg per capsule      TRULICITY 1.5 mg/0.5 mL PnIj        No current facility-administered medications for this visit.         Review of patient's allergies indicates:  No Known Allergies           Family History   Problem Relation Age of Onset    Breast cancer Mother 42          at 61yo from breast ca    Angioedema Daughter      Allergies Daughter      Asthma Maternal Uncle      Allergies Son      Allergic rhinitis Son      Eczema Son      Immunodeficiency Neg Hx      Ovarian cancer Neg Hx           Social History               Socioeconomic History    Marital status:    Tobacco Use    Smoking status: Never    Smokeless tobacco: Never   Substance and Sexual Activity    Alcohol use: Yes       Comment: seldom    Sexual activity: Not Currently       Partners: Male       Birth control/protection: None            Chief  Complaint:        Chief Complaint   Patient presents with    Follow-up       follow up left knee OA. discuss TKA          History of present illness: Is a 67-year-old female seen for bilateral knee pain.  Patient's had pain for years.  Patient's had injections, physical therapy, and even meniscal surgery.   Pain with walking standing or getting up from sitting.  Viscosupplementation is no longer working well for her. Hurts getting up from a chair.  Pain is 8/10.  Acting her quality of life significantly.     Review of Systems:  Musculoskeletal:  See HPI     Physical Examination:     Vital Signs:    There were no vitals filed for this visit.        There is no height or weight on file to calculate BMI.     This a well-developed, well nourished patient in no acute distress.  They are alert and oriented and cooperative to examination.  Pt. walks without an antalgic gait.       Examination of bilateral knees shows no rashes or erythema. There are no masses ecchymosis or effusion. Patient has full range of motion from 0-130°. Patient is nontender to palpation over lateral joint line and mildly tender to palpation over the medial joint line.  Knee is stable to varus and valgus stress. 5 out of 5 motor strength. Palpable distal pulses. Intact light touch sensation.  Moderate Patellofemoral crepitus     Heart is regular rate without obvious murmurs   Normal respiratory effort without audible wheezing  Abdomen is soft and nontender         X-rays: X-rays a both knees are  reviewed which show some moderate to severe left knee arthritis with near complete medial joint space narrowing and  moderate to severe right knee arthritis with more lateral joint space narrowing     Assessment:: Bilateral knee arthritis.  Left greater than right     Plan:  I reviewed the x-rays with her today.  Discuss knee replacement in great detail today.  We discussed the recovery.  Plan is for left robotic assisted total knee arthroplasty.  Risks,  benefits, and alternatives to the procedure were explained to the patient including but not limited to damage to nerves, arteries, blood vessels, bones, tendons, ligaments, stiffness, instability, infection, DVT, PE, as well as general anesthetic complications including seizure, stroke, heart attack and even death. The patient understood these risks and wished to proceed and signed the informed consent.         This note was created using Bloc voice recognition software that occasionally misinterpreted phrases or words.     Consult note is delivered via Epic messaging service.

## 2023-01-03 NOTE — ASSESSMENT & PLAN NOTE
S/p left TKA -0 POD # 0.   Continue to follow Orthopedic recommendations.  Needs aggressive incentive spirometry.  Follow hemoglobin and hematocrit closely.  Pain control with IV narcotics and antiemetics as needed.  Physical therapy as per Orthopedics protocol with fall precautions.

## 2023-01-03 NOTE — SUBJECTIVE & OBJECTIVE
Past Medical History:   Diagnosis Date    Angio-edema     Asthma     Diabetes mellitus     Eczema     Obesity     PONV (postoperative nausea and vomiting)     Urticaria        Past Surgical History:   Procedure Laterality Date    ADENOIDECTOMY      BONY PELVIS SURGERY       SECTION      X 2    FIXATION KYPHOPLASTY N/A 2018    Procedure: Kyphoplasty;  Surgeon: Martinez Morejon MD;  Location: [facility];  Service: Pain Management;  Laterality: N/A;  L1     HYSTERECTOMY      KNEE CARTILAGE SURGERY Left     TONSILLECTOMY      VAGINOPLASTY         Review of patient's allergies indicates:   Allergen Reactions    Levothyroxine Swelling     GENERIC ONLY / PT CAN TAKE SYNTHROID, face/tongue swelling       No current facility-administered medications on file prior to encounter.     Current Outpatient Medications on File Prior to Encounter   Medication Sig    atorvastatin (LIPITOR) 10 MG tablet Take 10 mg by mouth once daily.    metformin (GLUCOPHAGE) 500 MG tablet     olmesartan (BENICAR) 20 MG tablet     SYNTHROID 150 mcg tablet TAKE 1 TABLET BY MOUTH ONCE DAILY ON AN EMPTY STOMACH IN THE MORNING FOR 90 DAYS    nystatin (MYCOSTATIN) powder     triamterene-hydrochlorothiazide 37.5-25 mg (DYAZIDE) 37.5-25 mg per capsule      Family History       Problem Relation (Age of Onset)    Allergic rhinitis Son    Allergies Daughter, Son    Angioedema Daughter    Asthma Maternal Uncle    Breast cancer Mother (42)    Eczema Son          Tobacco Use    Smoking status: Never    Smokeless tobacco: Never   Substance and Sexual Activity    Alcohol use: Yes     Comment: seldom    Drug use: Never    Sexual activity: Not Currently     Partners: Male     Birth control/protection: See Surgical Hx     Review of Systems   Musculoskeletal:  Positive for arthralgias and back pain.        RLE Sciatica symptoms   All other systems reviewed and are negative.  Objective:     Vital Signs (Most Recent):  Temp: 97.9 °F (36.6 °C) (23 1040)  Pulse:  75 (01/03/23 1241)  Resp: 12 (01/03/23 1241)  BP: (!) 116/59 (01/03/23 1241)  SpO2: 98 % (01/03/23 1241)   Vital Signs (24h Range):  Temp:  [97.9 °F (36.6 °C)] 97.9 °F (36.6 °C)  Pulse:  [72-88] 75  Resp:  [8-23] 12  SpO2:  [88 %-100 %] 98 %  BP: (109-129)/(53-83) 116/59     Weight: 121.6 kg (268 lb)  Body mass index is 40.75 kg/m².    Physical Exam  Constitutional:       Appearance: She is well-developed.   HENT:      Head: Normocephalic and atraumatic.   Eyes:      Conjunctiva/sclera: Conjunctivae normal.      Pupils: Pupils are equal, round, and reactive to light.   Neck:      Thyroid: No thyromegaly.      Vascular: No JVD.   Cardiovascular:      Rate and Rhythm: Normal rate and regular rhythm.      Heart sounds: No murmur heard.    No friction rub. No gallop.   Pulmonary:      Effort: Pulmonary effort is normal.      Breath sounds: Normal breath sounds.   Abdominal:      General: Bowel sounds are normal. There is no distension.      Palpations: Abdomen is soft. There is no mass.      Tenderness: There is no abdominal tenderness.   Musculoskeletal:         General: Normal range of motion.      Cervical back: Neck supple.      Comments: Left knee surgical dressing C/D/I.   Skin:     General: Skin is warm and dry.   Neurological:      Mental Status: She is oriented to person, place, and time.      Cranial Nerves: No cranial nerve deficit.   Psychiatric:         Behavior: Behavior normal.         CRANIAL NERVES     CN III, IV, VI   Pupils are equal, round, and reactive to light.     Significant Labs: All pertinent labs within the past 24 hours have been reviewed.  CBC: No results for input(s): WBC, HGB, HCT, PLT in the last 48 hours.  CMP: No results for input(s): NA, K, CL, CO2, GLU, BUN, CREATININE, CALCIUM, PROT, ALBUMIN, BILITOT, ALKPHOS, AST, ALT, ANIONGAP, EGFRNONAA in the last 48 hours.    Invalid input(s): EST[place]    Significant Imaging:   Left knee x-ray:   Left knee total arthroplasty hardware with no  periprosthetic fracture or abnormal lucency to suggest loosening or infection.  No malalignment.  Small knee joint effusion.  Iatrogenic intra-articular and subcutaneous gas.

## 2023-01-03 NOTE — H&P
[facility] Hospital Medicine  History & Physical    Patient Name: Gail Temple  MRN: [id number]  Patient Class: OP- Outpatient Recovery  Admission Date: 1/3/2023  Attending Physician: Estevan Monteiro MD   Primary Care Provider: Clarice Starks MD         Patient information was obtained from patient, past medical records. records.     Subjective:     Principal Problem: s/p left TKA  Chief Complaint: Postop medical management     HPI: This is a 67-year-old  female with past medical history significant for osteoarthritis, diabetes mellitus type 2 and morbid obesity by medical criteria who underwent left total knee arthroplasty performed by Dr. Cayetano hanson.  Postoperatively patient reports adequate pain control.  Patient reports generalized weakness. Patient's had pain for years.  Patient's had injections, physical therapy, and even meniscal surgery.   Pain with walking standing or getting up from sitting.  Viscosupplementation is no longer working well for her. Hurts getting up from a chair which has been affecting her activities of daily living. Post-operatively, patient denied chest pain, shortness of breath, abdominal pain, nausea, vomiting, headache, vision changes, focal neuro-deficits, cough or fever.        Past Medical History:   Diagnosis Date    Angio-edema     Asthma     Diabetes mellitus     Eczema     Obesity     PONV (postoperative nausea and vomiting)     Urticaria        Past Surgical History:   Procedure Laterality Date    ADENOIDECTOMY      BONY PELVIS SURGERY       SECTION      X 2    FIXATION KYPHOPLASTY N/A 2018    Procedure: Kyphoplasty;  Surgeon: Martinez Morejon MD;  Location: [facility];  Service: Pain Management;  Laterality: N/A;  L1     HYSTERECTOMY      KNEE CARTILAGE SURGERY Left     TONSILLECTOMY      VAGINOPLASTY         Review of patient's allergies indicates:   Allergen Reactions    Levothyroxine Swelling     GENERIC ONLY  / PT CAN TAKE SYNTHROID, face/tongue swelling       No current facility-administered medications on file prior to encounter.     Current Outpatient Medications on File Prior to Encounter   Medication Sig    atorvastatin (LIPITOR) 10 MG tablet Take 10 mg by mouth once daily.    metformin (GLUCOPHAGE) 500 MG tablet     olmesartan (BENICAR) 20 MG tablet     SYNTHROID 150 mcg tablet TAKE 1 TABLET BY MOUTH ONCE DAILY ON AN EMPTY STOMACH IN THE MORNING FOR 90 DAYS    nystatin (MYCOSTATIN) powder     triamterene-hydrochlorothiazide 37.5-25 mg (DYAZIDE) 37.5-25 mg per capsule      Family History       Problem Relation (Age of Onset)    Allergic rhinitis Son    Allergies Daughter, Son    Angioedema Daughter    Asthma Maternal Uncle    Breast cancer Mother (42)    Eczema Son          Tobacco Use    Smoking status: Never    Smokeless tobacco: Never   Substance and Sexual Activity    Alcohol use: Yes     Comment: seldom    Drug use: Never    Sexual activity: Not Currently     Partners: Male     Birth control/protection: See Surgical Hx     Review of Systems   Musculoskeletal:  Positive for arthralgias and back pain.        RLE Sciatica symptoms   All other systems reviewed and are negative.  Objective:     Vital Signs (Most Recent):  Temp: 97.9 °F (36.6 °C) (01/03/23 1040)  Pulse: 75 (01/03/23 1241)  Resp: 12 (01/03/23 1241)  BP: (!) 116/59 (01/03/23 1241)  SpO2: 98 % (01/03/23 1241)   Vital Signs (24h Range):  Temp:  [97.9 °F (36.6 °C)] 97.9 °F (36.6 °C)  Pulse:  [72-88] 75  Resp:  [8-23] 12  SpO2:  [88 %-100 %] 98 %  BP: (109-129)/(53-83) 116/59     Weight: 121.6 kg (268 lb)  Body mass index is 40.75 kg/m².    Physical Exam  Constitutional:       Appearance: She is well-developed.   HENT:      Head: Normocephalic and atraumatic.   Eyes:      Conjunctiva/sclera: Conjunctivae normal.      Pupils: Pupils are equal, round, and reactive to light.   Neck:      Thyroid: No thyromegaly.      Vascular: No JVD.    Cardiovascular:      Rate and Rhythm: Normal rate and regular rhythm.      Heart sounds: No murmur heard.    No friction rub. No gallop.   Pulmonary:      Effort: Pulmonary effort is normal.      Breath sounds: Normal breath sounds.   Abdominal:      General: Bowel sounds are normal. There is no distension.      Palpations: Abdomen is soft. There is no mass.      Tenderness: There is no abdominal tenderness.   Musculoskeletal:         General: Normal range of motion.      Cervical back: Neck supple.      Comments: Left knee surgical dressing C/D/I.   Skin:     General: Skin is warm and dry.   Neurological:      Mental Status: She is oriented to person, place, and time.      Cranial Nerves: No cranial nerve deficit.   Psychiatric:         Behavior: Behavior normal.         CRANIAL NERVES     CN III, IV, VI   Pupils are equal, round, and reactive to light.     Significant Labs: All pertinent labs within the past 24 hours have been reviewed.  CBC: No results for input(s): WBC, HGB, HCT, PLT in the last 48 hours.  CMP: No results for input(s): NA, K, CL, CO2, GLU, BUN, CREATININE, CALCIUM, PROT, ALBUMIN, BILITOT, ALKPHOS, AST, ALT, ANIONGAP, EGFRNONAA in the last 48 hours.    Invalid input(s): [place]    Significant Imaging:   Left knee x-ray:   Left knee total arthroplasty hardware with no periprosthetic fracture or abnormal lucency to suggest loosening or infection.  No malalignment.  Small knee joint effusion.  Iatrogenic intra-articular and subcutaneous gas.    Assessment/Plan:     Obesity  Body mass index is 40.75 kg/m². Morbid obesity complicates all aspects of disease management from diagnostic modalities to treatment. Weight loss encouraged and health benefits explained to patient.         Diabetes mellitus  Patient's FSGs are controlled on current medication regimen.  Last A1c reviewed- No results found for: LABA1C, HGBA1C  Most recent fingerstick glucose reviewed- No results for input(s): POCTGLUCOSE in  the last 24 hours.  Current correctional scale  Low  Maintain anti-hyperglycemic dose as follows-   Antihyperglycemics (From admission, onward)    None        Hold Oral hypoglycemics while patient is in the hospital.    Primary osteoarthritis of both knees  S/p left TKA -0 POD # 0.   Continue to follow Orthopedic recommendations.  Needs aggressive incentive spirometry.  Follow hemoglobin and hematocrit closely.  Pain control with IV narcotics and antiemetics as needed.  Physical therapy as per Orthopedics protocol with fall precautions.            VTE Risk Mitigation (From admission, onward)         Ordered     IP VTE LOW RISK PATIENT  Once. On ASA 81 mg po bid as per Dr. Monteiro.           01/03/23 0848     Place sequential compression device  Until discontinued         01/03/23 0848                   Savita Fox MD  Department of Hospital Medicine   [facility]

## 2023-01-03 NOTE — ANESTHESIA PROCEDURE NOTES
Peripheral Block    Patient location during procedure: pre-op   Block not for primary anesthetic.  Reason for block: at surgeon's request and post-op pain management   Post-op Pain Location: left knee   Start time: 1/3/2023 6:45 AM  Timeout: 1/3/2023 6:45 AM   End time: 1/3/2023 6:50 AM    Staffing  Authorizing Provider: Fred Hawkins MD  Performing Provider: Fred Hawkins MD    Preanesthetic Checklist  Completed: patient identified, IV checked, site marked, risks and benefits discussed, surgical consent, monitors and equipment checked, pre-op evaluation and timeout performed  Peripheral Block  Patient position: supine  Prep: ChloraPrep  Patient monitoring: heart rate, cardiac monitor, continuous pulse ox, continuous capnometry and frequent blood pressure checks  Block type: adductor canal  Laterality: left  Injection technique: single shot  Needle  Needle type: Stimuplex   Needle gauge: 21 G  Needle length: 4 in  Needle localization: anatomical landmarks and ultrasound guidance   -ultrasound image captured on disc.  Assessment  Injection assessment: negative aspiration, negative parasthesia and local visualized surrounding nerve  Paresthesia pain: none  Heart rate change: no  Slow fractionated injection: yes  Pain Tolerance: comfortable throughout block and no complaints  Medications:    Medications: bupivacaine (pf) (MARCAINE) injection 0.5% - Perineural   10 mL - 1/3/2023 6:50:00 AM    Additional Notes  VSS.  [facility] RN monitoring vitals throughout procedure.  Patient tolerated procedure well.

## 2023-01-03 NOTE — PT/OT/SLP EVAL
Physical Therapy Evaluation    Patient Name:  Gail Temple   MRN:  [id number]    Recommendations:     Discharge Recommendations: home health PT   Discharge Equipment Recommendations: walker, rolling   Barriers to discharge: None    Assessment:     Gail Temple is a 67 y.o. female admitted with a medical diagnosis of <principal problem not specified>.  She presents with the following impairments/functional limitations: weakness, impaired endurance, impaired functional mobility, gait instability, impaired balance, decreased lower extremity function, decreased ROM, impaired cardiopulmonary response to activity, orthopedic precautions .    Pt seen at anibal operative area, awake/sleepy. Pt seen for thera ex in supine. EOB sitting min assist with c/o nausea and lightheadedness- pt medicated by nursing with Phenergan with increased drowsiness. Pt tolerated bed to chair transfers only with RW mod assist. Pt to require second visit.    Rehab Prognosis: Fair; patient would benefit from acute skilled PT services to address these deficits and reach maximum level of function.    Recent Surgery: Procedure(s) (LRB):  ROBOTIC ARTHROPLASTY, KNEE, TOTAL (Left) Day of Surgery    Plan:     During this hospitalization, patient to be seen BID to address the identified rehab impairments via gait training, therapeutic activities, therapeutic exercises and progress toward the following goals:    Plan of Care Expires:  01/30/23    Subjective   Pt's spouse and sjister at bedside assisting with care  Chief Complaint: weakness, drowsiness,nausea/lightheadedness  Patient/Family Comments/goals: none stated  Pain/Comfort:  Pain Rating 1: 0/10    Patients cultural, spiritual, Rastafarian conflicts given the current situation:      Living Environment:  Home with spouse  Prior to admission, patients level of function was ambulatory limited distance due to knee pain.  Equipment used at home: none.  DME owned (not currently used): none.  Upon  discharge, patient will have assistance from family.    Objective:     Communicated with nurse Edgard prior to session.  Patient found HOB elevated with blood pressure cuff, pulse ox (continuous)  upon PT entry to room.    General Precautions: Standard, fall  Orthopedic Precautions:LLE weight bearing as tolerated   Braces: N/A  Respiratory Status: Room air    Exams:  Postural Exam:  Patient presented with the following abnormalities:    -       Rounded shoulders  -       Forward head  -       BMI 40  RLE ROM: WFL  RLE Strength: WFL  LLE ROM: Deficits: LK flexion ~80*  LLE Strength: Deficits: 3/5    Functional Mobility:  Bed Mobility:     Rolling Right: moderate assistance  Scooting: moderate assistance  Supine to Sit: minimum assistance  Transfers:     Sit to Stand:  moderate assistance with rolling walker  Bed to Chair: moderate assistance with  rolling walker  using  Stand Pivot      AM-PAC 6 CLICK MOBILITY  Total Score:13       Treatment & Education:  Patient was educated on the importance of OOB activity and functional mobility to negate negative effects of prolonged bed rest during hospitalization, safe transfers and ambulation, and D/C planning   Pt completed thera ex with AP,QS/GS,SLR and HS x 10-20 reps  Bed to chair transfers only with c/o nausea/lightheadedness and drowsiness    Patient left up in chair with all lines intact, call button in reach, and spouse and sister present.    GOALS:   Multidisciplinary Problems       Physical Therapy Goals          Problem: Physical Therapy    Goal Priority Disciplines Outcome Goal Variances Interventions   Physical Therapy Goal     PT, PT/OT Ongoing, Progressing     Description: Goals to be met by: 2023     Patient will increase functional independence with mobility by performin. Supine to sit with Contact Guard Assistance  2. Sit to stand transfer with Contact Guard Assistance  3. Bed to chair transfer with Contact Guard Assistance using  Rolling Walker  4. Gait  x 250 feet with Contact Guard Assistance using Rolling Walker.   5. Ascend/descend 4 stair with right Handrails Contact Guard Assistance using No Assistive Device.   6. Lower extremity exercise program x20 reps                          History:     Past Medical History:   Diagnosis Date    Angio-edema     Asthma     Diabetes mellitus     Eczema     Obesity     PONV (postoperative nausea and vomiting)     Urticaria        Past Surgical History:   Procedure Laterality Date    ADENOIDECTOMY      BONY PELVIS SURGERY       SECTION      X 2    FIXATION KYPHOPLASTY N/A 2018    Procedure: Kyphoplasty;  Surgeon: Martinez Morejon MD;  Location: [facility];  Service: Pain Management;  Laterality: N/A;  L1     HYSTERECTOMY      KNEE CARTILAGE SURGERY Left     TONSILLECTOMY      VAGINOPLASTY         Time Tracking:     PT Received On: 23  PT Start Time: 1053     PT Stop Time: 1138  PT Total Time (min): 45 min     Billable Minutes: Evaluation 10, Therapeutic Activity 20, and Therapeutic Exercise 15      2023

## 2023-01-03 NOTE — ANESTHESIA POSTPROCEDURE EVALUATION
Anesthesia Post Evaluation    Patient: Gail Temple    Procedure(s) Performed: Procedure(s) (LRB):  ROBOTIC ARTHROPLASTY, KNEE, TOTAL (Left)    Final Anesthesia Type: spinal      Patient location during evaluation: PACU  Patient participation: Yes- Able to Participate  Level of consciousness: awake and alert  Post-procedure vital signs: reviewed and stable  Pain management: adequate  Airway patency: patent    PONV status at discharge: No PONV  Anesthetic complications: no      Cardiovascular status: hemodynamically stable  Respiratory status: unassisted and room air  Hydration status: euvolemic  Follow-up not needed.          Vitals Value Taken Time   /64 01/03/23 1645   Temp 36.6 °C (97.9 °F) 01/03/23 1040   Pulse 71 01/03/23 1645   Resp 16 01/03/23 1645   SpO2 96 % 01/03/23 1645         Event Time   Out of Recovery 10:50:00         Pain/Moon Score: Pain Rating Prior to Med Admin: 1 (1/3/2023 10:07 AM)  Moon Score: 10 (1/3/2023  2:00 PM)  Modified Moon Score: 20 (1/3/2023  4:45 PM)

## 2023-01-03 NOTE — PATIENT INSTRUCTIONS
1.Diet: Ice chips, clear liquids, and then diet as tolerated. Drink plenty of liquids.  2.Ice the area at least three times a day (20 minutes per session).  3.Elevate the extremity above the level of the heart to help reduce swelling.  4.Pain medication can be taken every four to six hours as needed. It is helpful to take pain medication prior to physical therapy.  Use Tylenol or anti-inflammatories for pain as much as possible.  Pain medication is for pain not responsive to over-the-counter medications.  5.Any activity that requires precise thinking or accuracy should be avoided for a minimum of 72 hours after surgery and while on narcotic pain medication. This includes operating machinery and/or driving a vehicle.  6.All sutures/staples will be removed approximately 14 days from the time of surgery. Leave steri-strips (skin tapes) in place until sutures are removed.  7. If skin glue is used instead of stitches, do not apply ointments or solutions to the incision. Keep the incision dry. The skin glue will peel off in 3-4 weeks.  8. Change dressing on the first post-op day. Use gauze for the first 3 days, then start using Band-Aids over the incision sites.   9. All casts, splints, braces, slings, crutches, abduction pillows, etc... Are to be worn as instructed. Crutches are just to be used as needed. If not needed for soreness or balance, may weight bear as tolerated without the crutches.  10. Keep the incision dry for 10-14 days. A waterproof dressing (purchase at [employer], [employer], etc) can be used to shower. No bath, pool, hot tub until instructed.  11. Call [phone] with any questions or concerns.

## 2023-01-03 NOTE — DISCHARGE INSTRUCTIONS
"Discharge Instructions: After Your Surgery/Procedure  Youve just had surgery. During surgery you were given medicine called anesthesia to keep you relaxed and free of pain. After surgery you may have some pain or nausea. This is common. Here are some tips for feeling better and getting well after surgery.     Stay on schedule with your medication.   Going home  Your doctor or nurse will show you how to take care of yourself when you go home. He or she will also answer your questions. Have an adult family member or friend drive you home.      For your safety we recommend these precaution for the first 24 hours after your procedure:  Do not drive or use heavy equipment.  Do not make important decisions or sign legal papers.  Do not drink alcohol.  Have someone stay with you, if needed. He or she can watch for problems and help keep you safe.  Your concentration, balance, coordination, and judgement may be impaired for many hours after anesthesia.  Use caution when ambulating or standing up.     You may feel weak and "washed out" after anesthesia and surgery.      Subtle residual effects of general anesthesia or sedation with regional / local anesthesia can last more than 24 hours.  Rest for the remainder of the day or longer if your Doctor/Surgeon has advised you to do so.  Although you may feel normal within the first 24 hours, your reflexes and mental ability may be impaired without you realizing it.  You may feel dizzy, lightheaded or sleepy for 24 hours or longer.      Be sure to go to all follow-up visits with your doctor. And rest after your surgery for as long as your doctor tells you to.  Coping with pain  If you have pain after surgery, pain medicine will help you feel better. Take it as told, before pain becomes severe. Also, ask your doctor or pharmacist about other ways to control pain. This might be with heat, ice, or relaxation. And follow any other instructions your surgeon or nurse gives you.  Tips " for taking pain medicine  To get the best relief possible, remember these points:  Pain medicines can upset your stomach. Taking them with a little food may help.  Most pain relievers taken by mouth need at least 20 to 30 minutes to start to work.  Taking medicine on a schedule can help you remember to take it. Try to time your medicine so that you can take it before starting an activity. This might be before you get dressed, go for a walk, or sit down for dinner.  Constipation is a common side effect of pain medicines. Call your doctor before taking any medicines such as laxatives or stool softeners to help ease constipation. Also ask if you should skip any foods. Drinking lots of fluids and eating foods such as fruits and vegetables that are high in fiber can also help. Remember, do not take laxatives unless your surgeon has prescribed them.  Drinking alcohol and taking pain medicine can cause dizziness and slow your breathing. It can even be deadly. Do not drink alcohol while taking pain medicine.  Pain medicine can make you react more slowly to things. Do not drive or run machinery while taking pain medicine.  Your health care provider may tell you to take acetaminophen to help ease your pain. Ask him or her how much you are supposed to take each day. Acetaminophen or other pain relievers may interact with your prescription medicines or other over-the-counter (OTC) drugs. Some prescription medicines have acetaminophen and other ingredients. Using both prescription and OTC acetaminophen for pain can cause you to overdose. Read the labels on your OTC medicines with care. This will help you to clearly know the list of ingredients, how much to take, and any warnings. It may also help you not take too much acetaminophen. If you have questions or do not understand the information, ask your pharmacist or health care provider to explain it to you before you take the OTC medicine.  Managing nausea  Some people have an  upset stomach after surgery. This is often because of anesthesia, pain, or pain medicine, or the stress of surgery. These tips will help you handle nausea and eat healthy foods as you get better. If you were on a special food plan before surgery, ask your doctor if you should follow it while you get better. These tips may help:  Do not push yourself to eat. Your body will tell you when to eat and how much.  Start off with clear liquids and soup. They are easier to digest.  Next try semi-solid foods, such as mashed potatoes, applesauce, and gelatin, as you feel ready.  Slowly move to solid foods. Dont eat fatty, rich, or spicy foods at first.  Do not force yourself to have 3 large meals a day. Instead eat smaller amounts more often.  Take pain medicines with a small amount of solid food, such as crackers or toast, to avoid nausea.     Call your surgeon if  You still have pain an hour after taking medicine. The medicine may not be strong enough.  You feel too sleepy, dizzy, or groggy. The medicine may be too strong.  You have side effects like nausea, vomiting, or skin changes, such as rash, itching, or hives.       If you have obstructive sleep apnea  You were given anesthesia medicine during surgery to keep you comfortable and free of pain. After surgery, you may have more apnea spells because of this medicine and other medicines you were given. The spells may last longer than usual.   At home:  Keep using the continuous positive airway pressure (CPAP) device when you sleep. Unless your health care provider tells you not to, use it when you sleep, day or night. CPAP is a common device used to treat obstructive sleep apnea.  Talk with your provider before taking any pain medicine, muscle relaxants, or sedatives. Your provider will tell you about the possible dangers of taking these medicines.  © 4120-3184 [employer]. [address] [place] [address], [place] [address]. All rights reserved. This information is  not intended as a substitute for professional medical care. Always follow your healthcare professional's instructions.       Using an Incentive Spirometer    An incentive spirometer is a device that helps you do deep breathing exercises. These exercises expand your lungs, aid in circulation, and help prevent pneumonia. Deep breathing exercises also help you breathe better and improve the function of your lungs by:  Keeping your lungs clear  Strengthening your breathing muscles  Helping prevent respiratory complications or problems  The incentive spirometer gives you a way to take an active part in recover. A nurse or therapist will teach you breathing exercises. To do these exercises, you will breathe in through your mouth and not your nose. The incentive spirometer only works correctly if you breathe in through your mouth.  Steps to clear lungs  Step 1. Exhale normally. Then, inhale normally.  Relax and breathe out.  Step 2. Place your lips tightly around the mouthpiece.  Make sure the device is upright and not tilted.  Step 3. Inhale as much air as you can through the mouthpiece (don't breath through your nose).  Inhale slowly and deeply.  Hold your breath long enough to keep the balls or disk raised for at least 3 to 5 seconds, or as instructed by your healthcare provider.  Some spirometers have an indicator to let you know that you are breathing in too fast. If the indicator goes off, breathe in more slowly.  Step 4. Repeat the exercise regularly.  Do this exercise every hour while you're awake, or as instructed by your healthcare provider.  If you were taught deep breathing and coughing exercises, do them regularly as instructed by your healthcare provider.        Post op instructions for prevention of DVT  What is deep vein thrombosis?  Deep vein thrombosis (DVT) is the medical term for blood clots in the deep veins of the leg.  These blood clots can be dangerous.  A DVT can block a blood vessel and keep blood  from getting where it needs to go.  Another problem is that the clot can travel to other parts of the body such as the lungs.  A clot that travels to the lungs is called a pulmonary embolus (PE) and can cause serious problems with breathing which can lead to death.  Am I at risk for DVT/PE?  If you are not very active, you are at risk of DVT.  Anyone confined to bed, sitting for long periods of time, recovering from surgery, etc. increases the risk of DVT.  Other risk factors are cancer diagnosis, certain medications, estrogen replacement in any form,older age, obesity, pregnancy, smoking, history of clotting disorders, and dehydration.  How will I know if I have a DVT?  Swelling in the lower leg  Pain  Warmth, redness, hardness or bulging of the vein  If you have any of these symptoms, call your doctors office right away.  Some people will not have any symptoms until the clot moves to the lungs.  What are the symptoms of a PE?  Panting, shortness of breath, or trouble breathing  Sharp, knife-like chest pain when you breathe  Coughing or coughing up blood  Rapid heartbeat  If you have any of these symptoms or get worse quickly, call 911 for emergency treatment.  How can I prevent a DVT?  Avoid long periods of inactivity and dont cross your legs--get up and walk around every hour or so.  Stay active--walking after surgery is highly encouraged.  This means you should get out of the house and walk in the neighborhood.  Going up and down stairs will not impair healing (unless advised against such activity by your doctor).    Drink plenty of noncaffeinated, nonalcoholic fluids each day to prevent dehydration.  Wear special support stockings, if they have been advised by your doctor.  If you travel, stop at least once an hour and walk around.  Avoid smoking (assistance with stopping is available through your healthcare provider)  Always notify your doctor if you are not able to follow the post operative instructions  that are given to you at the time of discharge.  It may be necessary to prescribe one of the medications available to prevent DVT.       We hope your stay was comfortable as you heal now, mend and rest.    For we have enjoyed taking care of you by giving your our best.    And as you get better, by regaining your health and strength;   We count it as a privilege to have served you and hope your time at [facility] was well spent.      Thank  You!!!

## 2023-01-03 NOTE — PROGRESS NOTES
Criteria met per anesthesia for transfer to post op pt alert oriented able  to lift RLE off of bed + sensation and movement pain and nausea controlled. Transferred per stretcher to phase 2 report given to Mary.  present

## 2023-01-03 NOTE — PT/OT/SLP PROGRESS
Physical Therapy Treatment    Patient Name:  Gail Temple   MRN:  [id number]    Recommendations:     Discharge Recommendations: home health PT  Discharge Equipment Recommendations: walker, rolling  Barriers to discharge: None    Assessment:     Gail Temple is a 67 y.o. female admitted with a medical diagnosis of <principal problem not specified>.  She presents with the following impairments/functional limitations: weakness, impaired endurance, impaired functional mobility, gait instability, impaired balance, decreased lower extremity function, decreased ROM, impaired cardiopulmonary response to activity, orthopedic precautions .    Pt seen BID today. More awake this pm- pt requiring mod assist with ist to stand with VC for technique and hand placement. Gait with RW 10ft with onset of incontinence. Pt allowed to sit back and brought to room for diaper and gown change. Pt ambulated 25ft x2 with RW min assist with another person following with chair. Pt continued to c/o weakness/lightheadedness. Pt slow to mobilize with sitting rests and extra time. Pt has 4 steps to enter home and is unable to complete task at this time  Pt not meeting goal for safe discharge to home today- nursing aware.    Rehab Prognosis: Fair; patient would benefit from acute skilled PT services to address these deficits and reach maximum level of function.    Recent Surgery: Procedure(s) (LRB):  ROBOTIC ARTHROPLASTY, KNEE, TOTAL (Left) Day of Surgery    Plan:     During this hospitalization, patient to be seen BID to address the identified rehab impairments via gait training, therapeutic activities, therapeutic exercises and progress toward the following goals:    Plan of Care Expires:  01/30/23    Subjective   Stated of feeling drowsy  Daughter, spouse and sister at bedside  Family concern about pt's inability to fully participate with therapies.    Chief Complaint: lightheadedness/weakness    Patient/Family Comments/goals: get  stronger  Pain/Comfort:  Pain Rating 1: 0/10      Objective:     Communicated with nurse Elaine prior to session.  Patient found up in chair with blood pressure cuff, pulse ox (continuous) upon PT entry to room.     General Precautions: Standard, fall  Orthopedic Precautions: LLE weight bearing as tolerated  Braces: N/A  Respiratory Status: Room air     Functional Mobility:  Transfers:     Sit to Stand:  moderate assistance with rolling walker  Gait: 10ft, 25ft x2 with RW min assist and another person following with chair.pt requiring seated rests due to weakness      AM-PAC 6 CLICK MOBILITY  Turning over in bed (including adjusting bedclothes, sheets and blankets)?: 3  Sitting down on and standing up from a chair with arms (e.g., wheelchair, bedside commode, etc.): 2  Moving from lying on back to sitting on the side of the bed?: 3  Moving to and from a bed to a chair (including a wheelchair)?: 2  Need to walk in hospital room?: 2  Climbing 3-5 steps with a railing?: 1  Basic Mobility Total Score: 13       Treatment & Education:  Patient was educated on the importance of OOB activity and functional mobility to negate negative effects of prolonged bed rest during hospitalization, safe transfers and ambulation, and D/C planning   Pt slow to mobilize with c/o weakness  Pt encouraged ankle pumping exercises, QS/GS  Pt left up in chair    Patient left up in chair with all lines intact, call button in reach, and nurses Zohreh and Tatiana notified..    GOALS:   Multidisciplinary Problems       Physical Therapy Goals          Problem: Physical Therapy    Goal Priority Disciplines Outcome Goal Variances Interventions   Physical Therapy Goal     PT, PT/OT Ongoing, Progressing     Description: Goals to be met by: 2023     Patient will increase functional independence with mobility by performin. Supine to sit with Contact Guard Assistance  2. Sit to stand transfer with Contact Guard Assistance  3.  Bed to chair transfer with Contact Guard Assistance using Rolling Walker  4. Gait  x 250 feet with Contact Guard Assistance using Rolling Walker.   5. Ascend/descend 4 stair with right Handrails Contact Guard Assistance using No Assistive Device.   6. Lower extremity exercise program x20 reps                          Time Tracking:     PT Received On: 01/03/23  PT Start Time: 1307     PT Stop Time: 1356  PT Total Time (min): 49 min     Billable Minutes: Gait Training 30 and Therapeutic Activity 19    Treatment Type: Treatment  PT/PTA: PT     PTA Visit Number: 0     01/03/2023

## 2023-01-03 NOTE — HPI
This is a 67-year-old  female with past medical history significant for osteoarthritis, diabetes mellitus type 2 and morbid obesity by medical criteria who underwent left total knee arthroplasty performed by Dr. Cayetano hanson.  Postoperatively patient reports adequate pain control.  Patient reports generalized weakness. Patient's had pain for years.  Patient's had injections, physical therapy, and even meniscal surgery.   Pain with walking standing or getting up from sitting.  Viscosupplementation is no longer working well for her. Hurts getting up from a chair which has been affecting her activities of daily living. Post-operatively, patient denied chest pain, shortness of breath, abdominal pain, nausea, vomiting, headache, vision changes, focal neuro-deficits, cough or fever.

## 2023-01-03 NOTE — ANESTHESIA PROCEDURE NOTES
Spinal    Diagnosis: osteoarthritis  Patient location during procedure: OR  Start time: 1/3/2023 7:05 AM  Timeout: 1/3/2023 7:05 AM  End time: 1/3/2023 7:10 AM    Staffing  Authorizing Provider: Fred Hawkins MD  Performing Provider: Fred Hawkins MD    Preanesthetic Checklist  Completed: patient identified, IV checked, site marked, risks and benefits discussed, surgical consent, monitors and equipment checked, pre-op evaluation and timeout performed  Spinal Block  Patient position: sitting  Prep: ChloraPrep  Patient monitoring: heart rate, cardiac monitor, continuous pulse ox, continuous capnometry and frequent blood pressure checks  Approach: midline  Location: L4-5  Injection technique: single shot  CSF Fluid: clear free-flowing CSF  Needle  Needle type: pencil-tip   Needle gauge: 25 G  Needle length: 3.5 in  Additional Documentation: incremental injection, negative aspiration for heme and no paresthesia on injection  Needle localization: anatomical landmarks  Assessment  Sensory level: T10   Dermatomal levels determined by alcohol wipe  Ease of block: easy  Patient's tolerance of the procedure: comfortable throughout block and no complaints  Medications:    Medications: bupivacaine (pf) (MARCAINE) injection 0.75% - Intraspinal   1.4 mL - 1/3/2023 7:10:00 AM

## 2023-01-03 NOTE — ASSESSMENT & PLAN NOTE
Body mass index is 40.75 kg/m². Morbid obesity complicates all aspects of disease management from diagnostic modalities to treatment. Weight loss encouraged and health benefits explained to patient.

## 2023-01-03 NOTE — ANESTHESIA PREPROCEDURE EVALUATION
01/03/2023  Gail Temple is a 67 y.o., female.      Pre-op Assessment    I have reviewed the Patient Summary Reports.     I have reviewed the Nursing Notes. I have reviewed the NPO Status.   I have reviewed the Medications.     Review of Systems  Anesthesia Hx:  No problems with previous Anesthesia    Social:  Non-Smoker    Hematology/Oncology:  Hematology Normal   Oncology Normal     EENT/Dental:EENT/Dental Normal   Pulmonary:   Asthma    Renal/:  Renal/ Normal     Musculoskeletal:   Arthritis  Left knee osteoarthritis    Neurological:  Neurology Normal    Endocrine:   Diabetes  Morbid Obesity / BMI > 40  Dermatological:  Skin Normal    Psych:  Psychiatric Normal           Physical Exam  General: Well nourished, Cooperative, Alert and Oriented    Airway:  Mallampati: II   Mouth Opening: Normal  TM Distance: Normal  Tongue: Normal  Neck ROM: Normal ROM        Anesthesia Plan  Type of Anesthesia, risks & benefits discussed:    Anesthesia Type: Spinal  Intra-op Monitoring Plan: Standard ASA Monitors  Post Op Pain Control Plan: multimodal analgesia, peripheral nerve block and IV/PO Opioids PRN  Informed Consent: Informed consent signed with the Patient and all parties understand the risks and agree with anesthesia plan.  All questions answered. Patient consented to blood products? Yes  ASA Score: 3  Day of Surgery Review of History & Physical: H&P Update referred to the surgeon/provider.    Ready For Surgery From Anesthesia Perspective.     .

## 2023-01-03 NOTE — PLAN OF CARE
Patient received from recovery at this time.  Patient is awake and alert.  NAD noted.  0/10 complaint of pain to left knee.  Patient is able to move BLE with no problems noted.

## 2023-01-03 NOTE — TRANSFER OF CARE
"Anesthesia Transfer of Care Note    Patient: Gail Temple    Procedure(s) Performed: Procedure(s) (LRB):  ROBOTIC ARTHROPLASTY, KNEE, TOTAL (Left)    Patient location: PACU    Anesthesia Type: spinal    Transport from OR: Transported from OR on 2-3 L/min O2 by NC with adequate spontaneous ventilation    Post pain: adequate analgesia    Post assessment: no apparent anesthetic complications and tolerated procedure well    Post vital signs: stable    Level of consciousness: awake, alert and oriented    Nausea/Vomiting: no nausea/vomiting    Complications: none    Transfer of care protocol was followed      Last vitals:   Visit Vitals  /83 (BP Location: Left arm, Patient Position: Sitting)   Pulse 80   Resp 16   Ht 5' 8" (1.727 m)   Wt 121.6 kg (268 lb)   SpO2 98%   Breastfeeding No   BMI 40.75 kg/m²     "

## 2023-01-03 NOTE — PLAN OF CARE
Problem: Physical Therapy  Goal: Physical Therapy Goal  Description: Goals to be met by: 2023     Patient will increase functional independence with mobility by performin. Supine to sit with Contact Guard Assistance  2. Sit to stand transfer with Contact Guard Assistance  3. Bed to chair transfer with Contact Guard Assistance using Rolling Walker  4. Gait  x 250 feet with Contact Guard Assistance using Rolling Walker.   5. Ascend/descend 4 stair with right Handrails Contact Guard Assistance using No Assistive Device.   6. Lower extremity exercise program x20 reps     Outcome: Ongoing, Progressing   PT eval and treat at anibal op area. Pt sleepy- tolerated thera ex in supine. C/o nausea upon sitting EOB and medicated with Phenergan. Pt tolerated brief standing and transfer to chair only. Will require second visit

## 2023-01-03 NOTE — PROGRESS NOTES
April, CM to BS with pt's home walker. April reported pt asking about an ice machine for home use. Will inquire with MD

## 2023-01-03 NOTE — PLAN OF CARE
Patient does not meet criteria for discharge per PT.  Dr Monteiro notified.  Dr Fox, hospital medicine notified and at the bedside now.  House  supervisor notified.  Patient and family updated and verbalized understanding.

## 2023-01-03 NOTE — PROGRESS NOTES
[facility] PT notified that pt c/o nausea, hx PONV, spoke with Dr Hawkins about pt's c/o nausea 6/10. Phenergan 6.25 mg IVPB ordered and given.     [id number] Lindsay PT assisted pt to BS chair and reclined, will come back a little later to attempt ambulation.

## 2023-01-03 NOTE — ASSESSMENT & PLAN NOTE
Patient's FSGs are controlled on current medication regimen.  Last A1c reviewed- No results found for: LABA1C, HGBA1C  Most recent fingerstick glucose reviewed- No results for input(s): POCTGLUCOSE in the last 24 hours.  Current correctional scale  Low  Maintain anti-hyperglycemic dose as follows-   Antihyperglycemics (From admission, onward)    None        Hold Oral hypoglycemics while patient is in the hospital.

## 2023-01-03 NOTE — PLAN OF CARE
Patient resting well with no complaints of pain at this time.  NAD noted.  Awaiting room assignment.

## 2023-01-04 VITALS
OXYGEN SATURATION: 98 % | SYSTOLIC BLOOD PRESSURE: 116 MMHG | DIASTOLIC BLOOD PRESSURE: 53 MMHG | HEIGHT: 68 IN | BODY MASS INDEX: 40.62 KG/M2 | WEIGHT: 268 LBS | HEART RATE: 70 BPM | RESPIRATION RATE: 18 BRPM | TEMPERATURE: 98 F

## 2023-01-04 PROCEDURE — 94799 UNLISTED PULMONARY SVC/PX: CPT

## 2023-01-04 PROCEDURE — 97116 GAIT TRAINING THERAPY: CPT

## 2023-01-04 PROCEDURE — 94761 N-INVAS EAR/PLS OXIMETRY MLT: CPT

## 2023-01-04 PROCEDURE — 97530 THERAPEUTIC ACTIVITIES: CPT

## 2023-01-04 PROCEDURE — 25000003 PHARM REV CODE 250: Performed by: ORTHOPAEDIC SURGERY

## 2023-01-04 PROCEDURE — 25000003 PHARM REV CODE 250: Performed by: ANESTHESIOLOGY

## 2023-01-04 PROCEDURE — 97110 THERAPEUTIC EXERCISES: CPT

## 2023-01-04 RX ORDER — PREGABALIN 75 MG/1
75 CAPSULE ORAL 2 TIMES DAILY
Qty: 60 CAPSULE | Refills: 0 | Status: SHIPPED | OUTPATIENT
Start: 2023-01-04 | End: 2023-02-03

## 2023-01-04 RX ORDER — PREGABALIN 75 MG/1
75 CAPSULE ORAL 2 TIMES DAILY
Qty: 60 CAPSULE | Refills: 0 | Status: SHIPPED | OUTPATIENT
Start: 2023-01-04 | End: 2023-01-04 | Stop reason: SDUPTHER

## 2023-01-04 RX ADMIN — SODIUM CHLORIDE: 9 INJECTION, SOLUTION INTRAVENOUS at 04:01

## 2023-01-04 RX ADMIN — ASPIRIN 81 MG CHEWABLE TABLET 81 MG: 81 TABLET CHEWABLE at 08:01

## 2023-01-04 RX ADMIN — OXYCODONE HYDROCHLORIDE 10 MG: 10 TABLET, FILM COATED, EXTENDED RELEASE ORAL at 08:01

## 2023-01-04 RX ADMIN — PREGABALIN 75 MG: 75 CAPSULE ORAL at 08:01

## 2023-01-04 NOTE — CARE UPDATE
01/04/23 0734   Patient Assessment/Suction   Level of Consciousness (AVPU) alert   Respiratory Effort Normal;Unlabored   Expansion/Accessory Muscles/Retractions no use of accessory muscles;no retractions;expansion symmetric   All Lung Fields Breath Sounds clear;diminished   Rhythm/Pattern, Respiratory unlabored;pattern regular;depth regular   Cough Frequency infrequent   Cough Type nonproductive   PRE-TX-O2   Device (Oxygen Therapy) room air   SpO2 96 %   Pulse Oximetry Type Intermittent   $ Pulse Oximetry - Multiple Charge Pulse Oximetry - Multiple   Pulse 74   Resp 18   Positioning HOB elevated 45 degrees   Incentive Spirometer   $ Incentive Spirometer Charges done with encouragement   Incentive Spirometer Predicted Level (mL) 1920   Administration (IS) proper technique demonstrated   Number of Repetitions (IS) 10   Level Incentive Spirometer (mL) 1500   Patient Tolerance (IS) good

## 2023-01-04 NOTE — NURSING
Nurses Note -- 4 Eyes      1/3/2023   7:44 PM      Skin assessed during: Transfer      [x] No Pressure Injuries Present    []Prevention Measures Documented      [] Yes- Altered Skin Integrity Present or Discovered   [] LDA Added if Not in Epic (Describe Wound)   [] New Altered Skin Integrity was Present on Admit and Documented in LDA   [] Wound Image Taken    Wound Care Consulted? No    Attending Nurse:  Vita Wood RN     Second RN/Staff Member:  Jocy Bagley LPN

## 2023-01-04 NOTE — DISCHARGE SUMMARY
[facility] Hospital Medicine  Discharge Summary      Patient Name: Gail Temple  MRN: [id number]  RIVER: [id number]  Patient Class: OP- Outpatient Recovery  Admission Date: 1/3/2023  Hospital Length of Stay: 0 days  Discharge Date and Time:  01/04/2023 9:14 AM  Attending Physician: Estevan Monteiro MD   Discharging Provider: Savita Fox MD  Primary Care Provider: Clarice Starks MD    Primary Care Team: Networked reference to record PCT     HPI:   This is a 67-year-old  female with past medical history significant for osteoarthritis, diabetes mellitus type 2 and morbid obesity by medical criteria who underwent left total knee arthroplasty performed by Dr. Cayetano hanson.  Postoperatively patient reports adequate pain control.  Patient reports generalized weakness. Patient's had pain for years.  Patient's had injections, physical therapy, and even meniscal surgery.   Pain with walking standing or getting up from sitting.  Viscosupplementation is no longer working well for her. Hurts getting up from a chair which has been affecting her activities of daily living. Post-operatively, patient denied chest pain, shortness of breath, abdominal pain, nausea, vomiting, headache, vision changes, focal neuro-deficits, cough or fever.        Procedure(s) (LRB):  ROBOTIC ARTHROPLASTY, KNEE, TOTAL (Left)      Hospital Course:   Post-operative, patient did well. Pain adequately controlled. Patient participated with physical therapy. Home health and home physical therapy has been arranged. Fall precautions discussed with the patient. Patient to follow up with primary care physician next week and orthopedic doctor in 2 weeks. Post-operative anti-coagulation as per orthopedics recommendations advised. In case of chest pain, shortness of breath, stroke or stroke like symptoms, high grade fever or any signs or symptoms of surgical site wound infection symptoms, patient to return to nearest emergency  room as soon as possible.      Goals of Care Treatment Preferences:  Code Status: Full Code      Consults: Dr. Monteiro    Final Active Diagnoses:    Diagnosis Date Noted POA    PRINCIPAL PROBLEM:  Primary osteoarthritis of both knees [M17.0] 03/05/2018 Yes    Diabetes mellitus [E11.9]  Yes    Obesity [E66.9]  Yes      Problems Resolved During this Admission:       Discharged Condition: good    Disposition: Home or Self Care    Follow Up:   Follow-up Information       [facility] Critical access hospital. Call in 1 day(s).    Specialties: Home Health Services, Home Therapy Services, Home Living Aide Services  Contact information:  [address] [place] [address]  [phone]             Estevan Monteiro MD Follow up in 2 week(s).    Specialties: Sports Medicine, Orthopedic Surgery  Why: For wound re-check  Contact information:  [address] [place] [address] [address]  [facility] [address]  [phone]                           Patient Instructions:      Diet general     Diet Cardiac     Diet diabetic     Leave dressing on - Keep it clean, dry, and intact until clinic visit     Change dressing (specify)   Order Comments: Dressing change: If dressing loses its seal, change daily.     Call MD for:  temperature >100.4     Call MD for:  persistent nausea and vomiting     Call MD for:  severe uncontrolled pain     Call MD for:  difficulty breathing, headache or visual disturbances     Call MD for:  redness, tenderness, or signs of infection (pain, swelling, redness, odor or green/yellow discharge around incision site)     Call MD for:  hives     Call MD for:  persistent dizziness or light-headedness     Call MD for:  extreme fatigue     Notify your health care provider if you experience any of the following:  temperature >100.4     Notify your health care provider if you experience any of the following:  severe uncontrolled pain     Notify your health care provider if you experience any of the following:  redness,  tenderness, or signs of infection (pain, swelling, redness, odor or green/yellow discharge around incision site)     Notify your health care provider if you experience any of the following:  persistent dizziness, light-headedness, or visual disturbances     Notify your health care provider if you experience any of the following:  increased confusion or weakness     Weight bearing as tolerated     Activity as tolerated   Order Comments: Fall precautions. LLE WBAT       Significant Diagnostic Studies: Labs: CMP No results for input(s): NA, K, CL, CO2, GLU, BUN, CREATININE, CALCIUM, PROT, ALBUMIN, BILITOT, ALKPHOS, AST, ALT, ANIONGAP, ESTGFRAFRICA, EGFRNONAA in the last 48 hours., CBC No results for input(s): WBC, HGB, HCT, PLT in the last 48 hours. and INR No results found for: INR, PROTIME    Pending Diagnostic Studies:       None           Medications:  Reconciled Home Medications:      Medication List        CONTINUE taking these medications      aspirin 81 MG EC tablet  Commonly known as: ECOTRIN  Take 1 tablet (81 mg total) by mouth 2 (two) times a day.     atorvastatin 10 MG tablet  Commonly known as: LIPITOR  Take 10 mg by mouth once daily.     cyclobenzaprine 10 MG tablet  Commonly known as: FLEXERIL  Take 1 tablet (10 mg total) by mouth 3 (three) times daily as needed for Muscle spasms.     ibuprofen 600 MG tablet  Commonly known as: ADVIL,MOTRIN  Take 1 tablet (600 mg total) by mouth 3 (three) times daily as needed for Pain.     metFORMIN 500 MG tablet  Commonly known as: GLUCOPHAGE     MOUNJARO 2.5 mg/0.5 mL Pnij  Generic drug: tirzepatide  Inject 1 mg into the skin every 7 days.     nystatin powder  Commonly known as: MYCOSTATIN     olmesartan 20 MG tablet  Commonly known as: BENICAR     ondansetron 4 MG tablet  Commonly known as: ZOFRAN  Take 1 tablet (4 mg total) by mouth every 6 (six) hours as needed for Nausea.     oxyCODONE-acetaminophen 5-325 mg per tablet  Commonly known as: PERCOCET  Take 1 tablet  by mouth every 4 (four) hours as needed for Pain.     PAIN RELIEVER ES(ACETAMINOPHN) 500 MG tablet  Generic drug: acetaminophen  Take 2 tablets (1,000 mg total) by mouth every 8 (eight) hours as needed for Pain.     SYNTHROID 150 MCG tablet  Generic drug: levothyroxine  TAKE 1 TABLET BY MOUTH ONCE DAILY ON AN EMPTY STOMACH IN THE MORNING FOR 90 DAYS     triamterene-hydrochlorothiazide 37.5-25 mg 37.5-25 mg per capsule  Commonly known as: DYAZIDE              Indwelling Lines/Drains at time of discharge:   Lines/Drains/Airways       None                   Time spent on the discharge of patient: 28 minutes         Savita Fox MD  Department of Hospital Medicine  [facility]

## 2023-01-04 NOTE — PLAN OF CARE
Problem: Adult Inpatient Plan of Care  Goal: Plan of Care Review  Outcome: Ongoing, Progressing     Problem: Adult Inpatient Plan of Care  Goal: Patient-Specific Goal (Individualized)  Outcome: Ongoing, Progressing   Patient alert and oriented resting in bed. NAD. Denies pain or SOB. VSS. Brief for stress incontinence. Up with assistance to BR . No tele. Ice to left leg. Bed alarm active. Plan of care reviewed with patient. Verbalizes understanding.Call light in reach. Pt free from fall or injury. Will monitor.

## 2023-01-04 NOTE — PLAN OF CARE
Per Anastasiia Lucas with [facility] to deliver RW.    [facility] delivered RW to pt at bedside and returned paperwork to first floor DME closet.

## 2023-01-04 NOTE — PT/OT/SLP PROGRESS
Physical Therapy Treatment    Patient Name:  Gail Temple   MRN:  [id number]    Recommendations:     Discharge Recommendations: home health PT  Discharge Equipment Recommendations: none (pt has walker)  Barriers to discharge: None    Assessment:     Gail Temple is a 67 y.o. female admitted with a medical diagnosis of <principal problem not specified>.  She presents with the following impairments/functional limitations: impaired endurance, weakness, impaired functional mobility, gait instability, decreased ROM, decreased lower extremity function, pain, orthopedic precautions .    Pt seen supine in bed seemed much more interactive and said she is ready to walk and go home. Pt performed exercises in bed and stated that she performs ankle pumps frequently. Pt required min assistance to edge of bed. Pt stood with CGA from bed. Pt ambulated 250 ft in hallway with CGA using a RW.  Pt ascended and descended frontwards and side ways 5 stair steps minimal assistance and using side rails. Pt ambulated another 100 ft CGA using a RW after using stairs.     Rehab Prognosis: Good; patient would benefit from acute skilled PT services to address these deficits and reach maximum level of function.    Recent Surgery: Procedure(s) (LRB):  ROBOTIC ARTHROPLASTY, KNEE, TOTAL (Left) 1 Day Post-Op    Plan:     During this hospitalization, patient to be seen BID to address the identified rehab impairments via gait training, therapeutic activities, therapeutic exercises and progress toward the following goals:    Plan of Care Expires:  01/30/23    Subjective   Pt was in very good spirits and had lots of family support who stated they had just ate breakfast together like they do every morning at home.   Chief Complaint: pt stated she was a little sore in the area of her   incision.   Patient/Family Comments/goals: pt stated her goals was to go to her nieces wedding and on a cruise with her family.   Pain/Comfort:  Pain Rating 1:  (did  not rate)  Location - Side 1: Left  Location 1: knee  Pain Addressed 1: Distraction, Reposition      Objective:     Communicated with rena prior to session.  Patient found HOB elevated with bed alarm upon PT entry to room.     General Precautions: Standard, fall  Orthopedic Precautions: LLE weight bearing as tolerated  Braces: N/A  Respiratory Status: Room air     Functional Mobility:  Bed Mobility:     Rolling Right: independence  Scooting: contact guard assistance  Supine to Sit: contact guard assistance  Transfers:     Sit to Stand:  minimum assistance with rolling walker  Bed to Chair: contact guard assistance with  rolling walker  using  Stand Pivot  Gait: pt ambulated 250 ft in the hallway CGA with RW. Pt ambulated another 100 ft CGA with RW after stairs.   Stairs:  Pt ascended/descended 5 stair(s) with No Assistive Device with left handrail with Minimal Assistance.       AM-PAC 6 CLICK MOBILITY  Turning over in bed (including adjusting bedclothes, sheets and blankets)?: 3  Sitting down on and standing up from a chair with arms (e.g., wheelchair, bedside commode, etc.): 3  Moving from lying on back to sitting on the side of the bed?: 3  Moving to and from a bed to a chair (including a wheelchair)?: 3  Need to walk in hospital room?: 3  Climbing 3-5 steps with a railing?: 3  Basic Mobility Total Score: 18       Treatment & Education:  Patient was educated on the importance of OOB activity and functional mobility to negate negative effects of prolonged bed rest during hospitalization, safe transfers and ambulation, and D/C planning    Pt completed exercises ankle pumps, quad sets, glute sets, SLR and heel slides. Pt educated on heel slides and had ~80* of knee flexion.     Patient left up in chair with all lines intact, call button in reach, chair alarm on, and Rena notified..    GOALS:   Multidisciplinary Problems       Physical Therapy Goals          Problem: Physical Therapy    Goal Priority Disciplines  Outcome Goal Variances Interventions   Physical Therapy Goal     PT, PT/OT Ongoing, Progressing     Description: Goals to be met by: 2023     Patient will increase functional independence with mobility by performin. Supine to sit with Contact Guard Assistance  2. Sit to stand transfer with Contact Guard Assistance  3. Bed to chair transfer with Contact Guard Assistance using Rolling Walker  4. Gait  x 250 feet with Contact Guard Assistance using Rolling Walker.   5. Ascend/descend 4 stair with right Handrails Contact Guard Assistance using No Assistive Device.   6. Lower extremity exercise program x20 reps                          Time Tracking:     PT Received On: 23  PT Start Time: 956     PT Stop Time: 1053  PT Total Time (min): 57 min     Billable Minutes: Gait Training 30, Therapeutic Activity 17, and Therapeutic Exercise 10    Treatment Type: Treatment  PT/PTA: PT     PTA Visit Number: 0     2023

## 2023-01-04 NOTE — PLAN OF CARE
Problem: Physical Therapy  Goal: Physical Therapy Goal  Description: Goals to be met by: 2023     Patient will increase functional independence with mobility by performin. Supine to sit with Contact Guard Assistance  2. Sit to stand transfer with Contact Guard Assistance  3. Bed to chair transfer with Contact Guard Assistance using Rolling Walker  4. Gait  x 250 feet with Contact Guard Assistance using Rolling Walker.   5. Ascend/descend 4 stair with right Handrails Contact Guard Assistance using No Assistive Device.   6. Lower extremity exercise program x20 reps     Outcome: Ongoing, Progressing   Pt ambulated 250 ft with rolling walker CGA. Pt taken to 4th floor for stair training. Pt completed 5 stairs ascending and descending minimal assistance. Pt had ~80 * knee flexion on L. Pt would benefit from  PT

## 2023-01-04 NOTE — PLAN OF CARE
Patient transferred to room 211 at this time.  Patient AAOX3.  NAD noted. No complaints of pain to left knee at this Patient is stable at this time.  VSS.   Dressings clean, dry and intact. To left knee. No complaints of nausea or vomiting.  Patient tolerating po intake well.    Voiding spontaneously with no problems noted.

## 2023-01-04 NOTE — PLAN OF CARE
[facility]  Initial Discharge Assessment       Primary Care Provider: Clarice Starks MD    Admission Diagnosis: Osteoarthritis of left knee, unspecified osteoarthritis type [M17.12]  Preop testing [Z01.818]    Admission Date: 1/3/2023  Expected Discharge Date: 1/4/2023    Pt confirmed home address and lives with Spouse Mikel Temple [phone] also at bedside. PT has home cane , raised toilet seat and confirmed delivery of home BSC and RW to bedside. PT is going to [facility] with [facility] . Pt confirmed CP Pas Dr. Starks and insurance as Medicare and [employer]. Pt uses [facility] on [address] and stated her spouse will provide transport home. CM following.       Discharge Barriers Identified: None    Payor: MEDICARE / Plan: MEDICARE PART A & B / Product Type: Government /     Extended Emergency Contact Information  Primary Emergency Contact: Katlin Lao  Address: [place] [address] [place]  Mobile Phone: [phone]  Relation: Daughter  Preferred language: English  Secondary Emergency Contact: Mikel Temple  Address: [address] [place] [address] [place]  Mobile Phone: [phone]  Relation: Spouse  Preferred language: English    Discharge Plan A: Home with family, Home Health  Discharge Plan B: Home      [facility] - [place], [place] - [address]  [address] [facility] [address]  Phone: [phone] Fax: [phone]      Initial Assessment (most recent)       Adult Discharge Assessment - 01/04/23 0924          Discharge Assessment    Assessment Type Discharge Planning Assessment     Confirmed/corrected address, phone number and insurance Yes     Confirmed Demographics Correct on Facesheet     Source of Information patient;family     Reason For Admission Knee replacement     People in Home spouse     Facility Arrived From: home     Do you expect to return to your current  living situation? Yes     Do you have help at home or someone to help you manage your care at home? Yes     Who are your caregiver(s) and their phone number(s)? Spouse Mikel Temple [phone]     Prior to hospitilization cognitive status: Alert/Oriented     Current cognitive status: Alert/Oriented     Walking or Climbing Stairs ambulation difficulty, requires equipment     Mobility Management cane walker     Home Layout Able to live on 1st floor     Equipment Currently Used at Home cane, straight;walker, standard;bedside commode;raised toilet     Readmission within 30 days? No     Patient currently being followed by outpatient case management? No     Do you currently have service(s) that help you manage your care at home? No     Do you take prescription medications? Yes     Do you have prescription coverage? Yes     Do you have any problems affording any of your prescribed medications? No     Is the patient taking medications as prescribed? yes     Who is going to help you get home at discharge? Spouse Mikel Temple [phone]     How do you get to doctors appointments? car, drives self;family or friend will provide     Are you on dialysis? No     Do you take coumadin? No     Discharge Plan A Home with family;Home Health     Discharge Plan B Home     DME Needed Upon Discharge  none     Discharge Plan discussed with: Patient;Spouse/sig other     Name(s) and Number(s) Spouse Mikel Temple [phone]     Discharge Barriers Identified None        Physical Activity    On average, how many days per week do you engage in moderate to strenuous exercise (like a brisk walk)? Patient refused     On average, how many minutes do you engage in exercise at this level? Patient refused        Financial Resource Strain    How hard is it for you to pay for the very basics like food, housing, medical care, and heating? Patient refused        Housing Stability    In the last 12 months, was there a time when you were not able to pay  the mortgage or rent on time? No     In the last 12 months, was there a time when you did not have a steady place to sleep or slept in a shelter (including now)? No        Transportation Needs    In the past 12 months, has lack of transportation kept you from medical appointments or from getting medications? No     In the past 12 months, has lack of transportation kept you from meetings, work, or from getting things needed for daily living? No        Food Insecurity    Within the past 12 months, you worried that your food would run out before you got the money to buy more. Never true     Within the past 12 months, the food you bought just didn't last and you didn't have money to get more. Never true        Stress    Do you feel stress - tense, restless, nervous, or anxious, or unable to sleep at night because your mind is troubled all the time - these days? Patient refused        Social Connections    In a typical week, how many times do you talk on the phone with family, friends, or neighbors? Patient refused     How often do you get together with friends or relatives? Patient refused     How often do you attend Nondenominational or Mandaeism services? Patient refused     Do you belong to any clubs or organizations such as Nondenominational groups, unions, fraternal or athletic groups, or school groups? Patient refused     How often do you attend meetings of the clubs or organizations you belong to? Patient refused     Are you , , , , never , or living with a partner?         Alcohol Use    Q1: How often do you have a drink containing alcohol? Patient refused     Q2: How many drinks containing alcohol do you have on a typical day when you are drinking? Patient refused     Q3: How often do you have six or more drinks on one occasion? Patient refused        OTHER    Name(s) of People in Home Spouse Mikel Temple [phone]

## 2023-01-04 NOTE — PLAN OF CARE
Pt accepted with [facility] for hh  Appt on AVS.    DME delivered     Pt clear for discharge from CM standpoint. Discharging home       01/04/23 1128   Final Note   Assessment Type Final Discharge Note   Anticipated Discharge Disposition [facility] Resources/Appts/Education Provided Appointments scheduled and added to AVS   Post-Acute Status   Post-Acute Authorization Home Health   Home Health Status Set-up Complete/Auth obtained

## 2023-01-05 ENCOUNTER — TELEPHONE (OUTPATIENT)
Dept: MEDSURG UNIT | Facility: HOSPITAL | Age: 68
End: 2023-01-05
Payer: MEDICARE

## 2023-01-05 PROCEDURE — G0180 MD CERTIFICATION HHA PATIENT: HCPCS | Mod: ,,, | Performed by: ORTHOPAEDIC SURGERY

## 2023-01-05 PROCEDURE — G0180 PR HOME HEALTH MD CERTIFICATION: ICD-10-PCS | Mod: ,,, | Performed by: ORTHOPAEDIC SURGERY

## 2023-01-11 DIAGNOSIS — M17.12 PRIMARY OSTEOARTHRITIS OF LEFT KNEE: Primary | ICD-10-CM

## 2023-01-13 ENCOUNTER — TELEPHONE (OUTPATIENT)
Dept: ORTHOPEDICS | Facility: CLINIC | Age: 68
End: 2023-01-13
Payer: MEDICARE

## 2023-01-13 DIAGNOSIS — M17.12 PRIMARY OSTEOARTHRITIS OF LEFT KNEE: Primary | ICD-10-CM

## 2023-01-13 NOTE — TELEPHONE ENCOUNTER
----- Message from Niharika Linares sent at 1/13/2023  9:36 AM CST -----  Contact: Tiffany  Type:  Needs Medical Advice    Who Called:  Tiffany with [facility]  PT       Would the patient rather a call back or a response via [facility]?  Call    Best Call Back Number:  [phone] ext [phone]    Additional Information: Tiffany with [facility] [facility]  PT called and said patient wants to do her PT with this company and they are asking if the Dr can fax a referral  to them    Fax number [phone]

## 2023-01-16 ENCOUNTER — HOSPITAL ENCOUNTER (EMERGENCY)
Facility: HOSPITAL | Age: 68
Discharge: HOME | End: 2023-01-16
Attending: EMERGENCY MEDICINE
Payer: MEDICARE

## 2023-01-16 VITALS
RESPIRATION RATE: 18 BRPM | BODY MASS INDEX: 42.06 KG/M2 | OXYGEN SATURATION: 100 % | WEIGHT: 268 LBS | HEART RATE: 89 BPM | TEMPERATURE: 98 F | DIASTOLIC BLOOD PRESSURE: 78 MMHG | SYSTOLIC BLOOD PRESSURE: 160 MMHG | HEIGHT: 67 IN

## 2023-01-16 DIAGNOSIS — K59.00 CONSTIPATION, UNSPECIFIED CONSTIPATION TYPE: Primary | ICD-10-CM

## 2023-01-16 PROCEDURE — 63600175 PHARM REV CODE 636 W HCPCS: Performed by: PHYSICIAN ASSISTANT

## 2023-01-16 PROCEDURE — 99284 EMERGENCY DEPT VISIT MOD MDM: CPT | Mod: 25

## 2023-01-16 PROCEDURE — 25000003 PHARM REV CODE 250: Performed by: EMERGENCY MEDICINE

## 2023-01-16 PROCEDURE — 96372 THER/PROPH/DIAG INJ SC/IM: CPT | Performed by: PHYSICIAN ASSISTANT

## 2023-01-16 PROCEDURE — 25000003 PHARM REV CODE 250: Performed by: PHYSICIAN ASSISTANT

## 2023-01-16 RX ORDER — LACTULOSE 10 G/15ML
30 SOLUTION ORAL
Status: COMPLETED | OUTPATIENT
Start: 2023-01-16 | End: 2023-01-16

## 2023-01-16 RX ORDER — LIDOCAINE HYDROCHLORIDE 20 MG/ML
JELLY TOPICAL
Qty: 30 ML | Refills: 0 | Status: SHIPPED | OUTPATIENT
Start: 2023-01-16 | End: 2023-07-03

## 2023-01-16 RX ORDER — SYRING-NEEDL,DISP,INSUL,0.3 ML 29 G X1/2"
296 SYRINGE, EMPTY DISPOSABLE MISCELLANEOUS
Status: DISCONTINUED | OUTPATIENT
Start: 2023-01-16 | End: 2023-01-16

## 2023-01-16 RX ORDER — BISACODYL 10 MG
10 SUPPOSITORY, RECTAL RECTAL DAILY PRN
Qty: 12 SUPPOSITORY | Refills: 0 | Status: SHIPPED | OUTPATIENT
Start: 2023-01-16 | End: 2023-07-03

## 2023-01-16 RX ORDER — BISACODYL 10 MG
10 SUPPOSITORY, RECTAL RECTAL
Status: DISCONTINUED | OUTPATIENT
Start: 2023-01-16 | End: 2023-01-16

## 2023-01-16 RX ORDER — DEXTROMETHORPHAN POLISTIREX 30 MG/5 ML
1 SUSPENSION, EXTENDED RELEASE 12 HR ORAL
Status: COMPLETED | OUTPATIENT
Start: 2023-01-16 | End: 2023-01-16

## 2023-01-16 RX ORDER — LIDOCAINE HYDROCHLORIDE 20 MG/ML
JELLY TOPICAL
Qty: 30 ML | Refills: 0 | Status: SHIPPED | OUTPATIENT
Start: 2023-01-16 | End: 2023-01-16 | Stop reason: SDUPTHER

## 2023-01-16 RX ORDER — POLYETHYLENE GLYCOL 3350, SODIUM SULFATE ANHYDROUS, SODIUM BICARBONATE, SODIUM CHLORIDE, POTASSIUM CHLORIDE 236; 22.74; 6.74; 5.86; 2.97 G/4L; G/4L; G/4L; G/4L; G/4L
2 POWDER, FOR SOLUTION ORAL ONCE
Qty: 2000 ML | Refills: 0 | Status: SHIPPED | OUTPATIENT
Start: 2023-01-16 | End: 2023-01-16

## 2023-01-16 RX ORDER — PSEUDOEPHEDRINE/ACETAMINOPHEN 30MG-500MG
100 TABLET ORAL
Status: DISCONTINUED | OUTPATIENT
Start: 2023-01-16 | End: 2023-01-16

## 2023-01-16 RX ADMIN — MINERAL OIL 1 ENEMA: 100 ENEMA RECTAL at 12:01

## 2023-01-16 RX ADMIN — LACTULOSE 30 G: 20 SOLUTION ORAL at 02:01

## 2023-01-16 RX ADMIN — METHYLNALTREXONE BROMIDE 12 MG: 12 INJECTION, SOLUTION SUBCUTANEOUS at 02:01

## 2023-01-16 NOTE — ED NOTES
Upon discharge, patient is AAOx4, no cardiac or respiratory complications. Follow up care and  Medications have been reviewed with patient and has been instructed to return to the ER if needed. Patient verbalized understanding and ambulated to the lobby without difficulty. SANGEETHA PORTILLO.

## 2023-01-16 NOTE — ED NOTES
Pt identifiers checked and accurate with Gail Temple    Pt presents with complaints of constipation since knee surgery on 1/3. Pt reports able to pass small amount of stool without having full BM with severe rectal pain with spasms. Pt reports decreased appetite and not taking medications due to abdominal discomfort. Pt denies all other complaints at this time.

## 2023-01-16 NOTE — ED NOTES
Pt able to pass small stool, ED PA notified. No further orders at this time. Pt assisted back in bed, call bell within reach.

## 2023-01-16 NOTE — ED NOTES
ED PA and RN at the bedside for manual disimpaction, pt tolerated well. Pt assisted up to bedside commode to attempt BM. Pt instructed to call for assistance prior to transfer back to bed, pt verbalized understanding. Call bell within reach.

## 2023-01-17 ENCOUNTER — PATIENT MESSAGE (OUTPATIENT)
Dept: ORTHOPEDICS | Facility: CLINIC | Age: 68
End: 2023-01-17
Payer: MEDICARE

## 2023-01-17 NOTE — ED PROVIDER NOTES
Encounter Date: 2023       History     Chief Complaint   Patient presents with    Constipation     No bm x 13 days      Patient is a 67-year-old female 13 days postop left total knee replacement who presents to the emergency department with constipation.  Patient reports over the last 10 days she is not had a normal bowel movement.  She denies abdominal pain, but she reports she feels pressure in her rectum.  She states she attempted to disimpact herself but she was unsuccessful.    The history is provided by the patient.   Review of patient's allergies indicates:   Allergen Reactions    Levothyroxine Swelling     GENERIC ONLY / PT CAN TAKE SYNTHROID, face/tongue swelling     Past Medical History:   Diagnosis Date    Angio-edema     Asthma     Diabetes mellitus     Eczema     Obesity     PONV (postoperative nausea and vomiting)     Urticaria      Past Surgical History:   Procedure Laterality Date    ADENOIDECTOMY      BONY PELVIS SURGERY       SECTION      X 2    FIXATION KYPHOPLASTY N/A 2018    Procedure: Kyphoplasty;  Surgeon: Martinez Morejon MD;  Location: [facility] OR;  Service: Pain Management;  Laterality: N/A;  L1     HYSTERECTOMY      KNEE CARTILAGE SURGERY Left     ROBOTIC ARTHROPLASTY, KNEE Left 1/3/2023    Procedure: ROBOTIC ARTHROPLASTY, KNEE, TOTAL;  Surgeon: Estevan Monteiro MD;  Location: [facility] OR;  Service: Orthopedics;  Laterality: Left;    TONSILLECTOMY      VAGINOPLASTY       Family History   Problem Relation Age of Onset    Breast cancer Mother 42         at 61yo from breast ca    Angioedema Daughter     Allergies Daughter     Asthma Maternal Uncle     Allergies Son     Allergic rhinitis Son     Eczema Son     Immunodeficiency Neg Hx     Ovarian cancer Neg Hx      Social History     Tobacco Use    Smoking status: Never    Smokeless tobacco: Never   Substance Use Topics    Alcohol use: Yes     Comment: seldom    Drug use: Never     Review of Systems   Constitutional:  Negative  for activity change, appetite change, chills, fatigue and fever.   HENT:  Negative for congestion, ear discharge, ear pain and rhinorrhea.    Respiratory:  Negative for cough.    Cardiovascular:  Negative for chest pain.   Gastrointestinal:  Positive for constipation and rectal pain. Negative for abdominal pain, blood in stool, diarrhea, nausea and vomiting.   Genitourinary:  Negative for dysuria.   Musculoskeletal:  Negative for back pain, neck pain and neck stiffness.   Skin:  Negative for rash and wound.   Neurological:  Negative for dizziness, light-headedness and headaches.     Physical Exam     Initial Vitals [01/16/23 0936]   BP Pulse Resp Temp SpO2   134/78 (!) 113 20 98.5 °F (36.9 °C) 97 %      MAP       --         Physical Exam    Nursing note and vitals reviewed.  Constitutional: She appears well-developed and well-nourished. She is not diaphoretic. No distress.   HENT:   Head: Normocephalic.   Right Ear: External ear normal.   Left Ear: External ear normal.   Eyes: Conjunctivae are normal. Pupils are equal, round, and reactive to light.   Neck:   Normal range of motion.  Cardiovascular:  Normal rate and regular rhythm.           Pulmonary/Chest: Breath sounds normal.   Abdominal: Abdomen is soft. Bowel sounds are normal. She exhibits no distension and no mass. There is no abdominal tenderness. There is no rebound and no guarding.   Genitourinary:    Genitourinary Comments: Large stool bolus and rectum     Musculoskeletal:      Cervical back: Normal range of motion.      Comments: Left total knee replacement incision with no surrounding erythema or edema.  No drainage.     Neurological: She is alert and oriented to person, place, and time.   Skin: Skin is warm and dry. Capillary refill takes less than 2 seconds.   Psychiatric: She has a normal mood and affect.       ED Course   Procedures  Labs Reviewed - No data to display       Imaging Results    None          Medications   mineral oil enema 1 enema (1  enema Rectal Given 1/16/23 1245)   methylnaltrexone 12 mg/0.6 mL subcutaneous injection 12 mg (12 mg Subcutaneous Given 1/16/23 1424)   lactulose 20 gram/30 mL solution Soln 30 g (30 g Oral Given 1/16/23 2263)     Medical Decision Making:   Initial Assessment:   Urgent evaluation of a 67-year-old female who presents to the emergency department with postsurgical constipation.  Patient is afebrile and nontoxic appearing.  Soft abdomen with no tenderness.  Not concerned for obstruction.  She does have stool impaction.  Attempted to disimpact.  Patient was given enema.  Only small amount of stool produced.  She is requesting more intervention.  She declines another disimpaction.  Supervising physician evaluated patient and offered regimen that patient want to try and go home to have bowel movement.  She is advised to follow up with PCP.  Advised to return to the emergency department with any worsening symptoms or concerns.                        Clinical Impression:   Final diagnoses:  [K59.00] Constipation, unspecified constipation type (Primary)        ED Disposition Condition    Discharge Stable          ED Prescriptions       Medication Sig Dispense Start Date End Date Auth. Provider    polyethylene glycol (GOLYTELY) 236-22.74-6.74 -5.86 gram suspension (Expires today) Take 2,000 mLs (2 L total) by mouth once. for 1 dose 2,000 mL 1/16/2023 1/16/2023 Martinez Bach MD    LIDOcaine HCL 2% (XYLOCAINE) 2 % jelly  (Status: Discontinued) Apply topically as needed. 30 mL 1/16/2023 1/16/2023 Martinez Bach MD    bisacodyL (DULCOLAX) 10 mg Supp Place 1 suppository (10 mg total) rectally daily as needed. 12 suppository 1/16/2023 -- Martinez Bach MD    LIDOcaine HCL 2% (XYLOCAINE) 2 % jelly Apply topically as needed. 30 mL 1/16/2023 -- Lillie Fowler PA-C          Follow-up Information       Follow up With Specialties Details Why Contact Info    Clarice Starks MD Family Medicine  As needed [address]  [facility] [address]  [phone]      [facility] Emergency Dept Emergency Medicine  If symptoms worsen [address] [place] [id number]  [phone]             Lillie Fowler PA-C  01/16/23 6568

## 2023-01-19 ENCOUNTER — OFFICE VISIT (OUTPATIENT)
Dept: ORTHOPEDICS | Facility: CLINIC | Age: 68
End: 2023-01-19
Payer: MEDICARE

## 2023-01-19 ENCOUNTER — HOSPITAL ENCOUNTER (OUTPATIENT)
Dept: RADIOLOGY | Facility: HOSPITAL | Age: 68
Discharge: HOME | End: 2023-01-19
Attending: ORTHOPAEDIC SURGERY
Payer: MEDICARE

## 2023-01-19 VITALS — BODY MASS INDEX: 42.06 KG/M2 | HEIGHT: 67 IN | RESPIRATION RATE: 18 BRPM | WEIGHT: 268 LBS

## 2023-01-19 DIAGNOSIS — M17.12 PRIMARY OSTEOARTHRITIS OF LEFT KNEE: ICD-10-CM

## 2023-01-19 DIAGNOSIS — Z96.652 STATUS POST TOTAL LEFT KNEE REPLACEMENT USING CEMENT: Primary | ICD-10-CM

## 2023-01-19 PROCEDURE — 99024 POSTOP FOLLOW-UP VISIT: CPT | Mod: POP,,, | Performed by: ORTHOPAEDIC SURGERY

## 2023-01-19 PROCEDURE — 73560 XR KNEE 1 OR 2 VIEW LEFT: ICD-10-PCS | Mod: 26,LT,, | Performed by: RADIOLOGY

## 2023-01-19 PROCEDURE — 73560 X-RAY EXAM OF KNEE 1 OR 2: CPT | Mod: TC,PN,LT

## 2023-01-19 PROCEDURE — 99999 PR PBB SHADOW E&M-EST. PATIENT-LVL II: ICD-10-PCS | Mod: PBBFAC,,, | Performed by: ORTHOPAEDIC SURGERY

## 2023-01-19 PROCEDURE — 99999 PR PBB SHADOW E&M-EST. PATIENT-LVL II: CPT | Mod: PBBFAC,,, | Performed by: ORTHOPAEDIC SURGERY

## 2023-01-19 PROCEDURE — 99024 PR POST-OP FOLLOW-UP VISIT: ICD-10-PCS | Mod: POP,,, | Performed by: ORTHOPAEDIC SURGERY

## 2023-01-19 PROCEDURE — 73560 X-RAY EXAM OF KNEE 1 OR 2: CPT | Mod: 26,LT,, | Performed by: RADIOLOGY

## 2023-01-19 PROCEDURE — 99212 OFFICE O/P EST SF 10 MIN: CPT | Mod: PBBFAC,PN | Performed by: ORTHOPAEDIC SURGERY

## 2023-01-19 NOTE — PROGRESS NOTES
Past Medical History:   Diagnosis Date    Angio-edema     Asthma     Diabetes mellitus     Eczema     Obesity     PONV (postoperative nausea and vomiting)     Urticaria        Past Surgical History:   Procedure Laterality Date    ADENOIDECTOMY      BONY PELVIS SURGERY       SECTION      X 2    FIXATION KYPHOPLASTY N/A 2018    Procedure: Kyphoplasty;  Surgeon: Martinez Morejon MD;  Location: [facility] OR;  Service: Pain Management;  Laterality: N/A;  L1     HYSTERECTOMY      KNEE CARTILAGE SURGERY Left     ROBOTIC ARTHROPLASTY, KNEE Left 1/3/2023    Procedure: ROBOTIC ARTHROPLASTY, KNEE, TOTAL;  Surgeon: Estevan Monteiro MD;  Location: [facility] OR;  Service: Orthopedics;  Laterality: Left;    TONSILLECTOMY      VAGINOPLASTY         Current Outpatient Medications   Medication Sig    acetaminophen (TYLENOL) 500 MG tablet Take 2 tablets (1,000 mg total) by mouth every 8 (eight) hours as needed for Pain.    aspirin (ECOTRIN) 81 MG EC tablet Take 1 tablet (81 mg total) by mouth 2 (two) times a day.    atorvastatin (LIPITOR) 10 MG tablet Take 10 mg by mouth once daily.    bisacodyL (DULCOLAX) 10 mg Supp Place 1 suppository (10 mg total) rectally daily as needed.    ibuprofen (ADVIL,MOTRIN) 600 MG tablet Take 1 tablet (600 mg total) by mouth 3 (three) times daily as needed for Pain.    LIDOcaine HCL 2% (XYLOCAINE) 2 % jelly Apply topically as needed.    metformin (GLUCOPHAGE) 500 MG tablet     nystatin (MYCOSTATIN) powder     olmesartan (BENICAR) 20 MG tablet     ondansetron (ZOFRAN) 4 MG tablet Take 1 tablet (4 mg total) by mouth every 6 (six) hours as needed for Nausea.    oxyCODONE-acetaminophen (PERCOCET) 5-325 mg per tablet Take 1 tablet by mouth every 4 (four) hours as needed for Pain.    pregabalin (LYRICA) 75 MG capsule Take 1 capsule (75 mg total) by mouth 2 (two) times daily.    SYNTHROID 150 mcg tablet TAKE 1 TABLET BY MOUTH ONCE DAILY ON AN EMPTY STOMACH IN THE MORNING FOR 90 DAYS    tirzepatide  (MOUNJARO) 2.5 mg/0.5 mL PnIj Inject 1 mg into the skin every 7 days.    triamterene-hydrochlorothiazide 37.5-25 mg (DYAZIDE) 37.5-25 mg per capsule      No current facility-administered medications for this visit.       Review of patient's allergies indicates:   Allergen Reactions    Levothyroxine Swelling     GENERIC ONLY / PT CAN TAKE SYNTHROID, face/tongue swelling       Family History   Problem Relation Age of Onset    Breast cancer Mother 42         at 61yo from breast ca    Angioedema Daughter     Allergies Daughter     Asthma Maternal Uncle     Allergies Son     Allergic rhinitis Son     Eczema Son     Immunodeficiency Neg Hx     Ovarian cancer Neg Hx        Social History     Socioeconomic History    Marital status:    Tobacco Use    Smoking status: Never    Smokeless tobacco: Never   Substance and Sexual Activity    Alcohol use: Yes     Comment: seldom    Drug use: Never    Sexual activity: Not Currently     Partners: Male     Birth control/protection: See Surgical Hx     Social Determinants of Health     Financial Resource Strain: Unknown    Difficulty of Paying Living Expenses: Patient refused   Food Insecurity: No Food Insecurity    Worried About Running Out of Food in the Last Year: Never true    Ran Out of Food in the Last Year: Never true   Transportation Needs: No Transportation Needs    Lack of Transportation (Medical): No    Lack of Transportation (Non-Medical): No   Physical Activity: Unknown    Days of Exercise per Week: Patient refused    Minutes of Exercise per Session: Patient refused   Stress: Unknown    Feeling of Stress : Patient refused   Social Connections: Unknown    Frequency of Communication with Friends and Family: Patient refused    Frequency of Social Gatherings with Friends and Family: Patient refused    Attends Druze Services: Patient refused    Active Member of Clubs or Organizations: Patient refused    Attends Club or Organization Meetings: Patient refused     Marital Status:    Housing Stability: Unknown    Unable to Pay for Housing in the Last Year: No    Unstable Housing in the Last Year: No       Chief Complaint:   Chief Complaint   Patient presents with    Post-op Evaluation       Date of surgery:  January 3, 2023    History of present illness:  67-year-old female underwent left robotic assisted total knee arthroplasty.  Patient is doing pretty well.  Pain is a 5/10.  Patient is no longer taking the pain medicine.      Review of Systems:    Musculoskeletal:  See HPI        Physical Examination:    Vital Signs:    Vitals:    01/19/23 0842   Resp: 18       Body mass index is 41.97 kg/m².    This a well-developed, well nourished patient in no acute distress.  They are alert and oriented and cooperative to examination.  Pt. walks without an antalgic gait.      Examination left knee shows well-healing surgical incision.  No erythema drainage.  Mild swelling.  Good range of motion from 0-95 degrees already.  No calf pain.    X-rays:  Two views left knee are ordered and reviewed which show well-aligned total knee arthroplasty components without complication     Assessment::  Status post left [place] Fabien CR total knee arthroplasty using cement    Plan:  Reviewed the x-ray with her today.  Patient may get the incision wet.  We will transition from home PT to outpatient PT.  I will see her back in a month.    This note was created using [employer] voice recognition software that occasionally misinterpreted phrases or words.

## 2023-02-02 ENCOUNTER — EXTERNAL HOME HEALTH (OUTPATIENT)
Dept: HOME HEALTH SERVICES | Facility: HOSPITAL | Age: 68
End: 2023-02-02
Payer: MEDICARE

## 2023-02-16 ENCOUNTER — OFFICE VISIT (OUTPATIENT)
Dept: ORTHOPEDICS | Facility: CLINIC | Age: 68
End: 2023-02-16
Payer: MEDICARE

## 2023-02-16 VITALS — HEIGHT: 67 IN | BODY MASS INDEX: 42.06 KG/M2 | RESPIRATION RATE: 18 BRPM | WEIGHT: 268 LBS

## 2023-02-16 DIAGNOSIS — Z96.652 STATUS POST TOTAL LEFT KNEE REPLACEMENT USING CEMENT: Primary | ICD-10-CM

## 2023-02-16 PROCEDURE — 99024 POSTOP FOLLOW-UP VISIT: CPT | Mod: POP,,, | Performed by: ORTHOPAEDIC SURGERY

## 2023-02-16 PROCEDURE — 99213 OFFICE O/P EST LOW 20 MIN: CPT | Mod: PBBFAC,PN | Performed by: ORTHOPAEDIC SURGERY

## 2023-02-16 PROCEDURE — 99999 PR PBB SHADOW E&M-EST. PATIENT-LVL III: ICD-10-PCS | Mod: PBBFAC,,, | Performed by: ORTHOPAEDIC SURGERY

## 2023-02-16 PROCEDURE — 99999 PR PBB SHADOW E&M-EST. PATIENT-LVL III: CPT | Mod: PBBFAC,,, | Performed by: ORTHOPAEDIC SURGERY

## 2023-02-16 PROCEDURE — 99024 PR POST-OP FOLLOW-UP VISIT: ICD-10-PCS | Mod: POP,,, | Performed by: ORTHOPAEDIC SURGERY

## 2023-02-16 NOTE — PROGRESS NOTES
Past Medical History:   Diagnosis Date    Angio-edema     Asthma     Diabetes mellitus     Eczema     Obesity     PONV (postoperative nausea and vomiting)     Urticaria        Past Surgical History:   Procedure Laterality Date    ADENOIDECTOMY      BONY PELVIS SURGERY       SECTION      X 2    FIXATION KYPHOPLASTY N/A 2018    Procedure: Kyphoplasty;  Surgeon: Martinez Morejon MD;  Location: [facility] OR;  Service: Pain Management;  Laterality: N/A;  L1     HYSTERECTOMY      KNEE CARTILAGE SURGERY Left     ROBOTIC ARTHROPLASTY, KNEE Left 1/3/2023    Procedure: ROBOTIC ARTHROPLASTY, KNEE, TOTAL;  Surgeon: Estevan Monteiro MD;  Location: [facility] OR;  Service: Orthopedics;  Laterality: Left;    TONSILLECTOMY      VAGINOPLASTY         Current Outpatient Medications   Medication Sig    atorvastatin (LIPITOR) 10 MG tablet Take 10 mg by mouth once daily.    bisacodyL (DULCOLAX) 10 mg Supp Place 1 suppository (10 mg total) rectally daily as needed.    LIDOcaine HCL 2% (XYLOCAINE) 2 % jelly Apply topically as needed.    metformin (GLUCOPHAGE) 500 MG tablet     nystatin (MYCOSTATIN) powder     olmesartan (BENICAR) 20 MG tablet     SYNTHROID 150 mcg tablet TAKE 1 TABLET BY MOUTH ONCE DAILY ON AN EMPTY STOMACH IN THE MORNING FOR 90 DAYS    tirzepatide (MOUNJARO) 2.5 mg/0.5 mL PnIj Inject 1 mg into the skin every 7 days.    triamterene-hydrochlorothiazide 37.5-25 mg (DYAZIDE) 37.5-25 mg per capsule     acetaminophen (TYLENOL) 500 MG tablet Take 2 tablets (1,000 mg total) by mouth every 8 (eight) hours as needed for Pain. (Patient not taking: Reported on 2023)    aspirin (ECOTRIN) 81 MG EC tablet Take 1 tablet (81 mg total) by mouth 2 (two) times a day.    ibuprofen (ADVIL,MOTRIN) 600 MG tablet Take 1 tablet (600 mg total) by mouth 3 (three) times daily as needed for Pain. (Patient not taking: Reported on 2023)    ondansetron (ZOFRAN) 4 MG tablet Take 1 tablet (4 mg total) by mouth every 6 (six) hours as needed  for Nausea. (Patient not taking: Reported on 2023)    oxyCODONE-acetaminophen (PERCOCET) 5-325 mg per tablet Take 1 tablet by mouth every 4 (four) hours as needed for Pain. (Patient not taking: Reported on 2023)    pregabalin (LYRICA) 75 MG capsule Take 1 capsule (75 mg total) by mouth 2 (two) times daily.     No current facility-administered medications for this visit.       Review of patient's allergies indicates:   Allergen Reactions    Levothyroxine Swelling     GENERIC ONLY / PT CAN TAKE SYNTHROID, face/tongue swelling       Family History   Problem Relation Age of Onset    Breast cancer Mother 42         at 59yo from breast ca    Angioedema Daughter     Allergies Daughter     Asthma Maternal Uncle     Allergies Son     Allergic rhinitis Son     Eczema Son     Immunodeficiency Neg Hx     Ovarian cancer Neg Hx        Social History     Socioeconomic History    Marital status:    Tobacco Use    Smoking status: Never    Smokeless tobacco: Never   Substance and Sexual Activity    Alcohol use: Yes     Comment: seldom    Drug use: Never    Sexual activity: Not Currently     Partners: Male     Birth control/protection: See Surgical Hx     Social Determinants of Health     Financial Resource Strain: Unknown    Difficulty of Paying Living Expenses: Patient refused   Food Insecurity: No Food Insecurity    Worried About Running Out of Food in the Last Year: Never true    Ran Out of Food in the Last Year: Never true   Transportation Needs: No Transportation Needs    Lack of Transportation (Medical): No    Lack of Transportation (Non-Medical): No   Physical Activity: Unknown    Days of Exercise per Week: Patient refused    Minutes of Exercise per Session: Patient refused   Stress: Unknown    Feeling of Stress : Patient refused   Social Connections: Unknown    Frequency of Communication with Friends and Family: Patient refused    Frequency of Social Gatherings with Friends and Family: Patient refused     Attends Denominational Services: Patient refused    Active Member of Clubs or Organizations: Patient refused    Attends Club or Organization Meetings: Patient refused    Marital Status:    Housing Stability: Unknown    Unable to Pay for Housing in the Last Year: No    Unstable Housing in the Last Year: No       Chief Complaint:   Chief Complaint   Patient presents with    Post-op Evaluation     s/p left TKA. dos 1/3/23       Date of surgery:  January 3, 2023    History of present illness:  67-year-old female underwent left robotic assisted total knee arthroplasty.  Patient is doing pretty well.  PT is going well.  Rates her pain is a 5/10.      Review of Systems:    Musculoskeletal:  See HPI        Physical Examination:    Vital Signs:    Vitals:    02/16/23 0831   Resp: 18       Body mass index is 41.97 kg/m².    This a well-developed, well nourished patient in no acute distress.  They are alert and oriented and cooperative to examination.  Pt. walks without an antalgic gait.      Examination left knee shows well-healed surgical incision.  No erythema drainage.  Mild swelling.  Good range of motion from 0-95 degrees already.  No calf pain.    X-rays:  Two views left knee are reviewed which show well-aligned total knee arthroplasty components without complication     Assessment::  Status post left [place] Fabien CR total knee arthroplasty using cement    Plan:  Continue the outpatient physical therapy.  I will see her back in 6 weeks with four views of the left knee.    This note was created using [employer] voice recognition software that occasionally misinterpreted phrases or words.

## 2023-03-21 DIAGNOSIS — Z96.652 STATUS POST TOTAL LEFT KNEE REPLACEMENT USING CEMENT: Primary | ICD-10-CM

## 2023-03-27 ENCOUNTER — HOSPITAL ENCOUNTER (OUTPATIENT)
Dept: RADIOLOGY | Facility: HOSPITAL | Age: 68
Discharge: HOME | End: 2023-03-27
Attending: ORTHOPAEDIC SURGERY
Payer: MEDICARE

## 2023-03-27 ENCOUNTER — OFFICE VISIT (OUTPATIENT)
Dept: ORTHOPEDICS | Facility: CLINIC | Age: 68
End: 2023-03-27
Payer: MEDICARE

## 2023-03-27 VITALS — RESPIRATION RATE: 18 BRPM | BODY MASS INDEX: 42.06 KG/M2 | WEIGHT: 268 LBS | HEIGHT: 67 IN

## 2023-03-27 DIAGNOSIS — Z96.652 STATUS POST TOTAL LEFT KNEE REPLACEMENT USING CEMENT: ICD-10-CM

## 2023-03-27 DIAGNOSIS — Z96.652 STATUS POST TOTAL LEFT KNEE REPLACEMENT USING CEMENT: Primary | ICD-10-CM

## 2023-03-27 DIAGNOSIS — M17.0 PRIMARY OSTEOARTHRITIS OF BOTH KNEES: ICD-10-CM

## 2023-03-27 PROCEDURE — 99999 PR PBB SHADOW E&M-EST. PATIENT-LVL IV: ICD-10-PCS | Mod: PBBFAC,,, | Performed by: ORTHOPAEDIC SURGERY

## 2023-03-27 PROCEDURE — 99999 PR PBB SHADOW E&M-EST. PATIENT-LVL IV: CPT | Mod: PBBFAC,,, | Performed by: ORTHOPAEDIC SURGERY

## 2023-03-27 PROCEDURE — 73564 X-RAY EXAM KNEE 4 OR MORE: CPT | Mod: TC,PN,LT

## 2023-03-27 PROCEDURE — 73564 X-RAY EXAM KNEE 4 OR MORE: CPT | Mod: 26,LT,, | Performed by: RADIOLOGY

## 2023-03-27 PROCEDURE — 99214 OFFICE O/P EST MOD 30 MIN: CPT | Mod: 25,PBBFAC,PN | Performed by: ORTHOPAEDIC SURGERY

## 2023-03-27 PROCEDURE — 73562 X-RAY EXAM OF KNEE 3: CPT | Mod: 26,RT,, | Performed by: RADIOLOGY

## 2023-03-27 PROCEDURE — 99024 POSTOP FOLLOW-UP VISIT: CPT | Mod: POP,,, | Performed by: ORTHOPAEDIC SURGERY

## 2023-03-27 PROCEDURE — 73564 XR KNEE ORTHO LEFT WITH FLEXION: ICD-10-PCS | Mod: 26,LT,, | Performed by: RADIOLOGY

## 2023-03-27 PROCEDURE — 99024 PR POST-OP FOLLOW-UP VISIT: ICD-10-PCS | Mod: POP,,, | Performed by: ORTHOPAEDIC SURGERY

## 2023-03-27 PROCEDURE — 73562 XR KNEE ORTHO LEFT WITH FLEXION: ICD-10-PCS | Mod: 26,RT,, | Performed by: RADIOLOGY

## 2023-03-27 NOTE — PROGRESS NOTES
Past Medical History:   Diagnosis Date    Angio-edema     Asthma     Diabetes mellitus     Eczema     Obesity     PONV (postoperative nausea and vomiting)     Urticaria        Past Surgical History:   Procedure Laterality Date    ADENOIDECTOMY      BONY PELVIS SURGERY       SECTION      X 2    FIXATION KYPHOPLASTY N/A 2018    Procedure: Kyphoplasty;  Surgeon: Martinez Morejon MD;  Location: [facility] OR;  Service: Pain Management;  Laterality: N/A;  L1     HYSTERECTOMY      KNEE CARTILAGE SURGERY Left     ROBOTIC ARTHROPLASTY, KNEE Left 1/3/2023    Procedure: ROBOTIC ARTHROPLASTY, KNEE, TOTAL;  Surgeon: Estevan Monteiro MD;  Location: [facility] OR;  Service: Orthopedics;  Laterality: Left;    TONSILLECTOMY      VAGINOPLASTY         Current Outpatient Medications   Medication Sig    acyclovir (ZOVIRAX) 400 MG tablet Take 400 mg by mouth every 8 (eight) hours.    atorvastatin (LIPITOR) 10 MG tablet Take 10 mg by mouth once daily.    clobetasol 0.05% (TEMOVATE) 0.05 % Oint Apply sparingly to rash bid x 2-3 weeks.  Avoid face and groin.    clotrimazole-betamethasone 1-0.05% (LOTRISONE) cream Apply topically.    doxycycline (VIBRAMYCIN) 100 MG Cap Take 100 mg by mouth 2 (two) times daily.    fluconazole (DIFLUCAN) 150 MG Tab Take by mouth.    GAVILYTE-G 236-22.74-6.74 -5.86 gram suspension SMARTSI Milliliter(s) By Mouth Once    hydrOXYzine HCL (ATARAX) 25 MG tablet Take 25 mg by mouth nightly as needed.    metformin (GLUCOPHAGE) 500 MG tablet     MOUNJARO 15 mg/0.5 mL PnIj     nystatin (MYCOSTATIN) powder     olmesartan (BENICAR) 20 MG tablet     SYNTHROID 150 mcg tablet TAKE 1 TABLET BY MOUTH ONCE DAILY ON AN EMPTY STOMACH IN THE MORNING FOR 90 DAYS    tirzepatide (MOUNJARO) 2.5 mg/0.5 mL PnIj Inject 1 mg into the skin every 7 days.    triamterene-hydrochlorothiazide 37.5-25 mg (DYAZIDE) 37.5-25 mg per capsule     TRULICITY 4.5 mg/0.5 mL pen injector Inject 4.5 mg into the skin once a week.    acetaminophen  (TYLENOL) 500 MG tablet Take 2 tablets (1,000 mg total) by mouth every 8 (eight) hours as needed for Pain. (Patient not taking: Reported on 2023)    aspirin (ECOTRIN) 81 MG EC tablet Take 1 tablet (81 mg total) by mouth 2 (two) times a day.    bisacodyL (DULCOLAX) 10 mg Supp Place 1 suppository (10 mg total) rectally daily as needed. (Patient not taking: Reported on 3/23/2023)    ibuprofen (ADVIL,MOTRIN) 600 MG tablet Take 1 tablet (600 mg total) by mouth 3 (three) times daily as needed for Pain. (Patient not taking: Reported on 2023)    LIDOcaine HCL 2% (XYLOCAINE) 2 % jelly Apply topically as needed. (Patient not taking: Reported on 3/23/2023)    ondansetron (ZOFRAN) 4 MG tablet Take 1 tablet (4 mg total) by mouth every 6 (six) hours as needed for Nausea. (Patient not taking: Reported on 2023)    oxyCODONE-acetaminophen (PERCOCET) 5-325 mg per tablet Take 1 tablet by mouth every 4 (four) hours as needed for Pain. (Patient not taking: Reported on 2023)    pregabalin (LYRICA) 75 MG capsule Take 1 capsule (75 mg total) by mouth 2 (two) times daily.     No current facility-administered medications for this visit.       Review of patient's allergies indicates:   Allergen Reactions    Levothyroxine Swelling     GENERIC ONLY / PT CAN TAKE SYNTHROID, face/tongue swelling       Family History   Problem Relation Age of Onset    Breast cancer Mother 42         at 61yo from breast ca    Angioedema Daughter     Allergies Daughter     Asthma Maternal Uncle     Allergies Son     Allergic rhinitis Son     Eczema Son     Immunodeficiency Neg Hx     Ovarian cancer Neg Hx        Social History     Socioeconomic History    Marital status:    Tobacco Use    Smoking status: Never    Smokeless tobacco: Never   Substance and Sexual Activity    Alcohol use: Yes     Comment: seldom    Drug use: Never    Sexual activity: Not Currently     Partners: Male     Birth control/protection: See Surgical Hx     Social  Determinants of Health     Financial Resource Strain: Unknown    Difficulty of Paying Living Expenses: Patient refused   Food Insecurity: No Food Insecurity    Worried About Running Out of Food in the Last Year: Never true    Ran Out of Food in the Last Year: Never true   Transportation Needs: No Transportation Needs    Lack of Transportation (Medical): No    Lack of Transportation (Non-Medical): No   Physical Activity: Unknown    Days of Exercise per Week: Patient refused    Minutes of Exercise per Session: Patient refused   Stress: Unknown    Feeling of Stress : Patient refused   Social Connections: Unknown    Frequency of Communication with Friends and Family: Patient refused    Frequency of Social Gatherings with Friends and Family: Patient refused    Attends Mormon Services: Patient refused    Active Member of Clubs or Organizations: Patient refused    Attends Club or Organization Meetings: Patient refused    Marital Status:    Housing Stability: Unknown    Unable to Pay for Housing in the Last Year: No    Unstable Housing in the Last Year: No       Chief Complaint:   Chief Complaint   Patient presents with    Follow-up     s/p left TKA. dos 1/3/23       Date of surgery:  January 3, 2023    History of present illness:  67-year-old female underwent left robotic assisted total knee arthroplasty.  Patient is doing pretty well.  PT is going well.  Rates her pain is a 4/10.  She is ecstatic about how she is doing.      Review of Systems:    Musculoskeletal:  See HPI        Physical Examination:    Vital Signs:    Vitals:    03/27/23 0847   Resp: 18       Body mass index is 41.97 kg/m².    This a well-developed, well nourished patient in no acute distress.  They are alert and oriented and cooperative to examination.  Pt. walks without an antalgic gait.      Examination left knee shows well-healed surgical incision.  No erythema drainage.  Mild swelling.  Good range of motion from 0-95 degrees already.  No  calf pain.    X-rays:  Four views of the left knee are ordered and reviewed which show well-aligned total knee arthroplasty components without complication     Assessment::  Status post left [place] Fabien CR total knee arthroplasty using cement    Plan:  Continue the outpatient physical therapy.  Follow-up in 3 months.  Discussed doing other treatment for the right knee until she is ready to proceed with knee replacement likely next year.    This note was created using [employer] voice recognition software that occasionally misinterpreted phrases or words.

## 2023-06-09 ENCOUNTER — PATIENT MESSAGE (OUTPATIENT)
Dept: ORTHOPEDICS | Facility: CLINIC | Age: 68
End: 2023-06-09
Payer: MEDICARE

## 2023-06-19 ENCOUNTER — OFFICE VISIT (OUTPATIENT)
Dept: ORTHOPEDICS | Facility: CLINIC | Age: 68
End: 2023-06-19
Payer: MEDICARE

## 2023-06-19 VITALS — BODY MASS INDEX: 42.06 KG/M2 | RESPIRATION RATE: 18 BRPM | WEIGHT: 268 LBS | HEIGHT: 67 IN

## 2023-06-19 DIAGNOSIS — Z96.652 STATUS POST TOTAL LEFT KNEE REPLACEMENT USING CEMENT: Primary | ICD-10-CM

## 2023-06-19 DIAGNOSIS — M17.10 ARTHRITIS OF KNEE: ICD-10-CM

## 2023-06-19 DIAGNOSIS — M65.341 TRIGGER FINGER, RIGHT RING FINGER: ICD-10-CM

## 2023-06-19 PROCEDURE — 20610 PR DRAIN/INJECT LARGE JOINT/BURSA: ICD-10-PCS | Mod: S$PBB,RT,, | Performed by: ORTHOPAEDIC SURGERY

## 2023-06-19 PROCEDURE — 20550 NJX 1 TENDON SHEATH/LIGAMENT: CPT | Mod: PBBFAC,51,XS,PO,RT | Performed by: ORTHOPAEDIC SURGERY

## 2023-06-19 PROCEDURE — 99214 OFFICE O/P EST MOD 30 MIN: CPT | Mod: 25,S$PBB,, | Performed by: ORTHOPAEDIC SURGERY

## 2023-06-19 PROCEDURE — 20610 DRAIN/INJ JOINT/BURSA W/O US: CPT | Mod: PBBFAC,PO,RT | Performed by: ORTHOPAEDIC SURGERY

## 2023-06-19 PROCEDURE — 99213 OFFICE O/P EST LOW 20 MIN: CPT | Mod: PBBFAC,PO,25 | Performed by: ORTHOPAEDIC SURGERY

## 2023-06-19 PROCEDURE — 99214 PR OFFICE/OUTPT VISIT, EST, LEVL IV, 30-39 MIN: ICD-10-PCS | Mod: 25,S$PBB,, | Performed by: ORTHOPAEDIC SURGERY

## 2023-06-19 PROCEDURE — 99999 PR PBB SHADOW E&M-EST. PATIENT-LVL III: CPT | Mod: PBBFAC,,, | Performed by: ORTHOPAEDIC SURGERY

## 2023-06-19 PROCEDURE — 20550 TENDON SHEATH: ICD-10-PCS | Mod: S$PBB,51,XS,RT | Performed by: ORTHOPAEDIC SURGERY

## 2023-06-19 PROCEDURE — 99999 PR PBB SHADOW E&M-EST. PATIENT-LVL III: ICD-10-PCS | Mod: PBBFAC,,, | Performed by: ORTHOPAEDIC SURGERY

## 2023-06-19 PROCEDURE — 20610 DRAIN/INJ JOINT/BURSA W/O US: CPT | Mod: S$PBB,RT,, | Performed by: ORTHOPAEDIC SURGERY

## 2023-06-19 RX ORDER — TRIAMCINOLONE ACETONIDE 40 MG/ML
40 INJECTION, SUSPENSION INTRA-ARTICULAR; INTRAMUSCULAR
Status: DISCONTINUED | OUTPATIENT
Start: 2023-06-19 | End: 2023-06-19 | Stop reason: HOSPADM

## 2023-06-19 RX ADMIN — Medication 16 MG: at 08:06

## 2023-06-19 RX ADMIN — TRIAMCINOLONE ACETONIDE 40 MG: 40 INJECTION, SUSPENSION INTRA-ARTICULAR; INTRAMUSCULAR at 08:06

## 2023-06-19 NOTE — PROCEDURES
Tendon Sheath    Date/Time: 6/19/2023 8:45 AM  Performed by: Estevan Monteiro MD  Authorized by: Estevan Monteiro MD     Consent Done?:  Yes (Verbal)  Indications:  Pain  Site marked: the procedure site was marked    Timeout: prior to procedure the correct patient, procedure, and site was verified    Prep: patient was prepped and draped in usual sterile fashion      Local anesthesia used?: Yes    Anesthesia:  Local infiltration  Local anesthetic:  Lidocaine 1% without epinephrine and bupivacaine 0.25% without epinephrine  Anesthetic total (ml):  2    Location:  Ring finger  Site:  R ring flexor tendon sheath  Ultrasonic guidance for needle placement?: No    Needle size:  22 G  Approach:  Volar  Medications:  40 mg triamcinolone acetonide 40 mg/mL  Patient tolerance:  Patient tolerated the procedure well with no immediate complications

## 2023-06-19 NOTE — PROCEDURES
Large Joint Aspiration/Injection: R knee joint    Date/Time: 6/19/2023 8:45 AM  Performed by: Estevan Monteiro MD  Authorized by: Estevan Monteiro MD     Consent Done?:  Yes (Verbal)  Indications:  Pain  Site marked: the procedure site was marked    Timeout: prior to procedure the correct patient, procedure, and site was verified      Details:  Needle Size:  20 G  Approach:  Anterolateral  Location:  Knee  Site:  R knee joint  Medications:  16 mg hyaluronate 16 mg/2 mL  Patient tolerance:  Patient tolerated the procedure well with no immediate complications

## 2023-06-19 NOTE — PROGRESS NOTES
Past Medical History:   Diagnosis Date    Angio-edema     Asthma     Diabetes mellitus     Eczema     Obesity     PONV (postoperative nausea and vomiting)     Urticaria        Past Surgical History:   Procedure Laterality Date    ADENOIDECTOMY      BONY PELVIS SURGERY       SECTION      X 2    FIXATION KYPHOPLASTY N/A 2018    Procedure: Kyphoplasty;  Surgeon: Martinez Morejon MD;  Location: [facility] OR;  Service: Pain Management;  Laterality: N/A;  L1     HYSTERECTOMY      KNEE CARTILAGE SURGERY Left     ROBOTIC ARTHROPLASTY, KNEE Left 1/3/2023    Procedure: ROBOTIC ARTHROPLASTY, KNEE, TOTAL;  Surgeon: Estevan Monteiro MD;  Location: [facility] OR;  Service: Orthopedics;  Laterality: Left;    TONSILLECTOMY      VAGINOPLASTY         Current Outpatient Medications   Medication Sig    acyclovir (ZOVIRAX) 400 MG tablet Take 400 mg by mouth every 8 (eight) hours.    atorvastatin (LIPITOR) 10 MG tablet Take 10 mg by mouth once daily.    clobetasol 0.05% (TEMOVATE) 0.05 % Oint Apply sparingly to rash bid x 2-3 weeks.  Avoid face and groin.    clotrimazole-betamethasone 1-0.05% (LOTRISONE) cream Apply topically.    doxycycline (VIBRAMYCIN) 100 MG Cap Take 100 mg by mouth 2 (two) times daily.    fluconazole (DIFLUCAN) 150 MG Tab Take by mouth.    GAVILYTE-G 236-22.74-6.74 -5.86 gram suspension SMARTSI Milliliter(s) By Mouth Once    hydrOXYzine HCL (ATARAX) 25 MG tablet Take 25 mg by mouth nightly as needed.    metformin (GLUCOPHAGE) 500 MG tablet     MOUNJARO 15 mg/0.5 mL PnIj     nystatin (MYCOSTATIN) powder     olmesartan (BENICAR) 20 MG tablet     SYNTHROID 150 mcg tablet TAKE 1 TABLET BY MOUTH ONCE DAILY ON AN EMPTY STOMACH IN THE MORNING FOR 90 DAYS    tirzepatide (MOUNJARO) 2.5 mg/0.5 mL PnIj Inject 1 mg into the skin every 7 days.    triamterene-hydrochlorothiazide 37.5-25 mg (DYAZIDE) 37.5-25 mg per capsule     TRULICITY 4.5 mg/0.5 mL pen injector Inject 4.5 mg into the skin once a week.    acetaminophen  (TYLENOL) 500 MG tablet Take 2 tablets (1,000 mg total) by mouth every 8 (eight) hours as needed for Pain. (Patient not taking: Reported on 2023)    aspirin (ECOTRIN) 81 MG EC tablet Take 1 tablet (81 mg total) by mouth 2 (two) times a day.    bisacodyL (DULCOLAX) 10 mg Supp Place 1 suppository (10 mg total) rectally daily as needed. (Patient not taking: Reported on 3/23/2023)    ibuprofen (ADVIL,MOTRIN) 600 MG tablet Take 1 tablet (600 mg total) by mouth 3 (three) times daily as needed for Pain. (Patient not taking: Reported on 2023)    LIDOcaine HCL 2% (XYLOCAINE) 2 % jelly Apply topically as needed. (Patient not taking: Reported on 3/23/2023)    ondansetron (ZOFRAN) 4 MG tablet Take 1 tablet (4 mg total) by mouth every 6 (six) hours as needed for Nausea. (Patient not taking: Reported on 2023)    oxyCODONE-acetaminophen (PERCOCET) 5-325 mg per tablet Take 1 tablet by mouth every 4 (four) hours as needed for Pain. (Patient not taking: Reported on 2023)    pregabalin (LYRICA) 75 MG capsule Take 1 capsule (75 mg total) by mouth 2 (two) times daily.     No current facility-administered medications for this visit.       Review of patient's allergies indicates:   Allergen Reactions    Levothyroxine Swelling     GENERIC ONLY / PT CAN TAKE SYNTHROID, face/tongue swelling       Family History   Problem Relation Age of Onset    Breast cancer Mother 42         at 61yo from breast ca    Angioedema Daughter     Allergies Daughter     Asthma Maternal Uncle     Allergies Son     Allergic rhinitis Son     Eczema Son     Immunodeficiency Neg Hx     Ovarian cancer Neg Hx        Social History     Socioeconomic History    Marital status:    Tobacco Use    Smoking status: Never    Smokeless tobacco: Never   Substance and Sexual Activity    Alcohol use: Yes     Comment: seldom    Drug use: Never    Sexual activity: Not Currently     Partners: Male     Birth control/protection: See Surgical Hx     Social  Determinants of Health     Financial Resource Strain: Unknown    Difficulty of Paying Living Expenses: Patient refused   Food Insecurity: No Food Insecurity    Worried About Running Out of Food in the Last Year: Never true    Ran Out of Food in the Last Year: Never true   Transportation Needs: No Transportation Needs    Lack of Transportation (Medical): No    Lack of Transportation (Non-Medical): No   Physical Activity: Unknown    Days of Exercise per Week: Patient refused    Minutes of Exercise per Session: Patient refused   Stress: Unknown    Feeling of Stress : Patient refused   Social Connections: Unknown    Frequency of Communication with Friends and Family: Patient refused    Frequency of Social Gatherings with Friends and Family: Patient refused    Attends [employer] Services: Patient refused    Active Member of Clubs or Organizations: Patient refused    Attends Club or Organization Meetings: Patient refused    Marital Status:    Housing Stability: Unknown    Unable to Pay for Housing in the Last Year: No    Unstable Housing in the Last Year: No       Chief Complaint:   Chief Complaint   Patient presents with    Follow-up     s/p left TKA. dos 1/3/23       Date of surgery:  January 3, 2023    History of present illness:  68-year-old female underwent left robotic assisted total knee arthroplasty.  Patient is doing pretty well.  PT is going well.  Rates her pain is a 4/10.  She is ecstatic about how she is doing.  Right knee is starting to give her little more trouble.  Patient also has complaints of a new trigger finger that has been ongoing in her right ring finger for a few months now.      Review of Systems:    Musculoskeletal:  See HPI        Physical Examination:    Vital Signs:    Vitals:    06/19/23 0826   Resp: 18       Body mass index is 41.97 kg/m².    This a well-developed, well nourished patient in no acute distress.  They are alert and oriented and cooperative to examination.  Pt. walks  without an antalgic gait.      Examination left knee shows well-healed surgical incision.  No erythema drainage.  Mild swelling.  Good range of motion from 0-115 degrees already.  No calf pain.    Examination of the right knee shows no rashes or erythema. There are no masses ecchymosis or effusion. Patient has full range of motion from 0-130°. Patient is nontender to palpation over lateral joint line and moderately tender to palpation over the medial joint line. Patient has a - Lachman exam, - anterior drawer exam, and - posterior drawer exam. - Apley exam. Knee is stable to varus and valgus stress. 5 out of 5 motor strength. Palpable distal pulses. Intact light touch sensation. Negative Patellofemoral crepitus    Examination of the right hand and wrist shows no signs of rashes or erythema. Patient has no masses ecchymosis or effusions. Patient has full range of motion of the wrist in flexion and extension as well as ulnar and radial deviation. The patient also has full range of motion of all joints in the hand. There are 2+ radial pulse and intact light touch sensation in all 5 digits. Nontender over the anatomic snuffbox.  Palpable knot and triggering over the A1 pulley of the ring finger        X-rays:  Four views of the left knee are  reviewed which show well-aligned total knee arthroplasty components without complication     Assessment::  Status post left Cutler Fabien CR total knee arthroplasty using cement  Right knee arthritis   Right ring trigger finger    Plan:  Continue the exercises.  I agreed to do another Synvisc series on her today.  Injected her right knee with Synvisc 1  of 3.  We also talked about trigger fingers and she elected to try a trigger finger injection today as well.  Follow up next week to continue the Visco.      This note was created using [employer] voice recognition software that occasionally misinterpreted phrases or words.

## 2023-07-03 ENCOUNTER — OFFICE VISIT (OUTPATIENT)
Dept: ORTHOPEDICS | Facility: CLINIC | Age: 68
End: 2023-07-03
Payer: MEDICARE

## 2023-07-03 VITALS — BODY MASS INDEX: 42.06 KG/M2 | WEIGHT: 268 LBS | HEIGHT: 67 IN | RESPIRATION RATE: 18 BRPM

## 2023-07-03 DIAGNOSIS — M17.10 ARTHRITIS OF KNEE: Primary | ICD-10-CM

## 2023-07-03 PROCEDURE — 99999 PR PBB SHADOW E&M-EST. PATIENT-LVL II: CPT | Mod: PBBFAC,,, | Performed by: ORTHOPAEDIC SURGERY

## 2023-07-03 PROCEDURE — 20610 DRAIN/INJ JOINT/BURSA W/O US: CPT | Mod: S$PBB,RT,, | Performed by: ORTHOPAEDIC SURGERY

## 2023-07-03 PROCEDURE — 20610 PR DRAIN/INJECT LARGE JOINT/BURSA: ICD-10-PCS | Mod: S$PBB,RT,, | Performed by: ORTHOPAEDIC SURGERY

## 2023-07-03 PROCEDURE — 99499 UNLISTED E&M SERVICE: CPT | Mod: S$PBB,,, | Performed by: ORTHOPAEDIC SURGERY

## 2023-07-03 PROCEDURE — 99499 NO LOS: ICD-10-PCS | Mod: S$PBB,,, | Performed by: ORTHOPAEDIC SURGERY

## 2023-07-03 PROCEDURE — 20610 DRAIN/INJ JOINT/BURSA W/O US: CPT | Mod: PBBFAC,PO,RT | Performed by: ORTHOPAEDIC SURGERY

## 2023-07-03 PROCEDURE — 99999 PR PBB SHADOW E&M-EST. PATIENT-LVL II: ICD-10-PCS | Mod: PBBFAC,,, | Performed by: ORTHOPAEDIC SURGERY

## 2023-07-03 PROCEDURE — 99212 OFFICE O/P EST SF 10 MIN: CPT | Mod: PBBFAC,PO | Performed by: ORTHOPAEDIC SURGERY

## 2023-07-03 RX ADMIN — Medication 16 MG: at 11:07

## 2023-07-03 NOTE — PROCEDURES
Large Joint Aspiration/Injection: R knee joint    Date/Time: 7/3/2023 11:30 AM  Performed by: Estevan Monteiro MD  Authorized by: Estevan Monteiro MD     Consent Done?:  Yes (Verbal)  Indications:  Pain  Site marked: the procedure site was marked    Timeout: prior to procedure the correct patient, procedure, and site was verified      Details:  Needle Size:  20 G  Approach:  Anterolateral  Location:  Knee  Site:  R knee joint  Medications:  16 mg hyaluronate 16 mg/2 mL  Patient tolerance:  Patient tolerated the procedure well with no immediate complications

## 2023-07-03 NOTE — PROGRESS NOTES
Past Medical History:   Diagnosis Date    Angio-edema     Asthma     Diabetes mellitus     Eczema     Obesity     PONV (postoperative nausea and vomiting)     Urticaria        Past Surgical History:   Procedure Laterality Date    ADENOIDECTOMY      BONY PELVIS SURGERY       SECTION      X 2    FIXATION KYPHOPLASTY N/A 2018    Procedure: Kyphoplasty;  Surgeon: Martinez Morejon MD;  Location: [facility] OR;  Service: Pain Management;  Laterality: N/A;  L1     HYSTERECTOMY      KNEE CARTILAGE SURGERY Left     ROBOTIC ARTHROPLASTY, KNEE Left 1/3/2023    Procedure: ROBOTIC ARTHROPLASTY, KNEE, TOTAL;  Surgeon: Estevan Monteiro MD;  Location: [facility] OR;  Service: Orthopedics;  Laterality: Left;    TONSILLECTOMY      VAGINOPLASTY         Current Outpatient Medications   Medication Sig    acyclovir (ZOVIRAX) 400 MG tablet Take 400 mg by mouth every 8 (eight) hours.    aspirin (ECOTRIN) 81 MG EC tablet Take 1 tablet (81 mg total) by mouth 2 (two) times a day.    atorvastatin (LIPITOR) 10 MG tablet Take 10 mg by mouth once daily.    clobetasol 0.05% (TEMOVATE) 0.05 % Oint Apply sparingly to rash bid x 2-3 weeks.  Avoid face and groin.    clotrimazole-betamethasone 1-0.05% (LOTRISONE) cream Apply topically.    doxycycline (VIBRAMYCIN) 100 MG Cap Take 100 mg by mouth 2 (two) times daily.    fluconazole (DIFLUCAN) 150 MG Tab Take by mouth.    GAVILYTE-G 236-22.74-6.74 -5.86 gram suspension SMARTSI Milliliter(s) By Mouth Once    hydrOXYzine HCL (ATARAX) 25 MG tablet Take 25 mg by mouth nightly as needed.    metformin (GLUCOPHAGE) 500 MG tablet     MOUNJARO 15 mg/0.5 mL PnIj     nystatin (MYCOSTATIN) powder     olmesartan (BENICAR) 20 MG tablet     pregabalin (LYRICA) 75 MG capsule Take 1 capsule (75 mg total) by mouth 2 (two) times daily.    SYNTHROID 150 mcg tablet TAKE 1 TABLET BY MOUTH ONCE DAILY ON AN EMPTY STOMACH IN THE MORNING FOR 90 DAYS    tirzepatide (MOUNJARO) 2.5 mg/0.5 mL PnIj Inject 1 mg into the skin  every 7 days.    triamterene-hydrochlorothiazide 37.5-25 mg (DYAZIDE) 37.5-25 mg per capsule     TRULICITY 4.5 mg/0.5 mL pen injector Inject 4.5 mg into the skin once a week.     No current facility-administered medications for this visit.       Review of patient's allergies indicates:   Allergen Reactions    Levothyroxine Swelling     GENERIC ONLY / PT CAN TAKE SYNTHROID, face/tongue swelling       Family History   Problem Relation Age of Onset    Breast cancer Mother 42         at 61yo from breast ca    Angioedema Daughter     Allergies Daughter     Asthma Maternal Uncle     Allergies Son     Allergic rhinitis Son     Eczema Son     Immunodeficiency Neg Hx     Ovarian cancer Neg Hx        Social History     Socioeconomic History    Marital status:    Tobacco Use    Smoking status: Never    Smokeless tobacco: Never   Substance and Sexual Activity    Alcohol use: Yes     Comment: seldom    Drug use: Never    Sexual activity: Not Currently     Partners: Male     Birth control/protection: See Surgical Hx     Social Determinants of Health     Financial Resource Strain: Unknown    Difficulty of Paying Living Expenses: Patient refused   Food Insecurity: No Food Insecurity    Worried About Running Out of Food in the Last Year: Never true    Ran Out of Food in the Last Year: Never true   Transportation Needs: No Transportation Needs    Lack of Transportation (Medical): No    Lack of Transportation (Non-Medical): No   Physical Activity: Unknown    Days of Exercise per Week: Patient refused    Minutes of Exercise per Session: Patient refused   Stress: Unknown    Feeling of Stress : Patient refused   Social Connections: Unknown    Frequency of Communication with Friends and Family: Patient refused    Frequency of Social Gatherings with Friends and Family: Patient refused    Attends Religion Services: Patient refused    Active Member of Clubs or Organizations: Patient refused    Attends Club or Organization  Meetings: Patient refused    Marital Status:    Housing Stability: Unknown    Unable to Pay for Housing in the Last Year: No    Unstable Housing in the Last Year: No       Chief Complaint:   No chief complaint on file.      Date of surgery:  January 3, 2023    History of present illness:  68-year-old female underwent left robotic assisted total knee arthroplasty.  Patient is doing pretty well.  PT is going well.  Rates her pain is a 4/10.  She is ecstatic about how she is doing.  Right knee is starting to give her little more trouble.  Patient also has complaints of a new trigger finger that has been ongoing in her right ring finger for a few months now.      Review of Systems:    Musculoskeletal:  See HPI        Physical Examination:    Vital Signs:    There were no vitals filed for this visit.      There is no height or weight on file to calculate BMI.    This a well-developed, well nourished patient in no acute distress.  They are alert and oriented and cooperative to examination.  Pt. walks without an antalgic gait.      Examination left knee shows well-healed surgical incision.  No erythema drainage.  Mild swelling.  Good range of motion from 0-115 degrees already.  No calf pain.    Examination of the right knee shows no rashes or erythema. There are no masses ecchymosis or effusion. Patient has full range of motion from 0-130°. Patient is nontender to palpation over lateral joint line and moderately tender to palpation over the medial joint line. Patient has a - Lachman exam, - anterior drawer exam, and - posterior drawer exam. - Apley exam. Knee is stable to varus and valgus stress. 5 out of 5 motor strength. Palpable distal pulses. Intact light touch sensation. Negative Patellofemoral crepitus    Examination of the right hand and wrist shows no signs of rashes or erythema. Patient has no masses ecchymosis or effusions. Patient has full range of motion of the wrist in flexion and extension as well as  ulnar and radial deviation. The patient also has full range of motion of all joints in the hand. There are 2+ radial pulse and intact light touch sensation in all 5 digits. Nontender over the anatomic snuffbox.  Palpable knot and triggering over the A1 pulley of the ring finger        X-rays:  Four views of the left knee are  reviewed which show well-aligned total knee arthroplasty components without complication     Assessment::  Status post left Nasir Fabien CR total knee arthroplasty using cement  Right knee arthritis       Plan:  Injected her right knee with Synvisc 2 of 3.  Follow up next week to continue the Visco.      This note was created using [employer] voice recognition software that occasionally misinterpreted phrases or words.

## 2023-07-10 ENCOUNTER — OFFICE VISIT (OUTPATIENT)
Dept: ORTHOPEDICS | Facility: CLINIC | Age: 68
End: 2023-07-10
Payer: MEDICARE

## 2023-07-10 VITALS — BODY MASS INDEX: 42.06 KG/M2 | WEIGHT: 268 LBS | RESPIRATION RATE: 18 BRPM | HEIGHT: 67 IN

## 2023-07-10 DIAGNOSIS — M17.10 ARTHRITIS OF KNEE: Primary | ICD-10-CM

## 2023-07-10 PROCEDURE — 20610 PR DRAIN/INJECT LARGE JOINT/BURSA: ICD-10-PCS | Mod: S$PBB,RT,, | Performed by: ORTHOPAEDIC SURGERY

## 2023-07-10 PROCEDURE — 99499 NO LOS: ICD-10-PCS | Mod: S$PBB,,, | Performed by: ORTHOPAEDIC SURGERY

## 2023-07-10 PROCEDURE — 20610 DRAIN/INJ JOINT/BURSA W/O US: CPT | Mod: PBBFAC,PO,RT | Performed by: ORTHOPAEDIC SURGERY

## 2023-07-10 PROCEDURE — 99999 PR PBB SHADOW E&M-EST. PATIENT-LVL II: ICD-10-PCS | Mod: PBBFAC,,, | Performed by: ORTHOPAEDIC SURGERY

## 2023-07-10 PROCEDURE — 99999 PR PBB SHADOW E&M-EST. PATIENT-LVL II: CPT | Mod: PBBFAC,,, | Performed by: ORTHOPAEDIC SURGERY

## 2023-07-10 PROCEDURE — 99212 OFFICE O/P EST SF 10 MIN: CPT | Mod: PBBFAC,PO | Performed by: ORTHOPAEDIC SURGERY

## 2023-07-10 PROCEDURE — 99499 UNLISTED E&M SERVICE: CPT | Mod: S$PBB,,, | Performed by: ORTHOPAEDIC SURGERY

## 2023-07-10 PROCEDURE — 20610 DRAIN/INJ JOINT/BURSA W/O US: CPT | Mod: S$PBB,RT,, | Performed by: ORTHOPAEDIC SURGERY

## 2023-07-10 RX ADMIN — Medication 16 MG: at 11:07

## 2023-07-10 NOTE — PROCEDURES
Large Joint Aspiration/Injection: R knee joint    Date/Time: 7/10/2023 11:30 AM  Performed by: Estevan Monteiro MD  Authorized by: Estevan Monteiro MD     Consent Done?:  Yes (Verbal)  Indications:  Pain  Site marked: the procedure site was marked    Timeout: prior to procedure the correct patient, procedure, and site was verified      Details:  Needle Size:  20 G  Approach:  Anterolateral  Location:  Knee  Site:  R knee joint  Medications:  16 mg hyaluronate 16 mg/2 mL  Patient tolerance:  Patient tolerated the procedure well with no immediate complications

## 2023-07-10 NOTE — PROGRESS NOTES
Past Medical History:   Diagnosis Date    Angio-edema     Asthma     Diabetes mellitus     Eczema     Obesity     PONV (postoperative nausea and vomiting)     Urticaria        Past Surgical History:   Procedure Laterality Date    ADENOIDECTOMY      BONY PELVIS SURGERY       SECTION      X 2    FIXATION KYPHOPLASTY N/A 2018    Procedure: Kyphoplasty;  Surgeon: Martinez Morejon MD;  Location: [facility] OR;  Service: Pain Management;  Laterality: N/A;  L1     HYSTERECTOMY      KNEE CARTILAGE SURGERY Left     ROBOTIC ARTHROPLASTY, KNEE Left 1/3/2023    Procedure: ROBOTIC ARTHROPLASTY, KNEE, TOTAL;  Surgeon: Estevan Monteiro MD;  Location: [facility] OR;  Service: Orthopedics;  Laterality: Left;    TONSILLECTOMY      VAGINOPLASTY         Current Outpatient Medications   Medication Sig    acyclovir (ZOVIRAX) 400 MG tablet Take 400 mg by mouth every 8 (eight) hours.    aspirin (ECOTRIN) 81 MG EC tablet Take 1 tablet (81 mg total) by mouth 2 (two) times a day.    atorvastatin (LIPITOR) 10 MG tablet Take 10 mg by mouth once daily.    clobetasol 0.05% (TEMOVATE) 0.05 % Oint Apply sparingly to rash bid x 2-3 weeks.  Avoid face and groin.    clotrimazole-betamethasone 1-0.05% (LOTRISONE) cream Apply topically.    doxycycline (VIBRAMYCIN) 100 MG Cap Take 100 mg by mouth 2 (two) times daily.    fluconazole (DIFLUCAN) 150 MG Tab Take by mouth.    GAVILYTE-G 236-22.74-6.74 -5.86 gram suspension SMARTSI Milliliter(s) By Mouth Once    hydrOXYzine HCL (ATARAX) 25 MG tablet Take 25 mg by mouth nightly as needed.    metformin (GLUCOPHAGE) 500 MG tablet     MOUNJARO 15 mg/0.5 mL PnIj     nystatin (MYCOSTATIN) powder     olmesartan (BENICAR) 20 MG tablet     pregabalin (LYRICA) 75 MG capsule Take 1 capsule (75 mg total) by mouth 2 (two) times daily.    SYNTHROID 150 mcg tablet TAKE 1 TABLET BY MOUTH ONCE DAILY ON AN EMPTY STOMACH IN THE MORNING FOR 90 DAYS    tirzepatide (MOUNJARO) 2.5 mg/0.5 mL PnIj Inject 1 mg into the skin  every 7 days.    triamterene-hydrochlorothiazide 37.5-25 mg (DYAZIDE) 37.5-25 mg per capsule     TRULICITY 4.5 mg/0.5 mL pen injector Inject 4.5 mg into the skin once a week.     No current facility-administered medications for this visit.       Review of patient's allergies indicates:   Allergen Reactions    Levothyroxine Swelling     GENERIC ONLY / PT CAN TAKE SYNTHROID, face/tongue swelling       Family History   Problem Relation Age of Onset    Breast cancer Mother 42         at 59yo from breast ca    Angioedema Daughter     Allergies Daughter     Asthma Maternal Uncle     Allergies Son     Allergic rhinitis Son     Eczema Son     Immunodeficiency Neg Hx     Ovarian cancer Neg Hx        Social History     Socioeconomic History    Marital status:    Tobacco Use    Smoking status: Never    Smokeless tobacco: Never   Substance and Sexual Activity    Alcohol use: Yes     Comment: seldom    Drug use: Never    Sexual activity: Not Currently     Partners: Male     Birth control/protection: See Surgical Hx     Social Determinants of Health     Financial Resource Strain: Unknown    Difficulty of Paying Living Expenses: Patient refused   Food Insecurity: No Food Insecurity    Worried About Running Out of Food in the Last Year: Never true    Ran Out of Food in the Last Year: Never true   Transportation Needs: No Transportation Needs    Lack of Transportation (Medical): No    Lack of Transportation (Non-Medical): No   Physical Activity: Unknown    Days of Exercise per Week: Patient refused    Minutes of Exercise per Session: Patient refused   Stress: Unknown    Feeling of Stress : Patient refused   Social Connections: Unknown    Frequency of Communication with Friends and Family: Patient refused    Frequency of Social Gatherings with Friends and Family: Patient refused    Attends [employer] Services: Patient refused    Active Member of Clubs or Organizations: Patient refused    Attends Club or Organization  Meetings: Patient refused    Marital Status:    Housing Stability: Unknown    Unable to Pay for Housing in the Last Year: No    Unstable Housing in the Last Year: No       Chief Complaint:   No chief complaint on file.      Date of surgery:  January 3, 2023    History of present illness:  68-year-old female underwent left robotic assisted total knee arthroplasty.  Patient is doing pretty well.  PT is going well.  Rates her pain is a 4/10.  She is ecstatic about how she is doing.  Right knee is starting to give her little more trouble.  Patient also has complaints of a new trigger finger that has been ongoing in her right ring finger for a few months now.      Review of Systems:    Musculoskeletal:  See HPI        Physical Examination:    Vital Signs:    There were no vitals filed for this visit.      There is no height or weight on file to calculate BMI.    This a well-developed, well nourished patient in no acute distress.  They are alert and oriented and cooperative to examination.  Pt. walks without an antalgic gait.      Examination left knee shows well-healed surgical incision.  No erythema drainage.  Mild swelling.  Good range of motion from 0-115 degrees already.  No calf pain.    Examination of the right knee shows no rashes or erythema. There are no masses ecchymosis or effusion. Patient has full range of motion from 0-130°. Patient is nontender to palpation over lateral joint line and moderately tender to palpation over the medial joint line. Patient has a - Lachman exam, - anterior drawer exam, and - posterior drawer exam. - Apley exam. Knee is stable to varus and valgus stress. 5 out of 5 motor strength. Palpable distal pulses. Intact light touch sensation. Negative Patellofemoral crepitus    Examination of the right hand and wrist shows no signs of rashes or erythema. Patient has no masses ecchymosis or effusions. Patient has full range of motion of the wrist in flexion and extension as well as  ulnar and radial deviation. The patient also has full range of motion of all joints in the hand. There are 2+ radial pulse and intact light touch sensation in all 5 digits. Nontender over the anatomic snuffbox.  Palpable knot and triggering over the A1 pulley of the ring finger        X-rays:  Four views of the left knee are  reviewed which show well-aligned total knee arthroplasty components without complication     Assessment::  Status post left Nasir Fabien CR total knee arthroplasty using cement  Right knee arthritis       Plan:  Injected her right knee with Synvisc 3 of 3.  Follow up in 6 weeks to 6 months    This note was created using [employer] voice recognition software that occasionally misinterpreted phrases or words.

## 2023-10-13 ENCOUNTER — PATIENT MESSAGE (OUTPATIENT)
Dept: ORTHOPEDICS | Facility: CLINIC | Age: 68
End: 2023-10-13
Payer: MEDICARE

## 2023-10-16 ENCOUNTER — OFFICE VISIT (OUTPATIENT)
Dept: ORTHOPEDICS | Facility: CLINIC | Age: 68
End: 2023-10-16
Payer: MEDICARE

## 2023-10-16 DIAGNOSIS — M17.10 ARTHRITIS OF KNEE: Primary | ICD-10-CM

## 2023-10-16 DIAGNOSIS — Z96.652 STATUS POST TOTAL LEFT KNEE REPLACEMENT USING CEMENT: ICD-10-CM

## 2023-10-16 PROCEDURE — 99999 PR PBB SHADOW E&M-EST. PATIENT-LVL I: ICD-10-PCS | Mod: PBBFAC,,, | Performed by: ORTHOPAEDIC SURGERY

## 2023-10-16 PROCEDURE — 20610 LARGE JOINT ASPIRATION/INJECTION: R KNEE: ICD-10-PCS | Mod: S$PBB,RT,, | Performed by: ORTHOPAEDIC SURGERY

## 2023-10-16 PROCEDURE — 99999 PR PBB SHADOW E&M-EST. PATIENT-LVL I: CPT | Mod: PBBFAC,,, | Performed by: ORTHOPAEDIC SURGERY

## 2023-10-16 PROCEDURE — 20610 DRAIN/INJ JOINT/BURSA W/O US: CPT | Mod: PBBFAC,PO,RT | Performed by: ORTHOPAEDIC SURGERY

## 2023-10-16 PROCEDURE — 99999PBSHW PR PBB SHADOW TECHNICAL ONLY FILED TO HB: ICD-10-PCS | Mod: PBBFAC,,,

## 2023-10-16 PROCEDURE — 99211 OFF/OP EST MAY X REQ PHY/QHP: CPT | Mod: PBBFAC,PO,25 | Performed by: ORTHOPAEDIC SURGERY

## 2023-10-16 PROCEDURE — 99999PBSHW PR PBB SHADOW TECHNICAL ONLY FILED TO HB: Mod: PBBFAC,,,

## 2023-10-16 PROCEDURE — 99214 OFFICE O/P EST MOD 30 MIN: CPT | Mod: 25,S$PBB,, | Performed by: ORTHOPAEDIC SURGERY

## 2023-10-16 PROCEDURE — 99214 PR OFFICE/OUTPT VISIT, EST, LEVL IV, 30-39 MIN: ICD-10-PCS | Mod: 25,S$PBB,, | Performed by: ORTHOPAEDIC SURGERY

## 2023-10-16 RX ORDER — TRIAMCINOLONE ACETONIDE 40 MG/ML
40 INJECTION, SUSPENSION INTRA-ARTICULAR; INTRAMUSCULAR
Status: DISCONTINUED | OUTPATIENT
Start: 2023-10-16 | End: 2023-10-16 | Stop reason: HOSPADM

## 2023-10-16 RX ADMIN — TRIAMCINOLONE ACETONIDE 40 MG: 40 INJECTION, SUSPENSION INTRA-ARTICULAR; INTRAMUSCULAR at 10:10

## 2023-10-16 NOTE — PROCEDURES
Large Joint Aspiration/Injection: R knee    Date/Time: 10/16/2023 10:00 AM    Performed by: Estevan Monteiro MD  Authorized by: Estevan Monteiro MD    Consent Done?:  Yes (Verbal)  Indications:  Pain  Site marked: the procedure site was marked    Timeout: prior to procedure the correct patient, procedure, and site was verified    Local anesthetic: Ropivicaine.  Anesthetic total (ml):  3      Details:  Needle Size:  20 G  Approach:  Anterolateral  Location:  Knee  Site:  R knee  Medications:  40 mg triamcinolone acetonide 40 mg/mL  Patient tolerance:  Patient tolerated the procedure well with no immediate complications

## 2023-10-16 NOTE — PROGRESS NOTES
Past Medical History:   Diagnosis Date    Angio-edema     Asthma     Diabetes mellitus     Eczema     Obesity     PONV (postoperative nausea and vomiting)     Urticaria        Past Surgical History:   Procedure Laterality Date    ADENOIDECTOMY      BONY PELVIS SURGERY       SECTION      X 2    FIXATION KYPHOPLASTY N/A 2018    Procedure: Kyphoplasty;  Surgeon: Martinez Morejon MD;  Location: [facility] OR;  Service: Pain Management;  Laterality: N/A;  L1     HYSTERECTOMY      KNEE CARTILAGE SURGERY Left     ROBOTIC ARTHROPLASTY, KNEE Left 1/3/2023    Procedure: ROBOTIC ARTHROPLASTY, KNEE, TOTAL;  Surgeon: Estevan Monteiro MD;  Location: [facility] OR;  Service: Orthopedics;  Laterality: Left;    TONSILLECTOMY      VAGINOPLASTY         Current Outpatient Medications   Medication Sig    acyclovir (ZOVIRAX) 400 MG tablet Take 400 mg by mouth every 8 (eight) hours.    aspirin (ECOTRIN) 81 MG EC tablet Take 1 tablet (81 mg total) by mouth 2 (two) times a day.    atorvastatin (LIPITOR) 10 MG tablet Take 10 mg by mouth once daily.    clobetasol 0.05% (TEMOVATE) 0.05 % Oint Apply sparingly to rash bid x 2-3 weeks.  Avoid face and groin.    clotrimazole-betamethasone 1-0.05% (LOTRISONE) cream Apply topically.    doxycycline (VIBRAMYCIN) 100 MG Cap Take 100 mg by mouth 2 (two) times daily.    fluconazole (DIFLUCAN) 150 MG Tab Take by mouth.    GAVILYTE-G 236-22.74-6.74 -5.86 gram suspension SMARTSI Milliliter(s) By Mouth Once    hydrOXYzine HCL (ATARAX) 25 MG tablet Take 25 mg by mouth nightly as needed.    metformin (GLUCOPHAGE) 500 MG tablet     MOUNJARO 15 mg/0.5 mL PnIj     nystatin (MYCOSTATIN) powder     olmesartan (BENICAR) 20 MG tablet     pregabalin (LYRICA) 75 MG capsule Take 1 capsule (75 mg total) by mouth 2 (two) times daily.    SYNTHROID 150 mcg tablet TAKE 1 TABLET BY MOUTH ONCE DAILY ON AN EMPTY STOMACH IN THE MORNING FOR 90 DAYS    tirzepatide (MOUNJARO) 2.5 mg/0.5 mL PnIj Inject 1 mg into the skin  every 7 days.    triamterene-hydrochlorothiazide 37.5-25 mg (DYAZIDE) 37.5-25 mg per capsule     TRULICITY 4.5 mg/0.5 mL pen injector Inject 4.5 mg into the skin once a week.     No current facility-administered medications for this visit.       Review of patient's allergies indicates:   Allergen Reactions    Levothyroxine Swelling     GENERIC ONLY / PT CAN TAKE SYNTHROID, face/tongue swelling       Family History   Problem Relation Age of Onset    Breast cancer Mother 42         at 59yo from breast ca    Angioedema Daughter     Allergies Daughter     Asthma Maternal Uncle     Allergies Son     Allergic rhinitis Son     Eczema Son     Immunodeficiency Neg Hx     Ovarian cancer Neg Hx        Social History     Socioeconomic History    Marital status:    Tobacco Use    Smoking status: Never    Smokeless tobacco: Never   Substance and Sexual Activity    Alcohol use: Yes     Comment: seldom    Drug use: Never    Sexual activity: Not Currently     Partners: Male     Birth control/protection: See Surgical Hx     Social Determinants of Health     Financial Resource Strain: Unknown (2023)    Overall Financial Resource Strain (CARDIA)     Difficulty of Paying Living Expenses: Patient refused   Food Insecurity: No Food Insecurity (2023)    Hunger Vital Sign     Worried About Running Out of Food in the Last Year: Never true     Ran Out of Food in the Last Year: Never true   Transportation Needs: No Transportation Needs (2023)    PRAPARE - Transportation     Lack of Transportation (Medical): No     Lack of Transportation (Non-Medical): No   Physical Activity: Unknown (2023)    Exercise Vital Sign     Days of Exercise per Week: Patient refused     Minutes of Exercise per Session: Patient refused   Stress: Unknown (2023)    [employer] [place] [employer] - Occupational Stress Questionnaire     Feeling of Stress : Patient refused   Social Connections: Unknown (2023)    Social Connection  and Isolation Panel [NHANES]     Frequency of Communication with Friends and Family: Patient refused     Frequency of Social Gatherings with Friends and Family: Patient refused     Attends Faith Services: Patient refused     Active Member of Clubs or Organizations: Patient refused     Attends Club or Organization Meetings: Patient refused     Marital Status:    Housing Stability: Unknown (1/4/2023)    Housing Stability Vital Sign     Unable to Pay for Housing in the Last Year: No     Unstable Housing in the Last Year: No       Chief Complaint:   No chief complaint on file.      Date of surgery:  January 3, 2023    History of present illness:  68-year-old female underwent left robotic assisted total knee arthroplasty.  Right knee is starting to give her little more trouble.  Had Synvisc back in July.      Review of Systems:    Musculoskeletal:  See HPI        Physical Examination:    Vital Signs:    There were no vitals filed for this visit.      There is no height or weight on file to calculate BMI.    This a well-developed, well nourished patient in no acute distress.  They are alert and oriented and cooperative to examination.  Pt. walks without an antalgic gait.      Examination left knee shows well-healed surgical incision.  No erythema drainage.  Mild swelling.  Good range of motion from 0-115 degrees already.  No calf pain.    Examination of the right knee shows no rashes or erythema. There are no masses ecchymosis or effusion. Patient has full range of motion from 0-130°. Patient is nontender to palpation over lateral joint line and moderately tender to palpation over the medial joint line.  Knee is stable to varus and valgus stress. 5 out of 5 motor strength. Palpable distal pulses. Intact light touch sensation. Negative Patellofemoral crepitus        X-rays:  Four views of the left knee are  reviewed which show well-aligned left total knee arthroplasty components without complication      Assessment::  Status post left [place] Fabien CR total knee arthroplasty using cement  Right knee arthritis       Plan:  Reviewed the findings with her today.  Talked about treatment options for the right knee.  Patient is about to go on vacation would like another cortisone injection.    This note was created using popexpert voice recognition software that occasionally misinterpreted phrases or words.

## 2023-10-29 ENCOUNTER — PATIENT MESSAGE (OUTPATIENT)
Dept: ORTHOPEDICS | Facility: CLINIC | Age: 68
End: 2023-10-29
Payer: MEDICARE

## 2023-10-30 ENCOUNTER — TELEPHONE (OUTPATIENT)
Dept: ORTHOPEDICS | Facility: CLINIC | Age: 68
End: 2023-10-30
Payer: MEDICARE

## 2023-10-30 NOTE — TELEPHONE ENCOUNTER
----- Message from Martinze Fam sent at 10/30/2023 10:14 AM CDT -----  Regarding: fell on TKA, swelling and pain, rhea coleman, call pt   Contact: pt   fell on TKA, swelling and pain, rhea coleman, call pt

## 2023-10-31 DIAGNOSIS — M25.522 LEFT ELBOW PAIN: ICD-10-CM

## 2023-10-31 DIAGNOSIS — M17.10 ARTHRITIS OF KNEE: Primary | ICD-10-CM

## 2023-11-09 ENCOUNTER — HOSPITAL ENCOUNTER (OUTPATIENT)
Dept: RADIOLOGY | Facility: HOSPITAL | Age: 68
Discharge: HOME | End: 2023-11-09
Attending: ORTHOPAEDIC SURGERY
Payer: MEDICARE

## 2023-11-09 ENCOUNTER — OFFICE VISIT (OUTPATIENT)
Dept: ORTHOPEDICS | Facility: CLINIC | Age: 68
End: 2023-11-09
Payer: MEDICARE

## 2023-11-09 VITALS — RESPIRATION RATE: 18 BRPM | BODY MASS INDEX: 42.06 KG/M2 | HEIGHT: 67 IN | WEIGHT: 268 LBS

## 2023-11-09 DIAGNOSIS — Z96.652 STATUS POST TOTAL LEFT KNEE REPLACEMENT USING CEMENT: ICD-10-CM

## 2023-11-09 DIAGNOSIS — M25.522 LEFT ELBOW PAIN: Primary | ICD-10-CM

## 2023-11-09 DIAGNOSIS — M17.10 ARTHRITIS OF KNEE: ICD-10-CM

## 2023-11-09 DIAGNOSIS — M25.522 LEFT ELBOW PAIN: ICD-10-CM

## 2023-11-09 PROCEDURE — 99214 OFFICE O/P EST MOD 30 MIN: CPT | Mod: S$PBB,,, | Performed by: ORTHOPAEDIC SURGERY

## 2023-11-09 PROCEDURE — 99214 PR OFFICE/OUTPT VISIT, EST, LEVL IV, 30-39 MIN: ICD-10-PCS | Mod: S$PBB,,, | Performed by: ORTHOPAEDIC SURGERY

## 2023-11-09 PROCEDURE — 99999 PR PBB SHADOW E&M-EST. PATIENT-LVL III: CPT | Mod: PBBFAC,,, | Performed by: ORTHOPAEDIC SURGERY

## 2023-11-09 PROCEDURE — 99999 PR PBB SHADOW E&M-EST. PATIENT-LVL III: ICD-10-PCS | Mod: PBBFAC,,, | Performed by: ORTHOPAEDIC SURGERY

## 2023-11-09 PROCEDURE — 73080 XR ELBOW COMPLETE 3 VIEW LEFT: ICD-10-PCS | Mod: 26,LT,, | Performed by: RADIOLOGY

## 2023-11-09 PROCEDURE — 73080 X-RAY EXAM OF ELBOW: CPT | Mod: TC,PO,LT

## 2023-11-09 PROCEDURE — 73080 X-RAY EXAM OF ELBOW: CPT | Mod: 26,LT,, | Performed by: RADIOLOGY

## 2023-11-09 PROCEDURE — 99213 OFFICE O/P EST LOW 20 MIN: CPT | Mod: PBBFAC,PO | Performed by: ORTHOPAEDIC SURGERY

## 2023-11-09 PROCEDURE — 73564 XR KNEE ORTHO BILAT WITH FLEXION: ICD-10-PCS | Mod: 26,50,, | Performed by: RADIOLOGY

## 2023-11-09 PROCEDURE — 73564 X-RAY EXAM KNEE 4 OR MORE: CPT | Mod: 26,50,, | Performed by: RADIOLOGY

## 2023-11-09 PROCEDURE — 73564 X-RAY EXAM KNEE 4 OR MORE: CPT | Mod: TC,50,PO

## 2023-11-09 NOTE — PROGRESS NOTES
Past Medical History:   Diagnosis Date    Angio-edema     Asthma     Diabetes mellitus     Eczema     Obesity     PONV (postoperative nausea and vomiting)     Urticaria        Past Surgical History:   Procedure Laterality Date    ADENOIDECTOMY      BONY PELVIS SURGERY       SECTION      X 2    FIXATION KYPHOPLASTY N/A 2018    Procedure: Kyphoplasty;  Surgeon: Martinez Morejon MD;  Location: [facility] OR;  Service: Pain Management;  Laterality: N/A;  L1     HYSTERECTOMY      KNEE CARTILAGE SURGERY Left     ROBOTIC ARTHROPLASTY, KNEE Left 1/3/2023    Procedure: ROBOTIC ARTHROPLASTY, KNEE, TOTAL;  Surgeon: Estevan Monteiro MD;  Location: [facility] OR;  Service: Orthopedics;  Laterality: Left;    TONSILLECTOMY      VAGINOPLASTY         Current Outpatient Medications   Medication Sig    acyclovir (ZOVIRAX) 400 MG tablet Take 400 mg by mouth every 8 (eight) hours.    atorvastatin (LIPITOR) 10 MG tablet Take 10 mg by mouth once daily.    clobetasol 0.05% (TEMOVATE) 0.05 % Oint Apply sparingly to rash bid x 2-3 weeks.  Avoid face and groin.    clotrimazole-betamethasone 1-0.05% (LOTRISONE) cream Apply topically.    doxycycline (VIBRAMYCIN) 100 MG Cap Take 100 mg by mouth 2 (two) times daily.    fluconazole (DIFLUCAN) 150 MG Tab Take by mouth.    GAVILYTE-G 236-22.74-6.74 -5.86 gram suspension SMARTSI Milliliter(s) By Mouth Once    hydrOXYzine HCL (ATARAX) 25 MG tablet Take 25 mg by mouth nightly as needed.    metformin (GLUCOPHAGE) 500 MG tablet     MOUNJARO 15 mg/0.5 mL PnIj     nystatin (MYCOSTATIN) powder     olmesartan (BENICAR) 20 MG tablet     SYNTHROID 150 mcg tablet TAKE 1 TABLET BY MOUTH ONCE DAILY ON AN EMPTY STOMACH IN THE MORNING FOR 90 DAYS    tirzepatide (MOUNJARO) 2.5 mg/0.5 mL PnIj Inject 1 mg into the skin every 7 days.    triamterene-hydrochlorothiazide 37.5-25 mg (DYAZIDE) 37.5-25 mg per capsule     TRULICITY 4.5 mg/0.5 mL pen injector Inject 4.5 mg into the skin once a week.    aspirin  (ECOTRIN) 81 MG EC tablet Take 1 tablet (81 mg total) by mouth 2 (two) times a day.    pregabalin (LYRICA) 75 MG capsule Take 1 capsule (75 mg total) by mouth 2 (two) times daily.     No current facility-administered medications for this visit.       Review of patient's allergies indicates:   Allergen Reactions    Levothyroxine Swelling     GENERIC ONLY / PT CAN TAKE SYNTHROID, face/tongue swelling       Family History   Problem Relation Age of Onset    Breast cancer Mother 42         at 61yo from breast ca    Angioedema Daughter     Allergies Daughter     Asthma Maternal Uncle     Allergies Son     Allergic rhinitis Son     Eczema Son     Immunodeficiency Neg Hx     Ovarian cancer Neg Hx        Social History     Socioeconomic History    Marital status:    Tobacco Use    Smoking status: Never    Smokeless tobacco: Never   Substance and Sexual Activity    Alcohol use: Yes     Comment: seldom    Drug use: Never    Sexual activity: Not Currently     Partners: Male     Birth control/protection: See Surgical Hx     Social Determinants of Health     Financial Resource Strain: Unknown (2023)    Overall Financial Resource Strain (CARDIA)     Difficulty of Paying Living Expenses: Patient refused   Food Insecurity: No Food Insecurity (2023)    Hunger Vital Sign     Worried About Running Out of Food in the Last Year: Never true     Ran Out of Food in the Last Year: Never true   Transportation Needs: No Transportation Needs (2023)    PRAPARE - Transportation     Lack of Transportation (Medical): No     Lack of Transportation (Non-Medical): No   Physical Activity: Unknown (2023)    Exercise Vital Sign     Days of Exercise per Week: Patient refused     Minutes of Exercise per Session: Patient refused   Stress: Unknown (2023)    [employer] [employer]ational [employer] - Occupational Stress Questionnaire     Feeling of Stress : Patient refused   Social Connections: Unknown (2023)    Social  Connection and Isolation Panel [NHANES]     Frequency of Communication with Friends and Family: Patient refused     Frequency of Social Gatherings with Friends and Family: Patient refused     Attends Gnosticism Services: Patient refused     Active Member of Clubs or Organizations: Patient refused     Attends Club or Organization Meetings: Patient refused     Marital Status:    Housing Stability: Unknown (1/4/2023)    Housing Stability Vital Sign     Unable to Pay for Housing in the Last Year: No     Unstable Housing in the Last Year: No       Chief Complaint:   Chief Complaint   Patient presents with    Follow-up     Follow up post left knee TKA, fall on 10/26/23       Date of surgery:  January 3, 2023    History of present illness:  68-year-old female underwent left robotic assisted total knee arthroplasty.  She was doing well until she fell while on a cruise on January 3rd.  She fell onto her left knee.  Just wants to get that checked out.  Also has some left elbow pain.  Pain in the elbow is a 4/10 when she rests it on a table.  Knee does not have any pain.      Review of Systems:    Musculoskeletal:  See HPI        Physical Examination:    Vital Signs:    Vitals:    11/09/23 0957   Resp: 18         Body mass index is 41.97 kg/m².    This a well-developed, well nourished patient in no acute distress.  They are alert and oriented and cooperative to examination.  Pt. walks without an antalgic gait.      Examination left knee shows well-healed surgical incision.  No erythema drainage.  Mild swelling.  Good range of motion from 0-115 degrees     Examination of the left elbow shows no signs of rashes or erythema. The patient has no masses, ecchymosis, or effusion. The patient has full range of motion from 0-160°. Patient has full pronation and supination. Patient is nontender along the medial epicondyle and nontender over the lateral epicondyle. Nontender over the olecranon process.  Nontender along the course  of the UCL. Patient has a negative valgus stress test and milking maneuver. Negative Tinel's sign over the cubital tunnel. 2+ radial pulse. Intact light touch sensation.         X-rays:  Four views of the left knee are  ordered and reviewed which show well-aligned left total knee arthroplasty components without complication.    X-rays left elbow are ordered and reviewed which show no acute fracture     Assessment::  Status post left Nasir Fabien CR total knee arthroplasty using cement  Right knee arthritis   Left elbow contusion      Plan:  Reviewed the findings with her today.  I do not see anything concerning.  Patient did not miss anything up in her knee and likely just has not elbow contusion.  Follow-up at her previously scheduled appointments.    This note was created using M Modal voice recognition software that occasionally misinterpreted phrases or words.

## 2023-12-15 DIAGNOSIS — Z12.31 VISIT FOR SCREENING MAMMOGRAM: Primary | ICD-10-CM

## 2023-12-19 ENCOUNTER — PATIENT MESSAGE (OUTPATIENT)
Dept: OBSTETRICS AND GYNECOLOGY | Facility: CLINIC | Age: 68
End: 2023-12-19
Payer: MEDICARE

## 2023-12-19 ENCOUNTER — HOSPITAL ENCOUNTER (OUTPATIENT)
Dept: RADIOLOGY | Facility: HOSPITAL | Age: 68
Discharge: HOME | End: 2023-12-19
Attending: OBSTETRICS & GYNECOLOGY
Payer: MEDICARE

## 2023-12-19 DIAGNOSIS — Z12.31 VISIT FOR SCREENING MAMMOGRAM: ICD-10-CM

## 2023-12-19 PROCEDURE — 77067 SCR MAMMO BI INCL CAD: CPT | Mod: TC,PN

## 2023-12-19 PROCEDURE — 77063 BREAST TOMOSYNTHESIS BI: CPT | Mod: 26,,, | Performed by: RADIOLOGY

## 2023-12-19 PROCEDURE — 77063 MAMMO DIGITAL SCREENING BILAT WITH TOMO: ICD-10-PCS | Mod: 26,,, | Performed by: RADIOLOGY

## 2023-12-19 PROCEDURE — 77067 SCR MAMMO BI INCL CAD: CPT | Mod: 26,,, | Performed by: RADIOLOGY

## 2023-12-19 PROCEDURE — 77067 MAMMO DIGITAL SCREENING BILAT WITH TOMO: ICD-10-PCS | Mod: 26,,, | Performed by: RADIOLOGY

## 2024-01-03 DIAGNOSIS — Z96.652 STATUS POST TOTAL LEFT KNEE REPLACEMENT USING CEMENT: Primary | ICD-10-CM

## 2024-03-05 ENCOUNTER — PATIENT MESSAGE (OUTPATIENT)
Dept: ORTHOPEDICS | Facility: CLINIC | Age: 69
End: 2024-03-05
Payer: MEDICARE

## 2024-03-18 ENCOUNTER — OFFICE VISIT (OUTPATIENT)
Dept: ORTHOPEDICS | Facility: CLINIC | Age: 69
End: 2024-03-18
Payer: MEDICARE

## 2024-03-18 VITALS — BODY MASS INDEX: 42.07 KG/M2 | WEIGHT: 268.06 LBS | HEIGHT: 67 IN

## 2024-03-18 DIAGNOSIS — Z96.652 STATUS POST TOTAL LEFT KNEE REPLACEMENT USING CEMENT: Primary | ICD-10-CM

## 2024-03-18 DIAGNOSIS — M17.10 ARTHRITIS OF KNEE: ICD-10-CM

## 2024-03-18 PROCEDURE — 99999PBSHW PR PBB SHADOW TECHNICAL ONLY FILED TO HB: Mod: JZ,PBBFAC,,

## 2024-03-18 PROCEDURE — 99999 PR PBB SHADOW E&M-EST. PATIENT-LVL II: CPT | Mod: PBBFAC,,, | Performed by: ORTHOPAEDIC SURGERY

## 2024-03-18 PROCEDURE — 99214 OFFICE O/P EST MOD 30 MIN: CPT | Mod: 25,S$PBB,, | Performed by: ORTHOPAEDIC SURGERY

## 2024-03-18 PROCEDURE — 99212 OFFICE O/P EST SF 10 MIN: CPT | Mod: PBBFAC,PO | Performed by: ORTHOPAEDIC SURGERY

## 2024-03-18 RX ADMIN — Medication 16 MG: at 01:03

## 2024-03-18 NOTE — PROCEDURES
Large Joint Aspiration/Injection: R knee joint    Date/Time: 3/18/2024 1:45 PM    Performed by: Estevan Monteiro MD  Authorized by: Esteavn Monteiro MD    Consent Done?:  Yes (Verbal)  Indications:  Pain  Site marked: the procedure site was marked    Timeout: prior to procedure the correct patient, procedure, and site was verified      Details:  Needle Size:  20 G  Approach:  Anterolateral  Location:  Knee  Site:  R knee joint  Medications:  16 mg hyaluronate 16 mg/2 mL  Patient tolerance:  Patient tolerated the procedure well with no immediate complications

## 2024-03-18 NOTE — PROGRESS NOTES
Past Medical History:   Diagnosis Date    Angio-edema     Asthma     Diabetes mellitus     Eczema     Obesity     PONV (postoperative nausea and vomiting)     Urticaria        Past Surgical History:   Procedure Laterality Date    ADENOIDECTOMY      BONY PELVIS SURGERY       SECTION      X 2    FIXATION KYPHOPLASTY N/A 2018    Procedure: Kyphoplasty;  Surgeon: Martinez Morejon MD;  Location: [facility] OR;  Service: Pain Management;  Laterality: N/A;  L1     HYSTERECTOMY      KNEE CARTILAGE SURGERY Left     ROBOTIC ARTHROPLASTY, KNEE Left 1/3/2023    Procedure: ROBOTIC ARTHROPLASTY, KNEE, TOTAL;  Surgeon: Estevan Monteiro MD;  Location: [facility] OR;  Service: Orthopedics;  Laterality: Left;    TONSILLECTOMY      VAGINOPLASTY         Current Outpatient Medications   Medication Sig    acyclovir (ZOVIRAX) 400 MG tablet Take 400 mg by mouth every 8 (eight) hours.    aspirin (ECOTRIN) 81 MG EC tablet Take 1 tablet (81 mg total) by mouth 2 (two) times a day.    atorvastatin (LIPITOR) 10 MG tablet Take 10 mg by mouth once daily.    clobetasol 0.05% (TEMOVATE) 0.05 % Oint Apply sparingly to rash bid x 2-3 weeks.  Avoid face and groin.    clotrimazole-betamethasone 1-0.05% (LOTRISONE) cream Apply topically.    doxycycline (VIBRAMYCIN) 100 MG Cap Take 100 mg by mouth 2 (two) times daily.    fluconazole (DIFLUCAN) 150 MG Tab Take by mouth.    GAVILYTE-G 236-22.74-6.74 -5.86 gram suspension SMARTSI Milliliter(s) By Mouth Once    hydrOXYzine HCL (ATARAX) 25 MG tablet Take 25 mg by mouth nightly as needed.    metformin (GLUCOPHAGE) 500 MG tablet     MOUNJARO 15 mg/0.5 mL PnIj     nystatin (MYCOSTATIN) powder     olmesartan (BENICAR) 20 MG tablet     pregabalin (LYRICA) 75 MG capsule Take 1 capsule (75 mg total) by mouth 2 (two) times daily.    SYNTHROID 150 mcg tablet TAKE 1 TABLET BY MOUTH ONCE DAILY ON AN EMPTY STOMACH IN THE MORNING FOR 90 DAYS    tirzepatide (MOUNJARO) 2.5 mg/0.5 mL PnIj Inject 1 mg into the skin  every 7 days.    triamterene-hydrochlorothiazide 37.5-25 mg (DYAZIDE) 37.5-25 mg per capsule     TRULICITY 4.5 mg/0.5 mL pen injector Inject 4.5 mg into the skin once a week.     No current facility-administered medications for this visit.       Review of patient's allergies indicates:   Allergen Reactions    Levothyroxine Swelling     GENERIC ONLY / PT CAN TAKE SYNTHROID, face/tongue swelling       Family History   Problem Relation Age of Onset    Breast cancer Mother 42         at 59yo from breast ca    Angioedema Daughter     Allergies Daughter     Asthma Maternal Uncle     Allergies Son     Allergic rhinitis Son     Eczema Son     Immunodeficiency Neg Hx     Ovarian cancer Neg Hx        Social History     Socioeconomic History    Marital status:    Tobacco Use    Smoking status: Never    Smokeless tobacco: Never   Substance and Sexual Activity    Alcohol use: Yes     Comment: seldom    Drug use: Never    Sexual activity: Not Currently     Partners: Male     Birth control/protection: See Surgical Hx     Social Determinants of Health     Financial Resource Strain: Patient Declined (2023)    Overall Financial Resource Strain (CARDIA)     Difficulty of Paying Living Expenses: Patient declined   Food Insecurity: No Food Insecurity (2023)    Hunger Vital Sign     Worried About Running Out of Food in the Last Year: Never true     Ran Out of Food in the Last Year: Never true   Transportation Needs: No Transportation Needs (2023)    PRAPARE - Transportation     Lack of Transportation (Medical): No     Lack of Transportation (Non-Medical): No   Physical Activity: Patient Declined (2023)    Exercise Vital Sign     Days of Exercise per Week: Patient declined     Minutes of Exercise per Session: Patient declined   Stress: Patient Declined (2023)    [place] [employer] - Occupational Stress Questionnaire     Feeling of Stress : Patient declined   Social Connections: Unknown  (1/4/2023)    Social Connection and Isolation Panel [NHANES]     Frequency of Communication with Friends and Family: Patient declined     Frequency of Social Gatherings with Friends and Family: Patient declined     Attends Tenriism Services: Patient declined     Active Member of Clubs or Organizations: Patient declined     Attends Club or Organization Meetings: Patient declined     Marital Status:    Housing Stability: Unknown (1/4/2023)    Housing Stability Vital Sign     Unable to Pay for Housing in the Last Year: No     Unstable Housing in the Last Year: No       Chief Complaint:   No chief complaint on file.      Date of surgery:  January 3, 2023    History of present illness:  69-year-old female underwent left robotic assisted total knee arthroplasty.  She was doing well until she fell while on a cruise on January 3rd.  She fell onto her left knee.  Having more pain in the right knee.  Has not had anything done in quite a while.  Has done well with viscosupplementation in the past.      Review of Systems:    Musculoskeletal:  See HPI        Physical Examination:    Vital Signs:    There were no vitals filed for this visit.        There is no height or weight on file to calculate BMI.    This a well-developed, well nourished patient in no acute distress.  They are alert and oriented and cooperative to examination.  Pt. walks without an antalgic gait.      Examination left knee shows well-healed surgical incision.  No erythema drainage.  Mild swelling.  Good range of motion from 0-115 degrees     Examination of the right knee shows no rashes or erythema. There are no masses ecchymosis or effusion. Patient has full range of motion from 0-130°. Patient is moderately tender to palpation over lateral joint line and nontender to palpation over the medial joint line. Patient has a - Lachman exam, - anterior drawer exam, and - posterior drawer exam. - Apley exam. Knee is stable to varus and valgus stress. 5 out  of 5 motor strength. Palpable distal pulses. Intact light touch sensation. Negative Patellofemoral crepitus        X-rays:  Four views of the left knee are  reviewed which show well-aligned left total knee arthroplasty components without complication.  Severe lateral compartment narrowing of the right knee.  X-rays left elbow are reviewed which show no acute fracture     Assessment::  Status post left [place] Fabien CR total knee arthroplasty using cement  Right valgus knee arthritis         Plan:  Reviewed the findings with her today.  I agreed to inject her right knee with Synvisc 1 of 3.  Follow up next week also made her appointment with Dr. Teixeira for some bunions and toe issues    This note was created using M Modal voice recognition software that occasionally misinterpreted phrases or words.

## 2024-03-19 DIAGNOSIS — M79.672 PAIN IN BOTH FEET: Primary | ICD-10-CM

## 2024-03-19 DIAGNOSIS — M79.671 PAIN IN BOTH FEET: Primary | ICD-10-CM

## 2024-03-25 ENCOUNTER — OFFICE VISIT (OUTPATIENT)
Dept: ORTHOPEDICS | Facility: CLINIC | Age: 69
End: 2024-03-25
Payer: MEDICARE

## 2024-03-25 DIAGNOSIS — M17.10 ARTHRITIS OF KNEE: Primary | ICD-10-CM

## 2024-03-25 PROCEDURE — 99999 PR PBB SHADOW E&M-EST. PATIENT-LVL I: CPT | Mod: PBBFAC,,, | Performed by: ORTHOPAEDIC SURGERY

## 2024-03-25 PROCEDURE — 99999PBSHW PR PBB SHADOW TECHNICAL ONLY FILED TO HB: Mod: JZ,PBBFAC,,

## 2024-03-25 PROCEDURE — 99211 OFF/OP EST MAY X REQ PHY/QHP: CPT | Mod: PBBFAC,PO | Performed by: ORTHOPAEDIC SURGERY

## 2024-03-25 PROCEDURE — 99499 UNLISTED E&M SERVICE: CPT | Mod: 25,S$PBB,, | Performed by: ORTHOPAEDIC SURGERY

## 2024-03-25 RX ADMIN — Medication 16 MG: at 10:03

## 2024-03-25 NOTE — PROCEDURES
Large Joint Aspiration/Injection: R knee joint    Date/Time: 3/25/2024 10:30 AM    Performed by: Estevan Monteiro MD  Authorized by: Estevan Monteiro MD    Consent Done?:  Yes (Verbal)  Indications:  Pain  Site marked: the procedure site was marked    Timeout: prior to procedure the correct patient, procedure, and site was verified      Details:  Needle Size:  20 G  Approach:  Anterolateral  Location:  Knee  Site:  R knee joint  Medications:  16 mg hyaluronate 16 mg/2 mL  Patient tolerance:  Patient tolerated the procedure well with no immediate complications

## 2024-03-25 NOTE — PROGRESS NOTES
Past Medical History:   Diagnosis Date    Angio-edema     Asthma     Diabetes mellitus     Eczema     Obesity     PONV (postoperative nausea and vomiting)     Urticaria        Past Surgical History:   Procedure Laterality Date    ADENOIDECTOMY      BONY PELVIS SURGERY       SECTION      X 2    FIXATION KYPHOPLASTY N/A 2018    Procedure: Kyphoplasty;  Surgeon: Martinez Morejon MD;  Location: [facility] OR;  Service: Pain Management;  Laterality: N/A;  L1     HYSTERECTOMY      KNEE CARTILAGE SURGERY Left     ROBOTIC ARTHROPLASTY, KNEE Left 1/3/2023    Procedure: ROBOTIC ARTHROPLASTY, KNEE, TOTAL;  Surgeon: Estevan Monteiro MD;  Location: [facility] OR;  Service: Orthopedics;  Laterality: Left;    TONSILLECTOMY      VAGINOPLASTY         Current Outpatient Medications   Medication Sig    acyclovir (ZOVIRAX) 400 MG tablet Take 400 mg by mouth every 8 (eight) hours.    aspirin (ECOTRIN) 81 MG EC tablet Take 1 tablet (81 mg total) by mouth 2 (two) times a day.    atorvastatin (LIPITOR) 10 MG tablet Take 10 mg by mouth once daily.    clobetasol 0.05% (TEMOVATE) 0.05 % Oint Apply sparingly to rash bid x 2-3 weeks.  Avoid face and groin.    clotrimazole-betamethasone 1-0.05% (LOTRISONE) cream Apply topically.    doxycycline (VIBRAMYCIN) 100 MG Cap Take 100 mg by mouth 2 (two) times daily.    fluconazole (DIFLUCAN) 150 MG Tab Take by mouth.    GAVILYTE-G 236-22.74-6.74 -5.86 gram suspension SMARTSI Milliliter(s) By Mouth Once    hydrOXYzine HCL (ATARAX) 25 MG tablet Take 25 mg by mouth nightly as needed.    metformin (GLUCOPHAGE) 500 MG tablet     MOUNJARO 15 mg/0.5 mL PnIj     nystatin (MYCOSTATIN) powder     olmesartan (BENICAR) 20 MG tablet     pregabalin (LYRICA) 75 MG capsule Take 1 capsule (75 mg total) by mouth 2 (two) times daily.    SYNTHROID 150 mcg tablet TAKE 1 TABLET BY MOUTH ONCE DAILY ON AN EMPTY STOMACH IN THE MORNING FOR 90 DAYS    tirzepatide (MOUNJARO) 2.5 mg/0.5 mL PnIj Inject 1 mg into the skin  every 7 days.    triamterene-hydrochlorothiazide 37.5-25 mg (DYAZIDE) 37.5-25 mg per capsule     TRULICITY 4.5 mg/0.5 mL pen injector Inject 4.5 mg into the skin once a week.     No current facility-administered medications for this visit.       Review of patient's allergies indicates:   Allergen Reactions    Levothyroxine Swelling     GENERIC ONLY / PT CAN TAKE SYNTHROID, face/tongue swelling       Family History   Problem Relation Age of Onset    Breast cancer Mother 42         at 61yo from breast ca    Angioedema Daughter     Allergies Daughter     Asthma Maternal Uncle     Allergies Son     Allergic rhinitis Son     Eczema Son     Immunodeficiency Neg Hx     Ovarian cancer Neg Hx        Social History     Socioeconomic History    Marital status:    Tobacco Use    Smoking status: Never    Smokeless tobacco: Never   Substance and Sexual Activity    Alcohol use: Yes     Comment: seldom    Drug use: Never    Sexual activity: Not Currently     Partners: Male     Birth control/protection: See Surgical Hx     Social Determinants of Health     Financial Resource Strain: Patient Declined (2023)    Overall Financial Resource Strain (CARDIA)     Difficulty of Paying Living Expenses: Patient declined   Food Insecurity: No Food Insecurity (2023)    Hunger Vital Sign     Worried About Running Out of Food in the Last Year: Never true     Ran Out of Food in the Last Year: Never true   Transportation Needs: No Transportation Needs (2023)    PRAPARE - Transportation     Lack of Transportation (Medical): No     Lack of Transportation (Non-Medical): No   Physical Activity: Patient Declined (2023)    Exercise Vital Sign     Days of Exercise per Week: Patient declined     Minutes of Exercise per Session: Patient declined   Stress: Patient Declined (2023)    [employer] - Occupational Stress Questionnaire     Feeling of Stress : Patient declined   Social Connections: Unknown  (1/4/2023)    Social Connection and Isolation Panel [NHANES]     Frequency of Communication with Friends and Family: Patient declined     Frequency of Social Gatherings with Friends and Family: Patient declined     Attends [employer] Services: Patient declined     Active Member of Clubs or Organizations: Patient declined     Attends Club or Organization Meetings: Patient declined     Marital Status:    Housing Stability: Unknown (1/4/2023)    Housing Stability Vital Sign     Unable to Pay for Housing in the Last Year: No     Unstable Housing in the Last Year: No       Chief Complaint:   No chief complaint on file.      Date of surgery:  January 3, 2023    History of present illness:  69-year-old female underwent left robotic assisted total knee arthroplasty.  She was doing well until she fell while on a cruise on January 3rd.  She fell onto her left knee.  Having more pain in the right knee.  Has not had anything done in quite a while.  Has done well with viscosupplementation in the past.      Review of Systems:    Musculoskeletal:  See HPI        Physical Examination:    Vital Signs:    There were no vitals filed for this visit.        There is no height or weight on file to calculate BMI.    This a well-developed, well nourished patient in no acute distress.  They are alert and oriented and cooperative to examination.  Pt. walks without an antalgic gait.      Examination left knee shows well-healed surgical incision.  No erythema drainage.  Mild swelling.  Good range of motion from 0-115 degrees     Examination of the right knee shows no rashes or erythema. There are no masses ecchymosis or effusion. Patient has full range of motion from 0-130°. Patient is moderately tender to palpation over lateral joint line and nontender to palpation over the medial joint line. Patient has a - Lachman exam, - anterior drawer exam, and - posterior drawer exam. - Apley exam. Knee is stable to varus and valgus stress. 5 out  of 5 motor strength. Palpable distal pulses. Intact light touch sensation. Negative Patellofemoral crepitus        X-rays:  Four views of the left knee are  reviewed which show well-aligned left total knee arthroplasty components without complication.  Severe lateral compartment narrowing of the right knee.  X-rays left elbow are reviewed which show no acute fracture     Assessment::  Status post left [place] [employer] CR total knee arthroplasty using cement  Right valgus knee arthritis         Plan:  Reviewed the findings with her today.  I agreed to inject her right knee with Synvisc 2 of 3.  Follow up next week    This note was created using M Modal voice recognition software that occasionally misinterpreted phrases or words.

## 2024-04-01 ENCOUNTER — OFFICE VISIT (OUTPATIENT)
Dept: ORTHOPEDICS | Facility: CLINIC | Age: 69
End: 2024-04-01
Payer: MEDICARE

## 2024-04-01 VITALS — HEIGHT: 67 IN | BODY MASS INDEX: 42.07 KG/M2 | RESPIRATION RATE: 18 BRPM | WEIGHT: 268.06 LBS

## 2024-04-01 DIAGNOSIS — M17.10 ARTHRITIS OF KNEE: Primary | ICD-10-CM

## 2024-04-01 PROCEDURE — 99499 UNLISTED E&M SERVICE: CPT | Mod: 25,S$PBB,, | Performed by: ORTHOPAEDIC SURGERY

## 2024-04-01 PROCEDURE — 99999 PR PBB SHADOW E&M-EST. PATIENT-LVL III: CPT | Mod: PBBFAC,,, | Performed by: ORTHOPAEDIC SURGERY

## 2024-04-01 PROCEDURE — 99213 OFFICE O/P EST LOW 20 MIN: CPT | Mod: PBBFAC,PO | Performed by: ORTHOPAEDIC SURGERY

## 2024-04-01 PROCEDURE — 99999PBSHW PR PBB SHADOW TECHNICAL ONLY FILED TO HB: Mod: JZ,PBBFAC,,

## 2024-04-01 RX ADMIN — Medication 16 MG: at 10:04

## 2024-04-01 NOTE — PROGRESS NOTES
Past Medical History:   Diagnosis Date    Angio-edema     Asthma     Diabetes mellitus     Eczema     Obesity     PONV (postoperative nausea and vomiting)     Urticaria        Past Surgical History:   Procedure Laterality Date    ADENOIDECTOMY      BONY PELVIS SURGERY       SECTION      X 2    FIXATION KYPHOPLASTY N/A 2018    Procedure: Kyphoplasty;  Surgeon: Martinez Morejon MD;  Location: [facility] OR;  Service: Pain Management;  Laterality: N/A;  L1     HYSTERECTOMY      KNEE CARTILAGE SURGERY Left     ROBOTIC ARTHROPLASTY, KNEE Left 1/3/2023    Procedure: ROBOTIC ARTHROPLASTY, KNEE, TOTAL;  Surgeon: Estevan Monteiro MD;  Location: [facility] OR;  Service: Orthopedics;  Laterality: Left;    TONSILLECTOMY      VAGINOPLASTY         Current Outpatient Medications   Medication Sig    acyclovir (ZOVIRAX) 400 MG tablet Take 400 mg by mouth every 8 (eight) hours.    aspirin (ECOTRIN) 81 MG EC tablet Take 1 tablet (81 mg total) by mouth 2 (two) times a day.    atorvastatin (LIPITOR) 10 MG tablet Take 10 mg by mouth once daily.    clobetasol 0.05% (TEMOVATE) 0.05 % Oint Apply sparingly to rash bid x 2-3 weeks.  Avoid face and groin.    clotrimazole-betamethasone 1-0.05% (LOTRISONE) cream Apply topically.    doxycycline (VIBRAMYCIN) 100 MG Cap Take 100 mg by mouth 2 (two) times daily.    fluconazole (DIFLUCAN) 150 MG Tab Take by mouth.    GAVILYTE-G 236-22.74-6.74 -5.86 gram suspension SMARTSI Milliliter(s) By Mouth Once    hydrOXYzine HCL (ATARAX) 25 MG tablet Take 25 mg by mouth nightly as needed.    metformin (GLUCOPHAGE) 500 MG tablet     MOUNJARO 15 mg/0.5 mL PnIj     nystatin (MYCOSTATIN) powder     olmesartan (BENICAR) 20 MG tablet     pregabalin (LYRICA) 75 MG capsule Take 1 capsule (75 mg total) by mouth 2 (two) times daily.    SYNTHROID 150 mcg tablet TAKE 1 TABLET BY MOUTH ONCE DAILY ON AN EMPTY STOMACH IN THE MORNING FOR 90 DAYS    tirzepatide (MOUNJARO) 2.5 mg/0.5 mL PnIj Inject 1 mg into the skin  every 7 days.    triamterene-hydrochlorothiazide 37.5-25 mg (DYAZIDE) 37.5-25 mg per capsule     TRULICITY 4.5 mg/0.5 mL pen injector Inject 4.5 mg into the skin once a week.     No current facility-administered medications for this visit.       Review of patient's allergies indicates:   Allergen Reactions    Levothyroxine Swelling     GENERIC ONLY / PT CAN TAKE SYNTHROID, face/tongue swelling       Family History   Problem Relation Age of Onset    Breast cancer Mother 42         at 61yo from breast ca    Angioedema Daughter     Allergies Daughter     Asthma Maternal Uncle     Allergies Son     Allergic rhinitis Son     Eczema Son     Immunodeficiency Neg Hx     Ovarian cancer Neg Hx        Social History     Socioeconomic History    Marital status:    Tobacco Use    Smoking status: Never    Smokeless tobacco: Never   Substance and Sexual Activity    Alcohol use: Yes     Comment: seldom    Drug use: Never    Sexual activity: Not Currently     Partners: Male     Birth control/protection: See Surgical Hx     Social Determinants of Health     Financial Resource Strain: Patient Declined (2023)    Overall Financial Resource Strain (CARDIA)     Difficulty of Paying Living Expenses: Patient declined   Food Insecurity: No Food Insecurity (2023)    Hunger Vital Sign     Worried About Running Out of Food in the Last Year: Never true     Ran Out of Food in the Last Year: Never true   Transportation Needs: No Transportation Needs (2023)    PRAPARE - Transportation     Lack of Transportation (Medical): No     Lack of Transportation (Non-Medical): No   Physical Activity: Patient Declined (2023)    Exercise Vital Sign     Days of Exercise per Week: Patient declined     Minutes of Exercise per Session: Patient declined   Stress: Patient Declined (2023)    [employer] [place] [employer] - Occupational Stress Questionnaire     Feeling of Stress : Patient declined   Social Connections: Unknown  (1/4/2023)    Social Connection and Isolation Panel [NHANES]     Frequency of Communication with Friends and Family: Patient declined     Frequency of Social Gatherings with Friends and Family: Patient declined     Attends Spiritism Services: Patient declined     Active Member of Clubs or Organizations: Patient declined     Attends Club or Organization Meetings: Patient declined     Marital Status:    Housing Stability: Unknown (1/4/2023)    Housing Stability Vital Sign     Unable to Pay for Housing in the Last Year: No     Unstable Housing in the Last Year: No       Chief Complaint:   No chief complaint on file.      Date of surgery:  January 3, 2023    History of present illness:  69-year-old female underwent left robotic assisted total knee arthroplasty.  She was doing well until she fell while on a cruise on January 3rd.  She fell onto her left knee.  Having more pain in the right knee.  Has not had anything done in quite a while.  Has done well with viscosupplementation in the past.      Review of Systems:    Musculoskeletal:  See HPI        Physical Examination:    Vital Signs:    There were no vitals filed for this visit.        There is no height or weight on file to calculate BMI.    This a well-developed, well nourished patient in no acute distress.  They are alert and oriented and cooperative to examination.  Pt. walks without an antalgic gait.      Examination left knee shows well-healed surgical incision.  No erythema drainage.  Mild swelling.  Good range of motion from 0-115 degrees     Examination of the right knee shows no rashes or erythema. There are no masses ecchymosis or effusion. Patient has full range of motion from 0-130°. Patient is moderately tender to palpation over lateral joint line and nontender to palpation over the medial joint line. Patient has a - Lachman exam, - anterior drawer exam, and - posterior drawer exam. - Apley exam. Knee is stable to varus and valgus stress. 5 out  of 5 motor strength. Palpable distal pulses. Intact light touch sensation. Negative Patellofemoral crepitus        X-rays:  Four views of the left knee are  reviewed which show well-aligned left total knee arthroplasty components without complication.  Severe lateral compartment narrowing of the right knee.  X-rays left elbow are reviewed which show no acute fracture     Assessment::  Status post left [place] [employer] CR total knee arthroplasty using cement  Right valgus knee arthritis         Plan:  Reviewed the findings with her today.  I agreed to inject her right knee with Synvisc 3 of 3.  Follow up as needed.    This note was created using M Modal voice recognition software that occasionally misinterpreted phrases or words.

## 2024-04-01 NOTE — PROCEDURES
Large Joint Aspiration/Injection: R knee joint    Date/Time: 4/1/2024 10:30 AM    Performed by: Estevan Monteiro MD  Authorized by: Estevan Monteiro MD    Consent Done?:  Yes (Verbal)  Indications:  Pain  Site marked: the procedure site was marked    Timeout: prior to procedure the correct patient, procedure, and site was verified      Details:  Needle Size:  20 G  Approach:  Anterolateral  Location:  Knee  Site:  R knee joint  Medications:  16 mg hyaluronate 16 mg/2 mL  Patient tolerance:  Patient tolerated the procedure well with no immediate complications

## 2024-04-23 ENCOUNTER — TELEPHONE (OUTPATIENT)
Dept: ORTHOPEDICS | Facility: CLINIC | Age: 69
End: 2024-04-23
Payer: MEDICARE

## 2024-04-23 NOTE — TELEPHONE ENCOUNTER
----- Message from Era Llanes MA sent at 4/23/2024  3:51 PM CDT -----  Contact: pt  Pt wants to schedule steroid injection sooner than she has self scheduled   Call back

## 2024-04-26 ENCOUNTER — OFFICE VISIT (OUTPATIENT)
Dept: ORTHOPEDICS | Facility: CLINIC | Age: 69
End: 2024-04-26
Payer: MEDICARE

## 2024-04-26 ENCOUNTER — HOSPITAL ENCOUNTER (OUTPATIENT)
Dept: RADIOLOGY | Facility: HOSPITAL | Age: 69
Discharge: HOME | End: 2024-04-26
Attending: ORTHOPAEDIC SURGERY
Payer: MEDICARE

## 2024-04-26 DIAGNOSIS — M20.41 ACQUIRED HAMMERTOE OF RIGHT FOOT: ICD-10-CM

## 2024-04-26 DIAGNOSIS — M79.671 PAIN IN BOTH FEET: ICD-10-CM

## 2024-04-26 DIAGNOSIS — M79.672 PAIN IN BOTH FEET: ICD-10-CM

## 2024-04-26 DIAGNOSIS — Q66.211 ACQUIRED HALLUX VALGUS OF RIGHT FOOT DUE TO METATARSUS PRIMUS VARUS: Primary | ICD-10-CM

## 2024-04-26 DIAGNOSIS — M20.42 ACQUIRED HAMMERTOE OF LEFT FOOT: ICD-10-CM

## 2024-04-26 DIAGNOSIS — M76.821 POSTERIOR TIBIAL TENDON DYSFUNCTION (PTTD) OF RIGHT LOWER EXTREMITY: ICD-10-CM

## 2024-04-26 PROCEDURE — 73630 X-RAY EXAM OF FOOT: CPT | Mod: TC,50,PO

## 2024-04-26 PROCEDURE — 99204 OFFICE O/P NEW MOD 45 MIN: CPT | Mod: S$PBB,,, | Performed by: ORTHOPAEDIC SURGERY

## 2024-04-26 PROCEDURE — 73610 X-RAY EXAM OF ANKLE: CPT | Mod: TC,50,PO

## 2024-04-26 PROCEDURE — 73630 X-RAY EXAM OF FOOT: CPT | Mod: 26,50,, | Performed by: RADIOLOGY

## 2024-04-26 PROCEDURE — 73610 X-RAY EXAM OF ANKLE: CPT | Mod: 26,50,, | Performed by: RADIOLOGY

## 2024-04-26 NOTE — PROGRESS NOTES
Status/Diagnosis: Bilateral 2nd hammertoe deformity; Right hallux valgus; Right > Left PCFD.  Date of Surgery: none  Date of Injury: none  Return visit: PRN  X-rays on Return: pending patient complaint    Chief Complaint:   Chief Complaint   Patient presents with    Right Foot - Pain    Left Foot - Pain     Present History:  [facility]   Gailfloridalma Temple is a 69 y.o. female with a longstanding history of right> left forefoot pain.  Denies any history of injury or other inciting event.  Has tried shoe wear and activity modification, over-the-counter oral NSAID use with minimal relief.  No history of physical therapy, corticosteroid injection, surgical intervention, etc.   Patient with pain primarily localized to the right great toe medial eminence and bilateral, right worse than left, 2nd toe dorsally related to worsening hammertoe deformity.  Minimal pain when walking barefoot or with open toed shoes.  Did voiced limitation with shoe wear given the above.  Denies any numbness or tingling.    Past medical history significant for type 2 diabetes, A1c of 5.5; asthma.  Denies tobacco use.      Past Medical History:   Diagnosis Date    Angio-edema     Asthma     Diabetes mellitus     Eczema     Obesity     PONV (postoperative nausea and vomiting)     Urticaria        Past Surgical History:   Procedure Laterality Date    ADENOIDECTOMY      BONY PELVIS SURGERY       SECTION      X 2    FIXATION KYPHOPLASTY N/A 2018    Procedure: Kyphoplasty;  Surgeon: Martinez Morejon MD;  Location: [facility] OR;  Service: Pain Management;  Laterality: N/A;  L1     HYSTERECTOMY      KNEE CARTILAGE SURGERY Left     ROBOTIC ARTHROPLASTY, KNEE Left 1/3/2023    Procedure: ROBOTIC ARTHROPLASTY, KNEE, TOTAL;  Surgeon: Estevan Monteiro MD;  Location: [facility] OR;  Service: Orthopedics;  Laterality: Left;    TONSILLECTOMY      VAGINOPLASTY         Current Outpatient Medications   Medication Sig  Dispense Refill    acyclovir (ZOVIRAX) 400 MG tablet Take 400 mg by mouth every 8 (eight) hours.      aspirin (ECOTRIN) 81 MG EC tablet Take 1 tablet (81 mg total) by mouth 2 (two) times a day. 56 tablet 0    atorvastatin (LIPITOR) 10 MG tablet Take 10 mg by mouth once daily.      clobetasol 0.05% (TEMOVATE) 0.05 % Oint Apply sparingly to rash bid x 2-3 weeks.  Avoid face and groin. 120 g 0    clotrimazole-betamethasone 1-0.05% (LOTRISONE) cream Apply topically.      doxycycline (VIBRAMYCIN) 100 MG Cap Take 100 mg by mouth 2 (two) times daily.      fluconazole (DIFLUCAN) 150 MG Tab Take by mouth.      GAVILYTE-G 236-22.74-6.74 -5.86 gram suspension SMARTSI Milliliter(s) By Mouth Once      hydrOXYzine HCL (ATARAX) 25 MG tablet Take 25 mg by mouth nightly as needed.      metformin (GLUCOPHAGE) 500 MG tablet       MOUNJARO 15 mg/0.5 mL PnIj       nystatin (MYCOSTATIN) powder       olmesartan (BENICAR) 20 MG tablet       pregabalin (LYRICA) 75 MG capsule Take 1 capsule (75 mg total) by mouth 2 (two) times daily. 60 capsule 0    SYNTHROID 150 mcg tablet TAKE 1 TABLET BY MOUTH ONCE DAILY ON AN EMPTY STOMACH IN THE MORNING FOR 90 DAYS      tirzepatide (MOUNJARO) 2.5 mg/0.5 mL PnIj Inject 1 mg into the skin every 7 days.      triamterene-hydrochlorothiazide 37.5-25 mg (DYAZIDE) 37.5-25 mg per capsule       TRULICITY 4.5 mg/0.5 mL pen injector Inject 4.5 mg into the skin once a week.       No current facility-administered medications for this visit.       Review of patient's allergies indicates:   Allergen Reactions    Levothyroxine Swelling     GENERIC ONLY / PT CAN TAKE SYNTHROID, face/tongue swelling       Family History   Problem Relation Name Age of Onset    Breast cancer Mother  42         at 61yo from breast ca    Angioedema Daughter      Allergies Daughter      Asthma Maternal Uncle      Allergies Son      Allergic rhinitis Son      Eczema Son      Immunodeficiency Neg Hx      Ovarian cancer Neg Hx          Social History     Socioeconomic History    Marital status:    Tobacco Use    Smoking status: Never    Smokeless tobacco: Never   Substance and Sexual Activity    Alcohol use: Yes     Comment: seldom    Drug use: Never    Sexual activity: Not Currently     Partners: Male     Birth control/protection: See Surgical Hx     Social Determinants of Health     Financial Resource Strain: Patient Declined (1/4/2023)    Overall Financial Resource Strain (CARDIA)     Difficulty of Paying Living Expenses: Patient declined   Food Insecurity: No Food Insecurity (1/4/2023)    Hunger Vital Sign     Worried About Running Out of Food in the Last Year: Never true     Ran Out of Food in the Last Year: Never true   Transportation Needs: No Transportation Needs (1/4/2023)    PRAPARE - Transportation     Lack of Transportation (Medical): No     Lack of Transportation (Non-Medical): No   Physical Activity: Patient Declined (1/4/2023)    Exercise Vital Sign     Days of Exercise per Week: Patient declined     Minutes of Exercise per Session: Patient declined   Stress: Patient Declined (1/4/2023)    [employer] - Occupational Stress Questionnaire     Feeling of Stress : Patient declined   Social Connections: Unknown (1/4/2023)    Social Connection and Isolation Panel [NHANES]     Frequency of Communication with Friends and Family: Patient declined     Frequency of Social Gatherings with Friends and Family: Patient declined     Attends [employer] Services: Patient declined     Active Member of Clubs or Organizations: Patient declined     Attends Club or Organization Meetings: Patient declined     Marital Status:    Housing Stability: Unknown (1/4/2023)    Housing Stability Vital Sign     Unable to Pay for Housing in the Last Year: No     Unstable Housing in the Last Year: No       Physical exam:  There were no vitals filed for this visit.  There is no height or weight on file to calculate  BMI.  General: In no apparent distress; well developed and well nourished.  HEENT: normocephalic; atraumatic.  Cardiovascular: regular rate.  Respiratory: no increased work of breathing.  Musculoskeletal:   Gait: nonantalgic  Inspection:   Patient with moderate to severe bilateral, right worse than left, flatfoot deformity with associated hindfoot valgus.  Forefoot abduction only on the right.  Did not attempt double or single limb heel rise today.    Patient with isolated right-sided hallux valgus.  Early 2nd over 1st right crossover toe deformity.    Patient has bilateral 2nd rigid hammertoe deformities.    Redness and irritation over the dorsal 2nd toe PIP joint bilaterally and over the right 1st metatarsal medial eminence.  Full painless 1st MTP range of motion.  Equivocal 1st TMT hypermobility testing.  Silfverskiold: Negative  Alignment:  Knee: neutral               Ankle: neutral              Hindfoot: valgus              Forefoot:  Abduction on the right  Strength:              Dorsiflexion 5/5  Plantar flexion 5/5  Inversion 4/5  Eversion 5/5  Sensation:              SILT distally  ROM:              Ankle: full              Subtalar: near full with pain at extremes  Pulses: Palpable pedal pulse                   Imaging Studies/Outside documentation:  I have ordered/reviewed/interpreted the following images/outside documentation:  1. Weight-bearing 3-views of bilateral foot and ankle:   On my independent review, no acute bony abnormality noted.  Patient with significant decreased calcaneal pitch.  Difficult to assess Talonavicular uncoverage due to pronation on plain films.  Moderate 2nd and 3rd TMT DJD, right worse than left.  Right-sided hallux valgus.  Ankle mortise remains congruent.        Assessment:  Gail Temple is a 69 y.o. female with Bilateral 2nd hammertoe deformity; Right hallux valgus; Right > Left PCFD.     Plan:   Clinical and radiographic findings were discussed at length patient today.   Operative versus nonoperative treatment options were described.  As noted in HPI, patient's primary complaint is related to forefoot pain with closed toed shoe wear.    Patient given information regarding bunion gel spacer use as well as a hammertoe/budin splint for bilateral 2nd hammertoe deformities.    Patient already using arch support inserts for chronic flatfoot deformity.    Patient voiced understanding.  All questions were answered.  She will call regarding future follow-up should symptoms persist or worsen.    This note was created using voice recognition software and may contain grammatical errors.

## 2024-05-02 ENCOUNTER — OFFICE VISIT (OUTPATIENT)
Dept: ORTHOPEDICS | Facility: CLINIC | Age: 69
End: 2024-05-02
Payer: MEDICARE

## 2024-05-02 VITALS — HEIGHT: 67 IN | WEIGHT: 268.06 LBS | BODY MASS INDEX: 42.07 KG/M2

## 2024-05-02 DIAGNOSIS — M17.0 PRIMARY OSTEOARTHRITIS OF BOTH KNEES: Primary | ICD-10-CM

## 2024-05-02 PROCEDURE — 99999 PR PBB SHADOW E&M-EST. PATIENT-LVL II: CPT | Mod: PBBFAC,,, | Performed by: ORTHOPAEDIC SURGERY

## 2024-05-02 PROCEDURE — 99212 OFFICE O/P EST SF 10 MIN: CPT | Mod: PBBFAC,PO,25 | Performed by: ORTHOPAEDIC SURGERY

## 2024-05-02 PROCEDURE — 20610 DRAIN/INJ JOINT/BURSA W/O US: CPT | Mod: PBBFAC,PO,RT | Performed by: ORTHOPAEDIC SURGERY

## 2024-05-02 PROCEDURE — 99999PBSHW PR PBB SHADOW TECHNICAL ONLY FILED TO HB: Mod: PBBFAC,,,

## 2024-05-02 PROCEDURE — 99499 UNLISTED E&M SERVICE: CPT | Mod: S$PBB,,, | Performed by: ORTHOPAEDIC SURGERY

## 2024-05-02 RX ORDER — TRIAMCINOLONE ACETONIDE 40 MG/ML
40 INJECTION, SUSPENSION INTRA-ARTICULAR; INTRAMUSCULAR
Status: DISCONTINUED | OUTPATIENT
Start: 2024-05-02 | End: 2024-05-02 | Stop reason: HOSPADM

## 2024-05-02 RX ADMIN — TRIAMCINOLONE ACETONIDE 40 MG: 40 INJECTION, SUSPENSION INTRA-ARTICULAR; INTRAMUSCULAR at 02:05

## 2024-05-02 NOTE — PROCEDURES
Large Joint Aspiration/Injection: R knee    Date/Time: 5/2/2024 2:15 PM    Performed by: Estevan Monteiro MD  Authorized by: Estevan Monteiro MD    Consent Done?:  Yes (Verbal)  Indications:  Pain  Site marked: the procedure site was marked    Timeout: prior to procedure the correct patient, procedure, and site was verified    Local anesthetic: Ropivicaine.  Anesthetic total (ml):  3      Details:  Needle Size:  20 G  Approach:  Anterolateral  Location:  Knee  Site:  R knee  Medications:  40 mg triamcinolone acetonide 40 mg/mL  Patient tolerance:  Patient tolerated the procedure well with no immediate complications

## 2024-05-02 NOTE — PROGRESS NOTES
Past Medical History:   Diagnosis Date    Angio-edema     Asthma     Diabetes mellitus     Eczema     Obesity     PONV (postoperative nausea and vomiting)     Urticaria        Past Surgical History:   Procedure Laterality Date    ADENOIDECTOMY      BONY PELVIS SURGERY       SECTION      X 2    FIXATION KYPHOPLASTY N/A 2018    Procedure: Kyphoplasty;  Surgeon: Martinez Morejon MD;  Location: [facility] OR;  Service: Pain Management;  Laterality: N/A;  L1     HYSTERECTOMY      KNEE CARTILAGE SURGERY Left     ROBOTIC ARTHROPLASTY, KNEE Left 1/3/2023    Procedure: ROBOTIC ARTHROPLASTY, KNEE, TOTAL;  Surgeon: Estevan Monteiro MD;  Location: [facility] OR;  Service: Orthopedics;  Laterality: Left;    TONSILLECTOMY      VAGINOPLASTY         Current Outpatient Medications   Medication Sig Dispense Refill    acyclovir (ZOVIRAX) 400 MG tablet Take 400 mg by mouth every 8 (eight) hours.      aspirin (ECOTRIN) 81 MG EC tablet Take 1 tablet (81 mg total) by mouth 2 (two) times a day. 56 tablet 0    atorvastatin (LIPITOR) 10 MG tablet Take 10 mg by mouth once daily.      clobetasol 0.05% (TEMOVATE) 0.05 % Oint Apply sparingly to rash bid x 2-3 weeks.  Avoid face and groin. 120 g 0    clotrimazole-betamethasone 1-0.05% (LOTRISONE) cream Apply topically.      doxycycline (VIBRAMYCIN) 100 MG Cap Take 100 mg by mouth 2 (two) times daily.      fluconazole (DIFLUCAN) 150 MG Tab Take by mouth.      GAVILYTE-G 236-22.74-6.74 -5.86 gram suspension SMARTSI Milliliter(s) By Mouth Once      hydrOXYzine HCL (ATARAX) 25 MG tablet Take 25 mg by mouth nightly as needed.      metformin (GLUCOPHAGE) 500 MG tablet       MOUNJARO 15 mg/0.5 mL PnIj       nystatin (MYCOSTATIN) powder       olmesartan (BENICAR) 20 MG tablet       pregabalin (LYRICA) 75 MG capsule Take 1 capsule (75 mg total) by mouth 2 (two) times daily. 60 capsule 0    SYNTHROID 150 mcg tablet TAKE 1 TABLET BY MOUTH ONCE DAILY ON AN EMPTY STOMACH IN THE MORNING FOR 90 DAYS       tirzepatide (MOUNJARO) 2.5 mg/0.5 mL PnIj Inject 1 mg into the skin every 7 days.      triamterene-hydrochlorothiazide 37.5-25 mg (DYAZIDE) 37.5-25 mg per capsule       TRULICITY 4.5 mg/0.5 mL pen injector Inject 4.5 mg into the skin once a week.       No current facility-administered medications for this visit.       Review of patient's allergies indicates:   Allergen Reactions    Levothyroxine Swelling     GENERIC ONLY / PT CAN TAKE SYNTHROID, face/tongue swelling       Family History   Problem Relation Name Age of Onset    Breast cancer Mother  42         at 61yo from breast ca    Angioedema Daughter      Allergies Daughter      Asthma Maternal Uncle      Allergies Son      Allergic rhinitis Son      Eczema Son      Immunodeficiency Neg Hx      Ovarian cancer Neg Hx         Social History     Socioeconomic History    Marital status:    Tobacco Use    Smoking status: Never    Smokeless tobacco: Never   Substance and Sexual Activity    Alcohol use: Yes     Comment: seldom    Drug use: Never    Sexual activity: Not Currently     Partners: Male     Birth control/protection: See Surgical Hx     Social Determinants of Health     Financial Resource Strain: Patient Declined (2023)    Overall Financial Resource Strain (CARDIA)     Difficulty of Paying Living Expenses: Patient declined   Food Insecurity: No Food Insecurity (2023)    Hunger Vital Sign     Worried About Running Out of Food in the Last Year: Never true     Ran Out of Food in the Last Year: Never true   Transportation Needs: No Transportation Needs (2023)    PRAPARE - Transportation     Lack of Transportation (Medical): No     Lack of Transportation (Non-Medical): No   Physical Activity: Patient Declined (2023)    Exercise Vital Sign     Days of Exercise per Week: Patient declined     Minutes of Exercise per Session: Patient declined   Stress: Patient Declined (2023)    [employer] [place] [employer] - Occupational  Stress Questionnaire     Feeling of Stress : Patient declined   Housing Stability: Unknown (1/4/2023)    Housing Stability Vital Sign     Unable to Pay for Housing in the Last Year: No     Unstable Housing in the Last Year: No       Chief Complaint:   Chief Complaint   Patient presents with    Right Knee - Pain       Date of surgery:  January 3, 2023    History of present illness:  69-year-old female underwent left robotic assisted total knee arthroplasty.  She was doing well until she fell while on a cruise on January 3rd.  She fell onto her left knee.  Having more pain in the right knee.  Has not had anything done in quite a while.  Has done well with viscosupplementation in the past.      Review of Systems:    Musculoskeletal:  See HPI        Physical Examination:    Vital Signs:    There were no vitals filed for this visit.        Body mass index is 41.99 kg/m².    This a well-developed, well nourished patient in no acute distress.  They are alert and oriented and cooperative to examination.  Pt. walks without an antalgic gait.      Examination left knee shows well-healed surgical incision.  No erythema drainage.  Mild swelling.  Good range of motion from 0-115 degrees     Examination of the right knee shows no rashes or erythema. There are no masses ecchymosis or effusion. Patient has full range of motion from 0-130°. Patient is moderately tender to palpation over lateral joint line and nontender to palpation over the medial joint line. Patient has a - Lachman exam, - anterior drawer exam, and - posterior drawer exam. - Apley exam. Knee is stable to varus and valgus stress. 5 out of 5 motor strength. Palpable distal pulses. Intact light touch sensation. Negative Patellofemoral crepitus        X-rays:  Four views of the left knee are  reviewed which show well-aligned left total knee arthroplasty components without complication.  Severe lateral compartment narrowing of the right knee.  X-rays left elbow are  reviewed which show no acute fracture     Assessment::  Status post left [place] [employer] CR total knee arthroplasty using cement  Right valgus knee arthritis         Plan:  Reviewed the findings with her today.  I agreed to inject her right knee with Cortisone today.  Patient has a couple cruise it is coming up.  Patient is getting closer to scheduling her right total knee for January.    This note was created using [employer] voice recognition software that occasionally misinterpreted phrases or words.

## 2024-12-02 ENCOUNTER — PATIENT MESSAGE (OUTPATIENT)
Dept: ORTHOPEDICS | Facility: CLINIC | Age: 69
End: 2024-12-02
Payer: MEDICARE

## 2024-12-02 DIAGNOSIS — M17.0 PRIMARY OSTEOARTHRITIS OF BOTH KNEES: Primary | ICD-10-CM

## 2024-12-12 ENCOUNTER — TELEPHONE (OUTPATIENT)
Dept: OBSTETRICS AND GYNECOLOGY | Facility: CLINIC | Age: 69
End: 2024-12-12
Payer: MEDICARE

## 2024-12-12 NOTE — TELEPHONE ENCOUNTER
----- Message from Gerard sent at 12/12/2024  2:13 PM CST -----  Contact: Self  Type:  Patient Returning Call    Who Called:  Patient  Who Left Message for Patient:  Binromanmargie  Does the patient know what this is regarding?:  Yes  Best Call Back Number:  [phone]   Additional Information:

## 2024-12-16 ENCOUNTER — OFFICE VISIT (OUTPATIENT)
Dept: ORTHOPEDICS | Facility: CLINIC | Age: 69
End: 2024-12-16
Payer: MEDICARE

## 2024-12-16 VITALS — RESPIRATION RATE: 16 BRPM

## 2024-12-16 DIAGNOSIS — M17.0 PRIMARY OSTEOARTHRITIS OF BOTH KNEES: Primary | ICD-10-CM

## 2024-12-16 PROCEDURE — 99999PBSHW PR PBB SHADOW TECHNICAL ONLY FILED TO HB: Mod: JZ,PBBFAC,,

## 2024-12-16 PROCEDURE — 99999 PR PBB SHADOW E&M-EST. PATIENT-LVL II: CPT | Mod: PBBFAC,,, | Performed by: ORTHOPAEDIC SURGERY

## 2024-12-16 PROCEDURE — 99212 OFFICE O/P EST SF 10 MIN: CPT | Mod: PBBFAC,PO | Performed by: ORTHOPAEDIC SURGERY

## 2024-12-16 PROCEDURE — 99213 OFFICE O/P EST LOW 20 MIN: CPT | Mod: 25,S$PBB,, | Performed by: ORTHOPAEDIC SURGERY

## 2024-12-16 RX ADMIN — Medication 16 MG: at 11:12

## 2024-12-16 NOTE — PROCEDURES
Large Joint Aspiration/Injection: R knee joint    Date/Time: 12/16/2024 11:30 AM    Performed by: Estevan Monteiro MD  Authorized by: Estevan Monteiro MD    Consent Done?:  Yes (Verbal)  Indications:  Pain  Site marked: the procedure site was marked    Timeout: prior to procedure the correct patient, procedure, and site was verified      Details:  Needle Size:  20 G  Approach:  Anterolateral  Location:  Knee  Site:  R knee joint  Medications:  16 mg hyaluronate 16 mg/2 mL  Patient tolerance:  Patient tolerated the procedure well with no immediate complications

## 2024-12-16 NOTE — PROGRESS NOTES
Past Medical History:   Diagnosis Date    Angio-edema     Asthma     Diabetes mellitus     Eczema     Obesity     PONV (postoperative nausea and vomiting)     Urticaria        Past Surgical History:   Procedure Laterality Date    ADENOIDECTOMY      BONY PELVIS SURGERY       SECTION      X 2    FIXATION KYPHOPLASTY N/A 2018    Procedure: Kyphoplasty;  Surgeon: Martinez Morejon MD;  Location: [facility] OR;  Service: Pain Management;  Laterality: N/A;  L1     HYSTERECTOMY      KNEE CARTILAGE SURGERY Left     ROBOTIC ARTHROPLASTY, KNEE Left 1/3/2023    Procedure: ROBOTIC ARTHROPLASTY, KNEE, TOTAL;  Surgeon: Estevan Monteiro MD;  Location: [facility] OR;  Service: Orthopedics;  Laterality: Left;    TONSILLECTOMY      VAGINOPLASTY         Current Outpatient Medications   Medication Sig    acyclovir (ZOVIRAX) 400 MG tablet Take 400 mg by mouth every 8 (eight) hours.    atorvastatin (LIPITOR) 10 MG tablet Take 10 mg by mouth once daily.    clobetasol 0.05% (TEMOVATE) 0.05 % Oint Apply sparingly to rash bid x 2-3 weeks.  Avoid face and groin.    clotrimazole-betamethasone 1-0.05% (LOTRISONE) cream Apply topically.    fluconazole (DIFLUCAN) 150 MG Tab Take by mouth.    hydrOXYzine HCL (ATARAX) 25 MG tablet Take 25 mg by mouth nightly as needed.    metformin (GLUCOPHAGE) 500 MG tablet     MOUNJARO 15 mg/0.5 mL PnIj     nystatin (MYCOSTATIN) powder     olmesartan (BENICAR) 20 MG tablet     SYNTHROID 150 mcg tablet TAKE 1 TABLET BY MOUTH ONCE DAILY ON AN EMPTY STOMACH IN THE MORNING FOR 90 DAYS    tirzepatide (MOUNJARO) 2.5 mg/0.5 mL PnIj Inject 1 mg into the skin every 7 days.    triamterene-hydrochlorothiazide 37.5-25 mg (DYAZIDE) 37.5-25 mg per capsule      No current facility-administered medications for this visit.       Review of patient's allergies indicates:   Allergen Reactions    Levothyroxine Swelling     GENERIC ONLY / PT CAN TAKE SYNTHROID, face/tongue swelling       Family History   Problem Relation Name Age  of Onset    Breast cancer Mother  42         at 61yo from breast ca    Angioedema Daughter      Allergies Daughter      Asthma Maternal Uncle      Allergies Son      Allergic rhinitis Son      Eczema Son      Immunodeficiency Neg Hx      Ovarian cancer Neg Hx         Social History     Socioeconomic History    Marital status:    Tobacco Use    Smoking status: Never    Smokeless tobacco: Never   Substance and Sexual Activity    Alcohol use: Yes     Comment: seldom    Drug use: Never    Sexual activity: Not Currently     Partners: Male     Birth control/protection: See Surgical Hx     Social Drivers of Health     Financial Resource Strain: Patient Declined (2023)    Overall Financial Resource Strain (CARDIA)     Difficulty of Paying Living Expenses: Patient declined   Food Insecurity: No Food Insecurity (2023)    Hunger Vital Sign     Worried About Running Out of Food in the Last Year: Never true     Ran Out of Food in the Last Year: Never true   Transportation Needs: No Transportation Needs (2023)    PRAPARE - Transportation     Lack of Transportation (Medical): No     Lack of Transportation (Non-Medical): No   Physical Activity: Patient Declined (2023)    Exercise Vital Sign     Days of Exercise per Week: Patient declined     Minutes of Exercise per Session: Patient declined   Stress: Patient Declined (2023)    [employer] [place] [employer] - Occupational Stress Questionnaire     Feeling of Stress : Patient declined   Housing Stability: Unknown (2023)    Housing Stability Vital Sign     Unable to Pay for Housing in the Last Year: No     Unstable Housing in the Last Year: No       Chief Complaint:   Chief Complaint   Patient presents with    Right Knee - Pain       Date of surgery:  January 3, 2023    History of present illness:  69-year-old female underwent left robotic assisted total knee arthroplasty.  She was doing well until she fell while on a cruise on .   She fell onto her left knee.  Having more pain in the right knee.  Has not had anything done in quite a while.  Has done well with viscosupplementation in the past.      Review of Systems:    Musculoskeletal:  See HPI        Physical Examination:    Vital Signs:    Vitals:    12/16/24 1106   Resp: 16           There is no height or weight on file to calculate BMI.    This a well-developed, well nourished patient in no acute distress.  They are alert and oriented and cooperative to examination.  Pt. walks without an antalgic gait.      Examination left knee shows well-healed surgical incision.  No erythema drainage.  Mild swelling.  Good range of motion from 0-115 degrees     Examination of the right knee shows no rashes or erythema. There are no masses ecchymosis or effusion. Patient has full range of motion from 0-130°. Patient is moderately tender to palpation over lateral joint line and nontender to palpation over the medial joint line. Patient has a - Lachman exam, - anterior drawer exam, and - posterior drawer exam. - Apley exam. Knee is stable to varus and valgus stress. 5 out of 5 motor strength. Palpable distal pulses. Intact light touch sensation. Negative Patellofemoral crepitus        X-rays:  Four views of the left knee are  reviewed which show well-aligned left total knee arthroplasty components without complication.  Severe lateral compartment narrowing of the right knee.  X-rays left elbow are reviewed which show no acute fracture     Assessment::  Status post left [place] [employer] CR total knee arthroplasty using cement  Right valgus knee arthritis         Plan:  Reviewed the findings with her today.  I agreed to inject her right knee with Synvisc 1 of 3 today.  Follow up next week.  Likely going to schedule her knee replacement in May of next year    This note was created using [employer] voice recognition software that occasionally misinterpreted phrases or words.

## 2024-12-17 ENCOUNTER — OFFICE VISIT (OUTPATIENT)
Dept: OBSTETRICS AND GYNECOLOGY | Facility: CLINIC | Age: 69
End: 2024-12-17
Payer: MEDICARE

## 2024-12-17 VITALS
SYSTOLIC BLOOD PRESSURE: 154 MMHG | HEIGHT: 67 IN | BODY MASS INDEX: 32.53 KG/M2 | WEIGHT: 207.25 LBS | DIASTOLIC BLOOD PRESSURE: 64 MMHG

## 2024-12-17 DIAGNOSIS — Z01.419 ENCOUNTER FOR ANNUAL ROUTINE GYNECOLOGICAL EXAMINATION: Primary | ICD-10-CM

## 2024-12-17 PROCEDURE — G0101 CA SCREEN;PELVIC/BREAST EXAM: HCPCS | Mod: PBBFAC,PN | Performed by: OBSTETRICS & GYNECOLOGY

## 2024-12-17 PROCEDURE — 99999 PR PBB SHADOW E&M-EST. PATIENT-LVL III: CPT | Mod: PBBFAC,,, | Performed by: OBSTETRICS & GYNECOLOGY

## 2024-12-17 PROCEDURE — 99213 OFFICE O/P EST LOW 20 MIN: CPT | Mod: PBBFAC,PN | Performed by: OBSTETRICS & GYNECOLOGY

## 2024-12-17 PROCEDURE — G0101 CA SCREEN;PELVIC/BREAST EXAM: HCPCS | Mod: S$PBB,,, | Performed by: OBSTETRICS & GYNECOLOGY

## 2024-12-17 PROCEDURE — 88175 CYTOPATH C/V AUTO FLUID REDO: CPT | Performed by: OBSTETRICS & GYNECOLOGY

## 2024-12-17 NOTE — PROGRESS NOTES
Chief Complaint   Patient presents with    Well Woman       History and Physical:  No LMP recorded. Patient has had a hysterectomy.       Gail Temple is a 69 y.o.  female who presents today for her routine annual GYN exam. The patient has no Gynecology complaints today. No bowel complaints.       Allergies:   Review of patient's allergies indicates:   Allergen Reactions    Levothyroxine Swelling     GENERIC ONLY / PT CAN TAKE SYNTHROID, face/tongue swelling       Past Medical History:   Diagnosis Date    Angio-edema     Asthma     Diabetes mellitus     Eczema     Obesity     PONV (postoperative nausea and vomiting)     Urticaria        Past Surgical History:   Procedure Laterality Date    ADENOIDECTOMY      BONY PELVIS SURGERY       SECTION      X 2    FIXATION KYPHOPLASTY N/A 2018    Procedure: Kyphoplasty;  Surgeon: Martinez Morejon MD;  Location: [facility] OR;  Service: Pain Management;  Laterality: N/A;  L1     HYSTERECTOMY      KNEE CARTILAGE SURGERY Left     ROBOTIC ARTHROPLASTY, KNEE Left 1/3/2023    Procedure: ROBOTIC ARTHROPLASTY, KNEE, TOTAL;  Surgeon: Estevan Monteiro MD;  Location: [facility] OR;  Service: Orthopedics;  Laterality: Left;    TONSILLECTOMY      VAGINOPLASTY         MEDS:   Current Outpatient Medications on File Prior to Visit   Medication Sig Dispense Refill    acyclovir (ZOVIRAX) 400 MG tablet Take 400 mg by mouth every 8 (eight) hours.      atorvastatin (LIPITOR) 10 MG tablet Take 10 mg by mouth once daily.      metformin (GLUCOPHAGE) 500 MG tablet       MOUNJARO 15 mg/0.5 mL PnIj       olmesartan (BENICAR) 20 MG tablet       SYNTHROID 150 mcg tablet 135 mcg.      tirzepatide (MOUNJARO) 2.5 mg/0.5 mL PnIj Inject 1 mg into the skin every 7 days.      triamterene-hydrochlorothiazide 37.5-25 mg (DYAZIDE) 37.5-25 mg per capsule       clobetasol 0.05% (TEMOVATE) 0.05 % Oint Apply sparingly to rash bid x 2-3 weeks.  Avoid face and groin. (Patient not taking: Reported on  2024) 120 g 0    clotrimazole-betamethasone 1-0.05% (LOTRISONE) cream Apply topically. (Patient not taking: Reported on 2024)      fluconazole (DIFLUCAN) 150 MG Tab Take by mouth. (Patient not taking: Reported on 2024)      hydrOXYzine HCL (ATARAX) 25 MG tablet Take 25 mg by mouth nightly as needed. (Patient not taking: Reported on 2024)      nystatin (MYCOSTATIN) powder  (Patient not taking: Reported on 2024)       Current Facility-Administered Medications on File Prior to Visit   Medication Dose Route Frequency Provider Last Rate Last Admin    [DISCONTINUED] hyaluronate (SYNVISC) 16 mg/2 mL injection 16 mg  16 mg Intra-articular  Estevan Monteiro MD   16 mg at 24 1130       OB History          4    Para   4    Term   2            AB        Living             SAB        IAB        Ectopic        Multiple        Live Births                     Social History     Socioeconomic History    Marital status:    Tobacco Use    Smoking status: Never    Smokeless tobacco: Never   Substance and Sexual Activity    Alcohol use: Yes     Comment: seldom    Drug use: Never    Sexual activity: Not Currently     Partners: Male     Birth control/protection: See Surgical Hx     Social Drivers of Health     Financial Resource Strain: Patient Declined (2023)    Overall Financial Resource Strain (CARDIA)     Difficulty of Paying Living Expenses: Patient declined   Food Insecurity: No Food Insecurity (2023)    Hunger Vital Sign     Worried About Running Out of Food in the Last Year: Never true     Ran Out of Food in the Last Year: Never true   Transportation Needs: No Transportation Needs (2023)    PRAPARE - Transportation     Lack of Transportation (Medical): No     Lack of Transportation (Non-Medical): No   Physical Activity: Patient Declined (2023)    Exercise Vital Sign     Days of Exercise per Week: Patient declined     Minutes of Exercise per Session:  "Patient declined   Stress: Patient Declined (2023)    [employer] [employer] - Occupational Stress Questionnaire     Feeling of Stress : Patient declined   Housing Stability: Unknown (2023)    Housing Stability Vital Sign     Unable to Pay for Housing in the Last Year: No     Unstable Housing in the Last Year: No       Family History   Problem Relation Name Age of Onset    Breast cancer Mother  42         at 61yo from breast ca    Angioedema Daughter      Allergies Daughter      Asthma Maternal Uncle      Allergies Son      Allergic rhinitis Son      Eczema Son      Immunodeficiency Neg Hx      Ovarian cancer Neg Hx           Past medical and surgical history reviewed.   I have reviewed the patient's medical history in detail and updated the computerized patient record.        Review of System:   General: no chills, fever, night sweats, weight gain or weight loss  Psychological: no depression or suicidal ideation  Breasts: no new or changing breast lumps, nipple discharge or masses.  Respiratory: no cough, shortness of breath, or wheezing  Cardiovascular: no chest pain or dyspnea on exertion  Gastrointestinal: no abdominal pain, change in bowel habits, or black or bloody stools  Genito-Urinary: no incontinence, urinary frequency/urgency or vulvar/vaginal symptoms, pelvic pain or abnormal vaginal bleeding.  Musculoskeletal: no gait disturbance or muscular weakness          Physical Exam:   BP (!) 154/64   Ht 5' 7" (1.702 m)   Wt 94 kg (207 lb 3.7 oz)   BMI 32.46 kg/m²   Constitutional: She is oriented to person, place, and time. She appears well-developed and well-nourished. No distress. OverWeight  HENT:   Head: Normocephalic and atraumatic.   Eyes: Conjunctivae and EOM are normal. No scleral icterus.   Neck: Normal range of motion. Neck supple. No tracheal deviation present.   Cardiovascular: Normal rate.    Pulmonary/Chest: Effort normal. No respiratory distress. She exhibits no " tenderness.  Breasts: are symmetrical.   Right breast exhibits no inverted nipple, no mass, no nipple discharge, no skin change and no tenderness.   Left breast exhibits no inverted nipple, no mass, no nipple discharge, no skin change and no tenderness.  Abdominal: Soft. She exhibits no distension and no mass. There is no tenderness. There is no rebound and no guarding.   Genitourinary: no masses, nontender, no lesions, no cystocele or rectocele.  Musculoskeletal: Normal range of motion.   Neurological: She is alert and oriented to person, place, and time. Coordination normal.   Skin: Skin is warm and dry. She is not diaphoretic.   Psychiatric: She has a normal mood and affect.        Assessment:   Normal annual GYN exam      Plan:   PAP   Mammogram  Urology referral  Follow up in 1 year.

## 2024-12-18 LAB
CLINICAL INFO: NORMAL
DATE OF PREVIOUS PAP: NORMAL
DATE PREVIOUS BX: NO
LMP START DATE: NORMAL
SPECIMEN SOURCE CVX/VAG CYTO: NORMAL

## 2024-12-20 ENCOUNTER — HOSPITAL ENCOUNTER (OUTPATIENT)
Dept: RADIOLOGY | Facility: HOSPITAL | Age: 69
Discharge: HOME | End: 2024-12-20
Attending: OBSTETRICS & GYNECOLOGY
Payer: MEDICARE

## 2024-12-20 DIAGNOSIS — Z12.31 SCREENING MAMMOGRAM, ENCOUNTER FOR: ICD-10-CM

## 2024-12-20 PROCEDURE — 77063 BREAST TOMOSYNTHESIS BI: CPT | Mod: TC,PN

## 2024-12-20 PROCEDURE — 77067 SCR MAMMO BI INCL CAD: CPT | Mod: 26,,, | Performed by: RADIOLOGY

## 2024-12-20 PROCEDURE — 77063 BREAST TOMOSYNTHESIS BI: CPT | Mod: 26,,, | Performed by: RADIOLOGY

## 2024-12-23 ENCOUNTER — OFFICE VISIT (OUTPATIENT)
Dept: ORTHOPEDICS | Facility: CLINIC | Age: 69
End: 2024-12-23
Payer: MEDICARE

## 2024-12-23 VITALS — RESPIRATION RATE: 17 BRPM

## 2024-12-23 DIAGNOSIS — M17.0 PRIMARY OSTEOARTHRITIS OF BOTH KNEES: Primary | ICD-10-CM

## 2024-12-23 PROCEDURE — 99999 PR PBB SHADOW E&M-EST. PATIENT-LVL II: CPT | Mod: PBBFAC,,, | Performed by: ORTHOPAEDIC SURGERY

## 2024-12-23 PROCEDURE — 99499 UNLISTED E&M SERVICE: CPT | Mod: 25,S$PBB,, | Performed by: ORTHOPAEDIC SURGERY

## 2024-12-23 PROCEDURE — 99212 OFFICE O/P EST SF 10 MIN: CPT | Mod: PBBFAC,PO | Performed by: ORTHOPAEDIC SURGERY

## 2024-12-23 PROCEDURE — 99999PBSHW PR PBB SHADOW TECHNICAL ONLY FILED TO HB: Mod: JZ,PBBFAC,,

## 2024-12-23 RX ADMIN — Medication 16 MG: at 01:12

## 2024-12-23 NOTE — PROGRESS NOTES
Past Medical History:   Diagnosis Date    Angio-edema     Asthma     Diabetes mellitus     Eczema     Obesity     PONV (postoperative nausea and vomiting)     Urticaria        Past Surgical History:   Procedure Laterality Date    ADENOIDECTOMY      BONY PELVIS SURGERY       SECTION      X 2    FIXATION KYPHOPLASTY N/A 2018    Procedure: Kyphoplasty;  Surgeon: Martinez Morejon MD;  Location: [facility];  Service: Pain Management;  Laterality: N/A;  L1     HYSTERECTOMY      KNEE CARTILAGE SURGERY Left     ROBOTIC ARTHROPLASTY, KNEE Left 1/3/2023    Procedure: ROBOTIC ARTHROPLASTY, KNEE, TOTAL;  Surgeon: Estevan Monteiro MD;  Location: [facility] OR;  Service: Orthopedics;  Laterality: Left;    TONSILLECTOMY      VAGINOPLASTY         Current Outpatient Medications   Medication Sig    acyclovir (ZOVIRAX) 400 MG tablet Take 400 mg by mouth every 8 (eight) hours.    atorvastatin (LIPITOR) 10 MG tablet Take 10 mg by mouth once daily.    clobetasol 0.05% (TEMOVATE) 0.05 % Oint Apply sparingly to rash bid x 2-3 weeks.  Avoid face and groin. (Patient not taking: Reported on 2024)    clotrimazole-betamethasone 1-0.05% (LOTRISONE) cream Apply topically. (Patient not taking: Reported on 2024)    fluconazole (DIFLUCAN) 150 MG Tab Take by mouth. (Patient not taking: Reported on 2024)    hydrOXYzine HCL (ATARAX) 25 MG tablet Take 25 mg by mouth nightly as needed. (Patient not taking: Reported on 2024)    metformin (GLUCOPHAGE) 500 MG tablet     MOUNJARO 15 mg/0.5 mL PnIj     nystatin (MYCOSTATIN) powder  (Patient not taking: Reported on 2024)    olmesartan (BENICAR) 20 MG tablet     SYNTHROID 150 mcg tablet 135 mcg.    tirzepatide (MOUNJARO) 2.5 mg/0.5 mL PnIj Inject 1 mg into the skin every 7 days.    triamterene-hydrochlorothiazide 37.5-25 mg (DYAZIDE) 37.5-25 mg per capsule      No current facility-administered medications for this visit.       Review of patient's allergies indicates:    Allergen Reactions    Levothyroxine Swelling     GENERIC ONLY / PT CAN TAKE SYNTHROID, face/tongue swelling       Family History   Problem Relation Name Age of Onset    Breast cancer Mother  42         at 61yo from breast ca    Angioedema Daughter      Allergies Daughter      Asthma Maternal Uncle      Allergies Son      Allergic rhinitis Son      Eczema Son      Immunodeficiency Neg Hx      Ovarian cancer Neg Hx         Social History     Socioeconomic History    Marital status:    Tobacco Use    Smoking status: Never    Smokeless tobacco: Never   Substance and Sexual Activity    Alcohol use: Yes     Comment: seldom    Drug use: Never    Sexual activity: Not Currently     Partners: Male     Birth control/protection: See Surgical Hx     Social Drivers of Health     Financial Resource Strain: Patient Declined (2023)    Overall Financial Resource Strain (CARDIA)     Difficulty of Paying Living Expenses: Patient declined   Food Insecurity: No Food Insecurity (2023)    Hunger Vital Sign     Worried About Running Out of Food in the Last Year: Never true     Ran Out of Food in the Last Year: Never true   Transportation Needs: No Transportation Needs (2023)    PRAPARE - Transportation     Lack of Transportation (Medical): No     Lack of Transportation (Non-Medical): No   Physical Activity: Patient Declined (2023)    Exercise Vital Sign     Days of Exercise per Week: Patient declined     Minutes of Exercise per Session: Patient declined   Stress: Patient Declined (2023)    [place] [place] [employer] - Occupational Stress Questionnaire     Feeling of Stress : Patient declined   Housing Stability: Unknown (2023)    Housing Stability Vital Sign     Unable to Pay for Housing in the Last Year: No     Unstable Housing in the Last Year: No       Chief Complaint:   Chief Complaint   Patient presents with    Right Knee - Pain       Date of surgery:  January 3, 2023    History of  present illness:  69-year-old female underwent left robotic assisted total knee arthroplasty.  She was doing well until she fell while on a cruise on January 3rd.  She fell onto her left knee.  Having more pain in the right knee.  Has not had anything done in quite a while.  Has done well with viscosupplementation in the past.      Review of Systems:    Musculoskeletal:  See HPI        Physical Examination:    Vital Signs:    Vitals:    12/23/24 1026   Resp: 17           There is no height or weight on file to calculate BMI.    This a well-developed, well nourished patient in no acute distress.  They are alert and oriented and cooperative to examination.  Pt. walks without an antalgic gait.      Examination left knee shows well-healed surgical incision.  No erythema drainage.  Mild swelling.  Good range of motion from 0-115 degrees     Examination of the right knee shows no rashes or erythema. There are no masses ecchymosis or effusion. Patient has full range of motion from 0-130°. Patient is moderately tender to palpation over lateral joint line and nontender to palpation over the medial joint line. Patient has a - Lachman exam, - anterior drawer exam, and - posterior drawer exam. - Apley exam. Knee is stable to varus and valgus stress. 5 out of 5 motor strength. Palpable distal pulses. Intact light touch sensation. Negative Patellofemoral crepitus        X-rays:  Four views of the left knee are  reviewed which show well-aligned left total knee arthroplasty components without complication.  Severe lateral compartment narrowing of the right knee.  X-rays left elbow are reviewed which show no acute fracture     Assessment::  Status post left [place] Fabien CR total knee arthroplasty using cement  Right valgus knee arthritis         Plan:  Reviewed the findings with her today.  I agreed to inject her right knee with Synvisc 2 of 3 today.  Follow up next week.  Likely going to schedule her knee replacement in May of next  year    This note was created using [employer] voice recognition software that occasionally misinterpreted phrases or words.

## 2024-12-23 NOTE — PROCEDURES
Large Joint Aspiration/Injection: R knee joint    Date/Time: 12/23/2024 1:30 PM    Performed by: Estevan Monteiro MD  Authorized by: Estevan Monteiro MD    Consent Done?:  Yes (Verbal)  Indications:  Pain  Site marked: the procedure site was marked    Timeout: prior to procedure the correct patient, procedure, and site was verified      Details:  Needle Size:  20 G  Approach:  Anterolateral  Location:  Knee  Site:  R knee joint  Medications:  16 mg hyaluronate 16 mg/2 mL  Patient tolerance:  Patient tolerated the procedure well with no immediate complications

## 2024-12-24 ENCOUNTER — PATIENT MESSAGE (OUTPATIENT)
Dept: OBSTETRICS AND GYNECOLOGY | Facility: CLINIC | Age: 69
End: 2024-12-24
Payer: MEDICARE

## 2024-12-26 ENCOUNTER — PATIENT MESSAGE (OUTPATIENT)
Dept: OBSTETRICS AND GYNECOLOGY | Facility: CLINIC | Age: 69
End: 2024-12-26
Payer: MEDICARE

## 2024-12-30 ENCOUNTER — OFFICE VISIT (OUTPATIENT)
Dept: ORTHOPEDICS | Facility: CLINIC | Age: 69
End: 2024-12-30
Payer: MEDICARE

## 2024-12-30 VITALS — WEIGHT: 207.25 LBS | HEIGHT: 67 IN | BODY MASS INDEX: 32.53 KG/M2

## 2024-12-30 DIAGNOSIS — M17.10 ARTHRITIS OF KNEE: Primary | ICD-10-CM

## 2024-12-30 PROCEDURE — 99499 UNLISTED E&M SERVICE: CPT | Mod: 25,S$PBB,, | Performed by: ORTHOPAEDIC SURGERY

## 2024-12-30 PROCEDURE — 99999 PR PBB SHADOW E&M-EST. PATIENT-LVL II: CPT | Mod: PBBFAC,,, | Performed by: ORTHOPAEDIC SURGERY

## 2024-12-30 PROCEDURE — 99212 OFFICE O/P EST SF 10 MIN: CPT | Mod: PBBFAC,PO | Performed by: ORTHOPAEDIC SURGERY

## 2024-12-30 PROCEDURE — 99999PBSHW PR PBB SHADOW TECHNICAL ONLY FILED TO HB: Mod: JZ,PBBFAC,,

## 2024-12-30 RX ADMIN — Medication 16 MG: at 01:12

## 2024-12-30 NOTE — PROGRESS NOTES
Past Medical History:   Diagnosis Date    Angio-edema     Asthma     Diabetes mellitus     Eczema     Obesity     PONV (postoperative nausea and vomiting)     Urticaria        Past Surgical History:   Procedure Laterality Date    ADENOIDECTOMY      BONY PELVIS SURGERY       SECTION      X 2    FIXATION KYPHOPLASTY N/A 2018    Procedure: Kyphoplasty;  Surgeon: Martinez Morejon MD;  Location: [facility];  Service: Pain Management;  Laterality: N/A;  L1     HYSTERECTOMY      KNEE CARTILAGE SURGERY Left     ROBOTIC ARTHROPLASTY, KNEE Left 1/3/2023    Procedure: ROBOTIC ARTHROPLASTY, KNEE, TOTAL;  Surgeon: Estevan Monteiro MD;  Location: [facility] OR;  Service: Orthopedics;  Laterality: Left;    TONSILLECTOMY      VAGINOPLASTY         Current Outpatient Medications   Medication Sig    acyclovir (ZOVIRAX) 400 MG tablet Take 400 mg by mouth every 8 (eight) hours.    atorvastatin (LIPITOR) 10 MG tablet Take 10 mg by mouth once daily.    clobetasol 0.05% (TEMOVATE) 0.05 % Oint Apply sparingly to rash bid x 2-3 weeks.  Avoid face and groin. (Patient not taking: Reported on 2024)    clotrimazole-betamethasone 1-0.05% (LOTRISONE) cream Apply topically. (Patient not taking: Reported on 2024)    fluconazole (DIFLUCAN) 150 MG Tab Take by mouth. (Patient not taking: Reported on 2024)    hydrOXYzine HCL (ATARAX) 25 MG tablet Take 25 mg by mouth nightly as needed. (Patient not taking: Reported on 2024)    metformin (GLUCOPHAGE) 500 MG tablet     MOUNJARO 15 mg/0.5 mL PnIj     nystatin (MYCOSTATIN) powder  (Patient not taking: Reported on 2024)    olmesartan (BENICAR) 20 MG tablet     SYNTHROID 150 mcg tablet 135 mcg.    tirzepatide (MOUNJARO) 2.5 mg/0.5 mL PnIj Inject 1 mg into the skin every 7 days.    triamterene-hydrochlorothiazide 37.5-25 mg (DYAZIDE) 37.5-25 mg per capsule      No current facility-administered medications for this visit.       Review of patient's allergies indicates:    Allergen Reactions    Levothyroxine Swelling     GENERIC ONLY / PT CAN TAKE SYNTHROID, face/tongue swelling       Family History   Problem Relation Name Age of Onset    Breast cancer Mother  42         at 61yo from breast ca    Angioedema Daughter      Allergies Daughter      Asthma Maternal Uncle      Allergies Son      Allergic rhinitis Son      Eczema Son      Immunodeficiency Neg Hx      Ovarian cancer Neg Hx         Social History     Socioeconomic History    Marital status:    Tobacco Use    Smoking status: Never    Smokeless tobacco: Never   Substance and Sexual Activity    Alcohol use: Yes     Comment: seldom    Drug use: Never    Sexual activity: Not Currently     Partners: Male     Birth control/protection: See Surgical Hx     Social Drivers of Health     Financial Resource Strain: Patient Declined (2023)    Overall Financial Resource Strain (CARDIA)     Difficulty of Paying Living Expenses: Patient declined   Food Insecurity: No Food Insecurity (2023)    Hunger Vital Sign     Worried About Running Out of Food in the Last Year: Never true     Ran Out of Food in the Last Year: Never true   Transportation Needs: No Transportation Needs (2023)    PRAPARE - Transportation     Lack of Transportation (Medical): No     Lack of Transportation (Non-Medical): No   Physical Activity: Patient Declined (2023)    Exercise Vital Sign     Days of Exercise per Week: Patient declined     Minutes of Exercise per Session: Patient declined   Stress: Patient Declined (2023)    [employer] [place] [employer] - Occupational Stress Questionnaire     Feeling of Stress : Patient declined   Housing Stability: Unknown (2023)    Housing Stability Vital Sign     Unable to Pay for Housing in the Last Year: No     Unstable Housing in the Last Year: No       Chief Complaint:   Chief Complaint   Patient presents with    Injections     R synvisc 3/3       Date of surgery:  January 3,  2023    History of present illness:  69-year-old female underwent left robotic assisted total knee arthroplasty.  She was doing well until she fell while on a cruise on January 3rd.  She fell onto her left knee.  Having more pain in the right knee.  Has not had anything done in quite a while.  Has done well with viscosupplementation in the past.      Review of Systems:    Musculoskeletal:  See HPI        Physical Examination:    Vital Signs:    There were no vitals filed for this visit.          Body mass index is 32.46 kg/m².    This a well-developed, well nourished patient in no acute distress.  They are alert and oriented and cooperative to examination.  Pt. walks without an antalgic gait.      Examination left knee shows well-healed surgical incision.  No erythema drainage.  Mild swelling.  Good range of motion from 0-115 degrees     Examination of the right knee shows no rashes or erythema. There are no masses ecchymosis or effusion. Patient has full range of motion from 0-130°. Patient is moderately tender to palpation over lateral joint line and nontender to palpation over the medial joint line. Patient has a - Lachman exam, - anterior drawer exam, and - posterior drawer exam. - Apley exam. Knee is stable to varus and valgus stress. 5 out of 5 motor strength. Palpable distal pulses. Intact light touch sensation. Negative Patellofemoral crepitus        X-rays:  Four views of the left knee are  reviewed which show well-aligned left total knee arthroplasty components without complication.  Severe lateral compartment narrowing of the right knee.  X-rays left elbow are reviewed which show no acute fracture     Assessment::  Status post left Nasir Fabien CR total knee arthroplasty using cement  Right valgus knee arthritis         Plan:  Reviewed the findings with her today.  I agreed to inject her right knee with Synvisc 3 of 3 today.  Likely going to schedule her knee replacement in May of next year    This note  was created using [employer] voice recognition software that occasionally misinterpreted phrases or words.

## 2024-12-30 NOTE — PROCEDURES
Large Joint Aspiration/Injection: R knee joint    Date/Time: 12/30/2024 1:30 PM    Performed by: Estevan Monteiro MD  Authorized by: Estevan Monteiro MD    Consent Done?:  Yes (Verbal)  Indications:  Pain  Site marked: the procedure site was marked    Timeout: prior to procedure the correct patient, procedure, and site was verified      Details:  Needle Size:  20 G  Approach:  Anterolateral  Location:  Knee  Site:  R knee joint  Medications:  16 mg hyaluronate 16 mg/2 mL  Patient tolerance:  Patient tolerated the procedure well with no immediate complications

## 2025-02-06 ENCOUNTER — PATIENT MESSAGE (OUTPATIENT)
Dept: ORTHOPEDICS | Facility: CLINIC | Age: 70
End: 2025-02-06

## 2025-02-06 ENCOUNTER — OFFICE VISIT (OUTPATIENT)
Dept: ORTHOPEDICS | Facility: CLINIC | Age: 70
End: 2025-02-06
Payer: MEDICARE

## 2025-02-06 DIAGNOSIS — Z01.818 PRE-OP TESTING: ICD-10-CM

## 2025-02-06 DIAGNOSIS — Z96.652 STATUS POST TOTAL LEFT KNEE REPLACEMENT USING CEMENT: ICD-10-CM

## 2025-02-06 DIAGNOSIS — M17.11 PRIMARY OSTEOARTHRITIS OF RIGHT KNEE: Primary | ICD-10-CM

## 2025-02-06 DIAGNOSIS — M17.10 ARTHRITIS OF KNEE: Primary | ICD-10-CM

## 2025-02-06 PROCEDURE — 99999 PR PBB SHADOW E&M-EST. PATIENT-LVL II: CPT | Mod: PBBFAC,,, | Performed by: ORTHOPAEDIC SURGERY

## 2025-02-06 PROCEDURE — 20610 DRAIN/INJ JOINT/BURSA W/O US: CPT | Mod: RT,S$GLB,, | Performed by: ORTHOPAEDIC SURGERY

## 2025-02-06 PROCEDURE — 99214 OFFICE O/P EST MOD 30 MIN: CPT | Mod: 25,S$GLB,, | Performed by: ORTHOPAEDIC SURGERY

## 2025-02-06 PROCEDURE — 1160F RVW MEDS BY RX/DR IN RCRD: CPT | Mod: CPTII,S$GLB,, | Performed by: ORTHOPAEDIC SURGERY

## 2025-02-06 PROCEDURE — 1159F MED LIST DOCD IN RCRD: CPT | Mod: CPTII,S$GLB,, | Performed by: ORTHOPAEDIC SURGERY

## 2025-02-06 RX ORDER — TRIAMCINOLONE ACETONIDE 40 MG/ML
40 INJECTION, SUSPENSION INTRA-ARTICULAR; INTRAMUSCULAR
Status: DISCONTINUED | OUTPATIENT
Start: 2025-02-06 | End: 2025-02-06 | Stop reason: HOSPADM

## 2025-02-06 RX ADMIN — TRIAMCINOLONE ACETONIDE 40 MG: 40 INJECTION, SUSPENSION INTRA-ARTICULAR; INTRAMUSCULAR at 03:02

## 2025-02-06 NOTE — PROGRESS NOTES
Past Medical History:   Diagnosis Date    Angio-edema     Asthma     Diabetes mellitus     Eczema     Obesity     PONV (postoperative nausea and vomiting)     Urticaria        Past Surgical History:   Procedure Laterality Date    ADENOIDECTOMY      BONY PELVIS SURGERY       SECTION      X 2    FIXATION KYPHOPLASTY N/A 2018    Procedure: Kyphoplasty;  Surgeon: Martinez Morejon MD;  Location: [facility];  Service: Pain Management;  Laterality: N/A;  L1     HYSTERECTOMY      KNEE CARTILAGE SURGERY Left     ROBOTIC ARTHROPLASTY, KNEE Left 1/3/2023    Procedure: ROBOTIC ARTHROPLASTY, KNEE, TOTAL;  Surgeon: Estevan Monteiro MD;  Location: [facility] OR;  Service: Orthopedics;  Laterality: Left;    TONSILLECTOMY      VAGINOPLASTY         Current Outpatient Medications   Medication Sig    acyclovir (ZOVIRAX) 400 MG tablet Take 400 mg by mouth every 8 (eight) hours.    atorvastatin (LIPITOR) 10 MG tablet Take 10 mg by mouth once daily.    clobetasol 0.05% (TEMOVATE) 0.05 % Oint Apply sparingly to rash bid x 2-3 weeks.  Avoid face and groin. (Patient not taking: Reported on 2024)    clotrimazole-betamethasone 1-0.05% (LOTRISONE) cream Apply topically. (Patient not taking: Reported on 2024)    fluconazole (DIFLUCAN) 150 MG Tab Take by mouth. (Patient not taking: Reported on 2024)    hydrOXYzine HCL (ATARAX) 25 MG tablet Take 25 mg by mouth nightly as needed. (Patient not taking: Reported on 2024)    metformin (GLUCOPHAGE) 500 MG tablet     MOUNJARO 15 mg/0.5 mL PnIj     nystatin (MYCOSTATIN) powder  (Patient not taking: Reported on 2024)    olmesartan (BENICAR) 20 MG tablet     SYNTHROID 150 mcg tablet 135 mcg.    tirzepatide (MOUNJARO) 2.5 mg/0.5 mL PnIj Inject 1 mg into the skin every 7 days.    triamterene-hydrochlorothiazide 37.5-25 mg (DYAZIDE) 37.5-25 mg per capsule      No current facility-administered medications for this visit.       Review of patient's allergies indicates:    Allergen Reactions    Levothyroxine Swelling     GENERIC ONLY / PT CAN TAKE SYNTHROID, face/tongue swelling       Family History   Problem Relation Name Age of Onset    Breast cancer Mother  42         at 61yo from breast ca    Angioedema Daughter      Allergies Daughter      Asthma Maternal Uncle      Allergies Son      Allergic rhinitis Son      Eczema Son      Immunodeficiency Neg Hx      Ovarian cancer Neg Hx         Social History     Socioeconomic History    Marital status:    Tobacco Use    Smoking status: Never    Smokeless tobacco: Never   Substance and Sexual Activity    Alcohol use: Yes     Comment: seldom    Drug use: Never    Sexual activity: Not Currently     Partners: Male     Birth control/protection: See Surgical Hx     Social Drivers of Health     Financial Resource Strain: Patient Declined (2023)    Overall Financial Resource Strain (CARDIA)     Difficulty of Paying Living Expenses: Patient declined   Food Insecurity: No Food Insecurity (2023)    Hunger Vital Sign     Worried About Running Out of Food in the Last Year: Never true     Ran Out of Food in the Last Year: Never true   Transportation Needs: No Transportation Needs (2023)    PRAPARE - Transportation     Lack of Transportation (Medical): No     Lack of Transportation (Non-Medical): No   Physical Activity: Patient Declined (2023)    Exercise Vital Sign     Days of Exercise per Week: Patient declined     Minutes of Exercise per Session: Patient declined   Stress: Patient Declined (2023)    [place] [place] [employer] - Occupational Stress Questionnaire     Feeling of Stress : Patient declined   Housing Stability: Unknown (2023)    Housing Stability Vital Sign     Unable to Pay for Housing in the Last Year: No     Unstable Housing in the Last Year: No       Chief Complaint:   No chief complaint on file.      Date of surgery:  January 3, 2023    History of present illness:  69-year-old female  underwent left robotic assisted total knee arthroplasty.  She was doing well until she fell while on a cruise on January 3rd.  She fell onto her left knee.  Having more pain in the right knee.  Has not had anything done in quite a while.  Has done well with viscosupplementation in the past.      Review of Systems:    Musculoskeletal:  See HPI        Physical Examination:    Vital Signs:    There were no vitals filed for this visit.          There is no height or weight on file to calculate BMI.    This a well-developed, well nourished patient in no acute distress.  They are alert and oriented and cooperative to examination.  Pt. walks without an antalgic gait.      Examination left knee shows well-healed surgical incision.  No erythema drainage.  Mild swelling.  Good range of motion from 0-115 degrees     Examination of the right knee shows no rashes or erythema. There are no masses ecchymosis or effusion. Patient has full range of motion from 0-130°. Patient is moderately tender to palpation over lateral joint line and nontender to palpation over the medial joint line. Patient has a - Lachman exam, - anterior drawer exam, and - posterior drawer exam. - Apley exam. Knee is stable to varus and valgus stress. 5 out of 5 motor strength. Palpable distal pulses. Intact light touch sensation. Negative Patellofemoral crepitus    Heart is regular rate without obvious murmurs   Normal respiratory effort without audible wheezing  Abdomen is soft and nontender         X-rays:  Four views of the left knee are  reviewed which show well-aligned left total knee arthroplasty components without complication.  Severe lateral compartment narrowing of the right knee.  X-rays left elbow are reviewed which show no acute fracture     Assessment::  Status post left [place] Fabien CR total knee arthroplasty using cement  Right valgus knee arthritis         Plan:  Reviewed the findings with her today.  I agreed to inject her right knee with  cortisone today.  We will go ahead and do her paperwork for her right total knee arthroplasty today.  Plan is for right robotic assisted outpatient total knee arthroplasty.  Risks, benefits, and alternatives to the procedure were explained to the patient including but not limited to damage to nerves, arteries, blood vessels, bones, tendons, ligaments, stiffness, instability, infection, permanent limb dysfunction, DVT, PE, as well as general anesthetic complications including seizure, stroke, heart attack and even death. The patient understood these risks and wished to proceed and signed the informed consent.       This note was created using [employer] voice recognition software that occasionally misinterpreted phrases or words.

## 2025-02-07 RX ORDER — MUPIROCIN 20 MG/G
OINTMENT TOPICAL
OUTPATIENT
Start: 2025-02-07

## 2025-02-19 DIAGNOSIS — Z01.818 ENCOUNTER FOR OTHER PREPROCEDURAL EXAMINATION: Primary | ICD-10-CM

## 2025-04-10 ENCOUNTER — HOSPITAL ENCOUNTER (OUTPATIENT)
Dept: RADIOLOGY | Facility: HOSPITAL | Age: 70
Discharge: HOME | End: 2025-04-10
Attending: FAMILY MEDICINE
Payer: MEDICARE

## 2025-04-10 ENCOUNTER — HOSPITAL ENCOUNTER (OUTPATIENT)
Dept: CARDIOLOGY | Facility: HOSPITAL | Age: 70
Discharge: HOME | End: 2025-04-10
Attending: FAMILY MEDICINE
Payer: MEDICARE

## 2025-04-10 DIAGNOSIS — Z01.818 ENCOUNTER FOR OTHER PREPROCEDURAL EXAMINATION: ICD-10-CM

## 2025-04-10 PROCEDURE — 93005 ELECTROCARDIOGRAM TRACING: CPT

## 2025-04-10 PROCEDURE — 71046 X-RAY EXAM CHEST 2 VIEWS: CPT | Mod: TC

## 2025-04-10 PROCEDURE — 71046 X-RAY EXAM CHEST 2 VIEWS: CPT | Mod: 26,,, | Performed by: RADIOLOGY

## 2025-04-23 ENCOUNTER — HOSPITAL ENCOUNTER (OUTPATIENT)
Dept: RADIOLOGY | Facility: HOSPITAL | Age: 70
Discharge: HOME | End: 2025-04-23
Attending: ORTHOPAEDIC SURGERY
Payer: MEDICARE

## 2025-04-23 ENCOUNTER — HOSPITAL ENCOUNTER (OUTPATIENT)
Dept: PREADMISSION TESTING | Facility: HOSPITAL | Age: 70
Discharge: HOME | End: 2025-04-23
Attending: ORTHOPAEDIC SURGERY
Payer: MEDICARE

## 2025-04-23 VITALS — WEIGHT: 207 LBS | HEIGHT: 67 IN | BODY MASS INDEX: 32.49 KG/M2

## 2025-04-23 DIAGNOSIS — Z01.818 PRE-OP TESTING: ICD-10-CM

## 2025-04-23 DIAGNOSIS — M17.11 PRIMARY OSTEOARTHRITIS OF RIGHT KNEE: Primary | ICD-10-CM

## 2025-04-23 DIAGNOSIS — M17.11 PRIMARY OSTEOARTHRITIS OF RIGHT KNEE: ICD-10-CM

## 2025-04-23 DIAGNOSIS — Z01.818 PREOP TESTING: Primary | ICD-10-CM

## 2025-04-23 DIAGNOSIS — M17.10 ARTHRITIS OF KNEE: ICD-10-CM

## 2025-04-23 LAB
ABSOLUTE EOSINOPHIL (SMH): 0 K/UL
ABSOLUTE MONOCYTE (SMH): 0.56 K/UL (ref 0.3–1)
ABSOLUTE NEUTROPHIL COUNT (SMH): 7.3 K/UL (ref 1.8–7.7)
ANION GAP (SMH): 8 MMOL/L (ref 8–16)
BASOPHILS # BLD AUTO: 0.02 K/UL
BASOPHILS NFR BLD AUTO: 0.2 %
BUN SERPL-MCNC: 21 MG/DL (ref 8–23)
CALCIUM SERPL-MCNC: 9 MG/DL (ref 8.7–10.5)
CHLORIDE SERPL-SCNC: 107 MMOL/L (ref 95–110)
CO2 SERPL-SCNC: 24 MMOL/L (ref 23–29)
CREAT SERPL-MCNC: 0.8 MG/DL (ref 0.5–1.4)
ERYTHROCYTE [DISTWIDTH] IN BLOOD BY AUTOMATED COUNT: 13.4 % (ref 11.5–14.5)
GFR SERPLBLD CREATININE-BSD FMLA CKD-EPI: >60 ML/MIN/1.73/M2
GLUCOSE SERPL-MCNC: 114 MG/DL (ref 70–110)
HCT VFR BLD AUTO: 34.5 % (ref 37–48.5)
HGB BLD-MCNC: 11.3 GM/DL (ref 12–16)
IMM GRANULOCYTES # BLD AUTO: 0.03 K/UL (ref 0–0.04)
IMM GRANULOCYTES NFR BLD AUTO: 0.3 % (ref 0–0.5)
LYMPHOCYTES # BLD AUTO: 1.4 K/UL (ref 1–4.8)
MCH RBC QN AUTO: 29.2 PG (ref 27–31)
MCHC RBC AUTO-ENTMCNC: 32.8 G/DL (ref 32–36)
MCV RBC AUTO: 89 FL (ref 82–98)
MRSA PCR SCRN (SMH): NOT DETECTED
NUCLEATED RBC (/100WBC) (SMH): 0 /100 WBC
PLATELET # BLD AUTO: 224 K/UL (ref 150–450)
PMV BLD AUTO: 9.8 FL (ref 9.2–12.9)
POTASSIUM SERPL-SCNC: 4.4 MMOL/L (ref 3.5–5.1)
RBC # BLD AUTO: 3.87 M/UL (ref 4–5.4)
RELATIVE EOSINOPHIL (SMH): 0 % (ref 0–8)
RELATIVE LYMPHOCYTE (SMH): 15.1 % (ref 18–48)
RELATIVE MONOCYTE (SMH): 6 % (ref 4–15)
RELATIVE NEUTROPHIL (SMH): 78.4 % (ref 38–73)
SODIUM SERPL-SCNC: 139 MMOL/L (ref 136–145)
WBC # BLD AUTO: 9.27 K/UL (ref 3.9–12.7)

## 2025-04-23 PROCEDURE — 85025 COMPLETE CBC W/AUTO DIFF WBC: CPT | Performed by: ORTHOPAEDIC SURGERY

## 2025-04-23 PROCEDURE — 87641 MR-STAPH DNA AMP PROBE: CPT | Performed by: ORTHOPAEDIC SURGERY

## 2025-04-23 PROCEDURE — 73700 CT LOWER EXTREMITY W/O DYE: CPT | Mod: TC,RT

## 2025-04-23 PROCEDURE — 36415 COLL VENOUS BLD VENIPUNCTURE: CPT | Performed by: ORTHOPAEDIC SURGERY

## 2025-04-23 PROCEDURE — 82310 ASSAY OF CALCIUM: CPT | Performed by: ORTHOPAEDIC SURGERY

## 2025-04-23 PROCEDURE — 73700 CT LOWER EXTREMITY W/O DYE: CPT | Mod: 26,RT,, | Performed by: RADIOLOGY

## 2025-04-23 RX ORDER — PROMETHAZINE HYDROCHLORIDE AND DEXTROMETHORPHAN HYDROBROMIDE 6.25; 15 MG/5ML; MG/5ML
SYRUP ORAL
COMMUNITY
Start: 2025-04-22

## 2025-04-23 RX ORDER — AZITHROMYCIN 250 MG/1
250 TABLET, FILM COATED ORAL
COMMUNITY
Start: 2025-04-22

## 2025-04-23 NOTE — PRE ADMISSION SCREENING
JOINT CAMP ASSESSMENT    Name Gail Temple   MRN [id number]    Age/Sex 70 y.o. female    Surgeon Dr. Estevan Monteiro   Joint Camp Date 2025   Surgery Date 2025   Procedure Right Knee Arthroplasty   Insurance Payor: [place] [employer] [facility] MCARE / Plan: [employer] [facility] MEDICARE ADVANTAGE / Product Type: Medicare Advantage /    Care Team Patient Care Team:  Clarice Starks MD as PCP - General    Pharmacy   [facility]  [place] - [id number]  [id number]  [place] [address]  Phone: [phone] Fax: [phone]    [id number] - [place] - [address]  [address] [place] [address]  Phone: [phone] Fax: [phone]    [id number] - [place] - [address]  [address] [place] [address]  Phone: [phone] Fax: [phone]     AM-PAC Score   24   Risk Assessment Score 2     Past Medical History:   Diagnosis Date    Angio-edema     Asthma     Diabetes mellitus     Eczema     Obesity     PONV (postoperative nausea and vomiting)     Urticaria        Past Surgical History:   Procedure Laterality Date    ADENOIDECTOMY      BONY PELVIS SURGERY       SECTION      X 2    FIXATION KYPHOPLASTY N/A 2018    Procedure: Kyphoplasty;  Surgeon: Martinez Morejon MD;  Location: [facility] OR;  Service: Pain Management;  Laterality: N/A;  L1     HYSTERECTOMY      KNEE CARTILAGE SURGERY Left     ROBOTIC ARTHROPLASTY, KNEE Left 1/3/2023    Procedure: ROBOTIC ARTHROPLASTY, KNEE, TOTAL;  Surgeon: Estevan Monteiro MD;  Location: [facility] OR;  Service: Orthopedics;  Laterality: Left;    TONSILLECTOMY      VAGINOPLASTY           Home Enviroment     Living Arrangement: Lives with spouse  Home Environment: 1-story house/ trailer, number of outside stairs: 4 with a railing, tub-shower  Home Safety Concerns: Pets in the home:  satinder (1).    DISCHARGE CAREGIVER/SUPPORT SYSTEM     Identified post-op caregiver: Patient has spouse / significant other.  Patient's caregiver(s) will be able to provide physical assistance. Patient will have someone to assist overnight.      Caregiver present at pre-op interview:  No      PRE-OPERATIVE FUNCTIONAL STATUS     Employment: Retired    Pre-op Functional Status: Patient is independent with mobility/ambulation, transfers, ADL's, IADL's.    Use of assistive device for ambulation: none  ADL: self care  ADL Limitations: difficulty with walking  Medical Restrictions: Unstable ambulation and Decreased range of motions in extremities    POTENTIAL BARRIERS TO DISCHARGE/POTENTIAL POST-OP COMPLICATIONS     Patient with hx of angio-edema, asthma, diabetes mellitus, obesity, post-operative nausea & vomiting. Patient had left knee arthroplasty on 01/23/2023 without complication. POSSIBLE SAME DAY DISCHARGE.    DISCHARGE PLAN     Expected LOS of 1 days or less for joint replacement discussed with patient.     Patient in agreement with discharge plan: Yes    Discharge to: Outpatient PT and Home with 24 hour assistance     HH:  None.     OP PT: [facility] ([place]).     Home DME: rolling walker, single point cane, bedside commode, and tub transfer bench    Needed DME at D/C: none     Rx: Per Dr. Monteiro at discharge     Meds to Beds: Yes  Patient expected to discharge on Aspirin 81mg by mouth twice daily for DVT prophylaxis.

## 2025-04-23 NOTE — PRE ADMISSION SCREENING
"               [facility] Risk Assessment Scale    Patient Name: Gail Temple  YOB: 1955  MRN: [id number]            RIsk Factor Measure Recommendation Patient Data Scale/Score   BMI >40 Reconsider surgery, weight loss   Estimated body mass index is 32.42 kg/m² as calculated from the following:    Height as of this encounter: 5' 7" (1.702 m).    Weight as of this encounter: 93.9 kg (207 lb).   [] 0 = 1 - 24.9  [] 1 = 25-29.9  [x] 2 = 30-34.9  [] 3 = 35-39.9  [] 4 = 40-44.9  [] 5 = 45-99.9   Hemoglobin AIC (if applicable) >9 Delay surgery until DM under control  Refer for:  Nutrition Therapy  Exercise   Medication    Lab Results   Component Value Date    HGBA1C 5.5 01/03/2023       Lab Results   Component Value Date    GLU 97 09/28/2018      [x] 0 = 4.0-5.6  [] 1 = 5.7-6.4  [] 2 = 6.5-6.9  [] 3 = 7.0-7.9  [] 4 = 8.0-8.9  [] 5 = 9.0-12   Hemoglobin (Anemia) <9 Delay surgery   Correct anemia Lab Results   Component Value Date    HGB 11.3 (L) 04/23/2025    [] 20 - <9.0                    Albumin <3 Delay surgery &Workup Lab Results   Component Value Date    ALBUMIN 3.8 09/28/2018    [] 20 - <3.0   Smoking Cessation >4 Weeks Delay Surgery  Refer to OP Cessation Class    Never Smoker [] 20 - current smoker                                _____ PPD                    Hx of MI, PE, Arrhythmia, CVA, DVT <30 Days Delay Surgery    N/A [] 20      Infection Variable Delay surgery and re-evaluate   N/A [] 20 - recent/current infection     Depression (PHQ) >10 out of 27 Delay Surgery and re-evaluate  Medication  Counseling              [x] 0     []1     []2     []3      []4      [] 5                    (1-4)      (5-9)  (10-14)  (15-19)   (20-27)     Memory Impairment & Memory loss (Mini-Cog Screening Tool) Advanced dementia and/or Parkinson's Reconsider surgery     [x] 0     []1     []2     []3     []4     [] 5     Physical Conditioning (Modified AM-PAC Per Physical Therapy at [facility]) Unable to ambulate on day of " surgery Delay surgery and re-evaluate  Pre-Rehabilitation   (PT evaluation)       [x]  0   []4       []8     []12        []16     []20       (<20%)   (<40%)   (<60%)   (<80% )    (>80%)     Home Environment/Caregiver support  (Per /Navigator Interview)    Availability of basic services and/or approprate assistance during post-operative period Delay surgery and re-evaluate  Safe home environment  Health   1 week post-surgery  Transportation  availability  Ability to obtain DME/Medications post-op    [x] 0     []1     []2     []3     []4     [] 5  [x] 0     []1     []2     []3     []4     [] 5  [x] 0     []1     []2     []3     []4     [] 5  [x] 0     []1     []2     []3     []4     [] 5         MD Contact: Dr. Monteiro Comments:  Total Score:  2

## 2025-04-23 NOTE — DISCHARGE INSTRUCTIONS
To confirm, Your doctor has instructed you that surgery is scheduled for: 5/6/25 WITH DR. GENTILE    Please report to [facility], Registration the morning of surgery. You must check-in and receive a wristband before going to your procedure.  [address] [place] [address] [place] [address]    Pre-Op will call the afternoon prior to surgery between 1:00 and 6:00 PM with the final arrival time.  Phone number: [phone]    PLEASE NOTE:  The surgery schedule has many variables which may affect the time of your surgery case.  Family members should be available if your surgery time changes.  Plan to be here the day of your procedure between 4-6 hours.    MEDICATIONS:  TAKE ONLY THESE MEDICATIONS WITH A SMALL SIP OF WATER THE MORNING OF YOUR PROCEDURE:  SEE MED LIST      DO NOT TAKE THESE MEDICATIONS 5-7 DAYS PRIOR to your procedure or per your surgeon's request:   ASPIRIN, ALEVE, ADVIL, IBUPROFEN, FISH OIL VITAMIN E, HERBALS  (May take Tylenol)    ONLY if you are prescribed any types of blood thinners such as:  Aspirin, Coumadin, Plavix, Pradaxa, Xarelto, Aggrenox, Effient, Eliquis, Savasya, Brilinta, or any other, ask your surgeon whether you should stop taking them and how long before surgery you should stop.  You may also need to verify with the prescribing physician if it is ok to stop your medication.      INSTRUCTIONS IMPORTANT!!  Do not eat or drink anything between midnight and the time of your procedure- this includes gum, mints, and candy.  EXCEPT: you may have clear liquids such as:  WATER, BLACK COFFEE, UNSWEET TEA, OR GATORADE (NO RED OR PURPLE) UP TO 2 HOURS PRIOR TO YOUR ARRIVAL TIME.  Do not smoke or drink alcoholic beverages 24 hours prior to your procedure.  Shower the night before AND the morning of your procedure with a Chlorhexidine wash such as Hibiclens or Dial antibacterial soap from the neck down.  Do not get it on your face or in your eyes.  You may use your own shampoo and face  wash. This helps your skin to be as bacteria free as possible.    If you wear contact lenses, dentures, hearing aids or glasses, bring a container to put them in during surgery and give to a family member for safe keeping.  Please leave all jewelry, piercing's and valuables at home. You must remove your false eyelashes prior to surgery.    DO NOT remove hair from the surgery site.  Do not shave the incision site unless you are given specific instructions to do so.    ONLY if you have been diagnosed with sleep apnea please bring your C-PAP machine.  ONLY if you wear home oxygen please bring your portable oxygen tank the day of your procedure.  ONLY if you have a history of OPEN HEART SURGERY you will need a clearance from your Cardiologist per Anesthesia.      ONLY for patients requiring bowel prep, written instructions will be given by your doctor's office.  ONLY if you have a neuro stimulator, please bring the controller with you the morning of surgery  ONLY if a type and screen test is needed before surgery, please return:  If your doctor has scheduled you for an overnight stay, bring a small overnight bag with any personal items you need.  Make arrangements in advance for transportation home by a responsible adult. You can not go home in an uber or a cab per hospital policy.  It is not safe to drive a vehicle during the 24 hours after anesthesia.          All  facilities and properties are tobacco free.  Smoking is NOT allowed.   If you have any questions about these instructions, call Pre-Op Admit  Nursing at [phone] or the Pre-Op Day Surgery Unit at [phone].

## 2025-04-23 NOTE — PRE ADMISSION SCREENING
Patient Name: Gail Temple  YOB: 1955   MRN: [id number]     [facility]   Basic Mobility Inpatient Short Form 6 Clicks         How much difficulty does the patient currently have  Unable  A Lot  A Little  None      1. Turning over in bed (including adjusting bedclothes, sheets and blankets)?     1 []    2 []    3 []    4 [x]        2. Sitting down on and standing up from a chair with arms (e.g., wheelchair, bedside commode, etc.)     1 []  2 []  3 []     4 [x]      3. Moving from lying on back to sitting on the side of the bed?     1 []  2 []  3 []    4 [x]    How much help from another person does the patient currently need  Total  A Lot  A Little  None      4. Moving to and from a bed to a chair (including a wheelchair)?    1 []  2 []  3 []    4 [x]      5. Need to walk in hospital room?    1 []  2 []  3 []    4 [x]      6. Climbing 3-5 steps with a railing?    1 []  2 []  3 []    4 [x]       Raw Score:     24             CMS 0-100% Score:     0       %   Standardized Score:   61.14            CMS Modifier:      [facility] AMPAC   Basic Mobility Inpatient Short Form 6 Clicks Score Conversion Table*         *Use this form to convert -PAC Basic Mobility Inpatient Raw Scores.   [facility] Inpatient Basic Mobility Short Form Scoring Example   1. Add the number values associated with the response to each item. For example, items totals yield a Raw Score of 21.   2. Match the raw score to the t-Scale scores (t-Scale score = 50.25, SE = 4.69).   3. Find the associated CMS % (CMS % = 28.97%).   4. Locate the correct CMS Functional Modifier Code, or G Code (G code = CJ)     NOTE: Each -PAC Short Form has a separate conversion table. Make sure that you use the correct conversion table.       Instruction Manual - page 45 contains conversion table

## 2025-05-05 ENCOUNTER — ANESTHESIA EVENT (OUTPATIENT)
Dept: SURGERY | Facility: HOSPITAL | Age: 70
End: 2025-05-05
Payer: MEDICARE

## 2025-05-05 DIAGNOSIS — M17.11 PRIMARY OSTEOARTHRITIS OF RIGHT KNEE: Primary | ICD-10-CM

## 2025-05-05 RX ORDER — ACETAMINOPHEN 500 MG
1000 TABLET ORAL EVERY 8 HOURS PRN
Qty: 30 TABLET | Refills: 0 | Status: SHIPPED | OUTPATIENT
Start: 2025-05-05

## 2025-05-05 RX ORDER — ONDANSETRON 4 MG/1
4 TABLET, FILM COATED ORAL EVERY 6 HOURS PRN
Qty: 30 TABLET | Refills: 0 | Status: SHIPPED | OUTPATIENT
Start: 2025-05-05

## 2025-05-05 RX ORDER — ASPIRIN 81 MG/1
81 TABLET ORAL DAILY
Qty: 56 TABLET | Refills: 0 | Status: SHIPPED | OUTPATIENT
Start: 2025-05-05 | End: 2026-05-05

## 2025-05-05 RX ORDER — OXYCODONE AND ACETAMINOPHEN 5; 325 MG/1; MG/1
1 TABLET ORAL EVERY 6 HOURS PRN
Qty: 28 TABLET | Refills: 0 | Status: SHIPPED | OUTPATIENT
Start: 2025-05-05

## 2025-05-05 RX ORDER — IBUPROFEN 600 MG/1
600 TABLET ORAL EVERY 6 HOURS PRN
Qty: 30 TABLET | Refills: 0 | Status: SHIPPED | OUTPATIENT
Start: 2025-05-05

## 2025-05-05 RX ORDER — CYCLOBENZAPRINE HCL 10 MG
10 TABLET ORAL 3 TIMES DAILY PRN
Qty: 30 TABLET | Refills: 0 | Status: SHIPPED | OUTPATIENT
Start: 2025-05-05 | End: 2025-05-15

## 2025-05-06 ENCOUNTER — ANESTHESIA (OUTPATIENT)
Dept: SURGERY | Facility: HOSPITAL | Age: 70
End: 2025-05-06
Payer: MEDICARE

## 2025-05-06 ENCOUNTER — HOSPITAL ENCOUNTER (OUTPATIENT)
Facility: HOSPITAL | Age: 70
Discharge: HOME HEALTH | End: 2025-05-09
Attending: ORTHOPAEDIC SURGERY | Admitting: STUDENT IN AN ORGANIZED HEALTH CARE EDUCATION/TRAINING PROGRAM
Payer: MEDICARE

## 2025-05-06 DIAGNOSIS — R94.31 QT PROLONGATION: ICD-10-CM

## 2025-05-06 DIAGNOSIS — R07.9 CHEST PAIN: ICD-10-CM

## 2025-05-06 DIAGNOSIS — M17.10 ARTHRITIS OF KNEE: Primary | ICD-10-CM

## 2025-05-06 PROBLEM — R11.0 NAUSEA: Status: ACTIVE | Noted: 2025-05-06

## 2025-05-06 LAB
EAG (SMH): 108 MG/DL (ref 68–131)
HBA1C MFR BLD: 5.4 % (ref 4–5.6)
POCT GLUCOSE: 145 MG/DL (ref 70–110)
POCT GLUCOSE: 156 MG/DL (ref 70–110)

## 2025-05-06 PROCEDURE — 27201423 OPTIME MED/SURG SUP & DEVICES STERILE SUPPLY: Performed by: ORTHOPAEDIC SURGERY

## 2025-05-06 PROCEDURE — 71000015 HC POSTOP RECOV 1ST HR: Performed by: ORTHOPAEDIC SURGERY

## 2025-05-06 PROCEDURE — 71000016 HC POSTOP RECOV ADDL HR: Performed by: ORTHOPAEDIC SURGERY

## 2025-05-06 PROCEDURE — 37000008 HC ANESTHESIA 1ST 15 MINUTES: Performed by: ORTHOPAEDIC SURGERY

## 2025-05-06 PROCEDURE — 36000712 HC OR TIME LEV V 1ST 15 MIN: Performed by: ORTHOPAEDIC SURGERY

## 2025-05-06 PROCEDURE — 99900035 HC TECH TIME PER 15 MIN (STAT)

## 2025-05-06 PROCEDURE — 63600175 PHARM REV CODE 636 W HCPCS

## 2025-05-06 PROCEDURE — 99406 BEHAV CHNG SMOKING 3-10 MIN: CPT

## 2025-05-06 PROCEDURE — 97110 THERAPEUTIC EXERCISES: CPT | Mod: 59

## 2025-05-06 PROCEDURE — 63600175 PHARM REV CODE 636 W HCPCS: Performed by: ORTHOPAEDIC SURGERY

## 2025-05-06 PROCEDURE — 93005 ELECTROCARDIOGRAM TRACING: CPT

## 2025-05-06 PROCEDURE — 25000003 PHARM REV CODE 250: Performed by: ORTHOPAEDIC SURGERY

## 2025-05-06 PROCEDURE — 27200665 HC NERVE BLOCK NEEDLE/ CATHETER: Performed by: ANESTHESIOLOGY

## 2025-05-06 PROCEDURE — 83036 HEMOGLOBIN GLYCOSYLATED A1C: CPT

## 2025-05-06 PROCEDURE — 97161 PT EVAL LOW COMPLEX 20 MIN: CPT

## 2025-05-06 PROCEDURE — 27447 TOTAL KNEE ARTHROPLASTY: CPT | Mod: RT,,, | Performed by: ORTHOPAEDIC SURGERY

## 2025-05-06 PROCEDURE — 25000003 PHARM REV CODE 250: Performed by: ANESTHESIOLOGY

## 2025-05-06 PROCEDURE — 97530 THERAPEUTIC ACTIVITIES: CPT

## 2025-05-06 PROCEDURE — 0055T BONE SRGRY CMPTR CT/MRI IMAG: CPT | Mod: ,,, | Performed by: ORTHOPAEDIC SURGERY

## 2025-05-06 PROCEDURE — C1776 JOINT DEVICE (IMPLANTABLE): HCPCS | Performed by: ORTHOPAEDIC SURGERY

## 2025-05-06 PROCEDURE — 94760 N-INVAS EAR/PLS OXIMETRY 1: CPT

## 2025-05-06 PROCEDURE — 36000713 HC OR TIME LEV V EA ADD 15 MIN: Performed by: ORTHOPAEDIC SURGERY

## 2025-05-06 PROCEDURE — 37000009 HC ANESTHESIA EA ADD 15 MINS: Performed by: ORTHOPAEDIC SURGERY

## 2025-05-06 PROCEDURE — 94799 UNLISTED PULMONARY SVC/PX: CPT

## 2025-05-06 PROCEDURE — C1769 GUIDE WIRE: HCPCS | Performed by: ORTHOPAEDIC SURGERY

## 2025-05-06 PROCEDURE — C1713 ANCHOR/SCREW BN/BN,TIS/BN: HCPCS | Performed by: ORTHOPAEDIC SURGERY

## 2025-05-06 PROCEDURE — 25000003 PHARM REV CODE 250

## 2025-05-06 PROCEDURE — 97116 GAIT TRAINING THERAPY: CPT

## 2025-05-06 PROCEDURE — 63600175 PHARM REV CODE 636 W HCPCS: Performed by: NURSE ANESTHETIST, CERTIFIED REGISTERED

## 2025-05-06 PROCEDURE — 64447 NJX AA&/STRD FEMORAL NRV IMG: CPT | Performed by: ANESTHESIOLOGY

## 2025-05-06 PROCEDURE — 71000033 HC RECOVERY, INTIAL HOUR: Performed by: ORTHOPAEDIC SURGERY

## 2025-05-06 PROCEDURE — 94799 UNLISTED PULMONARY SVC/PX: CPT | Mod: XB

## 2025-05-06 PROCEDURE — 36415 COLL VENOUS BLD VENIPUNCTURE: CPT

## 2025-05-06 PROCEDURE — 63600175 PHARM REV CODE 636 W HCPCS: Mod: JZ,TB | Performed by: ANESTHESIOLOGY

## 2025-05-06 PROCEDURE — 63600175 PHARM REV CODE 636 W HCPCS: Performed by: ANESTHESIOLOGY

## 2025-05-06 PROCEDURE — 71000039 HC RECOVERY, EACH ADD'L HOUR: Performed by: ORTHOPAEDIC SURGERY

## 2025-05-06 PROCEDURE — 27200688 HC TRAY, SPINAL-HYPER/ ISOBARIC: Performed by: ANESTHESIOLOGY

## 2025-05-06 PROCEDURE — 94761 N-INVAS EAR/PLS OXIMETRY MLT: CPT

## 2025-05-06 PROCEDURE — 93010 ELECTROCARDIOGRAM REPORT: CPT | Mod: ,,, | Performed by: INTERNAL MEDICINE

## 2025-05-06 DEVICE — PATELLA
Type: IMPLANTABLE DEVICE | Site: KNEE | Status: FUNCTIONAL
Brand: TRIATHLON

## 2025-05-06 DEVICE — TOBRA FULL DOSE ANTIBIOTIC BONE CEMENT, 10 PACK CATALOG NUMBER IS 6197-9-010
Type: IMPLANTABLE DEVICE | Site: KNEE | Status: FUNCTIONAL
Brand: SIMPLEX

## 2025-05-06 DEVICE — CRUCIATE RETAINING FEMORAL
Type: IMPLANTABLE DEVICE | Site: KNEE | Status: FUNCTIONAL
Brand: TRIATHLON

## 2025-05-06 DEVICE — TIBIAL BEARING INSERT
Type: IMPLANTABLE DEVICE | Site: KNEE | Status: FUNCTIONAL
Brand: TRIATHLON

## 2025-05-06 DEVICE — PRIMARY TIBIAL BASEPLATE
Type: IMPLANTABLE DEVICE | Site: KNEE | Status: FUNCTIONAL
Brand: TRIATHLON

## 2025-05-06 RX ORDER — GLUCAGON 1 MG
1 KIT INJECTION
Status: DISCONTINUED | OUTPATIENT
Start: 2025-05-06 | End: 2025-05-09 | Stop reason: HOSPADM

## 2025-05-06 RX ORDER — LIDOCAINE HYDROCHLORIDE 20 MG/ML
INJECTION INTRAVENOUS
Status: DISCONTINUED | OUTPATIENT
Start: 2025-05-06 | End: 2025-05-06

## 2025-05-06 RX ORDER — CELECOXIB 100 MG/1
200 CAPSULE ORAL ONCE
Status: COMPLETED | OUTPATIENT
Start: 2025-05-06 | End: 2025-05-06

## 2025-05-06 RX ORDER — ATORVASTATIN CALCIUM 10 MG/1
10 TABLET, FILM COATED ORAL NIGHTLY
Status: DISCONTINUED | OUTPATIENT
Start: 2025-05-06 | End: 2025-05-09 | Stop reason: HOSPADM

## 2025-05-06 RX ORDER — ACETAMINOPHEN 500 MG
1000 TABLET ORAL
Status: COMPLETED | OUTPATIENT
Start: 2025-05-06 | End: 2025-05-06

## 2025-05-06 RX ORDER — SODIUM,POTASSIUM PHOSPHATES 280-250MG
2 POWDER IN PACKET (EA) ORAL
Status: DISCONTINUED | OUTPATIENT
Start: 2025-05-06 | End: 2025-05-09 | Stop reason: HOSPADM

## 2025-05-06 RX ORDER — MUPIROCIN 20 MG/G
OINTMENT TOPICAL
Status: DISCONTINUED | OUTPATIENT
Start: 2025-05-06 | End: 2025-05-06

## 2025-05-06 RX ORDER — INSULIN ASPART 100 [IU]/ML
0-5 INJECTION, SOLUTION INTRAVENOUS; SUBCUTANEOUS
Status: DISCONTINUED | OUTPATIENT
Start: 2025-05-06 | End: 2025-05-09 | Stop reason: HOSPADM

## 2025-05-06 RX ORDER — DIPHENHYDRAMINE HYDROCHLORIDE 50 MG/ML
12.5 INJECTION, SOLUTION INTRAMUSCULAR; INTRAVENOUS EVERY 6 HOURS PRN
Status: DISCONTINUED | OUTPATIENT
Start: 2025-05-06 | End: 2025-05-06

## 2025-05-06 RX ORDER — ACETAMINOPHEN 325 MG/1
650 TABLET ORAL EVERY 4 HOURS PRN
Status: DISCONTINUED | OUTPATIENT
Start: 2025-05-06 | End: 2025-05-06 | Stop reason: SDUPTHER

## 2025-05-06 RX ORDER — BUPIVACAINE HYDROCHLORIDE 7.5 MG/ML
INJECTION, SOLUTION EPIDURAL; RETROBULBAR
Status: COMPLETED | OUTPATIENT
Start: 2025-05-06 | End: 2025-05-06

## 2025-05-06 RX ORDER — ONDANSETRON HYDROCHLORIDE 2 MG/ML
4 INJECTION, SOLUTION INTRAVENOUS ONCE AS NEEDED
Status: COMPLETED | OUTPATIENT
Start: 2025-05-06 | End: 2025-05-06

## 2025-05-06 RX ORDER — SODIUM CHLORIDE, SODIUM LACTATE, POTASSIUM CHLORIDE, CALCIUM CHLORIDE 600; 310; 30; 20 MG/100ML; MG/100ML; MG/100ML; MG/100ML
1000 INJECTION, SOLUTION INTRAVENOUS ONCE
Status: DISCONTINUED | OUTPATIENT
Start: 2025-05-06 | End: 2025-05-06 | Stop reason: HOSPADM

## 2025-05-06 RX ORDER — OXYCODONE HCL 10 MG/1
10 TABLET, FILM COATED, EXTENDED RELEASE ORAL ONCE
Status: COMPLETED | OUTPATIENT
Start: 2025-05-06 | End: 2025-05-06

## 2025-05-06 RX ORDER — ACETAMINOPHEN 325 MG/1
650 TABLET ORAL EVERY 8 HOURS PRN
Status: DISCONTINUED | OUTPATIENT
Start: 2025-05-06 | End: 2025-05-09 | Stop reason: HOSPADM

## 2025-05-06 RX ORDER — CEFAZOLIN 2 G/1
2 INJECTION, POWDER, FOR SOLUTION INTRAMUSCULAR; INTRAVENOUS
Status: COMPLETED | OUTPATIENT
Start: 2025-05-06 | End: 2025-05-06

## 2025-05-06 RX ORDER — SODIUM CHLORIDE 9 MG/ML
INJECTION, SOLUTION INTRAVENOUS CONTINUOUS
Status: DISCONTINUED | OUTPATIENT
Start: 2025-05-06 | End: 2025-05-07

## 2025-05-06 RX ORDER — MIDAZOLAM HYDROCHLORIDE 1 MG/ML
.5-4 INJECTION, SOLUTION INTRAMUSCULAR; INTRAVENOUS
Status: DISCONTINUED | OUTPATIENT
Start: 2025-05-06 | End: 2025-05-06

## 2025-05-06 RX ORDER — NAPROXEN SODIUM 220 MG/1
81 TABLET, FILM COATED ORAL 2 TIMES DAILY
Status: DISCONTINUED | OUTPATIENT
Start: 2025-05-06 | End: 2025-05-09 | Stop reason: HOSPADM

## 2025-05-06 RX ORDER — HYDROCODONE BITARTRATE AND ACETAMINOPHEN 5; 325 MG/1; MG/1
1 TABLET ORAL EVERY 6 HOURS PRN
Refills: 0 | Status: DISCONTINUED | OUTPATIENT
Start: 2025-05-06 | End: 2025-05-06 | Stop reason: SDUPTHER

## 2025-05-06 RX ORDER — AMOXICILLIN 250 MG
1 CAPSULE ORAL DAILY PRN
Status: DISCONTINUED | OUTPATIENT
Start: 2025-05-06 | End: 2025-05-09 | Stop reason: HOSPADM

## 2025-05-06 RX ORDER — GLUCAGON 1 MG
1 KIT INJECTION
Status: DISCONTINUED | OUTPATIENT
Start: 2025-05-06 | End: 2025-05-06 | Stop reason: HOSPADM

## 2025-05-06 RX ORDER — DEXAMETHASONE SODIUM PHOSPHATE 4 MG/ML
INJECTION, SOLUTION INTRA-ARTICULAR; INTRALESIONAL; INTRAMUSCULAR; INTRAVENOUS; SOFT TISSUE
Status: DISCONTINUED | OUTPATIENT
Start: 2025-05-06 | End: 2025-05-06

## 2025-05-06 RX ORDER — HYDROMORPHONE HYDROCHLORIDE 2 MG/ML
1 INJECTION, SOLUTION INTRAMUSCULAR; INTRAVENOUS; SUBCUTANEOUS EVERY 6 HOURS PRN
Status: DISCONTINUED | OUTPATIENT
Start: 2025-05-06 | End: 2025-05-08

## 2025-05-06 RX ORDER — ALUMINUM HYDROXIDE, MAGNESIUM HYDROXIDE, AND SIMETHICONE 1200; 120; 1200 MG/30ML; MG/30ML; MG/30ML
30 SUSPENSION ORAL 4 TIMES DAILY PRN
Status: DISCONTINUED | OUTPATIENT
Start: 2025-05-06 | End: 2025-05-09 | Stop reason: HOSPADM

## 2025-05-06 RX ORDER — ONDANSETRON HYDROCHLORIDE 2 MG/ML
4 INJECTION, SOLUTION INTRAVENOUS EVERY 6 HOURS PRN
Status: DISCONTINUED | OUTPATIENT
Start: 2025-05-06 | End: 2025-05-09 | Stop reason: HOSPADM

## 2025-05-06 RX ORDER — SODIUM CHLORIDE, SODIUM LACTATE, POTASSIUM CHLORIDE, CALCIUM CHLORIDE 600; 310; 30; 20 MG/100ML; MG/100ML; MG/100ML; MG/100ML
INJECTION, SOLUTION INTRAVENOUS CONTINUOUS
Status: DISCONTINUED | OUTPATIENT
Start: 2025-05-06 | End: 2025-05-06

## 2025-05-06 RX ORDER — BUPIVACAINE HYDROCHLORIDE 5 MG/ML
INJECTION, SOLUTION EPIDURAL; INTRACAUDAL; PERINEURAL
Status: COMPLETED | OUTPATIENT
Start: 2025-05-06 | End: 2025-05-06

## 2025-05-06 RX ORDER — IBUPROFEN 200 MG
24 TABLET ORAL
Status: DISCONTINUED | OUTPATIENT
Start: 2025-05-06 | End: 2025-05-09 | Stop reason: HOSPADM

## 2025-05-06 RX ORDER — LANOLIN ALCOHOL/MO/W.PET/CERES
800 CREAM (GRAM) TOPICAL
Status: DISCONTINUED | OUTPATIENT
Start: 2025-05-06 | End: 2025-05-09 | Stop reason: HOSPADM

## 2025-05-06 RX ORDER — MIDAZOLAM HYDROCHLORIDE 1 MG/ML
INJECTION INTRAMUSCULAR; INTRAVENOUS
Status: DISCONTINUED | OUTPATIENT
Start: 2025-05-06 | End: 2025-05-06

## 2025-05-06 RX ORDER — TALC
6 POWDER (GRAM) TOPICAL NIGHTLY PRN
Status: DISCONTINUED | OUTPATIENT
Start: 2025-05-06 | End: 2025-05-09 | Stop reason: HOSPADM

## 2025-05-06 RX ORDER — TRIAMTERENE AND HYDROCHLOROTHIAZIDE 37.5; 25 MG/1; MG/1
1 TABLET ORAL DAILY
Status: DISCONTINUED | OUTPATIENT
Start: 2025-05-07 | End: 2025-05-09 | Stop reason: HOSPADM

## 2025-05-06 RX ORDER — SODIUM CHLORIDE 0.9 % (FLUSH) 0.9 %
10 SYRINGE (ML) INJECTION EVERY 12 HOURS PRN
Status: DISCONTINUED | OUTPATIENT
Start: 2025-05-06 | End: 2025-05-09 | Stop reason: HOSPADM

## 2025-05-06 RX ORDER — PROMETHAZINE HYDROCHLORIDE 25 MG/ML
12.5 INJECTION, SOLUTION INTRAMUSCULAR; INTRAVENOUS EVERY 6 HOURS PRN
Status: DISCONTINUED | OUTPATIENT
Start: 2025-05-06 | End: 2025-05-06

## 2025-05-06 RX ORDER — PHENYLEPHRINE HYDROCHLORIDE 10 MG/ML
INJECTION INTRAVENOUS
Status: DISCONTINUED | OUTPATIENT
Start: 2025-05-06 | End: 2025-05-06

## 2025-05-06 RX ORDER — HYDROMORPHONE HYDROCHLORIDE 2 MG/ML
0.2 INJECTION, SOLUTION INTRAMUSCULAR; INTRAVENOUS; SUBCUTANEOUS EVERY 5 MIN PRN
Status: DISCONTINUED | OUTPATIENT
Start: 2025-05-06 | End: 2025-05-06

## 2025-05-06 RX ORDER — IBUPROFEN 200 MG
16 TABLET ORAL
Status: DISCONTINUED | OUTPATIENT
Start: 2025-05-06 | End: 2025-05-09 | Stop reason: HOSPADM

## 2025-05-06 RX ORDER — OXYCODONE HYDROCHLORIDE 5 MG/1
5 TABLET ORAL
Status: DISCONTINUED | OUTPATIENT
Start: 2025-05-06 | End: 2025-05-06

## 2025-05-06 RX ORDER — LOSARTAN POTASSIUM 25 MG/1
50 TABLET ORAL DAILY
Status: DISCONTINUED | OUTPATIENT
Start: 2025-05-06 | End: 2025-05-09 | Stop reason: HOSPADM

## 2025-05-06 RX ORDER — OXYCODONE AND ACETAMINOPHEN 5; 325 MG/1; MG/1
1 TABLET ORAL EVERY 6 HOURS PRN
Refills: 0 | Status: DISCONTINUED | OUTPATIENT
Start: 2025-05-06 | End: 2025-05-07

## 2025-05-06 RX ORDER — BUPIVACAINE 13.3 MG/ML
INJECTION, SUSPENSION, LIPOSOMAL INFILTRATION
Status: COMPLETED | OUTPATIENT
Start: 2025-05-06 | End: 2025-05-06

## 2025-05-06 RX ORDER — NALOXONE HCL 0.4 MG/ML
0.02 VIAL (ML) INJECTION
Status: DISCONTINUED | OUTPATIENT
Start: 2025-05-06 | End: 2025-05-09 | Stop reason: HOSPADM

## 2025-05-06 RX ORDER — ONDANSETRON HYDROCHLORIDE 2 MG/ML
INJECTION, SOLUTION INTRAVENOUS
Status: DISCONTINUED | OUTPATIENT
Start: 2025-05-06 | End: 2025-05-06

## 2025-05-06 RX ORDER — FENTANYL CITRATE 50 UG/ML
25-200 INJECTION, SOLUTION INTRAMUSCULAR; INTRAVENOUS
Status: DISCONTINUED | OUTPATIENT
Start: 2025-05-06 | End: 2025-05-06

## 2025-05-06 RX ORDER — FENTANYL CITRATE 50 UG/ML
25 INJECTION, SOLUTION INTRAMUSCULAR; INTRAVENOUS EVERY 5 MIN PRN
Status: DISCONTINUED | OUTPATIENT
Start: 2025-05-06 | End: 2025-05-06

## 2025-05-06 RX ORDER — GABAPENTIN 100 MG/1
100 CAPSULE ORAL 3 TIMES DAILY
Qty: 60 CAPSULE | Refills: 0 | Status: SHIPPED | OUTPATIENT
Start: 2025-05-06 | End: 2025-05-09

## 2025-05-06 RX ORDER — PROPOFOL 10 MG/ML
VIAL (ML) INTRAVENOUS CONTINUOUS PRN
Status: DISCONTINUED | OUTPATIENT
Start: 2025-05-06 | End: 2025-05-06

## 2025-05-06 RX ORDER — ONDANSETRON HYDROCHLORIDE 4 MG/5ML
4 SOLUTION ORAL EVERY 6 HOURS
Status: DISCONTINUED | OUTPATIENT
Start: 2025-05-06 | End: 2025-05-09 | Stop reason: HOSPADM

## 2025-05-06 RX ADMIN — SODIUM CHLORIDE: 9 INJECTION, SOLUTION INTRAVENOUS at 05:05

## 2025-05-06 RX ADMIN — FENTANYL CITRATE 50 MCG: 50 INJECTION, SOLUTION INTRAMUSCULAR; INTRAVENOUS at 08:05

## 2025-05-06 RX ADMIN — BUPIVACAINE 20 ML: 13.3 INJECTION, SUSPENSION, LIPOSOMAL INFILTRATION at 08:05

## 2025-05-06 RX ADMIN — OXYCODONE HYDROCHLORIDE AND ACETAMINOPHEN 1 TABLET: 5; 325 TABLET ORAL at 09:05

## 2025-05-06 RX ADMIN — ONDANSETRON 4 MG: 4 SOLUTION ORAL at 05:05

## 2025-05-06 RX ADMIN — CEFAZOLIN 2 G: 2 INJECTION, POWDER, FOR SOLUTION INTRAMUSCULAR; INTRAVENOUS at 09:05

## 2025-05-06 RX ADMIN — SODIUM CHLORIDE, SODIUM GLUCONATE, SODIUM ACETATE, POTASSIUM CHLORIDE AND MAGNESIUM CHLORIDE: 526; 502; 368; 37; 30 INJECTION, SOLUTION INTRAVENOUS at 10:05

## 2025-05-06 RX ADMIN — PROPOFOL 50 MCG/KG/MIN: 10 INJECTION, EMULSION INTRAVENOUS at 09:05

## 2025-05-06 RX ADMIN — ONDANSETRON 4 MG: 4 SOLUTION ORAL at 11:05

## 2025-05-06 RX ADMIN — DEXAMETHASONE SODIUM PHOSPHATE 4 MG: 4 INJECTION, SOLUTION INTRA-ARTICULAR; INTRALESIONAL; INTRAMUSCULAR; INTRAVENOUS; SOFT TISSUE at 09:05

## 2025-05-06 RX ADMIN — BUPIVACAINE HYDROCHLORIDE 10 ML: 5 INJECTION, SOLUTION EPIDURAL; INTRACAUDAL; PERINEURAL at 08:05

## 2025-05-06 RX ADMIN — MIDAZOLAM HYDROCHLORIDE 2 MG: 1 INJECTION, SOLUTION INTRAMUSCULAR; INTRAVENOUS at 08:05

## 2025-05-06 RX ADMIN — ONDANSETRON 4 MG: 2 INJECTION INTRAMUSCULAR; INTRAVENOUS at 11:05

## 2025-05-06 RX ADMIN — CELECOXIB 200 MG: 100 CAPSULE ORAL at 07:05

## 2025-05-06 RX ADMIN — ACETAMINOPHEN 1000 MG: 500 TABLET ORAL at 07:05

## 2025-05-06 RX ADMIN — BUPIVACAINE HYDROCHLORIDE 1.3 ML: 7.5 INJECTION, SOLUTION EPIDURAL; RETROBULBAR at 08:05

## 2025-05-06 RX ADMIN — PHENYLEPHRINE HYDROCHLORIDE 100 MCG: 10 INJECTION INTRAVENOUS at 09:05

## 2025-05-06 RX ADMIN — SODIUM CHLORIDE, SODIUM GLUCONATE, SODIUM ACETATE, POTASSIUM CHLORIDE AND MAGNESIUM CHLORIDE: 526; 502; 368; 37; 30 INJECTION, SOLUTION INTRAVENOUS at 07:05

## 2025-05-06 RX ADMIN — ONDANSETRON 4 MG: 2 INJECTION INTRAMUSCULAR; INTRAVENOUS at 09:05

## 2025-05-06 RX ADMIN — TRANEXAMIC ACID 1000 MG: 100 INJECTION, SOLUTION INTRAVENOUS at 10:05

## 2025-05-06 RX ADMIN — ATORVASTATIN CALCIUM 10 MG: 10 TABLET, FILM COATED ORAL at 09:05

## 2025-05-06 RX ADMIN — MUPIROCIN 1 G: 20 OINTMENT TOPICAL at 07:05

## 2025-05-06 RX ADMIN — LOSARTAN POTASSIUM 50 MG: 25 TABLET, FILM COATED ORAL at 05:05

## 2025-05-06 RX ADMIN — OXYCODONE HYDROCHLORIDE 10 MG: 10 TABLET, FILM COATED, EXTENDED RELEASE ORAL at 07:05

## 2025-05-06 RX ADMIN — ONDANSETRON 4 MG: 2 INJECTION INTRAMUSCULAR; INTRAVENOUS at 01:05

## 2025-05-06 RX ADMIN — LIDOCAINE HYDROCHLORIDE 75 MG: 20 INJECTION, SOLUTION INTRAVENOUS at 09:05

## 2025-05-06 RX ADMIN — ASPIRIN 81 MG: 81 TABLET, CHEWABLE ORAL at 09:05

## 2025-05-06 RX ADMIN — SODIUM CHLORIDE, SODIUM GLUCONATE, SODIUM ACETATE, POTASSIUM CHLORIDE AND MAGNESIUM CHLORIDE: 526; 502; 368; 37; 30 INJECTION, SOLUTION INTRAVENOUS at 09:05

## 2025-05-06 RX ADMIN — TRANEXAMIC ACID 1000 MG: 100 INJECTION, SOLUTION INTRAVENOUS at 09:05

## 2025-05-06 RX ADMIN — PHENYLEPHRINE HYDROCHLORIDE 200 MCG: 10 INJECTION INTRAVENOUS at 10:05

## 2025-05-06 RX ADMIN — ROPIVACAINE HYDROCHLORIDE: 5 INJECTION EPIDURAL; INFILTRATION; PERINEURAL at 09:05

## 2025-05-06 RX ADMIN — OXYCODONE HYDROCHLORIDE 5 MG: 5 TABLET ORAL at 02:05

## 2025-05-06 NOTE — ASSESSMENT & PLAN NOTE
"Patient's FSGs are controlled on current medication regimen.    Lab Results   Component Value Date    HGBA1C 5.5 01/03/2023     Most recent fingerstick glucose reviewed- No results for input(s): "POCTGLUCOSE" in the last 24 hours.  Current correctional scale  Low  Maintain anti-hyperglycemic dose as follows-   Antihyperglycemics (From admission, onward)      Start     Stop Route Frequency Ordered    05/06/25 1654  insulin aspart U-100 pen 0-5 Units         -- SubQ Before meals & nightly PRN 05/06/25 1554          Hold Oral hypoglycemics while patient is in the hospital.  " no

## 2025-05-06 NOTE — ASSESSMENT & PLAN NOTE
Body mass index is 32.11 kg/m². Morbid obesity complicates all aspects of disease management from diagnostic modalities to treatment. Weight loss encouraged and health benefits explained to patient.

## 2025-05-06 NOTE — TRANSFER OF CARE
"Anesthesia Transfer of Care Note    Patient: Gail Temple    Procedure(s) Performed: Procedure(s) (LRB):  ROBOTIC ARTHROPLASTY, KNEE, TOTAL (Right)    Patient location: PACU    Anesthesia Type: spinal and MAC    Transport from OR: Transported from OR on 2-3 L/min O2 by NC with adequate spontaneous ventilation    Post pain: adequate analgesia    Post assessment: no apparent anesthetic complications    Post vital signs: stable    Level of consciousness: awake    Nausea/Vomiting: no nausea/vomiting    Complications: none    Transfer of care protocol was followed      Last vitals: Visit Vitals  /66 (BP Location: Right forearm, Patient Position: Lying)   Pulse 60   Temp 36.6 °C (97.9 °F) (Skin)   Resp (!) 9   Ht 5' 7" (1.702 m)   Wt 93 kg (205 lb)   SpO2 100%   Breastfeeding No   BMI 32.11 kg/m²     "

## 2025-05-06 NOTE — SUBJECTIVE & OBJECTIVE
Past Medical History:   Diagnosis Date    Angio-edema     Asthma     Diabetes mellitus     Eczema     Hypertension     mild    Obesity     PONV (postoperative nausea and vomiting)     Slow to wake up after anesthesia     Urticaria        Past Surgical History:   Procedure Laterality Date    ADENOIDECTOMY      BONY PELVIS SURGERY       SECTION      X 2    FIXATION KYPHOPLASTY N/A 2018    Procedure: Kyphoplasty;  Surgeon: Martinez Morejon MD;  [facility];  Service: Pain Management;  Laterality: N/A;  L1     HYSTERECTOMY      KNEE CARTILAGE SURGERY Left     ROBOTIC ARTHROPLASTY, KNEE Left 1/3/2023    Procedure: ROBOTIC ARTHROPLASTY, KNEE, TOTAL;  Surgeon: Estevan Monteiro MD;  Location: [facility] OR;  Service: Orthopedics;  Laterality: Left;    TONSILLECTOMY      VAGINOPLASTY         Review of patient's allergies indicates:   Allergen Reactions    Levothyroxine Swelling     GENERIC ONLY / PT CAN TAKE SYNTHROID, face/tongue swelling    Levothyroxine sodium Swelling       No current facility-administered medications on file prior to encounter.     Current Outpatient Medications on File Prior to Encounter   Medication Sig    atorvastatin (LIPITOR) 10 MG tablet Take 10 mg by mouth every evening.    metformin (GLUCOPHAGE) 500 MG tablet     olmesartan (BENICAR) 20 MG tablet     SYNTHROID 150 mcg tablet 135 mcg.    triamterene-hydrochlorothiazide 37.5-25 mg (DYAZIDE) 37.5-25 mg per capsule     acyclovir (ZOVIRAX) 400 MG tablet Take 400 mg by mouth every 8 (eight) hours.    clobetasol 0.05% (TEMOVATE) 0.05 % Oint Apply sparingly to rash bid x 2-3 weeks.  Avoid face and groin. (Patient not taking: Reported on 2024)    clotrimazole-betamethasone 1-0.05% (LOTRISONE) cream Apply topically. (Patient not taking: Reported on 2024)    fluconazole (DIFLUCAN) 150 MG Tab Take by mouth. (Patient not taking: Reported on 2024)    hydrOXYzine HCL (ATARAX) 25 MG tablet Take 25 mg by mouth nightly as needed.  (Patient not taking: Reported on 12/17/2024)    MOUNJARO 15 mg/0.5 mL PnIj     nystatin (MYCOSTATIN) powder  (Patient not taking: Reported on 12/17/2024)    [DISCONTINUED] tirzepatide (MOUNJARO) 2.5 mg/0.5 mL PnIj Inject 1 mg into the skin every 7 days.     Family History       Problem Relation (Age of Onset)    Allergic rhinitis Son    Allergies Daughter, Son    Angioedema Daughter    Asthma Maternal Uncle    Breast cancer Mother (42)    Eczema Son          Tobacco Use    Smoking status: Never    Smokeless tobacco: Never   Substance and Sexual Activity    Alcohol use: Yes     Comment: seldom    Drug use: Never    Sexual activity: Not Currently     Partners: Male     Birth control/protection: See Surgical Hx     Review of Systems   Constitutional:  Positive for activity change. Negative for appetite change and fever.   HENT:  Negative for trouble swallowing.    Respiratory:  Negative for cough, chest tightness and shortness of breath.    Cardiovascular:  Negative for chest pain.   Gastrointestinal:  Positive for nausea and vomiting. Negative for abdominal pain.   Genitourinary:  Negative for difficulty urinating.   Musculoskeletal:  Negative for arthralgias.   Skin:  Negative for color change and pallor.   Neurological:  Positive for dizziness.     Objective:     Vital Signs (Most Recent):  Temp: 97.9 °F (36.6 °C) (05/06/25 1153)  Pulse: 60 (05/06/25 1215)  Resp: 16 (05/06/25 1436)  BP: 136/67 (05/06/25 1215)  SpO2: 95 % (05/06/25 1215) Vital Signs (24h Range):  Temp:  [97.9 °F (36.6 °C)] 97.9 °F (36.6 °C)  Pulse:  [56-68] 60  Resp:  [6-21] 16  SpO2:  [94 %-100 %] 95 %  BP: (110-185)/(57-98) 136/67     Weight: 93 kg (205 lb)  Body mass index is 32.11 kg/m².     Physical Exam  Constitutional:       General: She is not in acute distress.  HENT:      Head: Normocephalic.      Mouth/Throat:      Mouth: Mucous membranes are moist.   Cardiovascular:      Rate and Rhythm: Normal rate and regular rhythm.   Pulmonary:       "Effort: Pulmonary effort is normal.      Breath sounds: Normal breath sounds.   Abdominal:      Palpations: Abdomen is soft.      Tenderness: There is no abdominal tenderness.   Musculoskeletal:      Right knee: Decreased range of motion.      Comments: Patient is s/p right total knee arthroplasty; bandage clean, dry, & intact   Skin:     General: Skin is warm and dry.                Significant Labs: All pertinent labs within the past 24 hours have been reviewed.  BMP: No results for input(s): "GLU", "NA", "K", "CL", "CO2", "BUN", "CREATININE", "CALCIUM", "MG" in the last 48 hours.  CBC: No results for input(s): "WBC", "HGB", "HCT", "PLT" in the last 48 hours.    Significant Imaging: I have reviewed all pertinent imaging results/findings within the past 24 hours.  "

## 2025-05-06 NOTE — PLAN OF CARE
Released per anesthesia, when criteria met. VS WDL, Resp even and unlabored. IS reviewed and pt encouraged to continue independently. Dressing dry and intact. Denies pain, has slight nausea, but felt better after medication with IV zofran. Has not taken liquids yet and will be due to void.

## 2025-05-06 NOTE — CARE UPDATE
05/06/25 1744   Patient Assessment/Suction   Level of Consciousness (AVPU) alert   Respiratory Effort Unlabored   Expansion/Accessory Muscles/Retractions no use of accessory muscles;no retractions;expansion symmetric   All Lung Fields Breath Sounds Anterior:;Lateral:;clear   Rhythm/Pattern, Respiratory unlabored;depth regular;pattern regular;no shortness of breath reported   Cough Frequency no cough   PRE-TX-O2   Device (Oxygen Therapy) room air   SpO2 97 %   Pulse Oximetry Type Intermittent   $ Pulse Oximetry - Single Charge Pulse Oximetry - Single   Pulse 72   Resp 18   Incentive Spirometer   $ Incentive Spirometer Charges done with encouragement   Incentive Spirometer Predicted Level (mL) 1840   Administration (IS) mouthpiece utilized   Number of Repetitions (IS) 8   Level Incentive Spirometer (mL) 1750   Patient Tolerance (IS) good;no adverse signs/symptoms present   Tobacco Cessation Intervention   Do you use any type of tobacco product? No   $ Smoking Cessation Charges Smoking Cessation - Intermediate (Non-CTTS)   Respiratory Evaluation   $ Care Plan Tech Time 15 min   $ Respiratory Evaluation Complete   Evaluation For New Orders   Admitting Diagnosis Nausea   Cardiac Diagnosis No Hx   Pulmonary Diagnosis No Hx   Current Surgeries 5/6/25- Robotic Arthroplasty knee right   Home Oxygen   Has Home Oxygen? No   Oxygen Care Plan   Oxygen Care Plan Per Protocol   SPO2 Goal (%) 92% non-cardiac   Rationale SpO2 is <92% (Non-cardiac Pt.)   Bronchodilator Care Plan   Rationale No Rationale found   Atelectasis Care Plan   Atelectasis Care Plan Incentive Spiromentry   Frequency TID   I.S. Goal (ml) 1760 ml   I.S. Minimum (ml) 880 ml   Rationale   (post op)   Airway Clearance Care Plan   Rationale No rationale found

## 2025-05-06 NOTE — ASSESSMENT & PLAN NOTE
Patient underwent right total knee arthroplasty 5/6.    -Weight bearing as tolerated per Ortho  -PRN pain management as ordered  -PT/OT eval and treat  -VTE prophylaxis-ASA 81 mg BID

## 2025-05-06 NOTE — DISCHARGE INSTRUCTIONS
Our goal at [facility] is to always give you outstanding care and exceptional service. You may receive a survey from [email or web address] by mail, text or e-mail in the next 24-48 hours asking about the care you received with us. The survey should only take 5-10 minutes to complete and is very important to us.     Your feedback provides us with a way to recognize our staff who work tirelessly to provide the best care! Also, your responses help us learn how to improve when your experience was below our aspiration of excellence. We are always looking for ways to improve your stay. We WILL use your feedback to continue making improvements to help us provide the highest quality care. We keep your personal information and feedback confidential. We appreciate your time completing this survey and can't wait to hear from you!!!    We look forward to your continued care with us! Thanks so much for choosing [facility] for your healthcare needs!              Discharge Instructions: After Your Surgery/Procedure  Youve just had surgery. During surgery you were given medicine called anesthesia to keep you relaxed and free of pain. After surgery you may have some pain or nausea. This is common. Here are some tips for feeling better and getting well after surgery.     Stay on schedule with your medication.   Going home  Your doctor or nurse will show you how to take care of yourself when you go home. He or she will also answer your questions. Have an adult family member or friend drive you home.      For your safety we recommend these precaution for the first 24 hours after your procedure:  Do not drive or use heavy equipment.  Do not make important decisions or sign legal papers.  Do not drink alcohol.  Have someone stay with you, if needed. He or she can watch for problems and help keep you safe.  Your concentration, balance, coordination, and judgement may be impaired for many hours after anesthesia.  Use caution when ambulating or standing  "up.     You may feel weak and "washed out" after anesthesia and surgery.      Subtle residual effects of general anesthesia or sedation with regional / local anesthesia can last more than 24 hours.  Rest for the remainder of the day or longer if your Doctor/Surgeon has advised you to do so.  Although you may feel normal within the first 24 hours, your reflexes and mental ability may be impaired without you realizing it.  You may feel dizzy, lightheaded or sleepy for 24 hours or longer.      Be sure to go to all follow-up visits with your doctor. And rest after your surgery for as long as your doctor tells you to.  Coping with pain  If you have pain after surgery, pain medicine will help you feel better. Take it as told, before pain becomes severe. Also, ask your doctor or pharmacist about other ways to control pain. This might be with heat, ice, or relaxation. And follow any other instructions your surgeon or nurse gives you.  Tips for taking pain medicine  To get the best relief possible, remember these points:  Pain medicines can upset your stomach. Taking them with a little food may help.  Most pain relievers taken by mouth need at least 20 to 30 minutes to start to work.  Taking medicine on a schedule can help you remember to take it. Try to time your medicine so that you can take it before starting an activity. This might be before you get dressed, go for a walk, or sit down for dinner.  Constipation is a common side effect of pain medicines. Call your doctor before taking any medicines such as laxatives or stool softeners to help ease constipation. Also ask if you should skip any foods. Drinking lots of fluids and eating foods such as fruits and vegetables that are high in fiber can also help. Remember, do not take laxatives unless your surgeon has prescribed them.  Drinking alcohol and taking pain medicine can cause dizziness and slow your breathing. It can even be deadly. Do not drink alcohol while taking " pain medicine.  Pain medicine can make you react more slowly to things. Do not drive or run machinery while taking pain medicine.  Your health care provider may tell you to take acetaminophen to help ease your pain. Ask him or her how much you are supposed to take each day. Acetaminophen or other pain relievers may interact with your prescription medicines or other over-the-counter (OTC) drugs. Some prescription medicines have acetaminophen and other ingredients. Using both prescription and OTC acetaminophen for pain can cause you to overdose. Read the labels on your OTC medicines with care. This will help you to clearly know the list of ingredients, how much to take, and any warnings. It may also help you not take too much acetaminophen. If you have questions or do not understand the information, ask your pharmacist or health care provider to explain it to you before you take the OTC medicine.  Managing nausea  Some people have an upset stomach after surgery. This is often because of anesthesia, pain, or pain medicine, or the stress of surgery. These tips will help you handle nausea and eat healthy foods as you get better. If you were on a special food plan before surgery, ask your doctor if you should follow it while you get better. These tips may help:  Do not push yourself to eat. Your body will tell you when to eat and how much.  Start off with clear liquids and soup. They are easier to digest.  Next try semi-solid foods, such as mashed potatoes, applesauce, and gelatin, as you feel ready.  Slowly move to solid foods. Dont eat fatty, rich, or spicy foods at first.  Do not force yourself to have 3 large meals a day. Instead eat smaller amounts more often.  Take pain medicines with a small amount of solid food, such as crackers or toast, to avoid nausea.     Call your surgeon if  You still have pain an hour after taking medicine. The medicine may not be strong enough.  You feel too sleepy, dizzy, or groggy. The  medicine may be too strong.  You have side effects like nausea, vomiting, or skin changes, such as rash, itching, or hives.       If you have obstructive sleep apnea  You were given anesthesia medicine during surgery to keep you comfortable and free of pain. After surgery, you may have more apnea spells because of this medicine and other medicines you were given. The spells may last longer than usual.   At home:  Keep using the continuous positive airway pressure (CPAP) device when you sleep. Unless your health care provider tells you not to, use it when you sleep, day or night. CPAP is a common device used to treat obstructive sleep apnea.  Talk with your provider before taking any pain medicine, muscle relaxants, or sedatives. Your provider will tell you about the possible dangers of taking these medicines.  © 1690-9563 [employer]. [address] [place] [address]. All rights reserved. This information is not intended as a substitute for professional medical care. Always follow your healthcare professional's instructions.          Using an Incentive Spirometer    An incentive spirometer is a device that helps you do deep breathing exercises. These exercises expand your lungs, aid in circulation, and help prevent pneumonia. Deep breathing exercises also help you breathe better and improve the function of your lungs by:  Keeping your lungs clear  Strengthening your breathing muscles  Helping prevent respiratory complications or problems  The incentive spirometer gives you a way to take an active part in recover. A nurse or therapist will teach you breathing exercises. To do these exercises, you will breathe in through your mouth and not your nose. The incentive spirometer only works correctly if you breathe in through your mouth.  Steps to clear lungs  Step 1. Exhale normally. Then, inhale normally.  Relax and breathe out.  Step 2. Place your lips tightly around the mouthpiece.  Make sure the  device is upright and not tilted.  Step 3. Inhale as much air as you can through the mouthpiece (don't breath through your nose).  Inhale slowly and deeply.  Hold your breath long enough to keep the balls or disk raised for at least 3 to 5 seconds, or as instructed by your healthcare provider.  Some spirometers have an indicator to let you know that you are breathing in too fast. If the indicator goes off, breathe in more slowly.  Step 4. Repeat the exercise regularly.  Do this exercise every hour while you're awake, or as instructed by your healthcare provider.  If you were taught deep breathing and coughing exercises, do them regularly as instructed by your healthcare provider.           Post op instructions for prevention of DVT  What is deep vein thrombosis?  Deep vein thrombosis (DVT) is the medical term for blood clots in the deep veins of the leg.  These blood clots can be dangerous.  A DVT can block a blood vessel and keep blood from getting where it needs to go.  Another problem is that the clot can travel to other parts of the body such as the lungs.  A clot that travels to the lungs is called a pulmonary embolus (PE) and can cause serious problems with breathing which can lead to death.  Am I at risk for DVT/PE?  If you are not very active, you are at risk of DVT.  Anyone confined to bed, sitting for long periods of time, recovering from surgery, etc. increases the risk of DVT.  Other risk factors are cancer diagnosis, certain medications, estrogen replacement in any form,older age, obesity, pregnancy, smoking, history of clotting disorders, and dehydration.  How will I know if I have a DVT?  Swelling in the lower leg  Pain  Warmth, redness, hardness or bulging of the vein  If you have any of these symptoms, call your doctors office right away.  Some people will not have any symptoms until the clot moves to the lungs.  What are the symptoms of a PE?  Panting, shortness of breath, or trouble  breathing  Sharp, knife-like chest pain when you breathe  Coughing or coughing up blood  Rapid heartbeat  If you have any of these symptoms or get worse quickly, call 911 for emergency treatment.  How can I prevent a DVT?  Avoid long periods of inactivity and dont cross your legs--get up and walk around every hour or so.  Stay active--walking after surgery is highly encouraged.  This means you should get out of the house and walk in the neighborhood.  Going up and down stairs will not impair healing (unless advised against such activity by your doctor).    Drink plenty of noncaffeinated, nonalcoholic fluids each day to prevent dehydration.  Wear special support stockings, if they have been advised by your doctor.  If you travel, stop at least once an hour and walk around.  Avoid smoking (assistance with stopping is available through your healthcare provider)  Always notify your doctor if you are not able to follow the post operative instructions that are given to you at the time of discharge.  It may be necessary to prescribe one of the medications available to prevent DVT.          Exparel(bupivacaine) has been injected to provide approximately 72 hours of reduced pain after your surgery.  Do not remove the bracelet for five days.  Report to your doctor as soon as possible if you experience any of the following:   Restlessness   Anxiety   Speech problems    Lightheadedness   Numbness and tingling of the mouth and lips   Seizures    Metallic taste   Blurred vision   Tremors    Twitching   Depression   Extreme drowsiness  Avoid additional use of local anesthetics (such as dental procedures) for five days (96 hours).          We hope your stay was comfortable as you heal now, mend and rest.    For we have enjoyed taking care of you by giving your our best.    And as you get better, by regaining your health and strength;   We count it as a privilege to have served you and hope your time at [facility] was well spent.       Thank  You!!!

## 2025-05-06 NOTE — PT/OT/SLP PROGRESS
Physical Therapy Treatment    Patient Name:  Gail Temple   MRN:  [id number]    Recommendations:     Discharge Recommendations: Low Intensity Therapy  Discharge Equipment Recommendations: none  Barriers to discharge: None    Assessment:     Gail Temple is a 70 y.o. female admitted with a medical diagnosis of right TKA.  She presents with the following impairments/functional limitations: weakness, impaired endurance, impaired functional mobility, gait instability, impaired balance, decreased lower extremity function, pain, edema, orthopedic precautions .  Patient agreeable to PT treatment this afternoon but continued to report being nauseated, dizzy and with increased pain.  Patient presented sitting in chair and was able to stand with min assist.  Patient then able to stand for several minutes and take about 5 steps with decreased step length and  a lengthy pause between steps.  Patient then transferred chair to EOB with mod assist and a rw using a step transfer and mod assist to transfer to supine.     Rehab Prognosis: Good; patient would benefit from acute skilled PT services to address these deficits and reach maximum level of function.    Recent Surgery: Procedure(s) (LRB):  ROBOTIC ARTHROPLASTY, KNEE, TOTAL (Right) * Day of Surgery *    Plan:     During this hospitalization, patient to be seen BID to address the identified rehab impairments via gait training, therapeutic activities, therapeutic exercises and progress toward the following goals:    Plan of Care Expires:  06/09/25    Subjective     Chief Complaint: pain, nausea and dizziness  Patient/Family Comments/goals: spend the night in the hospital  Pain/Comfort:  Pain Rating 1: 6/10  Location - Side 1: Right  Location - Orientation 1: lower  Location 1: knee  Pain Addressed 1: Reposition, Cessation of Activity  Pain Rating Post-Intervention 1: 8/10      Objective:     Communicated with nurse prior to session.  Patient found supine with cryotherapy  upon PT entry to room.     General Precautions: Standard, fall  Orthopedic Precautions: RLE weight bearing as tolerated  Braces:    Respiratory Status: Room air     Functional Mobility:  Bed Mobility:     Sit to Supine: moderate assistance  Transfers:     Chair to mat: moderate assistance with  rolling walker  using  Step Transfer  Gait: x 5 steps rw min assist      AM-PAC 6 CLICK MOBILITY  Turning over in bed (including adjusting bedclothes, sheets and blankets)?: 3  Sitting down on and standing up from a chair with arms (e.g., wheelchair, bedside commode, etc.): 3  Moving from lying on back to sitting on the side of the bed?: 3  Moving to and from a bed to a chair (including a wheelchair)?: 3  Need to walk in hospital room?: 3  Climbing 3-5 steps with a railing?: 3  Basic Mobility Total Score: 18       Treatment & Education:  Transfer training sit to stand mod rw, chair to EOB rw mod step transfer  Standing tolerance/balance rw cga  Gait training x 5 steps rw min assist with decreases step length and a length pause between steps    Patient left supine with call button in reach, nurse notified, and spouse present..    GOALS:   Multidisciplinary Problems       Physical Therapy Goals          Problem: Physical Therapy    Goal Priority Disciplines Outcome Interventions   Physical Therapy Goal     PT, PT/OT Progressing    Description: Goals to be met by: 25     Patient will increase functional independence with mobility by performin. Supine to sit with [place]  2. Sit to supine with [place]  3. Sit to stand transfer with Stand-by Assistance  4. Bed to chair transfer with Stand-by Assistance using Rolling Walker  5. Gait  x 150 feet with Stand-by Assistance using Rolling Walker.                          DME Justifications:  No DME recommended requiring DME justifications    Time Tracking:     PT Received On: 25  PT Start Time: 1323     PT Stop Time: 1410  PT Total Time (min): 47 min      Billable Minutes: Gait Training 10 and Therapeutic Activity 37    Treatment Type: Treatment  PT/PTA: PT     Number of PTA visits since last PT visit: 0     05/06/2025

## 2025-05-06 NOTE — PLAN OF CARE
Patient not cleared by PT to discharge to home.  Dr Monteiro notified.  Orders received to admit to extended recovery and consult hospital medicine.

## 2025-05-06 NOTE — ANESTHESIA PREPROCEDURE EVALUATION
05/06/2025  Gail Temple is a 70 y.o., female.      Pre-op Assessment    I have reviewed the Patient Summary Reports.    I have reviewed the NPO Status.   I have reviewed the Medications.     Review of Systems  Anesthesia Hx:  No problems with previous Anesthesia                Social:  Non-Smoker       Hematology/Oncology:  Hematology Normal   Oncology Normal                                   EENT/Dental:  EENT/Dental Normal           Cardiovascular:     Hypertension                                    Hypertension         Pulmonary:    Asthma       Asthma:               Renal/:  Renal/ Normal                 Musculoskeletal:  Arthritis   Right knee osteoarthritis      Arthritis          Neurological:  Neurology Normal              Arthritis                           Endocrine:  Diabetes    Diabetes                    Obesity / BMI > 30  Dermatological:  Skin Normal    Psych:  Psychiatric Normal                    Physical Exam  General: Well nourished, Cooperative, Alert and Oriented    Airway:  Mallampati: II   Mouth Opening: Normal  TM Distance: Normal  Tongue: Normal  Neck ROM: Normal ROM        Anesthesia Plan  Type of Anesthesia, risks & benefits discussed:    Anesthesia Type: Spinal  Intra-op Monitoring Plan: Standard ASA Monitors  Post Op Pain Control Plan: multimodal analgesia, peripheral nerve block and IV/PO Opioids PRN  Informed Consent: Informed consent signed with the Patient and all parties understand the risks and agree with anesthesia plan.  All questions answered. Patient consented to blood products? Yes  ASA Score: 3  Day of Surgery Review of History & Physical: H&P Update referred to the surgeon/provider.    Ready For Surgery From Anesthesia Perspective.     .

## 2025-05-06 NOTE — ASSESSMENT & PLAN NOTE
Patient having intractable nausea and vomiting post operatively.    -clear liquid diet, will advance as tolerated  -zofran q4h   -phenergan q6 PRN

## 2025-05-06 NOTE — ANESTHESIA PROCEDURE NOTES
Peripheral Block    Patient location during procedure: pre-op   Block not for primary anesthetic.  Reason for block: at surgeon's request and post-op pain management   Post-op Pain Location: right knee   Start time: 5/6/2025 8:05 AM  Timeout: 5/6/2025 8:04 AM   End time: 5/6/2025 8:10 AM    Staffing  Authorizing Provider: Reuben Coronado MD  Performing Provider: Reuben Coronado MD    Staffing  Performed by: Reuben Coronado MD  Authorized by: Reuben Coronado MD    Preanesthetic Checklist  Completed: patient identified, IV checked, site marked, risks and benefits discussed, surgical consent, monitors and equipment checked, pre-op evaluation and timeout performed  Peripheral Block  Patient position: supine  Prep: ChloraPrep  Patient monitoring: heart rate, cardiac monitor, continuous pulse ox, continuous capnometry and frequent blood pressure checks  Block type: adductor canal  Laterality: right  Injection technique: single shot  Needle  Needle type: Stimuplex   Needle gauge: 21 G  Needle length: 4 in  Needle localization: anatomical landmarks and ultrasound guidance   -ultrasound image captured on disc.  Assessment  Injection assessment: negative aspiration, negative parasthesia and local visualized surrounding nerve  Paresthesia pain: none  Heart rate change: no  Slow fractionated injection: yes    Medications:    Medications: BUPivacaine liposome (PF) 1.3 % (13.3 mg/mL) suspension - Injection   20 mL - 5/6/2025 8:05:00 AM  bupivacaine (pf) (MARCAINE) injection 0.5% - Perineural   10 mL - 5/6/2025 8:05:00 AM    Additional Notes  VSS.  [facility] RN monitoring vitals throughout procedure.  Patient tolerated procedure well.

## 2025-05-06 NOTE — H&P
Past Medical History:   Diagnosis Date    Angio-edema      Asthma      Diabetes mellitus      Eczema      Obesity      PONV (postoperative nausea and vomiting)      Urticaria                 Past Surgical History:   Procedure Laterality Date    ADENOIDECTOMY        BONY PELVIS SURGERY         SECTION         X 2    FIXATION KYPHOPLASTY N/A 2018     Procedure: Kyphoplasty;  Surgeon: Martinez Morejon MD;  [facility];  Service: Pain Management;  Laterality: N/A;  L1     HYSTERECTOMY        KNEE CARTILAGE SURGERY Left      ROBOTIC ARTHROPLASTY, KNEE Left 1/3/2023     Procedure: ROBOTIC ARTHROPLASTY, KNEE, TOTAL;  Surgeon: Estevan Monteiro MD;  Location: [facility] OR;  Service: Orthopedics;  Laterality: Left;    TONSILLECTOMY        VAGINOPLASTY                  Current Outpatient Medications   Medication Sig    acyclovir (ZOVIRAX) 400 MG tablet Take 400 mg by mouth every 8 (eight) hours.    atorvastatin (LIPITOR) 10 MG tablet Take 10 mg by mouth once daily.    clobetasol 0.05% (TEMOVATE) 0.05 % Oint Apply sparingly to rash bid x 2-3 weeks.  Avoid face and groin. (Patient not taking: Reported on 2024)    clotrimazole-betamethasone 1-0.05% (LOTRISONE) cream Apply topically. (Patient not taking: Reported on 2024)    fluconazole (DIFLUCAN) 150 MG Tab Take by mouth. (Patient not taking: Reported on 2024)    hydrOXYzine HCL (ATARAX) 25 MG tablet Take 25 mg by mouth nightly as needed. (Patient not taking: Reported on 2024)    metformin (GLUCOPHAGE) 500 MG tablet      MOUNJARO 15 mg/0.5 mL PnIj      nystatin (MYCOSTATIN) powder  (Patient not taking: Reported on 2024)    olmesartan (BENICAR) 20 MG tablet      SYNTHROID 150 mcg tablet 135 mcg.    tirzepatide (MOUNJARO) 2.5 mg/0.5 mL PnIj Inject 1 mg into the skin every 7 days.    triamterene-hydrochlorothiazide 37.5-25 mg (DYAZIDE) 37.5-25 mg per capsule        No current facility-administered medications for this visit.                Review of patient's allergies indicates:   Allergen Reactions    Levothyroxine Swelling       GENERIC ONLY / PT CAN TAKE SYNTHROID, face/tongue swelling                Family History   Problem Relation Name Age of Onset    Breast cancer Mother   42          at 59yo from breast ca    Angioedema Daughter        Allergies Daughter        Asthma Maternal Uncle        Allergies Son        Allergic rhinitis Son        Eczema Son        Immunodeficiency Neg Hx        Ovarian cancer Neg Hx             Social History            Socioeconomic History    Marital status:    Tobacco Use    Smoking status: Never    Smokeless tobacco: Never   Substance and Sexual Activity    Alcohol use: Yes       Comment: seldom    Drug use: Never    Sexual activity: Not Currently       Partners: Male       Birth control/protection: See Surgical Hx      Social Drivers of Health           Financial Resource Strain: Patient Declined (2023)     Overall Financial Resource Strain (CARDIA)      Difficulty of Paying Living Expenses: Patient declined   Food Insecurity: No Food Insecurity (2023)     Hunger Vital Sign      Worried About Running Out of Food in the Last Year: Never true      Ran Out of Food in the Last Year: Never true   Transportation Needs: No Transportation Needs (2023)     PRAPARE - Transportation      Lack of Transportation (Medical): No      Lack of Transportation (Non-Medical): No   Physical Activity: Patient Declined (2023)     Exercise Vital Sign      Days of Exercise per Week: Patient declined      Minutes of Exercise per Session: Patient declined   Stress: Patient Declined (2023)     [employer] [place] [employer] - Occupational Stress Questionnaire      Feeling of Stress : Patient declined   Housing Stability: Unknown (2023)     Housing Stability Vital Sign      Unable to Pay for Housing in the Last Year: No      Unstable Housing in the Last Year: No         Chief Complaint:    No chief complaint on file.        Date of surgery:  January 3, 2023     History of present illness:  69-year-old female underwent left robotic assisted total knee arthroplasty.  She was doing well until she fell while on a cruise on January 3rd.  She fell onto her left knee.  Having more pain in the right knee.  Has not had anything done in quite a while.  Has done well with viscosupplementation in the past.        Review of Systems:     Musculoskeletal:  See HPI           Physical Examination:     Vital Signs:    There were no vitals filed for this visit.              There is no height or weight on file to calculate BMI.     This a well-developed, well nourished patient in no acute distress.  They are alert and oriented and cooperative to examination.  Pt. walks without an antalgic gait.       Examination left knee shows well-healed surgical incision.  No erythema drainage.  Mild swelling.  Good range of motion from 0-115 degrees      Examination of the right knee shows no rashes or erythema. There are no masses ecchymosis or effusion. Patient has full range of motion from 0-130°. Patient is moderately tender to palpation over lateral joint line and nontender to palpation over the medial joint line. Patient has a - Lachman exam, - anterior drawer exam, and - posterior drawer exam. - Apley exam. Knee is stable to varus and valgus stress. 5 out of 5 motor strength. Palpable distal pulses. Intact light touch sensation. Negative Patellofemoral crepitus     Heart is regular rate without obvious murmurs   Normal respiratory effort without audible wheezing  Abdomen is soft and nontender            X-rays:  Four views of the left knee are  reviewed which show well-aligned left total knee arthroplasty components without complication.  Severe lateral compartment narrowing of the right knee.  X-rays left elbow are reviewed which show no acute fracture     Assessment::  Status post left Watseka Fabien CR total knee  arthroplasty using cement  Right valgus knee arthritis            Plan:  Reviewed the findings with her today.  I agreed to inject her right knee with cortisone today.  We will go ahead and do her paperwork for her right total knee arthroplasty today.  Plan is for right robotic assisted outpatient total knee arthroplasty.  Risks, benefits, and alternatives to the procedure were explained to the patient including but not limited to damage to nerves, arteries, blood vessels, bones, tendons, ligaments, stiffness, instability, infection, permanent limb dysfunction, DVT, PE, as well as general anesthetic complications including seizure, stroke, heart attack and even death. The patient understood these risks and wished to proceed and signed the informed consent.         This note was created using [employer] voice recognition software that occasionally misinterpreted phrases or words.

## 2025-05-06 NOTE — PT/OT/SLP EVAL
Physical Therapy Evaluation    Patient Name:  Gail Temple   MRN:  [id number]    Recommendations:     Discharge Recommendations: Low Intensity Therapy   Discharge Equipment Recommendations: none   Barriers to discharge: None    Assessment:     Gail Temple is a 70 y.o. female admitted with a medical diagnosis of right TKA.  She presents with the following impairments/functional limitations: weakness, impaired endurance, impaired functional mobility, gait instability, impaired balance, decreased lower extremity function, pain, decreased ROM, edema, orthopedic precautions .  Patient agreeable to PT evaluation this afternoon but did report pain, nausea and feeling dizzy.  Patient presented supine in bed and required mod assist to transfer to sitting EOB and then min assist to stand.  Patient unable stand more then a few seconds due to nausea, pain an feeling dizzy.    Rehab Prognosis: Good; patient would benefit from acute skilled PT services to address these deficits and reach maximum level of function.    Recent Surgery: Procedure(s) (LRB):  ROBOTIC ARTHROPLASTY, KNEE, TOTAL (Right) * Day of Surgery *    Plan:     During this hospitalization, patient to be seen BID to address the identified rehab impairments via gait training, therapeutic activities, therapeutic exercises and progress toward the following goals:    Plan of Care Expires:  06/09/25    Subjective     Chief Complaint: pain, nausea and feeling dizzy  Patient/Family Comments/goals: get better  Pain/Comfort:  Pain Rating 1: 2/10  Location - Side 1: Right  Location - Orientation 1: lower  Location 1: knee  Pain Addressed 1: Reposition, Cessation of Activity  Pain Rating Post-Intervention 1: 6/10    Patients cultural, spiritual, Rastafarian conflicts given the current situation:      Living Environment:  Currently lives with spouse in 1 story home with 4 steps to enter with a left hand rail.  Prior to admission, patients level of function was  independent.  Equipment used at home: none.  DME owned (not currently used): rolling walker.  Upon discharge, patient will have assistance from family.    Objective:     Communicated with nurse prior to session.  Patient found supine with cryotherapy  upon PT entry to room.    General Precautions: Standard, fall  Orthopedic Precautions:RLE weight bearing as tolerated   Braces:    Respiratory Status: Room air    Exams:  RLE ROM: Deficits: knee extension -40 degrees, flexion 85 degrees both taken in sitting  RLE Strength: Deficits: 2/5 overall  LLE ROM: WFL  LLE Strength: Deficits: 4/5 overall    Functional Mobility:  Bed Mobility:     Supine to Sit: moderate assistance  Transfers:     Sit to Stand:  minimum assistance with rolling walker      AM-PAC 6 CLICK MOBILITY  Total Score:18       Treatment & Education:  Exercise to include ankle pumps, quad sets, heel slides, hip abd and LAQ.  All done right LE x 10 reps as AAROM    Patient left up in chair with call button in reach, nurse notified, and family present.    GOALS:   Multidisciplinary Problems       Physical Therapy Goals          Problem: Physical Therapy    Goal Priority Disciplines Outcome Interventions   Physical Therapy Goal     PT, PT/OT Progressing    Description: Goals to be met by: 25     Patient will increase functional independence with mobility by performin. Supine to sit with [place]  2. Sit to supine with [place]  3. Sit to stand transfer with Stand-by Assistance  4. Bed to chair transfer with Stand-by Assistance using Rolling Walker  5. Gait  x 150 feet with Stand-by Assistance using Rolling Walker.                          DME Justifications:  No DME recommended requiring DME justifications    History:     Past Medical History:   Diagnosis Date    Angio-edema     Asthma     Diabetes mellitus     Eczema     Hypertension     mild    Obesity     PONV (postoperative nausea and vomiting)     Slow to wake up after anesthesia      Urticaria        Past Surgical History:   Procedure Laterality Date    ADENOIDECTOMY      BONY PELVIS SURGERY       SECTION      X 2    FIXATION KYPHOPLASTY N/A 2018    Procedure: Kyphoplasty;  Surgeon: Martinez Morejon MD;  Location: [facility] OR;  Service: Pain Management;  Laterality: N/A;  L1     HYSTERECTOMY      KNEE CARTILAGE SURGERY Left     ROBOTIC ARTHROPLASTY, KNEE Left 1/3/2023    Procedure: ROBOTIC ARTHROPLASTY, KNEE, TOTAL;  Surgeon: Estevan Monteiro MD;  Location: [facility] OR;  Service: Orthopedics;  Laterality: Left;    TONSILLECTOMY      VAGINOPLASTY         Time Tracking:     PT Received On: 25  PT Start Time: 1237     PT Stop Time: 1306  PT Total Time (min): 29 min     Billable Minutes: Evaluation 20 and Therapeutic Exercise 9      2025

## 2025-05-06 NOTE — HPI
Patient is a 70 year old female with a past medical history of angioedema, asthma, DM, obesity, and post-operative nausea/vomiting. Patient underwent right robotic assisted total knee arthroplasty with Dr. Monteiro. Pre-operatively patient was evaluated in clinic, she fell on a cruise on January 3, 2025 but had been doing well prior. She failed cortisone injections and elected for surgical intervention. Patient's procedure was uneventful, however, patient was having persistent nausea and vomiting as well as verbalizing concern with ambulating up four stairs at her house. Patient was consulted to hospitalist services for monitoring and evaluation. Patient denies any chest pain or shortness of breath. Patient admits to nausea with episodes of vomiting.

## 2025-05-06 NOTE — ANESTHESIA PROCEDURE NOTES
Spinal    Diagnosis: knee pain right  Patient location during procedure: OR  Start time: 5/6/2025 8:55 AM  Timeout: 5/6/2025 8:54 AM  End time: 5/6/2025 9:00 AM    Staffing  Authorizing Provider: Reuben Coronado MD  Performing Provider: Reuben Cornoado MD    Staffing  Performed by: Reuben Coronado MD  Authorized by: Reuben Coronado MD    Preanesthetic Checklist  Completed: patient identified, IV checked, site marked, risks and benefits discussed, surgical consent, monitors and equipment checked, pre-op evaluation and timeout performed  Spinal Block  Patient position: sitting  Prep: ChloraPrep  Patient monitoring: cardiac monitor, continuous pulse ox and continuous capnometry  Approach: midline  Location: L3-4  Injection technique: single shot  CSF Fluid: clear free-flowing CSF  Needle  Needle type: pencil-tip   Needle gauge: 25 G  Needle length: 3.5 in  Needle localization: anatomical landmarks  Assessment  Ease of block: easy  Medications:    Medications: bupivacaine (pf) (MARCAINE) injection 0.75% - Intraspinal   1.3 mL - 5/6/2025 8:55:00 AM

## 2025-05-06 NOTE — PLAN OF CARE
Problem: Physical Therapy  Goal: Physical Therapy Goal  Description: Goals to be met by: 25     Patient will increase functional independence with mobility by performin. Supine to sit with [place]  2. Sit to supine with [place]  3. Sit to stand transfer with Stand-by Assistance  4. Bed to chair transfer with Stand-by Assistance using Rolling Walker  5. Gait  x 150 feet with Stand-by Assistance using Rolling Walker.     Outcome: Progressing

## 2025-05-06 NOTE — NURSING
Arrives to room 421 AAOX 4 S/P ROBOTIC ARTHROPLASTY, KNEE, TOTAL (Right) Per Dr Monteiro VSS afebrile surgical dressing to right knee CDI no redness swelling or drainage noted pure wick placed for urination SCD sleeve to LLE scd foot pump to right foot  20 jelco to left wrist NS infusing @ 75 ML/HR Tele monitor in place Blood glucose monitored no Sliding scale insulin required per sliding scale protocol

## 2025-05-06 NOTE — PLAN OF CARE
Patient transferred to room 421 at this time. Tele monitor [id number] placed on patient prior to transfer.  EKG ordered and done in postop and placed in chart.  Personal belongings transferred to room with the patient.  Rolling walker,  medications for home use delivered to bedside and patient's spouse encouraged to take them home.  Ice packs also transferred with the patient and instructed patient and spouse on how to use.  Both verbalized understanding. Patient is stable at this time.  VSS.  Dressings to right knee clean, dry and intact.  Pain is a 5/10.  No complaints of nausea or vomiting at this time.  Patient seen in postop by hospital medicine.

## 2025-05-06 NOTE — OP NOTE
"Pediatric Gastroenterology Follow Up Office Visit      HPI  Juan J Barry is a 6 year old with neurogenic bowel being followed in the myelomeningocele clinic at [facility].  In the past he has used EX LAX (as needed) and MiraLAX to keep stools soft and easy to pass.   He had horrible cramping with EX LAX and mom would prefer not to use unless needed. MiraLAX does keep his stools soft most of the time and he will also occasionally have some hard stool balls.      Urine - CIC   Stool - \"bear down\" trying  but not always motivated. Does not have good sensation of needing to go. Had discussed Navina  in the past but does not want to use at this time. Is open to using in the future if unable to bear down and go on his own in the future.     Abdominal Pain - none  Nausea - none  Vomiting - none  Reflux/Regurgitation - none  Dysphagia  - none    Weight  - has always had slow weight gain. Used Pediasure in the past.     BM frequency - daily to every other day.   BM quality BSC  -  can be hard or soft.  Wears diaper   Goal is to have potty training at some point.   BM soiling - yes, intermittent. Does not smell.       Nutrition  Food restrictions - none  Food aversions - none  Picky eating - no  Fruits - yes  Vegetables - yes    Social    PHYSICAL EXAMINATION:  Vital signs :   Weight 14.6 kg      Physical Exam  Constitutional:       General: Appear well.   HENT:      Head: Normocephalic.      Right Ear: External ear normal.      Left Ear: External ear normal.      Nose: Nose normal.      Mouth/Throat:      Mouth: Mucous membranes are moist.   Eyes:      Extraocular Movements: Extraocular movements intact.      Conjunctiva/sclera: Conjunctivae normal.   Cardiovascular:      Rate and Rhythm: Normal rate and regular rhythm.      Heart sounds: Normal heart sounds.      Capillary Refill: Capillary refill takes less than 2 seconds.   Pulmonary:      Effort: Respiratory effort is normal.      Breath " [facility]  Orthopedic Surgery  Operative Note    SUMMARY     Date of Procedure: 5/6/2025     Procedure: Procedure(s) (LRB):  ROBOTIC ARTHROPLASTY, KNEE, TOTAL (Right)       Surgeons and Role:     * Estevan Monteiro MD - Primary    Assistant: Avelino ENGLAND    Pre-Operative Diagnosis: Arthritis of right knee [M17.10]    Post-Operative Diagnosis: Post-Op Diagnosis Codes:     * Arthritis of right knee [M17.10]    Anesthesia: General    Complications: No    Estimated Blood Loss (EBL): 50 mL           Implants:   Implant Name Type Inv. Item Serial No.  Lot No. LRB No. Used Action   PIN BONE [id number] - [id number]  PIN BONE [id number]  [employer].  Right 1 Implanted and Explanted   PIN FIXATION BONE 140X3.2MM - [id number]  PIN FIXATION BONE 140X3.2MM  [employer].  Right 1 Implanted and Explanted   FEMORAL CRUC RTN BOBO SZ 3 RT - [id number]  FEMORAL CRUC RTN BOBO SZ 3 RT  [employer]. [id number] Right 1 Implanted   CEMENT BONE ANTIBIO SIMPLEX P - [id number]  CEMENT BONE ANTIBIO SIMPLEX P  [employer]. [id number] Right 1 Implanted   PATELLA TRIATHLON 29X8 SYMTRC - [id number]  PATELLA TRIATHLON 29X8 SYMTRC  [employer]. [id number] Right 1 Implanted   BASEPLATE TIB BOBO PRIM SZ 4 - [id number]  BASEPLATE TIB BOBO PRIM SZ 4  [employer]. [id number] Right 1 Implanted   INSERT TRIATHLON CS TIB 9MM 4 - [id number]  INSERT TRIATHLON CS TIB 9MM 4  [employer]. [id number] Right 1 Implanted       Tourniquet time: 45min at 300mmHg    Specimens:   Specimen (24h ago, onward)      None                    Condition: Good    Disposition: PACU - hemodynamically stable.    Attestation: I was present and scrubbed for the entire procedure.    INDICATIONS FOR THE PROCEDURE: A 70F with a history of chronic   Right knee arthritis, had failed all conservative measures including cortisone   injections, PT, viscosupplementation and activity modification. After a long    discussion, the patient wished to proceed with the procedure above.     PROCEDURE IN DETAIL: Risks, benefits and alternatives of the procedure were   explained to the patient including, but not limited to damage to nerves,   arteries or blood vessels. Also explained risk of infection, stiffness, DVT, PE,   polyethylene wear as well as anesthetic complications including seizure, stroke,   heart attack and death, understood this and signed informed consent. The   patient's Right knee was marked prior to coming to the Operating Room. The patient was brought to the operating room, placed on the operating table in a supine position.A  formal timeout was done in which correct patient, procedure and op site were all   correctly identified and confirmed by the entire operating team.  2gm Ancef was given prior to surgical incision. Spinal anesthesia was induced. The patient'sRight  lower extremity was prepped and   draped in normal sterile fashion. TheRight  leg was exsanguinated with an   Esmarch. Tourniquet was inflated up 300 mmHg. Standard anterior approach to   the knee was made. Medial parapatellar arthrotomy was made, leaving about 1/8   inch of tissue for later repair. Proximal medial tibial release was performed,   making sure not to release any of the MCL. We then   everted the patella and reflexed the knee. Fat pad was excised as well as some   of the ACL. Soft tissue off the distal anterior femur was removed for   visualization, so as to help prevent notching.  We started off by placing our femoral pins.  Two pins were placed about 2 centimeters proximal and 1 centimeter anterior to the medial epicondyle.  We then retracted down to the level of the tibial tubercle.    Two pins were placed just short of bicortically in the tibia.  We then hooked up our arays to our pins.  We placed 2 checkpoints.  One in the tibia and 1 in the femur.  We then took the leg through range of motion.  We then began by marking off  sounds: Normal breath sounds.   Abdominal:      General: Abdomen is flat. Bowel sounds are normal. There is no distension. There are no masses.      Palpations: Abdomen is soft.      Tenderness: There is no abdominal tenderness.      Gastrostomy tubes: N/A  Anal Rectal:     Examined - no seepage, no sensation of touch.   Musculoskeletal:         General: Normal range of motion of all extremities.     Joints: no selling or redness.  Skin:     General: Skin is warm and dry.      No rashes  Neurological:      General: No focal deficit present.      Mental Status: Alert  Psychiatric:         Mood and Affect: Mood normal.             IMPRESSION AND PLAN:    Juan J Barry is a 6 year old  neurogenic bowel being followed in the myelomeningocele clinic at [facility]".   He is overall doing pretty well.     BM - we discussed learning to use his abdominal muscles to bear down. If not stooling or having overflow diarrhea, please use a suppository to empty the rectal vault.   MiraLAX 1/2 to 1 cap a day to keep stool soft.   We also discussed the possibility of Navina or Peristeen anal irrigation system to promote daily BM as he gets older if needed.     Weight - Pediasure daily. We will call in prescription.   Also, our team will reach out to schedule an appointment with July Robb RD at the [facility] location.     GI follow up in 6-9 months      CONTACT:  Division of Pediatric Gastroenterology, Hepatology and Nutrition  All results will be on line on My Chart.  Make sure sure you have signed up for My Chart.     Office phone   Office fax   Email [email or web address]     Please note:  After hours and on call [phone] and ask for Pediatric Gastroenterology Fellow on Call  Office visit Scheduling   Radiology Scheduling      I am in clinic M, T, W and may not be able to return call until Thursday.   Phone calls and email to our office are  our bony points.  We did this 1st on the femur and then on the tibia.  After this we did ligament balancing of the knee 1st extension and then in flexion by using some spoons and an osteotome.  We then adjusted our bone cuts on the computer and once we liked our plan began by making our cuts.  The EGT robotic assisted cutting arm was then brought in and then we made our femoral cuts and then turned our attention to the tibia.  Tibial cuts were made.  All of bone was removed as well as excess soft tissue.  Posterior osteophytes removed as well.  We then began to trial.  We placed a size 3 femur and a size 4 tibia with a size 9 polyethylene.  We were within 1 millimeter between our medial and lateral compartments in both flexion and extension.        We turned our attention to patella, caliper was used on the patella where it   measured 20. We set guide at 12 and made our 8-mm cut for polyethylene component, 29 was selected. Then drill holes were placed and the patellar button was placed.   This had good tracking. No need for a lateral release and with no hand tests,   it stayed centered the whole time. We then marked our tibial rotation. We then drilled our femoral lugs.  We then   removed everything except the size 4 tibial tray. We then checked our tibial tray   with a drop fabrice again and then marked our rotation and pinned it into place then   drilled, and then punched for our keel. Robot and pins were all removed. We then started preparing final   components on the back table. Anterior, medial and lateral structures were injected with our local   Cocktail. Bony surfaces   copiously irrigated with Pulsavac and then thoroughly dried. Once the cement   was the appropriate consistency, first the tibia and then the femur were   cemented into place, removing excess cement. A 9 trial was placed. The knee   was held in compression and then the patella was placed. Cement was allowed to   cure. Once the cement was cured,  returned by one of our nurses within 48 business hours.  Please call for prescription renewals when you have one week of medication remaining.   Please call if you have trouble with insurance company coverage of any medications we prescribe.      This note was created using voice recognition software. I have made every reasonable attempts to avoid incorrect errors, but this document may contain errors not identified before proof reading and finalizing the document. If the errors change the accuracy of the document, I would appreciate being brought to my attention. Thanks    we then removed excess cement. Again trialed   one final time, again seeing a 9. We then tapped into place the   final 9 meniscal bearing into place. After this was done, we then did our   diluted iodine soak for 3 minutes and then proceeded with closing.  Arthrotomy was closed using our StrataFix.   Subcutaneous tissue was closed using 2-0 Vicryl and skin was using a running 3-0   StrataFix and Dermabond.Instrument, sponge, and needle counts were correct prior to wound closure and at the conclusion of the case.  Sterile dressing was applied.   They were extubated, awakened and transferred from the Operating Room to the   Recovery Room in stable condition.

## 2025-05-06 NOTE — H&P
[facility] Medicine  History & Physical    Patient Name: Gail Temple  MRN: [id number]  Patient Class: OP- Outpatient Recovery  Admission Date: 2025  Attending Physician: Estevan Monteiro, *   Primary Care Provider: Clarice Starks MD         Patient information was obtained from patient and spouse/SO.     Subjective:     Principal Problem:Nausea    Chief Complaint:   Chief Complaint   Patient presents with    Nausea     Post operative nausea and vomiting        HPI: Patient is a 70 year old female with a past medical history of angioedema, asthma, DM, obesity, and post-operative nausea/vomiting. Patient underwent right robotic assisted total knee arthroplasty with Dr. Monteiro. Pre-operatively patient was evaluated in clinic, she fell on a cruise on January 3, 2025 but had been doing well prior. She failed cortisone injections and elected for surgical intervention. Patient's procedure was uneventful, however, patient was having persistent nausea and vomiting as well as verbalizing concern with ambulating up four stairs at her house. Patient was consulted to hospitalist services for monitoring and evaluation. Patient denies any chest pain or shortness of breath. Patient admits to nausea with episodes of vomiting.     Past Medical History:  Diagnosis Date  · Angio-edema    · Asthma    · Diabetes mellitus    · Eczema    · Hypertension      mild  · Obesity    · PONV (postoperative nausea and vomiting)    · Slow to wake up after anesthesia    · Urticaria          Past Surgical History:  Procedure Laterality Date  · ADENOIDECTOMY      · BONY PELVIS SURGERY      ·  SECTION        X 2  · FIXATION KYPHOPLASTY N/A 2018    Procedure: Kyphoplasty;  Surgeon: Martinez Morejon MD;  Location: [facility];  Service: Pain Management;  Laterality: N/A;  L1   · HYSTERECTOMY      · KNEE CARTILAGE SURGERY Left    · ROBOTIC ARTHROPLASTY, KNEE Left 1/3/2023    Procedure:  ROBOTIC ARTHROPLASTY, KNEE, TOTAL;  Surgeon: Estevan Monteiro MD;  Location: [facility];  Service: Orthopedics;  Laterality: Left;  · TONSILLECTOMY      · VAGINOPLASTY            Review of patient's allergies indicates:  Allergen Reactions  · Levothyroxine Swelling      GENERIC ONLY / PT CAN TAKE SYNTHROID, face/tongue swelling  · Levothyroxine sodium Swelling        No current facility-administered medications on file prior to encounter.     Current Outpatient Medications on File Prior to Encounter  Medication Sig  · atorvastatin (LIPITOR) 10 MG tablet Take 10 mg by mouth every evening.  · metformin (GLUCOPHAGE) 500 MG tablet    · olmesartan (BENICAR) 20 MG tablet    · SYNTHROID 150 mcg tablet 135 mcg.  · triamterene-hydrochlorothiazide 37.5-25 mg (DYAZIDE) 37.5-25 mg per capsule    · acyclovir (ZOVIRAX) 400 MG tablet Take 400 mg by mouth every 8 (eight) hours.  · clobetasol 0.05% (TEMOVATE) 0.05 % Oint Apply sparingly to rash bid x 2-3 weeks.  Avoid face and groin. (Patient not taking: Reported on 12/17/2024)  · clotrimazole-betamethasone 1-0.05% (LOTRISONE) cream Apply topically. (Patient not taking: Reported on 12/17/2024)  · fluconazole (DIFLUCAN) 150 MG Tab Take by mouth. (Patient not taking: Reported on 12/17/2024)  · hydrOXYzine HCL (ATARAX) 25 MG tablet Take 25 mg by mouth nightly as needed. (Patient not taking: Reported on 12/17/2024)  · MOUNJARO 15 mg/0.5 mL PnIj    · nystatin (MYCOSTATIN) powder  (Patient not taking: Reported on 12/17/2024)  · [DISCONTINUED] tirzepatide (MOUNJARO) 2.5 mg/0.5 mL PnIj Inject 1 mg into the skin every 7 days.     Family History        Problem Relation (Age of Onset)    Allergic rhinitis Son    Allergies Daughter, Son    Angioedema Daughter    Asthma Maternal Uncle    Breast cancer Mother (42)    Eczema Son           Tobacco Use  · Smoking status: Never  · Smokeless tobacco: Never  Substance and Sexual Activity  · Alcohol use: Yes      Comment: seldom  · Drug  "use: Never  · Sexual activity: Not Currently      Partners: Male      Birth control/protection: See Surgical Hx     Review of Systems   Constitutional:  Positive for activity change. Negative for appetite change and fever.   HENT:  Negative for trouble swallowing.    Respiratory:  Negative for cough, chest tightness and shortness of breath.    Cardiovascular:  Negative for chest pain.   Gastrointestinal:  Positive for nausea and vomiting. Negative for abdominal pain.   Genitourinary:  Negative for difficulty urinating.   Musculoskeletal:  Negative for arthralgias.   Skin:  Negative for color change and pallor.   Neurological:  Positive for dizziness.      Objective:     Vital Signs (Most Recent):  Temp: 97.9 °F (36.6 °C) (05/06/25 1153)  Pulse: 60 (05/06/25 1215)  Resp: 16 (05/06/25 1436)  BP: 136/67 (05/06/25 1215)  SpO2: 95 % (05/06/25 1215) Vital Signs (24h Range):  Temp:  [97.9 °F (36.6 °C)] 97.9 °F (36.6 °C)  Pulse:  [56-68] 60  Resp:  [6-21] 16  SpO2:  [94 %-100 %] 95 %  BP: (110-185)/(57-98) 136/67     Weight: 93 kg (205 lb)  Body mass index is 32.11 kg/m².  Physical Exam  Constitutional:       General: She is not in acute distress.  HENT:      Head: Normocephalic.      Mouth/Throat:      Mouth: Mucous membranes are moist.   Cardiovascular:      Rate and Rhythm: Normal rate and regular rhythm.   Pulmonary:      Effort: Pulmonary effort is normal.      Breath sounds: Normal breath sounds.   Abdominal:      Palpations: Abdomen is soft.      Tenderness: There is no abdominal tenderness.   Musculoskeletal:      Right knee: Decreased range of motion.      Comments: Patient is s/p right total knee arthroplasty; bandage clean, dry, & intact   Skin:     General: Skin is warm and dry.      Significant Labs:   BMP: No results for input(s): "GLU", "NA", "K", "CL", "CO2", "BUN", "CREATININE", "CALCIUM", "MG" in the last 48 hours.  CBC: No results for input(s): "WBC", "HGB", "HCT", "PLT" in the last 48 hours.   " "  Significant Imaging: I have reviewed all pertinent imaging results/findings within the past 24 hours.    Assessment/Plan:     Assessment & Plan  Nausea  Patient having intractable nausea and vomiting post operatively.    -clear liquid diet, will advance as tolerated  -zofran q4h   -phenergan q6 PRN  Diabetes mellitus  Patient's FSGs are controlled on current medication regimen.    Lab Results   Component Value Date    HGBA1C 5.5 01/03/2023     Most recent fingerstick glucose reviewed- No results for input(s): "POCTGLUCOSE" in the last 24 hours.  Current correctional scale  Low  Maintain anti-hyperglycemic dose as follows-   Antihyperglycemics (From admission, onward)      Start     Stop Route Frequency Ordered    05/06/25 1654  insulin aspart U-100 pen 0-5 Units         -- SubQ Before meals & nightly PRN 05/06/25 1554          Hold Oral hypoglycemics while patient is in the hospital.  Obesity  Body mass index is 32.11 kg/m². Morbid obesity complicates all aspects of disease management from diagnostic modalities to treatment. Weight loss encouraged and health benefits explained to patient.       Arthritis of knee  Patient underwent right total knee arthroplasty 5/6.    -Weight bearing as tolerated per Ortho  -PRN pain management as ordered  -PT/OT eval and treat  -VTE prophylaxis-ASA 81 mg BID    VTE Risk Mitigation (From admission, onward)           Ordered     IP VTE LOW RISK PATIENT  Once         05/06/25 1016     Place sequential compression device  Until discontinued         05/06/25 1016                                    JAVY NEWMAN NP  Department of Hospital Medicine  Formerly [facility]          "

## 2025-05-06 NOTE — DISCHARGE SUMMARY
[facility]  Discharge Note  Short Stay    Procedure(s) (LRB):  ROBOTIC ARTHROPLASTY, KNEE, TOTAL (Right)      OUTCOME: Patient tolerated treatment/procedure well without complication and is now ready for discharge.    DISPOSITION: Home or Self Care    FINAL DIAGNOSIS:  Right knee arthritis    FOLLOWUP: In clinic    DISCHARGE INSTRUCTIONS:    Discharge Procedure Orders   Diet general     Leave dressing on - Keep it clean, dry, and intact until clinic visit     Change dressing (specify)   Order Comments: Dressing change: If dressing loses its seal, change daily.     Call MD for:  temperature >100.4     Call MD for:  persistent nausea and vomiting     Call MD for:  severe uncontrolled pain     Call MD for:  difficulty breathing, headache or visual disturbances     Call MD for:  redness, tenderness, or signs of infection (pain, swelling, redness, odor or green/yellow discharge around incision site)     Call MD for:  hives     Call MD for:  persistent dizziness or light-headedness     Call MD for:  extreme fatigue     Keep surgical extremity elevated     Ice to affected area   Order Comments: using barrier between ice and skin (specify duration&frequency)     Weight bearing as tolerated     Activity as tolerated     Shower on day dressing removed (No bath)         Clinical Reference Documents Added to Patient Instructions         Document    KNEE REPLACEMENT (ENGLISH)    KNEE REPLACEMENT - DISCHARGE INSTRUCTIONS (ENGLISH)    POSTOP TOTAL KNEE REPLACEMENT EXERCISES LYING DOWN (ENGLISH)    POSTOP TOTAL KNEE REPLACEMENT EXERCISES SEATED OR STANDING (ENGLISH)    TOTAL KNEE REPLACEMENT  (ENGLISH)    TOTAL KNEE REPLACEMENT DISCHARGE INSTRUCTIONS  (ENGLISH)            TIME SPENT ON DISCHARGE: 5 minutes

## 2025-05-07 LAB
ABSOLUTE EOSINOPHIL (SMH): 0.03 K/UL
ABSOLUTE MONOCYTE (SMH): 0.76 K/UL (ref 0.3–1)
ABSOLUTE NEUTROPHIL COUNT (SMH): 8.7 K/UL (ref 1.8–7.7)
ALBUMIN SERPL-MCNC: 3 G/DL (ref 3.5–5.2)
ALP SERPL-CCNC: 52 UNIT/L (ref 40–150)
ALT SERPL-CCNC: 14 UNIT/L (ref 10–44)
ANION GAP (SMH): 11 MMOL/L (ref 8–16)
AST SERPL-CCNC: 20 UNIT/L (ref 11–45)
BASOPHILS # BLD AUTO: 0.04 K/UL
BASOPHILS NFR BLD AUTO: 0.4 %
BILIRUB SERPL-MCNC: 0.5 MG/DL (ref 0.1–1)
BUN SERPL-MCNC: 21 MG/DL (ref 8–23)
CALCIUM SERPL-MCNC: 8.1 MG/DL (ref 8.7–10.5)
CHLORIDE SERPL-SCNC: 106 MMOL/L (ref 95–110)
CO2 SERPL-SCNC: 21 MMOL/L (ref 23–29)
CREAT SERPL-MCNC: 0.9 MG/DL (ref 0.5–1.4)
ERYTHROCYTE [DISTWIDTH] IN BLOOD BY AUTOMATED COUNT: 13.6 % (ref 11.5–14.5)
GFR SERPLBLD CREATININE-BSD FMLA CKD-EPI: >60 ML/MIN/1.73/M2
GLUCOSE SERPL-MCNC: 109 MG/DL (ref 70–110)
HCT VFR BLD AUTO: 29.3 % (ref 37–48.5)
HGB BLD-MCNC: 9.6 GM/DL (ref 12–16)
IMM GRANULOCYTES # BLD AUTO: 0.05 K/UL (ref 0–0.04)
IMM GRANULOCYTES NFR BLD AUTO: 0.5 % (ref 0–0.5)
LYMPHOCYTES # BLD AUTO: 1.54 K/UL (ref 1–4.8)
MAGNESIUM SERPL-MCNC: 1.9 MG/DL (ref 1.6–2.6)
MCH RBC QN AUTO: 29.4 PG (ref 27–31)
MCHC RBC AUTO-ENTMCNC: 32.8 G/DL (ref 32–36)
MCV RBC AUTO: 90 FL (ref 82–98)
NUCLEATED RBC (/100WBC) (SMH): 0 /100 WBC
PLATELET # BLD AUTO: 224 K/UL (ref 150–450)
PMV BLD AUTO: 10.2 FL (ref 9.2–12.9)
POCT GLUCOSE: 120 MG/DL (ref 70–110)
POCT GLUCOSE: 177 MG/DL (ref 70–110)
POCT GLUCOSE: 82 MG/DL (ref 70–110)
POCT GLUCOSE: 93 MG/DL (ref 70–110)
POTASSIUM SERPL-SCNC: 4.3 MMOL/L (ref 3.5–5.1)
PROT SERPL-MCNC: 5.8 GM/DL (ref 6–8.4)
RBC # BLD AUTO: 3.26 M/UL (ref 4–5.4)
RELATIVE EOSINOPHIL (SMH): 0.3 % (ref 0–8)
RELATIVE LYMPHOCYTE (SMH): 13.9 % (ref 18–48)
RELATIVE MONOCYTE (SMH): 6.9 % (ref 4–15)
RELATIVE NEUTROPHIL (SMH): 78 % (ref 38–73)
SODIUM SERPL-SCNC: 138 MMOL/L (ref 136–145)
WBC # BLD AUTO: 11.07 K/UL (ref 3.9–12.7)

## 2025-05-07 PROCEDURE — 94799 UNLISTED PULMONARY SVC/PX: CPT

## 2025-05-07 PROCEDURE — 97165 OT EVAL LOW COMPLEX 30 MIN: CPT

## 2025-05-07 PROCEDURE — 97530 THERAPEUTIC ACTIVITIES: CPT

## 2025-05-07 PROCEDURE — 83735 ASSAY OF MAGNESIUM: CPT

## 2025-05-07 PROCEDURE — 82040 ASSAY OF SERUM ALBUMIN: CPT

## 2025-05-07 PROCEDURE — 97535 SELF CARE MNGMENT TRAINING: CPT

## 2025-05-07 PROCEDURE — 25000003 PHARM REV CODE 250

## 2025-05-07 PROCEDURE — 99900035 HC TECH TIME PER 15 MIN (STAT)

## 2025-05-07 PROCEDURE — 97116 GAIT TRAINING THERAPY: CPT

## 2025-05-07 PROCEDURE — 85025 COMPLETE CBC W/AUTO DIFF WBC: CPT

## 2025-05-07 PROCEDURE — 94761 N-INVAS EAR/PLS OXIMETRY MLT: CPT

## 2025-05-07 PROCEDURE — 36415 COLL VENOUS BLD VENIPUNCTURE: CPT

## 2025-05-07 PROCEDURE — G0378 HOSPITAL OBSERVATION PER HR: HCPCS

## 2025-05-07 PROCEDURE — 63600175 PHARM REV CODE 636 W HCPCS: Mod: JZ

## 2025-05-07 RX ORDER — OXYCODONE AND ACETAMINOPHEN 5; 325 MG/1; MG/1
1 TABLET ORAL EVERY 4 HOURS PRN
Refills: 0 | Status: DISCONTINUED | OUTPATIENT
Start: 2025-05-07 | End: 2025-05-09 | Stop reason: HOSPADM

## 2025-05-07 RX ORDER — POLYETHYLENE GLYCOL 3350 17 G/17G
17 POWDER, FOR SOLUTION ORAL DAILY
Status: DISCONTINUED | OUTPATIENT
Start: 2025-05-07 | End: 2025-05-08

## 2025-05-07 RX ORDER — OXYCODONE AND ACETAMINOPHEN 10; 325 MG/1; MG/1
1 TABLET ORAL EVERY 4 HOURS PRN
Refills: 0 | Status: DISCONTINUED | OUTPATIENT
Start: 2025-05-07 | End: 2025-05-09 | Stop reason: HOSPADM

## 2025-05-07 RX ADMIN — ONDANSETRON 4 MG: 4 SOLUTION ORAL at 01:05

## 2025-05-07 RX ADMIN — OXYCODONE HYDROCHLORIDE AND ACETAMINOPHEN 1 TABLET: 5; 325 TABLET ORAL at 09:05

## 2025-05-07 RX ADMIN — POLYETHYLENE GLYCOL (3350) 17 G: 17 POWDER, FOR SOLUTION ORAL at 01:05

## 2025-05-07 RX ADMIN — ONDANSETRON 4 MG: 4 SOLUTION ORAL at 05:05

## 2025-05-07 RX ADMIN — ONDANSETRON 4 MG: 4 SOLUTION ORAL at 06:05

## 2025-05-07 RX ADMIN — ASPIRIN 81 MG: 81 TABLET, CHEWABLE ORAL at 09:05

## 2025-05-07 RX ADMIN — OXYCODONE HYDROCHLORIDE AND ACETAMINOPHEN 1 TABLET: 5; 325 TABLET ORAL at 07:05

## 2025-05-07 RX ADMIN — ONDANSETRON 4 MG: 4 SOLUTION ORAL at 11:05

## 2025-05-07 RX ADMIN — HYDROMORPHONE HYDROCHLORIDE 1 MG: 2 INJECTION, SOLUTION INTRAMUSCULAR; INTRAVENOUS; SUBCUTANEOUS at 11:05

## 2025-05-07 RX ADMIN — ASPIRIN 81 MG: 81 TABLET, CHEWABLE ORAL at 08:05

## 2025-05-07 RX ADMIN — MELATONIN TAB 3 MG 6 MG: 3 TAB at 09:05

## 2025-05-07 RX ADMIN — ATORVASTATIN CALCIUM 10 MG: 10 TABLET, FILM COATED ORAL at 08:05

## 2025-05-07 RX ADMIN — OXYCODONE HYDROCHLORIDE AND ACETAMINOPHEN 1 TABLET: 5; 325 TABLET ORAL at 03:05

## 2025-05-07 RX ADMIN — HYDROMORPHONE HYDROCHLORIDE 1 MG: 2 INJECTION, SOLUTION INTRAMUSCULAR; INTRAVENOUS; SUBCUTANEOUS at 02:05

## 2025-05-07 NOTE — PLAN OF CARE
[facility] - Med/Surg  Initial Discharge Assessment       Primary Care Provider: Clarice Starks MD    Admission Diagnosis: Arthritis of knee [M17.10]    Admission Date: 5/6/2025  Expected Discharge Date: 5/8/2025    DC assessment completed with patient at bedside. Verified information on facesheet as correct. Pt lives at listed address with spouse.Reports she has help if needed from him.  PCP is Clarice Starks- reports last apt was 2 weeks ago.Pharm[facility] is [facility]. Denies hh/hd/outpt services. DME.has walker cane shower chair, bath bench  and raised toilet seat, denies DME needs.  Reports spouse will provide transportation home upon DC. Reports taking home medications as prescribed and can currently afford them. Verified insurance on file. Denies recent inpt stay in last 30 days.  DC plan is. Home with spouse      Transition of Care Barriers: None    Payor: [employer] MCARE / Plan: [employer] MEDICARE ADVANTAGE / Product Type: Medicare Advantage /     Extended Emergency Contact Information  Primary Emergency Contact: Katlin Lao  Address: [address] [place] [address] [place]  Mobile Phone: [phone]  Relation: Daughter  Preferred language: English  Secondary Emergency Contact: Mikel Temple  Address: [address] [place] [address] [place]  Mobile Phone: [phone]  Relation: Spouse  Preferred language: English    Discharge Plan A: Home with family  Discharge Plan B: Home      [id number] [place] - [address] AT [facility] [address]  [address] [place] [address]  Phone: [phone] Fax: [phone]      Initial Assessment (most recent)       Adult Discharge Assessment - 05/07/25 1617          Discharge Assessment    Assessment Type Discharge Planning Assessment     Confirmed/corrected address, phone number and insurance Yes     Source  of Information patient     When was your last doctors appointment? --   2 weeks ago    Does patient/caregiver understand observation status Yes     Communicated ANABEL with patient/caregiver Yes     Reason For Admission N/v pain after knee replacement     People in Home spouse     Do you expect to return to your current living situation? Yes     Do you have help at home or someone to help you manage your care at home? Yes     Who are your caregiver(s) and their phone number(s)? Spouse Mikel Temple [phone]     Prior to hospitilization cognitive status: Alert/Oriented     Current cognitive status: Alert/Oriented     Walking or Climbing Stairs Difficulty yes     Walking or Climbing Stairs ambulation difficulty, requires equipment     Mobility Management cane and rolling walker     Dressing/Bathing Difficulty yes     Dressing/Bathing bathing difficulty, requires equipment     Dressing/Bathing Management shower chair and bath bench, spouse will assit with dressing     Home Accessibility not wheelchair accessible     Home Layout Able to live on 1st floor     Equipment Currently Used at Home cane, straight;grab bar;raised toilet;shower chair;bath bench;walker, rolling     Readmission within 30 days? No     Patient currently being followed by outpatient case management? No     Do you currently have service(s) that help you manage your care at home? No     Do you take prescription medications? Yes     Do you have prescription coverage? Yes     Coverage [employer]     Do you have any problems affording any of your prescribed medications? No     Is the patient taking medications as prescribed? yes     Who is going to help you get home at discharge? Spouse     How do you get to doctors appointments? family or friend will provide     Are you on dialysis? No     Do you take coumadin? No     Discharge Plan A Home with family     Discharge Plan B Home     DME Needed Upon Discharge  none     Discharge Plan discussed with: Patient      Transition of Care Barriers None

## 2025-05-07 NOTE — PT/OT/SLP PROGRESS
Physical Therapy Treatment    Patient Name:  Gail Temple   MRN:  [id number]    Recommendations:     Discharge Recommendations: Low Intensity Therapy  Discharge Equipment Recommendations: none  Barriers to discharge: medical status    Assessment:     Gail Temple is a 70 y.o. female admitted with a medical diagnosis of Nausea.  She presents with the following impairments/functional limitations: weakness, impaired functional mobility, gait instability, impaired balance, decreased lower extremity function, decreased safety awareness, pain, decreased ROM, orthopedic precautions Patient agreed to PT session. Patient required SBA for bed mobility, going supine to sit using UE and bed rail slowly. Patient sat EOB with good balance. Transfer sit to stand up to RW with Ping x2. Patient walked 25ft with RW and Ping very slowly with max verbal cuess for RW placement and technique. Patient needing max encouragement during session as she was focused on the pain and her prior recovery from the left knee sx. Patient given max cues for hand placement and technique to sit it bedside chair. Encouraged her to sit up in chair and to allow the knee to extend while in bed. All needs met and lines intact.     Rehab Prognosis: Good; patient would benefit from acute skilled PT services to address these deficits and reach maximum level of function.    Recent Surgery: Procedure(s) (LRB):  ROBOTIC ARTHROPLASTY, KNEE, TOTAL (Right) 1 Day Post-Op    Plan:     During this hospitalization, patient to be seen BID to address the identified rehab impairments via gait training, therapeutic activities, therapeutic exercises, neuromuscular re-education and progress toward the following goals:    Plan of Care Expires:  06/09/25    Subjective     Chief Complaint: my right knee hurts. My other knee sx did not hurt like this. I have a RW at home.  Patient/Family Comments/goals: to go home; I dont want home health  Pain/Comfort:  Pain Rating 1:  7/10  Location - Side 1: Right  Location 1: knee  Pain Addressed 1: Reposition      Objective:     Communicated with nurse prior to session.  Patient found HOB elevated with cryotherapy, peripheral IV, PureWick, SCD, bed alarm upon PT entry to room.     General Precautions: Standard, fall  Orthopedic Precautions: RLE weight bearing as tolerated  Braces:    Respiratory Status: Room air     Functional Mobility:  Bed Mobility:     Supine to Sit: stand by assistance  Transfers:     Sit to Stand:  minimum assistance and of 2 persons with rolling walker  Gait: 25ft with RW  Ping, chair follow by tech      AM-PAC 6 CLICK MOBILITY          Treatment & Education:  Pt educated on POC, discharge recommendation, need for assist with mobility, use of call bell to seek assistance as needed and fall prevention      Patient left up in chair with all lines intact, call button in reach, chair alarm on, and nurse notified..    GOALS:   Multidisciplinary Problems       Physical Therapy Goals          Problem: Physical Therapy    Goal Priority Disciplines Outcome Interventions   Physical Therapy Goal     PT, PT/OT Progressing    Description: Goals to be met by: 25     Patient will increase functional independence with mobility by performin. Supine to sit with [place]  2. Sit to supine with [place]  3. Sit to stand transfer with Stand-by Assistance  4. Bed to chair transfer with Stand-by Assistance using Rolling Walker  5. Gait  x 150 feet with Stand-by Assistance using Rolling Walker.                          DME Justifications:  No DME recommended requiring DME justifications    Time Tracking:     PT Received On: 25  PT Start Time: 1045     PT Stop Time: 1120  PT Total Time (min): 35 min     Billable Minutes: Gait Training 20 and Therapeutic Activity 15    Treatment Type: Treatment  PT/PTA: PT     Number of PTA visits since last PT visit: 0     2025

## 2025-05-07 NOTE — PT/OT/SLP EVAL
Occupational Therapy   Evaluation    Name: Gail Temple  MRN: [id number]  Admitting Diagnosis: Nausea  Recent Surgery: Procedure(s) (LRB):  ROBOTIC ARTHROPLASTY, KNEE, TOTAL (Right) 1 Day Post-Op    Recommendations:     Discharge Recommendations: Low Intensity Therapy  Discharge Equipment Recommendations:  cane, straight, bedside commode, walker, rolling, bath bench, other (see comments) (Pt has a reacher)  Barriers to discharge:       Assessment:     Gail Temple is a 70 y.o. female with a medical diagnosis of Nausea.  She presents with the following performance deficits affecting function: weakness, impaired endurance, impaired self care skills, impaired functional mobility, gait instability, impaired balance, decreased lower extremity function, pain, decreased ROM, orthopedic precautions.  Pt up in chair. Spouse and sister present.    Rehab Prognosis: Good; patient would benefit from acute skilled OT services to address these deficits and reach maximum level of function.       Plan:     Patient to be seen 6 x/week to address the above listed problems via self-care/home management, therapeutic activities, therapeutic exercises  Plan of Care Expires: 06/04/25  Plan of Care Reviewed with: patient, spouse, family    Subjective     Chief Complaint: pain/weakness  Patient/Family Comments/goals: To get better    Occupational Profile:  Living Environment: Pt lives with spouse in 1 story home with 4 MATTHEW with L handrail  Previous level of function: Independent  Roles and Routines: Sister, spouse  Equipment Used at Home:    Assistance upon Discharge: Family    Pain/Comfort:  Pain Rating 1: 6/10  Location - Side 1: Right  Location 1: knee  Pain Addressed 1: Nurse notified    Patients cultural, spiritual, Mandaen conflicts given the current situation:      Objective:     Communicated with: Nurse Lipscomb  prior to session.  Patient found up in chair with chair check, peripheral IV, PureWick upon OT entry to  room.    General Precautions: Standard, fall  Orthopedic Precautions: RLE weight bearing as tolerated  Braces: N/A  Respiratory Status: Room air    Occupational Performance:      Activities of Daily Living:  Feeding:  independence    Grooming: stand by assistance set up in sitting  Upper Body Dressing: minimum assistance    Lower Body Dressing: maximal assistance    Toileting: Purewick in place      Cognitive/Visual Perceptual:  Pt alert and oriented    Physical Exam:  Upper Extremity Strength:    -       Right Upper Extremity: WFL  -       Left Upper Extremity: WFL    AMPAC 6 Click ADL:  AMPAC Total Score: 16    Treatment & Education:  OT provided education in role of OT. Patient verbalized understanding and participated in OT.  OT provided instruction in home safety with ADL/IADL including review of home set up and DME/AE. Patient verbalized understanding.   OT provided review of use of AE for lower body ADL's. Pt verbalized understanding, has a reacher and reports she did not need a sock aid/doesn't wear socks.  OT provided education in calling for assist. Patient verbalized understanding.        Patient left up in chair with all lines intact, call button in reach, chair alarm on, Nurse Ruel  notified, and spouse  present    GOALS:   Multidisciplinary Problems       Occupational Therapy Goals          Problem: Occupational Therapy    Goal Priority Disciplines Outcome Interventions   Occupational Therapy Goal     OT, PT/OT     Description: Goals to be met by: 6/4/25     Patient will increase functional independence with ADLs by performing:    UE Dressing with Modified [place].  LE Dressing with Minimal Assistance.  Grooming while seated with Modified [place].  Toileting from toilet with Stand-by Assistance for hygiene and clothing management.   Bathing from  shower chair/bench with Minimal Assistance.  Toilet transfer to toilet with Stand-by Assistance.  Increased strength and functional activity  tolerance for ADL's/IADL's                         DME Justifications:  No DME recommended requiring DME justifications    History:     Past Medical History:   Diagnosis Date    Angio-edema     Asthma     Diabetes mellitus     Eczema     Hypertension     mild    Obesity     PONV (postoperative nausea and vomiting)     Slow to wake up after anesthesia     Urticaria          Past Surgical History:   Procedure Laterality Date    ADENOIDECTOMY      BONY PELVIS SURGERY       SECTION      X 2    FIXATION KYPHOPLASTY N/A 2018    Procedure: Kyphoplasty;  Surgeon: Martinez Morejon MD;  Location: [facility] OR;  Service: Pain Management;  Laterality: N/A;  L1     HYSTERECTOMY      KNEE CARTILAGE SURGERY Left     ROBOTIC ARTHROPLASTY, KNEE Left 1/3/2023    Procedure: ROBOTIC ARTHROPLASTY, KNEE, TOTAL;  Surgeon: Estevan Monteiro MD;  Location: [facility] OR;  Service: Orthopedics;  Laterality: Left;    ROBOTIC ARTHROPLASTY, KNEE Right 2025    Procedure: ROBOTIC ARTHROPLASTY, KNEE, TOTAL;  Surgeon: Estevan Monteiro MD;  Location: [facility] OR;  Service: Orthopedics;  Laterality: Right;    TONSILLECTOMY      VAGINOPLASTY         Time Tracking:     OT Date of Treatment: 25  OT Start Time: 1248  OT Stop Time: 1304  OT Total Time (min): 16 min    Billable Minutes:Evaluation 8  Self Care/Home Management 8    2025

## 2025-05-07 NOTE — PLAN OF CARE
05/07/25 1617   MOTT Message   Medicare Outpatient and Observation Notification regarding financial responsibility Given to patient/caregiver;Explained to patient/caregiver;Signed/date by patient/caregiver   Date MOTT was signed 05/07/25   Time MOTT was signed 1525     To be scanned

## 2025-05-07 NOTE — ASSESSMENT & PLAN NOTE
Patient's FSGs are controlled on current medication regimen.    Lab Results   Component Value Date    HGBA1C 5.4 05/06/2025     Most recent fingerstick glucose reviewed-   Recent Labs   Lab 05/06/25  1725 05/06/25  2037 05/07/25  0746 05/07/25  1135   POCTGLUCOSE 156* 145* 82 177*     Current correctional scale  Low  Maintain anti-hyperglycemic dose as follows-   Antihyperglycemics (From admission, onward)      Start     Stop Route Frequency Ordered    05/06/25 1654  insulin aspart U-100 pen 0-5 Units         -- SubQ Before meals & nightly PRN 05/06/25 1554          Hold Oral hypoglycemics while patient is in the hospital.

## 2025-05-07 NOTE — ASSESSMENT & PLAN NOTE
Body mass index is 34.91 kg/m². Morbid obesity complicates all aspects of disease management from diagnostic modalities to treatment. Weight loss encouraged and health benefits explained to patient.

## 2025-05-07 NOTE — SUBJECTIVE & OBJECTIVE
Interval History:  Patient seen and examined.  No acute overnight events. She denies any nausea and vomiting. She states she is able to tolerate p.o. intake without any nausea. She is reluctant to taking pain medication due to constipation concerns.  Bowel regimen initiated.  Patient developed increased pain with physical therapy. Dr. Monteiro notified and updated on patient's status.  Patient will remain in hospital due to increased pain medication needs.        Review of Systems   Constitutional:  Positive for activity change. Negative for appetite change, chills, diaphoresis, fatigue and fever.   Respiratory:  Negative for cough and shortness of breath.    Cardiovascular:  Negative for chest pain.   Gastrointestinal:  Negative for abdominal distention, abdominal pain, nausea and vomiting.   Genitourinary:  Negative for difficulty urinating.   Musculoskeletal:  Positive for arthralgias and gait problem. Negative for back pain.   Neurological:  Negative for dizziness, tremors, seizures, syncope, facial asymmetry, speech difficulty, light-headedness, numbness and headaches.     Objective:     Vital Signs (Most Recent):  Temp: 98 °F (36.7 °C) (05/07/25 1158)  Pulse: 67 (05/07/25 1158)  Resp: 14 (05/07/25 1422)  BP: (!) 142/63 (05/07/25 1158)  SpO2: 99 % (05/07/25 1158) Vital Signs (24h Range):  Temp:  [97.4 °F (36.3 °C)-98.7 °F (37.1 °C)] 98 °F (36.7 °C)  Pulse:  [62-72] 67  Resp:  [14-20] 14  SpO2:  [96 %-99 %] 99 %  BP: (124-150)/(62-76) 142/63     Weight: 101.1 kg (222 lb 14.2 oz)  Body mass index is 34.91 kg/m².    Intake/Output Summary (Last 24 hours) at 5/7/2025 1521  Last data filed at 5/7/2025 0531  Gross per 24 hour   Intake 590.7 ml   Output 875 ml   Net -284.3 ml         Physical Exam  Vitals and nursing note reviewed.   Constitutional:       Appearance: She is not ill-appearing.   Eyes:      Pupils: Pupils are equal, round, and reactive to light.   Cardiovascular:      Rate and Rhythm: Normal rate and  regular rhythm.      Pulses: Normal pulses.      Heart sounds: Normal heart sounds.   Pulmonary:      Effort: Pulmonary effort is normal. No respiratory distress.      Breath sounds: Normal breath sounds. No wheezing.   Abdominal:      General: Bowel sounds are normal. There is no distension.      Palpations: Abdomen is soft.      Tenderness: There is no abdominal tenderness. There is no guarding.   Musculoskeletal:      Right lower le+ Edema present.      Left lower le+ Edema present.   Skin:     General: Skin is warm and dry.      Capillary Refill: Capillary refill takes less than 2 seconds.   Neurological:      Mental Status: She is alert and oriented to person, place, and time.      GCS: GCS eye subscore is 4. GCS verbal subscore is 5. GCS motor subscore is 6.               Significant Labs: All pertinent labs within the past 24 hours have been reviewed.  A1C:   Recent Labs   Lab 25  1639   HGBA1C 5.4     Bilirubin:   Recent Labs   Lab 25  0418   BILITOT 0.5       BMP:   Recent Labs   Lab 25  0418         K 4.3      CO2 21*   BUN 21   CREATININE 0.9   CALCIUM 8.1*   MG 1.9     CBC:   Recent Labs   Lab 25  0418   WBC 11.07   HGB 9.6*   HCT 29.3*        CMP:   Recent Labs   Lab 25  0418      K 4.3      CO2 21*      BUN 21   CREATININE 0.9   CALCIUM 8.1*   PROT 5.8*   ALBUMIN 3.0*   BILITOT 0.5   ALKPHOS 52   AST 20   ALT 14   ANIONGAP 11       Magnesium:   Recent Labs   Lab 25  0418   MG 1.9     POCT Glucose:   Recent Labs   Lab 25  2037 25  0746 25  1135   POCTGLUCOSE 145* 82 177*         Significant Imaging:     X-Ray Knee 1 or 2 View Right  Order: [id number]   Status: Final result       Next appt: 2025 at 10:15 AM in Orthopedics (Estevan Monteiro MD)    Test Result Released: Yes (seen)    0 Result Notes  Details    Reading Physician Reading Date Result Priority   Jina Almonte,  MD  [phone]  5/6/2025 Routine     Narrative & Impression  EXAMINATION:  XR KNEE 1 OR 2 VIEW RIGHT     CLINICAL HISTORY:  TKA;     TECHNIQUE:  AP and lateral views of the right knee were performed.     COMPARISON:  11/09/2023     FINDINGS:  Interval right TKA with the orthopedic hardware appearing in place and intact.  Soft tissue air which can be attributed to a postoperative basis     Impression:     As above        Electronically signed by:Jina Almonte MD  Date:                                            05/06/2025  Time:                                           14:38        Exam Ended: 05/06/25 10:58 CDT Last Resulted: 05/06/25 14:38 CDT

## 2025-05-07 NOTE — PROGRESS NOTES
[facility] Medicine  Progress Note    Patient Name: Gail Temple  MRN: [id number]  Patient Class: OP- Outpatient Recovery   Admission Date: 5/6/2025  Length of Stay: 0 days  Attending Physician: Richie Marin MD  Primary Care Provider: Clarice Starks MD        Subjective     Principal Problem:Nausea        HPI:  Patient is a 70 year old female with a past medical history of angioedema, asthma, DM, obesity, and post-operative nausea/vomiting. Patient underwent right robotic assisted total knee arthroplasty with Dr. Monteiro. Pre-operatively patient was evaluated in clinic, she fell on a cruise on January 3, 2025 but had been doing well prior. She failed cortisone injections and elected for surgical intervention. Patient's procedure was uneventful, however, patient was having persistent nausea and vomiting as well as verbalizing concern with ambulating up four stairs at her house. Patient was consulted to hospitalist services for monitoring and evaluation. Patient denies any chest pain or shortness of breath. Patient admits to nausea with episodes of vomiting.     Overview/Hospital Course:  No notes on file    Interval History:  Patient seen and examined.  No acute overnight events. She denies any nausea and vomiting. She states she is able to tolerate p.o. intake without any nausea. She is reluctant to taking pain medication due to constipation concerns.  Bowel regimen initiated.  Patient developed increased pain with physical therapy. Dr. Monteiro notified and updated on patient's status.  Patient will remain in hospital due to increased pain medication needs.        Review of Systems   Constitutional:  Positive for activity change. Negative for appetite change, chills, diaphoresis, fatigue and fever.   Respiratory:  Negative for cough and shortness of breath.    Cardiovascular:  Negative for chest pain.   Gastrointestinal:  Negative for abdominal distention, abdominal pain,  nausea and vomiting.   Genitourinary:  Negative for difficulty urinating.   Musculoskeletal:  Positive for arthralgias and gait problem. Negative for back pain.   Neurological:  Negative for dizziness, tremors, seizures, syncope, facial asymmetry, speech difficulty, light-headedness, numbness and headaches.     Objective:     Vital Signs (Most Recent):  Temp: 98 °F (36.7 °C) (25 1158)  Pulse: 67 (25 1158)  Resp: 14 (25 1422)  BP: (!) 142/63 (25 1158)  SpO2: 99 % (25 1158) Vital Signs (24h Range):  Temp:  [97.4 °F (36.3 °C)-98.7 °F (37.1 °C)] 98 °F (36.7 °C)  Pulse:  [62-72] 67  Resp:  [14-20] 14  SpO2:  [96 %-99 %] 99 %  BP: (124-150)/(62-76) 142/63     Weight: 101.1 kg (222 lb 14.2 oz)  Body mass index is 34.91 kg/m².    Intake/Output Summary (Last 24 hours) at 2025 1521  Last data filed at 2025 0531  Gross per 24 hour   Intake 590.7 ml   Output 875 ml   Net -284.3 ml         Physical Exam  Vitals and nursing note reviewed.   Constitutional:       Appearance: She is not ill-appearing.   Eyes:      Pupils: Pupils are equal, round, and reactive to light.   Cardiovascular:      Rate and Rhythm: Normal rate and regular rhythm.      Pulses: Normal pulses.      Heart sounds: Normal heart sounds.   Pulmonary:      Effort: Pulmonary effort is normal. No respiratory distress.      Breath sounds: Normal breath sounds. No wheezing.   Abdominal:      General: Bowel sounds are normal. There is no distension.      Palpations: Abdomen is soft.      Tenderness: There is no abdominal tenderness. There is no guarding.   Musculoskeletal:      Right lower le+ Edema present.      Left lower le+ Edema present.   Skin:     General: Skin is warm and dry.      Capillary Refill: Capillary refill takes less than 2 seconds.   Neurological:      Mental Status: She is alert and oriented to person, place, and time.      GCS: GCS eye subscore is 4. GCS verbal subscore is 5. GCS motor subscore is 6.                Significant Labs: All pertinent labs within the past 24 hours have been reviewed.  A1C:   Recent Labs   Lab 05/06/25  1639   HGBA1C 5.4     Bilirubin:   Recent Labs   Lab 05/07/25  0418   BILITOT 0.5       BMP:   Recent Labs   Lab 05/07/25  0418         K 4.3      CO2 21*   BUN 21   CREATININE 0.9   CALCIUM 8.1*   MG 1.9     CBC:   Recent Labs   Lab 05/07/25  0418   WBC 11.07   HGB 9.6*   HCT 29.3*        CMP:   Recent Labs   Lab 05/07/25  0418      K 4.3      CO2 21*      BUN 21   CREATININE 0.9   CALCIUM 8.1*   PROT 5.8*   ALBUMIN 3.0*   BILITOT 0.5   ALKPHOS 52   AST 20   ALT 14   ANIONGAP 11       Magnesium:   Recent Labs   Lab 05/07/25  0418   MG 1.9     POCT Glucose:   Recent Labs   Lab 05/06/25 2037 05/07/25  0746 05/07/25  1135   POCTGLUCOSE 145* 82 177*         Significant Imaging:     X-Ray Knee 1 or 2 View Right  Order: [id number]   Status: Final result       Next appt: 05/19/2025 at 10:15 AM in Orthopedics (Estevan Monteiro MD)    Test Result Released: Yes (seen)    0 Result Notes  Details    Reading Physician Reading Date Result Priority   Jina Almonte MD  [phone]  5/6/2025 Routine     Narrative & Impression  EXAMINATION:  XR KNEE 1 OR 2 VIEW RIGHT     CLINICAL HISTORY:  TKA;     TECHNIQUE:  AP and lateral views of the right knee were performed.     COMPARISON:  11/09/2023     FINDINGS:  Interval right TKA with the orthopedic hardware appearing in place and intact.  Soft tissue air which can be attributed to a postoperative basis     Impression:     As above        Electronically signed by:Jina Almonte MD  Date:                                            05/06/2025  Time:                                           14:38        Exam Ended: 05/06/25 10:58 CDT Last Resulted: 05/06/25 14:38 CDT             Assessment & Plan  Nausea  Patient having intractable nausea and vomiting post operatively.    -clear liquid diet, will advance  as tolerated  -zofran q4h   -phenergan q6 PRN  Diabetes mellitus  Patient's FSGs are controlled on current medication regimen.    Lab Results   Component Value Date    HGBA1C 5.4 05/06/2025     Most recent fingerstick glucose reviewed-   Recent Labs   Lab 05/06/25  1725 05/06/25  2037 05/07/25  0746 05/07/25  1135   POCTGLUCOSE 156* 145* 82 177*     Current correctional scale  Low  Maintain anti-hyperglycemic dose as follows-   Antihyperglycemics (From admission, onward)      Start     Stop Route Frequency Ordered    05/06/25 1654  insulin aspart U-100 pen 0-5 Units         -- SubQ Before meals & nightly PRN 05/06/25 1554          Hold Oral hypoglycemics while patient is in the hospital.  Obesity  Body mass index is 34.91 kg/m². Morbid obesity complicates all aspects of disease management from diagnostic modalities to treatment. Weight loss encouraged and health benefits explained to patient.       Arthritis of knee  Patient underwent right total knee arthroplasty 5/6.    -Weight bearing as tolerated per Ortho  -PRN pain management as ordered  -PT/OT eval and treat  -VTE prophylaxis-ASA 81 mg BID    VTE Risk Mitigation (From admission, onward)           Ordered     IP VTE LOW RISK PATIENT  Once         05/06/25 1016     Place sequential compression device  Until discontinued         05/06/25 1016                    Discharge Planning   ANABEL: 5/8/2025     Code Status: Full Code   Medical Readiness for Discharge Date: 5/6/2025                   Please place Justification for DME        Shayna Morales DNP  Department of Hospital Medicine   [facility]/Surg

## 2025-05-07 NOTE — PLAN OF CARE
Problem: Adult Inpatient Plan of Care  Goal: Plan of Care Review  Outcome: Progressing  Goal: Patient-Specific Goal (Individualized)  Outcome: Progressing  Goal: Absence of Hospital-Acquired Illness or Injury  Outcome: Progressing  Goal: Optimal Comfort and Wellbeing  Outcome: Progressing  Goal: Readiness for Transition of Care  Outcome: Progressing     Problem: Diabetes Comorbidity  Goal: Blood Glucose Level Within Targeted Range  Outcome: Progressing     Problem: Wound  Goal: Optimal Coping  Outcome: Progressing  Goal: Optimal Functional Ability  Outcome: Progressing  Goal: Absence of Infection Signs and Symptoms  Outcome: Progressing  Goal: Improved Oral Intake  Outcome: Progressing  Goal: Optimal Pain Control and Function  Outcome: Progressing  Goal: Skin Health and Integrity  Outcome: Progressing  Goal: Optimal Wound Healing  Outcome: Progressing   Plan of care reviewed with patient. Patient verbalized understanding. All fall precautions maintained. Bed in lowest position, call light within reach, slip resistant socks maintained. Bed alarm on.

## 2025-05-07 NOTE — PLAN OF CARE
Goals to be met by: 6/4/25     Patient will increase functional independence with ADLs by performing:    UE Dressing with Modified [place].  LE Dressing with Minimal Assistance.  Grooming while seated with Modified [place].  Toileting from toilet with Stand-by Assistance for hygiene and clothing management.   Bathing from  shower chair/bench with Minimal Assistance.  Toilet transfer to toilet with Stand-by Assistance.  Increased strength and functional activity tolerance for ADL's/IADL's

## 2025-05-08 LAB
ABSOLUTE EOSINOPHIL (SMH): 0.19 K/UL
ABSOLUTE MONOCYTE (SMH): 0.59 K/UL (ref 0.3–1)
ABSOLUTE NEUTROPHIL COUNT (SMH): 4.2 K/UL (ref 1.8–7.7)
ALBUMIN SERPL-MCNC: 2.8 G/DL (ref 3.5–5.2)
ALP SERPL-CCNC: 50 UNIT/L (ref 40–150)
ALT SERPL-CCNC: 12 UNIT/L (ref 10–44)
ANION GAP (SMH): 8 MMOL/L (ref 8–16)
AST SERPL-CCNC: 14 UNIT/L (ref 11–45)
BASOPHILS # BLD AUTO: 0.05 K/UL
BASOPHILS NFR BLD AUTO: 0.7 %
BILIRUB SERPL-MCNC: 0.4 MG/DL (ref 0.1–1)
BUN SERPL-MCNC: 19 MG/DL (ref 8–23)
CALCIUM SERPL-MCNC: 8 MG/DL (ref 8.7–10.5)
CHLORIDE SERPL-SCNC: 108 MMOL/L (ref 95–110)
CO2 SERPL-SCNC: 25 MMOL/L (ref 23–29)
CREAT SERPL-MCNC: 1.1 MG/DL (ref 0.5–1.4)
ERYTHROCYTE [DISTWIDTH] IN BLOOD BY AUTOMATED COUNT: 14.1 % (ref 11.5–14.5)
GFR SERPLBLD CREATININE-BSD FMLA CKD-EPI: 55 ML/MIN/1.73/M2
GLUCOSE SERPL-MCNC: 91 MG/DL (ref 70–110)
HCT VFR BLD AUTO: 27.9 % (ref 37–48.5)
HGB BLD-MCNC: 9 GM/DL (ref 12–16)
IMM GRANULOCYTES # BLD AUTO: 0.03 K/UL (ref 0–0.04)
IMM GRANULOCYTES NFR BLD AUTO: 0.4 % (ref 0–0.5)
LYMPHOCYTES # BLD AUTO: 2.38 K/UL (ref 1–4.8)
MAGNESIUM SERPL-MCNC: 1.9 MG/DL (ref 1.6–2.6)
MCH RBC QN AUTO: 29.6 PG (ref 27–31)
MCHC RBC AUTO-ENTMCNC: 32.3 G/DL (ref 32–36)
MCV RBC AUTO: 92 FL (ref 82–98)
NUCLEATED RBC (/100WBC) (SMH): 0 /100 WBC
PLATELET # BLD AUTO: 199 K/UL (ref 150–450)
PMV BLD AUTO: 10.3 FL (ref 9.2–12.9)
POCT GLUCOSE: 110 MG/DL (ref 70–110)
POCT GLUCOSE: 114 MG/DL (ref 70–110)
POCT GLUCOSE: 95 MG/DL (ref 70–110)
POTASSIUM SERPL-SCNC: 4.3 MMOL/L (ref 3.5–5.1)
PROT SERPL-MCNC: 5.5 GM/DL (ref 6–8.4)
RBC # BLD AUTO: 3.04 M/UL (ref 4–5.4)
RELATIVE EOSINOPHIL (SMH): 2.6 % (ref 0–8)
RELATIVE LYMPHOCYTE (SMH): 32 % (ref 18–48)
RELATIVE MONOCYTE (SMH): 7.9 % (ref 4–15)
RELATIVE NEUTROPHIL (SMH): 56.4 % (ref 38–73)
SODIUM SERPL-SCNC: 141 MMOL/L (ref 136–145)
WBC # BLD AUTO: 7.43 K/UL (ref 3.9–12.7)

## 2025-05-08 PROCEDURE — 94799 UNLISTED PULMONARY SVC/PX: CPT

## 2025-05-08 PROCEDURE — G0378 HOSPITAL OBSERVATION PER HR: HCPCS

## 2025-05-08 PROCEDURE — 85025 COMPLETE CBC W/AUTO DIFF WBC: CPT

## 2025-05-08 PROCEDURE — 25000003 PHARM REV CODE 250

## 2025-05-08 PROCEDURE — 97110 THERAPEUTIC EXERCISES: CPT | Mod: 59

## 2025-05-08 PROCEDURE — 63600175 PHARM REV CODE 636 W HCPCS: Mod: JZ

## 2025-05-08 PROCEDURE — 97116 GAIT TRAINING THERAPY: CPT

## 2025-05-08 PROCEDURE — 99900031 HC PATIENT EDUCATION (STAT)

## 2025-05-08 PROCEDURE — 80053 COMPREHEN METABOLIC PANEL: CPT

## 2025-05-08 PROCEDURE — 99900035 HC TECH TIME PER 15 MIN (STAT)

## 2025-05-08 PROCEDURE — 99024 POSTOP FOLLOW-UP VISIT: CPT | Mod: ,,, | Performed by: ORTHOPAEDIC SURGERY

## 2025-05-08 PROCEDURE — 36415 COLL VENOUS BLD VENIPUNCTURE: CPT

## 2025-05-08 PROCEDURE — 94761 N-INVAS EAR/PLS OXIMETRY MLT: CPT

## 2025-05-08 PROCEDURE — 83735 ASSAY OF MAGNESIUM: CPT

## 2025-05-08 PROCEDURE — 94760 N-INVAS EAR/PLS OXIMETRY 1: CPT

## 2025-05-08 PROCEDURE — 97530 THERAPEUTIC ACTIVITIES: CPT

## 2025-05-08 RX ORDER — LEVOTHYROXINE SODIUM 137 UG/1
137 TABLET ORAL
Status: DISCONTINUED | OUTPATIENT
Start: 2025-05-09 | End: 2025-05-09 | Stop reason: HOSPADM

## 2025-05-08 RX ORDER — GABAPENTIN 300 MG/1
300 CAPSULE ORAL 3 TIMES DAILY
Status: DISCONTINUED | OUTPATIENT
Start: 2025-05-08 | End: 2025-05-09 | Stop reason: HOSPADM

## 2025-05-08 RX ORDER — POLYETHYLENE GLYCOL 3350 17 G/17G
17 POWDER, FOR SOLUTION ORAL 2 TIMES DAILY
Status: DISCONTINUED | OUTPATIENT
Start: 2025-05-08 | End: 2025-05-09 | Stop reason: HOSPADM

## 2025-05-08 RX ADMIN — ONDANSETRON 4 MG: 4 SOLUTION ORAL at 05:05

## 2025-05-08 RX ADMIN — OXYCODONE AND ACETAMINOPHEN 1 TABLET: 10; 325 TABLET ORAL at 06:05

## 2025-05-08 RX ADMIN — POLYETHYLENE GLYCOL (3350) 17 G: 17 POWDER, FOR SOLUTION ORAL at 08:05

## 2025-05-08 RX ADMIN — LOSARTAN POTASSIUM 50 MG: 25 TABLET, FILM COATED ORAL at 08:05

## 2025-05-08 RX ADMIN — GABAPENTIN 300 MG: 300 CAPSULE ORAL at 08:05

## 2025-05-08 RX ADMIN — ONDANSETRON 4 MG: 4 SOLUTION ORAL at 12:05

## 2025-05-08 RX ADMIN — ASPIRIN 81 MG: 81 TABLET, CHEWABLE ORAL at 08:05

## 2025-05-08 RX ADMIN — LEVOTHYROXINE SODIUM 137 MCG: 137 TABLET ORAL at 12:05

## 2025-05-08 RX ADMIN — HYDROMORPHONE HYDROCHLORIDE 1 MG: 2 INJECTION, SOLUTION INTRAMUSCULAR; INTRAVENOUS; SUBCUTANEOUS at 09:05

## 2025-05-08 RX ADMIN — GABAPENTIN 300 MG: 300 CAPSULE ORAL at 05:05

## 2025-05-08 RX ADMIN — ONDANSETRON 4 MG: 4 SOLUTION ORAL at 06:05

## 2025-05-08 RX ADMIN — ATORVASTATIN CALCIUM 10 MG: 10 TABLET, FILM COATED ORAL at 08:05

## 2025-05-08 RX ADMIN — TRIAMTERENE AND HYDROCHLOROTHIAZIDE 1 TABLET: 37.5; 25 TABLET ORAL at 08:05

## 2025-05-08 NOTE — PT/OT/SLP PROGRESS
Physical Therapy Treatment    Patient Name:  Gail Temple   MRN:  [id number]    Recommendations:     Discharge Recommendations: Low Intensity Therapy  Discharge Equipment Recommendations: none  Barriers to discharge: None    Assessment:     Gail Temple is a 70 y.o. female admitted with a medical diagnosis of Nausea.  She presents with the following impairments/functional limitations: weakness, impaired endurance, impaired functional mobility, gait instability, impaired balance, decreased lower extremity function, pain, decreased ROM, edema, orthopedic precautions .  Patient agreeable to PT treatment this morning.  Patient presented supine in bed and was able to transfer to sitting EOB with SBA and stood with CGA and a RW.  Paietn then ambuialted x 130 feet rw cga using a slow step to gait pattern.    Rehab Prognosis: Good; patient would benefit from acute skilled PT services to address these deficits and reach maximum level of function.    Recent Surgery: Procedure(s) (LRB):  ROBOTIC ARTHROPLASTY, KNEE, TOTAL (Right) 2 Days Post-Op    Plan:     During this hospitalization, patient to be seen BID to address the identified rehab impairments via gait training, therapeutic activities, therapeutic exercises and progress toward the following goals:    Plan of Care Expires:  06/09/25    Subjective     Chief Complaint: pain  Patient/Family Comments/goals: not hurt so badly  Pain/Comfort:  Pain Rating 1: 5/10  Location - Side 1: Right  Location - Orientation 1: lower  Location 1: knee  Pain Addressed 1: Reposition, Cessation of Activity  Pain Rating Post-Intervention 1: 5/10      Objective:     Communicated with nurse prior to session.  Patient found supine with bed alarm, cryotherapy upon PT entry to room.     General Precautions: Standard, fall  Orthopedic Precautions: RLE weight bearing as tolerated  Braces: N/A  Respiratory Status: Room air     Functional Mobility:  Bed Mobility:     Supine to Sit: stand by  assistance  Transfers:     Sit to Stand:  contact guard assistance with rolling walker  Gait: x 130 feet rw cga      AM-PAC 6 CLICK MOBILITY          Treatment & Education:  Exercise to include ankle pumps, quad sets, hip abd and LAQ.  All done right LE x 10 reps as AAROM  Gait training x 130 feet rw cga with slow step to gait pattern.      Patient left up in chair with call button in reach, chair alarm on, nurse notified, and spouse present..    GOALS:   Multidisciplinary Problems       Physical Therapy Goals          Problem: Physical Therapy    Goal Priority Disciplines Outcome Interventions   Physical Therapy Goal     PT, PT/OT Progressing    Description: Goals to be met by: 25     Patient will increase functional independence with mobility by performin. Supine to sit with [place]  2. Sit to supine with [place]  3. Sit to stand transfer with Stand-by Assistance  4. Bed to chair transfer with Stand-by Assistance using Rolling Walker  5. Gait  x 150 feet with Stand-by Assistance using Rolling Walker.                          DME Justifications:  No DME recommended requiring DME justifications    Time Tracking:     PT Received On: 25  PT Start Time: 825     PT Stop Time: 855  PT Total Time (min): 30 min     Billable Minutes: Gait Training 20 and Therapeutic Exercise 10    Treatment Type: Treatment  PT/PTA: PT     Number of PTA visits since last PT visit: 0     2025

## 2025-05-08 NOTE — PLAN OF CARE
Pt clear for DC from case management standpoint. Discharging to home      DC pending PT eval with stairs and pain controlled    Family at bedside to provide transportation home       05/08/25 1237   Final Note   Assessment Type Final Discharge Note   Anticipated Discharge Disposition Home

## 2025-05-08 NOTE — PROGRESS NOTES
[facility] Medicine  Progress Note    Patient Name: Gail Temple  MRN: [id number]  Patient Class: OP- Observation   Admission Date: 5/6/2025  Length of Stay: 0 days  Attending Physician: Richie Marin MD  Primary Care Provider: Clarice Starks MD        Subjective     Principal Problem:Nausea        HPI:  Patient is a 70 year old female with a past medical history of angioedema, asthma, DM, obesity, and post-operative nausea/vomiting. Patient underwent right robotic assisted total knee arthroplasty with Dr. Monteiro. Pre-operatively patient was evaluated in clinic, she fell on a cruise on January 3, 2025 but had been doing well prior. She failed cortisone injections and elected for surgical intervention. Patient's procedure was uneventful, however, patient was having persistent nausea and vomiting as well as verbalizing concern with ambulating up four stairs at her house. Patient was consulted to hospitalist services for monitoring and evaluation. Patient denies any chest pain or shortness of breath. Patient admits to nausea with episodes of vomiting.     Overview/Hospital Course:  No notes on file    Interval History:   Seen and examined.  NAD.  Continues to have pain at surgical site.  Unable to walk up stairs with PT recommending further PT evaluation.  Review of Systems   Constitutional:  Positive for activity change. Negative for appetite change, chills, diaphoresis, fatigue and fever.   Respiratory:  Negative for cough and shortness of breath.    Cardiovascular:  Negative for chest pain.   Gastrointestinal:  Negative for abdominal distention, abdominal pain, nausea and vomiting.   Genitourinary:  Negative for difficulty urinating.   Musculoskeletal:  Positive for arthralgias and gait problem. Negative for back pain.   Neurological:  Negative for dizziness, tremors, seizures, syncope, facial asymmetry, speech difficulty, light-headedness, numbness and headaches.      Objective:     Vital Signs (Most Recent):  Temp: 98.2 °F (36.8 °C) (25 1608)  Pulse: 69 (25 1608)  Resp: 16 (25 1608)  BP: (!) 149/67 (25 1608)  SpO2: 96 % (25 1608) Vital Signs (24h Range):  Temp:  [97.9 °F (36.6 °C)-98.4 °F (36.9 °C)] 98.2 °F (36.8 °C)  Pulse:  [60-77] 69  Resp:  [16-19] 16  SpO2:  [94 %-98 %] 96 %  BP: (123-149)/(59-70) 149/67     Weight: 101.1 kg (222 lb 14.2 oz)  Body mass index is 34.91 kg/m².    Intake/Output Summary (Last 24 hours) at 2025 1612  Last data filed at 2025 0457  Gross per 24 hour   Intake 680 ml   Output 1300 ml   Net -620 ml         Physical Exam  Vitals and nursing note reviewed.   Constitutional:       Appearance: She is not ill-appearing.   Eyes:      Pupils: Pupils are equal, round, and reactive to light.   Cardiovascular:      Rate and Rhythm: Normal rate and regular rhythm.      Pulses: Normal pulses.      Heart sounds: Normal heart sounds.   Pulmonary:      Effort: Pulmonary effort is normal. No respiratory distress.      Breath sounds: Normal breath sounds. No wheezing.   Abdominal:      General: Bowel sounds are normal. There is no distension.      Palpations: Abdomen is soft.      Tenderness: There is no abdominal tenderness. There is no guarding.   Musculoskeletal:      Right lower le+ Edema present.      Left lower le+ Edema present.   Skin:     General: Skin is warm and dry.      Capillary Refill: Capillary refill takes less than 2 seconds.   Neurological:      Mental Status: She is alert and oriented to person, place, and time.      GCS: GCS eye subscore is 4. GCS verbal subscore is 5. GCS motor subscore is 6.               Significant Labs: All pertinent labs within the past 24 hours have been reviewed.  CBC:   Recent Labs   Lab 25  0418 25  0413   WBC 11.07 7.43   HGB 9.6* 9.0*   HCT 29.3* 27.9*    199     CMP:   Recent Labs   Lab 25  0418 25  0413    141   K 4.3 4.3     108   CO2 21* 25    91   BUN 21 19   CREATININE 0.9 1.1   CALCIUM 8.1* 8.0*   PROT 5.8* 5.5*   ALBUMIN 3.0* 2.8*   BILITOT 0.5 0.4   ALKPHOS 52 50   AST 20 14   ALT 14 12   ANIONGAP 11 8     POCT Glucose:   Recent Labs   Lab 05/07/25 2058 05/08/25  0759 05/08/25  1123   POCTGLUCOSE 120* 95 114*       Significant Imaging: I have reviewed all pertinent imaging results/findings within the past 24 hours.         Assessment & Plan  Nausea  Patient having intractable nausea and vomiting post operatively.    -clear liquid diet, will advance as tolerated  -zofran q4h   -phenergan q6 PRN  Diabetes mellitus  Patient's FSGs are controlled on current medication regimen.    Lab Results   Component Value Date    HGBA1C 5.4 05/06/2025     Most recent fingerstick glucose reviewed-   Recent Labs   Lab 05/07/25  1706 05/07/25 2058 05/08/25  0759 05/08/25  1123   POCTGLUCOSE 93 120* 95 114*     Current correctional scale  Low  Maintain anti-hyperglycemic dose as follows-   Antihyperglycemics (From admission, onward)      Start     Stop Route Frequency Ordered    05/06/25 1654  insulin aspart U-100 pen 0-5 Units         -- SubQ Before meals & nightly PRN 05/06/25 1554          Hold Oral hypoglycemics while patient is in the hospital.  Obesity  Body mass index is 34.91 kg/m². Morbid obesity complicates all aspects of disease management from diagnostic modalities to treatment. Weight loss encouraged and health benefits explained to patient.       Arthritis of knee  Patient underwent right total knee arthroplasty 5/6.    -Weight bearing as tolerated per Ortho  -PRN pain management as ordered  -PT/OT eval and treat  -VTE prophylaxis-ASA 81 mg BID    VTE Risk Mitigation (From admission, onward)           Ordered     IP VTE LOW RISK PATIENT  Once         05/06/25 1016     Place sequential compression device  Until discontinued         05/06/25 1016                    Discharge Planning   ANABEL: 5/9/2025     Code Status: Full Code    Medical Readiness for Discharge Date: 5/6/2025  Discharge Plan A: Home with family                Please place Justification for DME        Juarez Ballard NP  Department of Hospital Medicine   [facility]/Surg

## 2025-05-08 NOTE — SUBJECTIVE & OBJECTIVE
Principal Problem:Nausea    Principal Orthopedic Problem:  Status post right total knee    Interval History:  Patient has struggled with physical therapy due to pain.  Increased her pain medicine.    Review of patient's allergies indicates:   Allergen Reactions    Levothyroxine Swelling     GENERIC ONLY / PT CAN TAKE SYNTHROID, face/tongue swelling    Levothyroxine sodium Swelling       Current Facility-Administered Medications   Medication    acetaminophen tablet 650 mg    acetaminophen tablet 650 mg    aluminum-magnesium hydroxide-simethicone 200-200-20 mg/5 mL suspension 30 mL    aspirin chewable tablet 81 mg    atorvastatin tablet 10 mg    dextrose 50% injection 12.5 g    dextrose 50% injection 12.5 g    dextrose 50% injection 25 g    dextrose 50% injection 25 g    glucagon (human recombinant) injection 1 mg    glucagon (human recombinant) injection 1 mg    glucose chewable tablet 16 g    glucose chewable tablet 16 g    glucose chewable tablet 24 g    glucose chewable tablet 24 g    HYDROmorphone (PF) injection 1 mg    insulin aspart U-100 pen 0-5 Units    losartan tablet 50 mg    magnesium oxide tablet 800 mg    magnesium oxide tablet 800 mg    melatonin tablet 6 mg    naloxone 0.4 mg/mL injection 0.02 mg    NON FORMULARY MEDICATION 135 mcg    ondansetron 4 mg/5 mL solution 4 mg    ondansetron injection 4 mg    oxyCODONE-acetaminophen  mg per tablet 1 tablet    oxyCODONE-acetaminophen 5-325 mg per tablet 1 tablet    polyethylene glycol packet 17 g    potassium bicarbonate disintegrating tablet 35 mEq    potassium bicarbonate disintegrating tablet 50 mEq    potassium bicarbonate disintegrating tablet 60 mEq    potassium, sodium phosphates 280-160-250 mg packet 2 packet    potassium, sodium phosphates 280-160-250 mg packet 2 packet    potassium, sodium phosphates 280-160-250 mg packet 2 packet    promethazine (PHENERGAN) 12.5 mg in 0.9% NaCl 50 mL IVPB    senna-docusate 8.6-50 mg per tablet 1 tablet     "sodium chloride 0.9% flush 10 mL    triamterene-hydrochlorothiazide 37.5-25 mg per tablet 1 tablet     Objective:     Vital Signs (Most Recent):  Temp: 98.2 °F (36.8 °C) (05/08/25 0349)  Pulse: 71 (05/08/25 0349)  Resp: 16 (05/08/25 0611)  BP: (!) 128/59 (05/08/25 0349)  SpO2: 97 % (05/08/25 0349) Vital Signs (24h Range):  Temp:  [98 °F (36.7 °C)-98.7 °F (37.1 °C)] 98.2 °F (36.8 °C)  Pulse:  [66-77] 71  Resp:  [14-19] 16  SpO2:  [94 %-99 %] 97 %  BP: (123-146)/(59-70) 128/59     Weight: 101.1 kg (222 lb 14.2 oz)  Height: 5' 7" (170.2 cm)  Body mass index is 34.91 kg/m².      Intake/Output Summary (Last 24 hours) at 5/8/2025 0719  Last data filed at 5/8/2025 0457  Gross per 24 hour   Intake 1280 ml   Output 1300 ml   Net -20 ml        General    Nursing note and vitals reviewed.  Constitutional: She is oriented to person, place, and time. She appears well-developed and well-nourished. No distress.   HENT:   Head: Normocephalic and atraumatic.   Eyes: EOM are normal.   Cardiovascular:  Normal rate.            Pulmonary/Chest: Effort normal.   Abdominal: Soft.   Neurological: She is alert and oriented to person, place, and time.   Psychiatric: Her behavior is normal.           Right Knee Exam     Inspection   Erythema: absent  Swelling: present  Effusion: present    Range of Motion   Extension:  0   Flexion:  100     Other   Sensation: normal    Comments:  Dsg c/d/i    Left Knee Exam   Left knee exam is normal.    Muscle Strength   Right Lower Extremity   Quadriceps:  4/5     Vascular Exam     Right Pulses  Dorsalis Pedis:      2+             Significant Labs: BMP:   Recent Labs   Lab 05/08/25 0413   GLU 91      K 4.3      CO2 25   BUN 19   CREATININE 1.1   CALCIUM 8.0*   MG 1.9     CBC:   Recent Labs   Lab 05/07/25 0418 05/08/25 0413   WBC 11.07 7.43   HGB 9.6* 9.0*   HCT 29.3* 27.9*    199     All pertinent labs within the past 24 hours have been reviewed.    Significant Imaging: None  "

## 2025-05-08 NOTE — ASSESSMENT & PLAN NOTE
Patient's FSGs are controlled on current medication regimen.    Lab Results   Component Value Date    HGBA1C 5.4 05/06/2025     Most recent fingerstick glucose reviewed-   Recent Labs   Lab 05/07/25  1706 05/07/25  2058 05/08/25  0759 05/08/25  1123   POCTGLUCOSE 93 120* 95 114*     Current correctional scale  Low  Maintain anti-hyperglycemic dose as follows-   Antihyperglycemics (From admission, onward)      Start     Stop Route Frequency Ordered    05/06/25 1654  insulin aspart U-100 pen 0-5 Units         -- SubQ Before meals & nightly PRN 05/06/25 1554          Hold Oral hypoglycemics while patient is in the hospital.

## 2025-05-08 NOTE — PLAN OF CARE
Problem: Adult Inpatient Plan of Care  Goal: Plan of Care Review  Outcome: Progressing  Goal: Patient-Specific Goal (Individualized)  Outcome: Progressing  Goal: Absence of Hospital-Acquired Illness or Injury  Outcome: Progressing  Goal: Optimal Comfort and Wellbeing  Outcome: Progressing  Goal: Readiness for Transition of Care  Outcome: Progressing     Problem: Diabetes Comorbidity  Goal: Blood Glucose Level Within Targeted Range  Outcome: Progressing     Problem: Wound  Goal: Optimal Coping  Outcome: Progressing  Goal: Optimal Functional Ability  Outcome: Progressing  Goal: Absence of Infection Signs and Symptoms  Outcome: Progressing  Goal: Improved Oral Intake  Outcome: Progressing  Goal: Optimal Pain Control and Function  Outcome: Progressing  Goal: Skin Health and Integrity  Outcome: Progressing  Goal: Optimal Wound Healing  Outcome: Progressing     Problem: Fall Injury Risk  Goal: Absence of Fall and Fall-Related Injury  Outcome: Progressing     Problem: Skin Injury Risk Increased  Goal: Skin Health and Integrity  Outcome: Progressing   Plan of care reviewed with patient. Patient verbalized understanding. All fall precautions maintained. Bed in lowest position, call light within reach, slip resistant socks maintained. Bed alarm on.

## 2025-05-08 NOTE — CARE UPDATE
05/08/25 0859   Patient Assessment/Suction   Level of Consciousness (AVPU) alert   Respiratory Effort Unlabored   Expansion/Accessory Muscles/Retractions no use of accessory muscles;no retractions;expansion symmetric   Rhythm/Pattern, Respiratory depth regular;unlabored;pattern regular;no shortness of breath reported   PRE-TX-O2   Device (Oxygen Therapy) room air   SpO2 98 %   Pulse Oximetry Type Intermittent   $ Pulse Oximetry - Multiple Charge Pulse Oximetry - Multiple   Pulse 60   Resp 16   /70   Incentive Spirometer   $ Incentive Spirometer Charges done with encouragement   Incentive Spirometer Predicted Level (mL) 1840   Administration (IS) mouthpiece utilized   Number of Repetitions (IS) 10   Level Incentive Spirometer (mL) 2000   Patient Tolerance (IS) good;no adverse signs/symptoms present

## 2025-05-08 NOTE — PT/OT/SLP PROGRESS
Physical Therapy Treatment    Patient Name:  Gail Temple   MRN:  [id number]    Recommendations:     Discharge Recommendations: Low Intensity Therapy  Discharge Equipment Recommendations: none  Barriers to discharge: None    Assessment:     Gail Temple is a 70 y.o. female admitted with a medical diagnosis of Nausea.  She presents with the following impairments/functional limitations: weakness, impaired endurance, impaired functional mobility, gait instability, impaired balance, decreased lower extremity function, pain, decreased ROM, edema, orthopedic precautions .  Patient agreeable to PT treatment this afternoon.  Patient presented sitting in chair at bedside and required min/mod assist to stand.  Patient then attempted to step up a step but was not able to bear weight on right LE enough to lift left LE up the step so will not be able to get into home today.  Patient then ambulated x 100 feet rw cga but with slow step to gait pattern before transferring back supine in bed with mod assist.      Rehab Prognosis: Good; patient would benefit from acute skilled PT services to address these deficits and reach maximum level of function.    Recent Surgery: Procedure(s) (LRB):  ROBOTIC ARTHROPLASTY, KNEE, TOTAL (Right) 2 Days Post-Op    Plan:     During this hospitalization, patient to be seen BID to address the identified rehab impairments via gait training, therapeutic exercises, therapeutic activities and progress toward the following goals:    Plan of Care Expires:  06/09/25    Subjective     Chief Complaint: pain  Patient/Family Comments/goals: get better to go home  Pain/Comfort:  Pain Rating 1: 10/10  Location - Side 1: Right  Location - Orientation 1: lower  Location 1: knee  Pain Addressed 1: Reposition, Cessation of Activity  Pain Rating Post-Intervention 1: 10/10      Objective:     Communicated with nurse prior to session.  Patient found supine with cryotherapy upon PT entry to room.     General  Precautions: Standard, fall  Orthopedic Precautions: RLE weight bearing as tolerated  Braces: N/A  Respiratory Status: Room air     Functional Mobility:  Bed Mobility:     Sit to Supine: moderate assistance  Transfers:     Sit to Stand:  moderate assistance with rolling walker  Gait: x 100 feet rw cga with slow step to gait pattern      AM-PAC 6 CLICK MOBILITY          Treatment & Education:  Transfer training sit to stand x 3 times min/mod assist, sit to supine mod assist  Gait training x 100 feet rw cga  Attempted to step up step but was unable to bear enough weight on right LE to lift up left LE    Patient left supine with call button in reach, bed alarm on, nurse notified, and spouse present..    GOALS:   Multidisciplinary Problems       Physical Therapy Goals          Problem: Physical Therapy    Goal Priority Disciplines Outcome Interventions   Physical Therapy Goal     PT, PT/OT Progressing    Description: Goals to be met by: 25     Patient will increase functional independence with mobility by performin. Supine to sit with [place]  2. Sit to supine with [place]  3. Sit to stand transfer with Stand-by Assistance  4. Bed to chair transfer with Stand-by Assistance using Rolling Walker  5. Gait  x 150 feet with Stand-by Assistance using Rolling Walker.                          DME Justifications:  No DME recommended requiring DME justifications    Time Tracking:     PT Received On: 25  PT Start Time: 1410     PT Stop Time: 1507  PT Total Time (min): 57 min     Billable Minutes: Gait Training 35 and Therapeutic Activity 22    Treatment Type: Treatment  PT/PTA: PT     Number of PTA visits since last PT visit: 0     2025

## 2025-05-08 NOTE — PROGRESS NOTES
[facility]/Surg  Orthopedics  Progress Note    Patient Name: Gail Temple  MRN: [id number]  Admission Date: 5/6/2025  Hospital Length of Stay: 0 days  Attending Provider: Richie Marin MD  Primary Care Provider: Clarice Starks MD  Follow-up For: Procedure(s) (LRB):  ROBOTIC ARTHROPLASTY, KNEE, TOTAL (Right)    Post-Operative Day: 2 Days Post-Op  Subjective:     Principal Problem:Nausea    Principal Orthopedic Problem:  Status post right total knee    Interval History:  Patient has struggled with physical therapy due to pain.  Increased her pain medicine.    Review of patient's allergies indicates:   Allergen Reactions    Levothyroxine Swelling     GENERIC ONLY / PT CAN TAKE SYNTHROID, face/tongue swelling    Levothyroxine sodium Swelling       Current Facility-Administered Medications   Medication    acetaminophen tablet 650 mg    acetaminophen tablet 650 mg    aluminum-magnesium hydroxide-simethicone 200-200-20 mg/5 mL suspension 30 mL    aspirin chewable tablet 81 mg    atorvastatin tablet 10 mg    dextrose 50% injection 12.5 g    dextrose 50% injection 12.5 g    dextrose 50% injection 25 g    dextrose 50% injection 25 g    glucagon (human recombinant) injection 1 mg    glucagon (human recombinant) injection 1 mg    glucose chewable tablet 16 g    glucose chewable tablet 16 g    glucose chewable tablet 24 g    glucose chewable tablet 24 g    HYDROmorphone (PF) injection 1 mg    insulin aspart U-100 pen 0-5 Units    losartan tablet 50 mg    magnesium oxide tablet 800 mg    magnesium oxide tablet 800 mg    melatonin tablet 6 mg    naloxone 0.4 mg/mL injection 0.02 mg    NON FORMULARY MEDICATION 135 mcg    ondansetron 4 mg/5 mL solution 4 mg    ondansetron injection 4 mg    oxyCODONE-acetaminophen  mg per tablet 1 tablet    oxyCODONE-acetaminophen 5-325 mg per tablet 1 tablet    polyethylene glycol packet 17 g    potassium bicarbonate disintegrating tablet 35 mEq    potassium  "bicarbonate disintegrating tablet 50 mEq    potassium bicarbonate disintegrating tablet 60 mEq    potassium, sodium phosphates 280-160-250 mg packet 2 packet    potassium, sodium phosphates 280-160-250 mg packet 2 packet    potassium, sodium phosphates 280-160-250 mg packet 2 packet    promethazine (PHENERGAN) 12.5 mg in 0.9% NaCl 50 mL IVPB    senna-docusate 8.6-50 mg per tablet 1 tablet    sodium chloride 0.9% flush 10 mL    triamterene-hydrochlorothiazide 37.5-25 mg per tablet 1 tablet     Objective:     Vital Signs (Most Recent):  Temp: 98.2 °F (36.8 °C) (05/08/25 0349)  Pulse: 71 (05/08/25 0349)  Resp: 16 (05/08/25 0611)  BP: (!) 128/59 (05/08/25 0349)  SpO2: 97 % (05/08/25 0349) Vital Signs (24h Range):  Temp:  [98 °F (36.7 °C)-98.7 °F (37.1 °C)] 98.2 °F (36.8 °C)  Pulse:  [66-77] 71  Resp:  [14-19] 16  SpO2:  [94 %-99 %] 97 %  BP: (123-146)/(59-70) 128/59     Weight: 101.1 kg (222 lb 14.2 oz)  Height: 5' 7" (170.2 cm)  Body mass index is 34.91 kg/m².      Intake/Output Summary (Last 24 hours) at 5/8/2025 0719  Last data filed at 5/8/2025 0457  Gross per 24 hour   Intake 1280 ml   Output 1300 ml   Net -20 ml        General    Nursing note and vitals reviewed.  Constitutional: She is oriented to person, place, and time. She appears well-developed and well-nourished. No distress.   HENT:   Head: Normocephalic and atraumatic.   Eyes: EOM are normal.   Cardiovascular:  Normal rate.            Pulmonary/Chest: Effort normal.   Abdominal: Soft.   Neurological: She is alert and oriented to person, place, and time.   Psychiatric: Her behavior is normal.           Right Knee Exam     Inspection   Erythema: absent  Swelling: present  Effusion: present    Range of Motion   Extension:  0   Flexion:  100     Other   Sensation: normal    Comments:  Dsg c/d/i    Left Knee Exam   Left knee exam is normal.    Muscle Strength   Right Lower Extremity   Quadriceps:  4/5     Vascular Exam     Right Pulses  Dorsalis Pedis:      " 2+             Significant Labs: BMP:   Recent Labs   Lab 05/08/25  0413   GLU 91      K 4.3      CO2 25   BUN 19   CREATININE 1.1   CALCIUM 8.0*   MG 1.9     CBC:   Recent Labs   Lab 05/07/25  0418 05/08/25  0413   WBC 11.07 7.43   HGB 9.6* 9.0*   HCT 29.3* 27.9*    199     All pertinent labs within the past 24 hours have been reviewed.    Significant Imaging: None  Assessment/Plan:     Arthritis of knee  Continue working with PT.  Hopefully her pain is better controlled and she is able to progress.  Discharge home if meets PT goals.          Estevan Monteiro MD  Orthopedics  [facility]/Surg

## 2025-05-08 NOTE — PLAN OF CARE
Problem: Adult Inpatient Plan of Care  Goal: Plan of Care Review  Outcome: Progressing  Goal: Patient-Specific Goal (Individualized)  Outcome: Progressing  Goal: Absence of Hospital-Acquired Illness or Injury  Outcome: Progressing  Goal: Optimal Comfort and Wellbeing  Outcome: Progressing  Goal: Readiness for Transition of Care  Outcome: Progressing     Problem: Diabetes Comorbidity  Goal: Blood Glucose Level Within Targeted Range  Outcome: Progressing     Problem: Wound  Goal: Optimal Coping  Outcome: Progressing  Goal: Optimal Pain Control and Function  Outcome: Progressing     Problem: Fall Injury Risk  Goal: Absence of Fall and Fall-Related Injury  Outcome: Progressing     Problem: Skin Injury Risk Increased  Goal: Skin Health and Integrity  Outcome: Progressing    POC reviewed with patient/spouse. Both verbalized understanding. Pain controlled on current regimen. IV medicine discontinued. Pt. Worked with therapy. Ambulated in [place], but unable to pass stair trial. Pt. Has four steps to get into her home.  Will need to improve or go to post-acute placement to work on mobility. BG well controlled. Medications adjusted, Oral intake improved. No neuro deficits. SR per monitor with first degree AV block and occ. PVCs. NAD.

## 2025-05-08 NOTE — PT/OT/SLP PROGRESS
Occupational Therapy      Patient Name:  Gail Temple   MRN:  [id number]    Patient not seen today secondary to Other (Comment) (Pt declined. PT to do stair training in prep for discharge.). Will follow-up.    5/8/2025

## 2025-05-08 NOTE — ASSESSMENT & PLAN NOTE
Continue working with PT.  Hopefully her pain is better controlled and she is able to progress.  Discharge home if meets PT goals.

## 2025-05-08 NOTE — PLAN OF CARE
POV reviewed Verbalizes understanding @this time VSS afebrile  reluctant to taking pain medication due to constipation concerns.  Bowel regimen initiated.  Patient developed increased pain with physical therapy. Dr. Monteiro notified and updated on patient's status will remain in hospital due to increased pain PRN IV dilaudid given per MD collazo which Patient verbalized really helped with pain

## 2025-05-08 NOTE — PLAN OF CARE
Pt declined HH, prefers to do OP PT/OT  Referral placed prior to sx    Updated AVS with post op apt    Family at bedside to provide transportation home. Awaiting pt to attempt stair training and better pain control prior to DC       05/08/25 1207   Post-Acute Status   Post-Acute Authorization Home Health   Home Health Status Patient declined/refused

## 2025-05-08 NOTE — SUBJECTIVE & OBJECTIVE
Interval History:   Seen and examined.  NAD.  Continues to have pain at surgical site.  Unable to walk up stairs with PT recommending further PT evaluation.  Review of Systems   Constitutional:  Positive for activity change. Negative for appetite change, chills, diaphoresis, fatigue and fever.   Respiratory:  Negative for cough and shortness of breath.    Cardiovascular:  Negative for chest pain.   Gastrointestinal:  Negative for abdominal distention, abdominal pain, nausea and vomiting.   Genitourinary:  Negative for difficulty urinating.   Musculoskeletal:  Positive for arthralgias and gait problem. Negative for back pain.   Neurological:  Negative for dizziness, tremors, seizures, syncope, facial asymmetry, speech difficulty, light-headedness, numbness and headaches.     Objective:     Vital Signs (Most Recent):  Temp: 98.2 °F (36.8 °C) (05/08/25 1608)  Pulse: 69 (05/08/25 1608)  Resp: 16 (05/08/25 1608)  BP: (!) 149/67 (05/08/25 1608)  SpO2: 96 % (05/08/25 1608) Vital Signs (24h Range):  Temp:  [97.9 °F (36.6 °C)-98.4 °F (36.9 °C)] 98.2 °F (36.8 °C)  Pulse:  [60-77] 69  Resp:  [16-19] 16  SpO2:  [94 %-98 %] 96 %  BP: (123-149)/(59-70) 149/67     Weight: 101.1 kg (222 lb 14.2 oz)  Body mass index is 34.91 kg/m².    Intake/Output Summary (Last 24 hours) at 5/8/2025 1612  Last data filed at 5/8/2025 0457  Gross per 24 hour   Intake 680 ml   Output 1300 ml   Net -620 ml         Physical Exam  Vitals and nursing note reviewed.   Constitutional:       Appearance: She is not ill-appearing.   Eyes:      Pupils: Pupils are equal, round, and reactive to light.   Cardiovascular:      Rate and Rhythm: Normal rate and regular rhythm.      Pulses: Normal pulses.      Heart sounds: Normal heart sounds.   Pulmonary:      Effort: Pulmonary effort is normal. No respiratory distress.      Breath sounds: Normal breath sounds. No wheezing.   Abdominal:      General: Bowel sounds are normal. There is no distension.      Palpations:  Abdomen is soft.      Tenderness: There is no abdominal tenderness. There is no guarding.   Musculoskeletal:      Right lower le+ Edema present.      Left lower le+ Edema present.   Skin:     General: Skin is warm and dry.      Capillary Refill: Capillary refill takes less than 2 seconds.   Neurological:      Mental Status: She is alert and oriented to person, place, and time.      GCS: GCS eye subscore is 4. GCS verbal subscore is 5. GCS motor subscore is 6.               Significant Labs: All pertinent labs within the past 24 hours have been reviewed.  CBC:   Recent Labs   Lab 25  0418 25  0413   WBC 11.07 7.43   HGB 9.6* 9.0*   HCT 29.3* 27.9*    199     CMP:   Recent Labs   Lab 25  0418 25  0413    141   K 4.3 4.3    108   CO2 21* 25    91   BUN 21 19   CREATININE 0.9 1.1   CALCIUM 8.1* 8.0*   PROT 5.8* 5.5*   ALBUMIN 3.0* 2.8*   BILITOT 0.5 0.4   ALKPHOS 52 50   AST 20 14   ALT 14 12   ANIONGAP 11 8     POCT Glucose:   Recent Labs   Lab 25  2058 25  0759 25  1123   POCTGLUCOSE 120* 95 114*       Significant Imaging: I have reviewed all pertinent imaging results/findings within the past 24 hours.

## 2025-05-09 VITALS
SYSTOLIC BLOOD PRESSURE: 147 MMHG | TEMPERATURE: 98 F | HEIGHT: 67 IN | WEIGHT: 222.88 LBS | OXYGEN SATURATION: 98 % | BODY MASS INDEX: 34.98 KG/M2 | HEART RATE: 76 BPM | RESPIRATION RATE: 18 BRPM | DIASTOLIC BLOOD PRESSURE: 70 MMHG

## 2025-05-09 LAB
ABSOLUTE EOSINOPHIL (SMH): 0.3 K/UL
ABSOLUTE MONOCYTE (SMH): 0.53 K/UL (ref 0.3–1)
ABSOLUTE NEUTROPHIL COUNT (SMH): 4.2 K/UL (ref 1.8–7.7)
ALBUMIN SERPL-MCNC: 2.8 G/DL (ref 3.5–5.2)
ALP SERPL-CCNC: 53 UNIT/L (ref 40–150)
ALT SERPL-CCNC: 17 UNIT/L (ref 10–44)
ANION GAP (SMH): 10 MMOL/L (ref 8–16)
AST SERPL-CCNC: 19 UNIT/L (ref 11–45)
BASOPHILS # BLD AUTO: 0.04 K/UL
BASOPHILS NFR BLD AUTO: 0.6 %
BILIRUB SERPL-MCNC: 0.5 MG/DL (ref 0.1–1)
BUN SERPL-MCNC: 13 MG/DL (ref 8–23)
CALCIUM SERPL-MCNC: 8.5 MG/DL (ref 8.7–10.5)
CHLORIDE SERPL-SCNC: 107 MMOL/L (ref 95–110)
CO2 SERPL-SCNC: 25 MMOL/L (ref 23–29)
CREAT SERPL-MCNC: 0.9 MG/DL (ref 0.5–1.4)
ERYTHROCYTE [DISTWIDTH] IN BLOOD BY AUTOMATED COUNT: 13.7 % (ref 11.5–14.5)
GFR SERPLBLD CREATININE-BSD FMLA CKD-EPI: >60 ML/MIN/1.73/M2
GLUCOSE SERPL-MCNC: 91 MG/DL (ref 70–110)
HCT VFR BLD AUTO: 29.6 % (ref 37–48.5)
HGB BLD-MCNC: 9.5 GM/DL (ref 12–16)
IMM GRANULOCYTES # BLD AUTO: 0.03 K/UL (ref 0–0.04)
IMM GRANULOCYTES NFR BLD AUTO: 0.4 % (ref 0–0.5)
LYMPHOCYTES # BLD AUTO: 1.83 K/UL (ref 1–4.8)
MAGNESIUM SERPL-MCNC: 1.9 MG/DL (ref 1.6–2.6)
MCH RBC QN AUTO: 29.4 PG (ref 27–31)
MCHC RBC AUTO-ENTMCNC: 32.1 G/DL (ref 32–36)
MCV RBC AUTO: 92 FL (ref 82–98)
NUCLEATED RBC (/100WBC) (SMH): 0 /100 WBC
PLATELET # BLD AUTO: 200 K/UL (ref 150–450)
PMV BLD AUTO: 9.9 FL (ref 9.2–12.9)
POCT GLUCOSE: 109 MG/DL (ref 70–110)
POCT GLUCOSE: 94 MG/DL (ref 70–110)
POTASSIUM SERPL-SCNC: 3.9 MMOL/L (ref 3.5–5.1)
PROT SERPL-MCNC: 5.8 GM/DL (ref 6–8.4)
RBC # BLD AUTO: 3.23 M/UL (ref 4–5.4)
RELATIVE EOSINOPHIL (SMH): 4.3 % (ref 0–8)
RELATIVE LYMPHOCYTE (SMH): 26.3 % (ref 18–48)
RELATIVE MONOCYTE (SMH): 7.6 % (ref 4–15)
RELATIVE NEUTROPHIL (SMH): 60.8 % (ref 38–73)
SODIUM SERPL-SCNC: 142 MMOL/L (ref 136–145)
WBC # BLD AUTO: 6.97 K/UL (ref 3.9–12.7)

## 2025-05-09 PROCEDURE — 94799 UNLISTED PULMONARY SVC/PX: CPT

## 2025-05-09 PROCEDURE — 36415 COLL VENOUS BLD VENIPUNCTURE: CPT

## 2025-05-09 PROCEDURE — G0378 HOSPITAL OBSERVATION PER HR: HCPCS

## 2025-05-09 PROCEDURE — 83735 ASSAY OF MAGNESIUM: CPT

## 2025-05-09 PROCEDURE — 94761 N-INVAS EAR/PLS OXIMETRY MLT: CPT

## 2025-05-09 PROCEDURE — 25000003 PHARM REV CODE 250

## 2025-05-09 PROCEDURE — 85025 COMPLETE CBC W/AUTO DIFF WBC: CPT

## 2025-05-09 PROCEDURE — 94760 N-INVAS EAR/PLS OXIMETRY 1: CPT

## 2025-05-09 PROCEDURE — 97116 GAIT TRAINING THERAPY: CPT | Mod: 59

## 2025-05-09 PROCEDURE — 80053 COMPREHEN METABOLIC PANEL: CPT

## 2025-05-09 RX ORDER — GABAPENTIN 100 MG/1
200 CAPSULE ORAL 3 TIMES DAILY
Qty: 60 CAPSULE | Refills: 0 | Status: SHIPPED | OUTPATIENT
Start: 2025-05-09 | End: 2026-05-09

## 2025-05-09 RX ORDER — POLYETHYLENE GLYCOL 3350 17 G/17G
17 POWDER, FOR SOLUTION ORAL 2 TIMES DAILY
Qty: 60 EACH | Refills: 0 | Status: SHIPPED | OUTPATIENT
Start: 2025-05-09

## 2025-05-09 RX ADMIN — TRIAMTERENE AND HYDROCHLOROTHIAZIDE 1 TABLET: 37.5; 25 TABLET ORAL at 08:05

## 2025-05-09 RX ADMIN — POLYETHYLENE GLYCOL (3350) 17 G: 17 POWDER, FOR SOLUTION ORAL at 08:05

## 2025-05-09 RX ADMIN — ONDANSETRON 4 MG: 4 SOLUTION ORAL at 12:05

## 2025-05-09 RX ADMIN — LOSARTAN POTASSIUM 50 MG: 25 TABLET, FILM COATED ORAL at 08:05

## 2025-05-09 RX ADMIN — ASPIRIN 81 MG: 81 TABLET, CHEWABLE ORAL at 08:05

## 2025-05-09 RX ADMIN — ONDANSETRON 4 MG: 4 SOLUTION ORAL at 05:05

## 2025-05-09 RX ADMIN — GABAPENTIN 300 MG: 300 CAPSULE ORAL at 08:05

## 2025-05-09 RX ADMIN — LEVOTHYROXINE SODIUM 137 MCG: 137 TABLET ORAL at 05:05

## 2025-05-09 RX ADMIN — OXYCODONE AND ACETAMINOPHEN 1 TABLET: 10; 325 TABLET ORAL at 07:05

## 2025-05-09 NOTE — PT/OT/SLP PROGRESS
Physical Therapy Treatment    Patient Name:  Gail Temple   MRN:  [id number]    Recommendations:     Discharge Recommendations: Low Intensity Therapy (OP PT)  Discharge Equipment Recommendations: none  Barriers to discharge: None    Assessment:     Gail Temple is a 70 y.o. female admitted with a medical diagnosis of Nausea.  She presents with the following impairments/functional limitations: weakness, impaired endurance, impaired functional mobility, gait instability, impaired balance, decreased lower extremity function, pain, decreased ROM, edema, orthopedic precautions .  Patient agreeable to PT treatment this morning.  Patient presented supine in bed and was able to transfer to sitting and standing with SBA.  Patient then ambulated x 120 feet, x 200 feet rw cga and up/down 5 steps left hand rail cga.    Rehab Prognosis: Good; patient would benefit from acute skilled PT services to address these deficits and reach maximum level of function.    Recent Surgery: Procedure(s) (LRB):  ROBOTIC ARTHROPLASTY, KNEE, TOTAL (Right) 3 Days Post-Op    Plan:     During this hospitalization, patient to be seen BID to address the identified rehab impairments via gait training, therapeutic activities, therapeutic exercises and progress toward the following goals:    Plan of Care Expires:  06/09/25    Subjective     Chief Complaint: pain  Patient/Family Comments/goals: go home  Pain/Comfort:  Pain Rating 1: 4/10  Location - Side 1: Right  Location - Orientation 1: lower  Location 1: knee  Pain Addressed 1: Reposition, Cessation of Activity  Pain Rating Post-Intervention 1: 4/10      Objective:     Communicated with nurse prior to session.  Patient found supine with bed alarm, telemetry upon PT entry to room.     General Precautions: Standard, fall  Orthopedic Precautions: RLE weight bearing as tolerated  Braces: N/A  Respiratory Status: Room air     Functional Mobility:  Bed Mobility:     Supine to Sit: stand by  assistance  Transfers:     Sit to Stand:  stand by assistance with rolling walker  Gait: x 120 feet, x 200 feet rw cga  Stairs:  Pt ascended/descended 5 stair(s) with No Assistive Device with left handrail with Contact Guard Assistance.       AM-PAC 6 CLICK MOBILITY          Treatment & Education:  Gait training x 120 feet, x 200 feet rw cga with step thru gait pattern but with slow cadance  Gait training upi/down 5 steps left hand rail CGA no AD    Patient left up in chair with call button in reach, chair alarm on, and nurse notified..    GOALS:   Multidisciplinary Problems       Physical Therapy Goals          Problem: Physical Therapy    Goal Priority Disciplines Outcome Interventions   Physical Therapy Goal     PT, PT/OT Progressing    Description: Goals to be met by: 25     Patient will increase functional independence with mobility by performin. Supine to sit with [place]  2. Sit to supine with [place]  3. Sit to stand transfer with Stand-by Assistance  4. Bed to chair transfer with Stand-by Assistance using Rolling Walker  5. Gait  x 150 feet with Stand-by Assistance using Rolling Walker.                          DME Justifications:  No DME recommended requiring DME justifications    Time Tracking:     PT Received On: 25  PT Start Time: 903     PT Stop Time: 932  PT Total Time (min): 29 min     Billable Minutes: Gait Training 29    Treatment Type: Treatment  PT/PTA: PT     Number of PTA visits since last PT visit: 0     2025

## 2025-05-09 NOTE — NURSING
AVS reviewed with patient and family. Both verbalized understanding. Patient discharged to home in wheelchair accompanied by .

## 2025-05-09 NOTE — PLAN OF CARE
POC reviewed with patient and spouse. Both verbalized understanding. Pain well controlled on current regimen. BG well controlled and not requiring coverage. No replacements needed. Patient moving much better today. Able to complete stair trial without difficulty. Spoke with patient about elastic wrap and need to keep surgical dressing until follow-up per Dr. Monteiro. Verbalized understanding. Plan is to discharge to home today. NAD.

## 2025-05-09 NOTE — PROGRESS NOTES
VN NOTE   Bedside nurse has decided to review the AVS with the patient, because of technical issues with the ipad.

## 2025-05-09 NOTE — PLAN OF CARE
Discharge orders and chart reviewed with no further post-acute discharge needs identified at this time.  At this time, patient is cleared for discharge from Case Management standpoint.     SW met with patient and spouse at bedside to discuss recommendations and discharge plans. Patient declined both SNF and HH.     Patient is cleared to discharge.       05/09/25 1124   Final Note   Assessment Type Final Discharge Note   Anticipated Discharge Disposition Home   Hospital Resources/Appts/Education Provided Patient refused appointment set-up   Post-Acute Status   Post-Acute Authorization Placement;Home Health   Post-Acute Placement Status Patient declined/refused   Home Health Status Patient declined/refused   Discharge Delays None known at this time

## 2025-05-09 NOTE — ASSESSMENT & PLAN NOTE
Patient's FSGs are controlled on current medication regimen.    Lab Results   Component Value Date    HGBA1C 5.4 05/06/2025     Most recent fingerstick glucose reviewed-   Recent Labs   Lab 05/08/25  1639 05/09/25  0728 05/09/25  1133   POCTGLUCOSE 110 94 109     Current correctional scale  Low  Maintain anti-hyperglycemic dose as follows-   Antihyperglycemics (From admission, onward)      Start     Stop Route Frequency Ordered    05/06/25 1654  insulin aspart U-100 pen 0-5 Units         -- SubQ Before meals & nightly PRN 05/06/25 1554          Hold Oral hypoglycemics while patient is in the hospital.

## 2025-05-09 NOTE — HOSPITAL COURSE
Gail BHARGAV Maverick was closely monitored while in the hospital.  Patient was admitted to Hospital Medicine postoperatively after TKA with Dr. Monteiro on 05/06.  Patient experience postop nausea and vomiting and living and mobility.  Hospital Medicine was consulted for overnight admission.  Patient's medication regimen was adjusted.  Patient worked with PT/OT during hospitalization and they recommended home health therapy.  Patient refused home health and opted for outpatient physical therapy to which she has not appointment with the paradigm 3 days after discharge.  Patient's vital signs remained stable during hospitalization.  Patient was educated on fall precautions and complications that might occur with now of home health.  Case management following for discharge planning.    Patient seen and examined on day of discharge.  Patient in no acute distress.  Chart reviewed.  Patient endorsed pain well-controlled on current medication regimen.  Patient and spouse educated on discharge planning.  They verbalized understanding.  Patient has required DME at home.  Patient is a follow up with PCP as needed, Dr. Monteiro, and outpatient PT appointment.  Patient safely discharged home with outpatient PT. Patient safely discharged home.

## 2025-05-09 NOTE — DISCHARGE SUMMARY
[facility] Medicine  Discharge Summary      Patient Name: Gail Temple  MRN: [id number]  [facility]: [id number]  Patient Class: OP- Observation  Admission Date: 5/6/2025  Hospital Length of Stay: 0 days  Discharge Date and Time: 5/9/2025  1:55 PM  Attending Physician: Richie Marin MD   Discharging Provider: Juarez Ballard NP  Primary Care Provider: Clarice Starks MD    Primary Care Team: Networked reference to record PCT     HPI:   Patient is a 70 year old female with a past medical history of angioedema, asthma, DM, obesity, and post-operative nausea/vomiting. Patient underwent right robotic assisted total knee arthroplasty with Dr. Monteiro. Pre-operatively patient was evaluated in clinic, she fell on a cruise on January 3, 2025 but had been doing well prior. She failed cortisone injections and elected for surgical intervention. Patient's procedure was uneventful, however, patient was having persistent nausea and vomiting as well as verbalizing concern with ambulating up four stairs at her house. Patient was consulted to hospitalist services for monitoring and evaluation. Patient denies any chest pain or shortness of breath. Patient admits to nausea with episodes of vomiting.     Procedure(s) (LRB):  ROBOTIC ARTHROPLASTY, KNEE, TOTAL (Right)      Hospital Course:   Gail Temple was closely monitored while in the hospital.  Patient was admitted to Hospital Medicine postoperatively after TKA with Dr. Fernandez on 05/06.  Patient experience postop nausea and vomiting and living and mobility.  Hospital Medicine was consulted for overnight admission.  Patient's medication regimen was adjusted.  Patient worked with PT/OT during hospitalization and they recommended home health therapy.  Patient refused home health and opted for outpatient physical therapy to which she has not appointment with the [facility] 3 days after discharge.  Patient's vital signs remained stable during  hospitalization.  Patient was educated on fall precautions and complications that might occur with now of home health.  Case management following for discharge planning.    Patient seen and examined on day of discharge.  Patient in no acute distress.  Chart reviewed.  Patient endorsed pain well-controlled on current medication regimen.  Patient and spouse educated on discharge planning.  They verbalized understanding.  Patient has required DME at home.  Patient is a follow up with PCP as needed, Dr. Fernandez, and outpatient PT appointment.  Patient safely discharged home with outpatient PT.     Goals of Care Treatment Preferences:  Code Status: Full Code         Consults:   Consults (From admission, onward)          Status Ordering Provider     Inpatient consult to Hospitalist  Once        Provider:  Eunice Cain NP    Acknowledged BISMARK GENTILE            Assessment & Plan  Nausea  Resolved  Patient having intractable nausea and vomiting post operatively.    -Diabetic Diet   -zofran q4h   -phenergan q6 PRN  Diabetes mellitus  Patient's FSGs are controlled on current medication regimen.    Lab Results   Component Value Date    HGBA1C 5.4 05/06/2025     Most recent fingerstick glucose reviewed-   Recent Labs   Lab 05/08/25  1639 05/09/25  0728 05/09/25  1133   POCTGLUCOSE 110 94 109     Current correctional scale  Low  Maintain anti-hyperglycemic dose as follows-   Antihyperglycemics (From admission, onward)      Start     Stop Route Frequency Ordered    05/06/25 1654  insulin aspart U-100 pen 0-5 Units         -- SubQ Before meals & nightly PRN 05/06/25 1554          Hold Oral hypoglycemics while patient is in the hospital.  Obesity  Body mass index is 34.91 kg/m². Morbid obesity complicates all aspects of disease management from diagnostic modalities to treatment. Weight loss encouraged and health benefits explained to patient.       Arthritis of knee  Patient underwent right total knee arthroplasty  5/6.    -Weight bearing as tolerated per Ortho  -PRN pain management as ordered  -PT/OT eval and treat  -VTE prophylaxis-ASA 81 mg BID    Final Active Diagnoses:    Diagnosis Date Noted POA    PRINCIPAL PROBLEM:  Nausea [R11.0] 05/06/2025 Yes    Arthritis of knee [M17.10] 05/06/2025 Yes    Diabetes mellitus [E11.9]  Yes    Obesity [E66.9]  Yes      Problems Resolved During this Admission:       Discharged Condition: stable    Disposition: Home-Health Care [facility]    Follow Up:   Follow-up Information       [facility]. Go in 3 day(s).    Why: Friday, May 9, 2025 at 9:00 AM  Contact information:  [address] [place] [address]      [phone]             Estevan Monteiro MD. Go on 5/19/2025.    Specialties: Sports Medicine, Orthopedic Surgery  Why: at 10:15 AM for post op apt  Contact information:  [address] [place] [address] [address]  [place] [address]  [phone]               Clarice Starks MD Follow up in 1 week(s).    Specialty: Family Medicine  Why: Office closed on Friday's, left voicemail to contact patient for hospital follow up  Contact information:  [address]  [facility] [address]  [phone]                           Patient Instructions:      Diet general     Leave dressing on - Keep it clean, dry, and intact until clinic visit     Change dressing (specify)   Order Comments: Dressing change: If dressing loses its seal, change daily.     Call MD for:  temperature >100.4     Call MD for:  persistent nausea and vomiting     Call MD for:  severe uncontrolled pain     Call MD for:  difficulty breathing, headache or visual disturbances     Call MD for:  redness, tenderness, or signs of infection (pain, swelling, redness, odor or green/yellow discharge around incision site)     Call MD for:  hives     Call MD for:  persistent dizziness or light-headedness     Call MD for:  extreme fatigue     Keep surgical extremity elevated     Ice to affected area   Order Comments: using barrier  between ice and skin (specify duration&frequency)     Weight bearing as tolerated     Activity as tolerated     Shower on day dressing removed (No bath)       Significant Diagnostic Studies: Labs: CMP   Recent Labs   Lab 05/08/25  0413 05/09/25  0421    142   K 4.3 3.9    107   CO2 25 25   GLU 91 91   BUN 19 13   CREATININE 1.1 0.9   CALCIUM 8.0* 8.5*   PROT 5.5* 5.8*   ALBUMIN 2.8* 2.8*   BILITOT 0.4 0.5   ALKPHOS 50 53   AST 14 19   ALT 12 17   ANIONGAP 8 10   , CBC   Recent Labs   Lab 05/08/25 0413 05/09/25  0421   WBC 7.43 6.97   HGB 9.0* 9.5*   HCT 27.9* 29.6*    200   , and All labs within the past 24 hours have been reviewed    Pending Diagnostic Studies:       None           Medications:  Reconciled Home Medications:      Medication List        START taking these medications      gabapentin 100 MG capsule  Commonly known as: NEURONTIN  Take 2 capsules (200 mg total) by mouth 3 (three) times daily.     polyethylene glycol 17 gram Pwpk  Commonly known as: GLYCOLAX  Take 17 g by mouth 2 (two) times daily.            CONTINUE taking these medications      acetaminophen 500 MG tablet  Commonly known as: TYLENOL  Take 2 tablets (1,000 mg total) by mouth every 8 (eight) hours as needed for Pain.     acyclovir 400 MG tablet  Commonly known as: ZOVIRAX  Take 400 mg by mouth every 8 (eight) hours.     aspirin 81 MG EC tablet  Commonly known as: ECOTRIN  Take 1 tablet (81 mg total) by mouth once daily.     atorvastatin 10 MG tablet  Commonly known as: LIPITOR  Take 10 mg by mouth every evening.     azithromycin 250 MG tablet  Commonly known as: Z-BAYLEE  Take 250 mg by mouth.     cyclobenzaprine 10 MG tablet  Commonly known as: FLEXERIL  Take 1 tablet (10 mg total) by mouth 3 (three) times daily as needed for Muscle spasms.     ibuprofen 600 MG tablet  Commonly known as: ADVIL,MOTRIN  Take 1 tablet (600 mg total) by mouth every 6 (six) hours as needed for Pain.     metFORMIN 500 MG tablet  Commonly  known as: GLUCOPHAGE     MOUNJARO 15 mg/0.5 mL Pnij  Generic drug: tirzepatide     olmesartan 20 MG tablet  Commonly known as: BENICAR     ondansetron 4 MG tablet  Commonly known as: ZOFRAN  Take 1 tablet (4 mg total) by mouth every 6 (six) hours as needed for Nausea.     oxyCODONE-acetaminophen 5-325 mg per tablet  Commonly known as: PERCOCET  Take 1 tablet by mouth every 6 (six) hours as needed for Pain.     promethazine-dextromethorphan 6.25-15 mg/5 mL Syrp  Commonly known as: PROMETHAZINE-DM  Take by mouth.     SYNTHROID 150 mcg tablet  Generic drug: levothyroxine  135 mcg.     triamterene-hydrochlorothiazide 37.5-25 mg 37.5-25 mg per capsule  Commonly known as: DYAZIDE     VITAMIN D ORAL  Take by mouth.            ASK your doctor about these medications      clobetasol 0.05% 0.05 % Oint  Commonly known as: TEMOVATE  Apply sparingly to rash bid x 2-3 weeks.  Avoid face and groin.     clotrimazole-betamethasone 1-0.05% cream  Commonly known as: LOTRISONE  Apply topically.     fluconazole 150 MG Tab  Commonly known as: DIFLUCAN  Take by mouth.     hydrOXYzine HCL 25 MG tablet  Commonly known as: ATARAX  Take 25 mg by mouth nightly as needed.     nystatin powder  Commonly known as: MYCOSTATIN              Indwelling Lines/Drains at time of discharge:   Lines/Drains/Airways       None                   Time spent on the discharge of patient: 35 minutes         Juarez Ballard NP  Department of Hospital Medicine  [facility]/Surg

## 2025-05-09 NOTE — NURSING
Patient released and discharged to home after all discharge teaching complete.   Vancomycin Dosing by Pharmacy - Daily Note   Vancomycin Therapy Day:  3  Indication: Osteomyelitis    Allergies:  Doxycycline, Pcn [penicillins], and Penicillin g   Actual Weight:    Wt Readings from Last 1 Encounters:   08/23/22 187 lb (84.8 kg)       Labs/Ancillary Data  Estimated Creatinine Clearance: 50 mL/min (A) (based on SCr of 1.49 mg/dL (H)). Recent Labs     08/23/22  1303 08/24/22  0603 08/25/22  0638   CREATININE 1.76* 1.61* 1.49*   BUN 32* 33* 24*   WBC 8.5 6.7 7.3     No results found for: PROCAL    Intake/Output Summary (Last 24 hours) at 8/25/2022 1116  Last data filed at 8/25/2022 0418  Gross per 24 hour   Intake 1161 ml   Output 600 ml   Net 561 ml     Temp: 97.5    Culture Date / Source  /  Results  Pending  Recent vancomycin administrations                     vancomycin 1000 mg IVPB in 250 mL D5W addavial (mg) 1,000 mg New Bag 08/24/22 1846    vancomycin (VANCOCIN) 2,000 mg in dextrose 5 % 500 mL IVPB (mg) 2,000 mg New Bag 08/23/22 1708                    Vancomycin Concentrations:   TROUGH:  No results for input(s): VANCOTROUGH in the last 72 hours. RANDOM:    Recent Labs     08/25/22  0638   VANCORANDOM 18.0       MRSA Nasal Swab: N/A. Non-respiratory infection. Tawana Crumble PLAN     Increase dose to 1250 mg q24h IV  Ensured BUN/sCr ordered at baseline and every 48 hours x at least 3 levels, then at least weekly. RPharmacy will continue to monitor patient and adjust therapy as indicated      Vancomycin Target Concentration Parameters  Treatment  Population Target AUC/EUGENE Target Trough   Invasive MRSA Infection (bacteremia, pneumonia, meningitis, endocarditis, osteomyelitis)  Sepsis (undifferentiated) 400-600 N/A   Infection due to non-MRSA pathogen  Empiric treatment of non-invasive MRSA infection  (SSTI, UTI) <500 10-15 mg/L   CrCl < 29 mL/min  Rapidly fluctuating serum creatinine   BRIANNA N/A < 15 mg/L     Renal replacement therapy is dosed by levels, per hospital protocol.   Abbreviations  * Pauc:

## 2025-05-09 NOTE — ASSESSMENT & PLAN NOTE
Resolved  Patient having intractable nausea and vomiting post operatively.    -Diabetic Diet   -zofran q4h   -phenergan q6 PRN

## 2025-05-16 DIAGNOSIS — M17.11 PRIMARY OSTEOARTHRITIS OF RIGHT KNEE: Primary | ICD-10-CM

## 2025-05-19 ENCOUNTER — HOSPITAL ENCOUNTER (OUTPATIENT)
Dept: RADIOLOGY | Facility: HOSPITAL | Age: 70
Discharge: HOME | End: 2025-05-19
Attending: ORTHOPAEDIC SURGERY
Payer: MEDICARE

## 2025-05-19 ENCOUNTER — OFFICE VISIT (OUTPATIENT)
Dept: ORTHOPEDICS | Facility: CLINIC | Age: 70
End: 2025-05-19
Payer: MEDICARE

## 2025-05-19 VITALS — RESPIRATION RATE: 16 BRPM

## 2025-05-19 DIAGNOSIS — Z96.651 STATUS POST TOTAL KNEE REPLACEMENT USING CEMENT, RIGHT: Primary | ICD-10-CM

## 2025-05-19 DIAGNOSIS — M17.11 PRIMARY OSTEOARTHRITIS OF RIGHT KNEE: ICD-10-CM

## 2025-05-19 PROCEDURE — 1159F MED LIST DOCD IN RCRD: CPT | Mod: CPTII,S$GLB,, | Performed by: ORTHOPAEDIC SURGERY

## 2025-05-19 PROCEDURE — 99999 PR PBB SHADOW E&M-EST. PATIENT-LVL II: CPT | Mod: PBBFAC,,, | Performed by: ORTHOPAEDIC SURGERY

## 2025-05-19 PROCEDURE — 73560 X-RAY EXAM OF KNEE 1 OR 2: CPT | Mod: TC,PO,RT

## 2025-05-19 PROCEDURE — 99024 POSTOP FOLLOW-UP VISIT: CPT | Mod: S$GLB,,, | Performed by: ORTHOPAEDIC SURGERY

## 2025-05-19 PROCEDURE — 1125F AMNT PAIN NOTED PAIN PRSNT: CPT | Mod: CPTII,S$GLB,, | Performed by: ORTHOPAEDIC SURGERY

## 2025-05-19 PROCEDURE — 73560 X-RAY EXAM OF KNEE 1 OR 2: CPT | Mod: 26,RT,, | Performed by: RADIOLOGY

## 2025-05-19 PROCEDURE — 3044F HG A1C LEVEL LT 7.0%: CPT | Mod: CPTII,S$GLB,, | Performed by: ORTHOPAEDIC SURGERY

## 2025-05-19 PROCEDURE — 1160F RVW MEDS BY RX/DR IN RCRD: CPT | Mod: CPTII,S$GLB,, | Performed by: ORTHOPAEDIC SURGERY

## 2025-05-19 PROCEDURE — 4010F ACE/ARB THERAPY RXD/TAKEN: CPT | Mod: CPTII,S$GLB,, | Performed by: ORTHOPAEDIC SURGERY

## 2025-05-19 NOTE — PROGRESS NOTES
Past Medical History:   Diagnosis Date    Angio-edema     Asthma     Diabetes mellitus     Eczema     Hypertension     mild    Obesity     PONV (postoperative nausea and vomiting)     Slow to wake up after anesthesia     Urticaria        Past Surgical History:   Procedure Laterality Date    ADENOIDECTOMY      BONY PELVIS SURGERY       SECTION      X 2    FIXATION KYPHOPLASTY N/A 2018    Procedure: Kyphoplasty;  Surgeon: Martinez Morejon MD;  Location: [facility] OR;  Service: Pain Management;  Laterality: N/A;  L1     HYSTERECTOMY      KNEE CARTILAGE SURGERY Left     ROBOTIC ARTHROPLASTY, KNEE Left 1/3/2023    Procedure: ROBOTIC ARTHROPLASTY, KNEE, TOTAL;  Surgeon: Estevan Monteiro MD;  Location: [facility] OR;  Service: Orthopedics;  Laterality: Left;    ROBOTIC ARTHROPLASTY, KNEE Right 2025    Procedure: ROBOTIC ARTHROPLASTY, KNEE, TOTAL;  Surgeon: Estevan Monterio MD;  Location: [facility] OR;  Service: Orthopedics;  Laterality: Right;    TONSILLECTOMY      VAGINOPLASTY         Current Outpatient Medications   Medication Sig    acetaminophen (TYLENOL) 500 MG tablet Take 2 tablets (1,000 mg total) by mouth every 8 (eight) hours as needed for Pain.    acyclovir (ZOVIRAX) 400 MG tablet Take 400 mg by mouth every 8 (eight) hours.    aspirin (ECOTRIN) 81 MG EC tablet Take 1 tablet (81 mg total) by mouth once daily.    atorvastatin (LIPITOR) 10 MG tablet Take 10 mg by mouth every evening.    azithromycin (Z-BAYLEE) 250 MG tablet Take 250 mg by mouth.    clobetasol 0.05% (TEMOVATE) 0.05 % Oint Apply sparingly to rash bid x 2-3 weeks.  Avoid face and groin. (Patient not taking: Reported on 2024)    clotrimazole-betamethasone 1-0.05% (LOTRISONE) cream Apply topically. (Patient not taking: Reported on 2024)    ergocalciferol, vitamin D2, (VITAMIN D ORAL) Take by mouth.    fluconazole (DIFLUCAN) 150 MG Tab Take by mouth. (Patient not taking: Reported on 2024)    gabapentin (NEURONTIN) 100 MG capsule  Take 2 capsules (200 mg total) by mouth 3 (three) times daily.    hydrOXYzine HCL (ATARAX) 25 MG tablet Take 25 mg by mouth nightly as needed. (Patient not taking: Reported on 2024)    ibuprofen (ADVIL,MOTRIN) 600 MG tablet Take 1 tablet (600 mg total) by mouth every 6 (six) hours as needed for Pain.    metformin (GLUCOPHAGE) 500 MG tablet     MOUNJARO 15 mg/0.5 mL PnIj     nystatin (MYCOSTATIN) powder  (Patient not taking: Reported on 2024)    olmesartan (BENICAR) 20 MG tablet     ondansetron (ZOFRAN) 4 MG tablet Take 1 tablet (4 mg total) by mouth every 6 (six) hours as needed for Nausea.    oxyCODONE-acetaminophen (PERCOCET) 5-325 mg per tablet Take 1 tablet by mouth every 6 (six) hours as needed for Pain.    polyethylene glycol (GLYCOLAX) 17 gram PwPk Take 17 g by mouth 2 (two) times daily.    promethazine-dextromethorphan (PROMETHAZINE-DM) 6.25-15 mg/5 mL Syrp Take by mouth.    SYNTHROID 150 mcg tablet 135 mcg.    triamterene-hydrochlorothiazide 37.5-25 mg (DYAZIDE) 37.5-25 mg per capsule      No current facility-administered medications for this visit.       Review of patient's allergies indicates:   Allergen Reactions    Levothyroxine Swelling     GENERIC ONLY / PT CAN TAKE SYNTHROID, face/tongue swelling    Levothyroxine sodium Swelling       Family History   Problem Relation Name Age of Onset    Breast cancer Mother  42         at 59yo from breast ca    Angioedema Daughter      Allergies Daughter      Asthma Maternal Uncle      Allergies Son      Allergic rhinitis Son      Eczema Son      Immunodeficiency Neg Hx      Ovarian cancer Neg Hx         Social History[1]    Chief Complaint:   Chief Complaint   Patient presents with    Right Knee - Post-op Evaluation       Date of surgery:  May 6, 2025    History of present illness:  70-year-old female underwent right robotic total knee arthroplasty.  Patient has a lot more difficulty postoperatively with pain after this knee than the other 1.  She  isn't taking the pain medicine.  Pain is a 7/10.  Using ibuprofen and gabapentin.      Review of Systems:    Musculoskeletal:  See HPI        Physical Examination:    Vital Signs:    Vitals:    05/19/25 0958   Resp: 16       There is no height or weight on file to calculate BMI.    This a well-developed, well nourished patient in no acute distress.  They are alert and oriented and cooperative to examination.  Pt. walks without an antalgic gait.      Examination of the right knee shows well-healing surgical incision.  No erythema drainage.  Mild swelling.  Range of motion is about 5-90 degrees.  No calf pain.    X-rays:  Two views of the right knee are ordered and reviewed which show well-aligned total knee arthroplasty without complication     Assessment::  Status post Nasir Fabien total knee arthroplasty    Plan:  I reviewed the x-ray with her today.  Continue the physical therapy.  Patient may get the incision wet.  Follow up in 4 weeks.    This note was created using [employer] voice recognition software that occasionally misinterpreted phrases or words.           [1]   Social History  Socioeconomic History    Marital status:    Tobacco Use    Smoking status: Never    Smokeless tobacco: Never   Substance and Sexual Activity    Alcohol use: Yes     Comment: seldom    Drug use: Never    Sexual activity: Not Currently     Partners: Male     Birth control/protection: See Surgical Hx     Social Drivers of Health     Financial Resource Strain: Patient Declined (1/4/2023)    Overall Financial Resource Strain (CARDIA)     Difficulty of Paying Living Expenses: Patient declined   Food Insecurity: No Food Insecurity (1/4/2023)    Hunger Vital Sign     Worried About Running Out of Food in the Last Year: Never true     Ran Out of Food in the Last Year: Never true   Transportation Needs: No Transportation Needs (1/4/2023)    PRAPARE - Transportation     Lack of Transportation (Medical): No     Lack of Transportation  (Non-Medical): No   Physical Activity: Patient Declined (1/4/2023)    Exercise Vital Sign     Days of Exercise per Week: Patient declined     Minutes of Exercise per Session: Patient declined   Stress: Patient Declined (1/4/2023)    [place] [place] [employer] - Occupational Stress Questionnaire     Feeling of Stress : Patient declined   Housing Stability: Unknown (1/4/2023)    Housing Stability Vital Sign     Unable to Pay for Housing in the Last Year: No     Unstable Housing in the Last Year: No

## 2025-06-02 ENCOUNTER — PATIENT MESSAGE (OUTPATIENT)
Dept: ORTHOPEDICS | Facility: CLINIC | Age: 70
End: 2025-06-02
Payer: MEDICARE

## 2025-06-03 DIAGNOSIS — Z96.651 STATUS POST TOTAL KNEE REPLACEMENT USING CEMENT, RIGHT: Primary | ICD-10-CM

## 2025-06-03 RX ORDER — GABAPENTIN 100 MG/1
100 CAPSULE ORAL 2 TIMES DAILY
Qty: 30 CAPSULE | Refills: 0 | Status: SHIPPED | OUTPATIENT
Start: 2025-06-03 | End: 2025-06-18

## 2025-06-09 ENCOUNTER — OFFICE VISIT (OUTPATIENT)
Dept: ORTHOPEDICS | Facility: CLINIC | Age: 70
End: 2025-06-09
Payer: MEDICARE

## 2025-06-09 VITALS — HEIGHT: 67 IN | BODY MASS INDEX: 34.98 KG/M2 | WEIGHT: 222.88 LBS

## 2025-06-09 DIAGNOSIS — Z96.651 STATUS POST TOTAL KNEE REPLACEMENT USING CEMENT, RIGHT: Primary | ICD-10-CM

## 2025-06-09 PROCEDURE — 3288F FALL RISK ASSESSMENT DOCD: CPT | Mod: CPTII,S$GLB,, | Performed by: ORTHOPAEDIC SURGERY

## 2025-06-09 PROCEDURE — 3008F BODY MASS INDEX DOCD: CPT | Mod: CPTII,S$GLB,, | Performed by: ORTHOPAEDIC SURGERY

## 2025-06-09 PROCEDURE — 3044F HG A1C LEVEL LT 7.0%: CPT | Mod: CPTII,S$GLB,, | Performed by: ORTHOPAEDIC SURGERY

## 2025-06-09 PROCEDURE — 1125F AMNT PAIN NOTED PAIN PRSNT: CPT | Mod: CPTII,S$GLB,, | Performed by: ORTHOPAEDIC SURGERY

## 2025-06-09 PROCEDURE — 99999 PR PBB SHADOW E&M-EST. PATIENT-LVL III: CPT | Mod: PBBFAC,,, | Performed by: ORTHOPAEDIC SURGERY

## 2025-06-09 PROCEDURE — 1101F PT FALLS ASSESS-DOCD LE1/YR: CPT | Mod: CPTII,S$GLB,, | Performed by: ORTHOPAEDIC SURGERY

## 2025-06-09 PROCEDURE — 99024 POSTOP FOLLOW-UP VISIT: CPT | Mod: S$GLB,,, | Performed by: ORTHOPAEDIC SURGERY

## 2025-06-09 PROCEDURE — 4010F ACE/ARB THERAPY RXD/TAKEN: CPT | Mod: CPTII,S$GLB,, | Performed by: ORTHOPAEDIC SURGERY

## 2025-06-09 PROCEDURE — 1159F MED LIST DOCD IN RCRD: CPT | Mod: CPTII,S$GLB,, | Performed by: ORTHOPAEDIC SURGERY

## 2025-06-09 NOTE — PROGRESS NOTES
Past Medical History:   Diagnosis Date    Angio-edema     Asthma     Diabetes mellitus     Eczema     Hypertension     mild    Obesity     PONV (postoperative nausea and vomiting)     Slow to wake up after anesthesia     Urticaria        Past Surgical History:   Procedure Laterality Date    ADENOIDECTOMY      BONY PELVIS SURGERY       SECTION      X 2    FIXATION KYPHOPLASTY N/A 2018    Procedure: Kyphoplasty;  Surgeon: Martinez Morejon MD;  Location: [facility] OR;  Service: Pain Management;  Laterality: N/A;  L1     HYSTERECTOMY      KNEE CARTILAGE SURGERY Left     ROBOTIC ARTHROPLASTY, KNEE Left 1/3/2023    Procedure: ROBOTIC ARTHROPLASTY, KNEE, TOTAL;  Surgeon: Estevan Monteiro MD;  Location: [facility] OR;  Service: Orthopedics;  Laterality: Left;    ROBOTIC ARTHROPLASTY, KNEE Right 2025    Procedure: ROBOTIC ARTHROPLASTY, KNEE, TOTAL;  Surgeon: Estevan Monteiro MD;  Location: [facility] OR;  Service: Orthopedics;  Laterality: Right;    TONSILLECTOMY      VAGINOPLASTY         Current Outpatient Medications   Medication Sig    acetaminophen (TYLENOL) 500 MG tablet Take 2 tablets (1,000 mg total) by mouth every 8 (eight) hours as needed for Pain.    acyclovir (ZOVIRAX) 400 MG tablet Take 400 mg by mouth every 8 (eight) hours.    aspirin (ECOTRIN) 81 MG EC tablet Take 1 tablet (81 mg total) by mouth once daily.    atorvastatin (LIPITOR) 10 MG tablet Take 10 mg by mouth every evening.    azithromycin (Z-BAYLEE) 250 MG tablet Take 250 mg by mouth.    clobetasol 0.05% (TEMOVATE) 0.05 % Oint Apply sparingly to rash bid x 2-3 weeks.  Avoid face and groin. (Patient not taking: Reported on 2025)    clotrimazole-betamethasone 1-0.05% (LOTRISONE) cream Apply topically. (Patient not taking: Reported on 2025)    ergocalciferol, vitamin D2, (VITAMIN D ORAL) Take by mouth.    fluconazole (DIFLUCAN) 150 MG Tab Take by mouth. (Patient not taking: Reported on 2025)    gabapentin (NEURONTIN) 100 MG capsule Take  1 capsule (100 mg total) by mouth 2 (two) times daily. for 15 days    hydrOXYzine HCL (ATARAX) 25 MG tablet Take 25 mg by mouth nightly as needed. (Patient not taking: Reported on 2024)    ibuprofen (ADVIL,MOTRIN) 600 MG tablet Take 1 tablet (600 mg total) by mouth every 6 (six) hours as needed for Pain.    metformin (GLUCOPHAGE) 500 MG tablet     MOUNJARO 15 mg/0.5 mL PnIj     nystatin (MYCOSTATIN) powder  (Patient not taking: Reported on 2025)    olmesartan (BENICAR) 20 MG tablet     ondansetron (ZOFRAN) 4 MG tablet Take 1 tablet (4 mg total) by mouth every 6 (six) hours as needed for Nausea.    oxyCODONE-acetaminophen (PERCOCET) 5-325 mg per tablet Take 1 tablet by mouth every 6 (six) hours as needed for Pain.    polyethylene glycol (GLYCOLAX) 17 gram PwPk Take 17 g by mouth 2 (two) times daily.    promethazine-dextromethorphan (PROMETHAZINE-DM) 6.25-15 mg/5 mL Syrp Take by mouth.    SYNTHROID 150 mcg tablet 135 mcg.    triamterene-hydrochlorothiazide 37.5-25 mg (DYAZIDE) 37.5-25 mg per capsule      No current facility-administered medications for this visit.       Review of patient's allergies indicates:   Allergen Reactions    Levothyroxine Swelling     GENERIC ONLY / PT CAN TAKE SYNTHROID, face/tongue swelling    Levothyroxine sodium Swelling       Family History   Problem Relation Name Age of Onset    Breast cancer Mother  42         at 59yo from breast ca    Angioedema Daughter      Allergies Daughter      Asthma Maternal Uncle      Allergies Son      Allergic rhinitis Son      Eczema Son      Immunodeficiency Neg Hx      Ovarian cancer Neg Hx         Social History[1]    Chief Complaint:   No chief complaint on file.      Date of surgery:  May 6, 2025    History of present illness:  70-year-old female underwent right robotic total knee arthroplasty.  Patient has a lot more difficulty postoperatively with pain after this knee than the other one.  She is doing pretty well.  A little tight but  making good progress with physical therapy.  Describes more of a constant achiness mostly at night.  Pain is a 5/10.  Taking gabapentin for the pain at night.      Review of Systems:  Musculoskeletal:  See HPI      Physical Examination:    Vital Signs:    There were no vitals filed for this visit.      There is no height or weight on file to calculate BMI.    This a well-developed, well nourished patient in no acute distress.  They are alert and oriented and cooperative to examination.  Pt. walks without an antalgic gait.      Examination of the right knee shows well-healing surgical incision.  No erythema drainage.  Mild swelling.  Range of motion is about 3-110 degrees.  Stable to varus and valgus stress.  Negative instability with drawer exam.    X-rays:  Two views of the right knee are reviewed which show well-aligned total knee arthroplasty without complication     Assessment::  Status post [place] Fabien total knee arthroplasty    Plan:  I reviewed the x-ray with her today.  Continue the physical therapy.  Follow up in 6 weeks with x-rays of both knees.    This note was created using M Mod[employer] voice recognition software that occasionally misinterpreted phrases or words.             [1]   Social History  Socioeconomic History    Marital status:    Tobacco Use    Smoking status: Never    Smokeless tobacco: Never   Substance and Sexual Activity    Alcohol use: Yes     Comment: seldom    Drug use: Never    Sexual activity: Not Currently     Partners: Male     Birth control/protection: See Surgical Hx     Social Drivers of Health     Financial Resource Strain: Patient Declined (1/4/2023)    Overall Financial Resource Strain (CARDIA)     Difficulty of Paying Living Expenses: Patient declined   Food Insecurity: No Food Insecurity (1/4/2023)    Hunger Vital Sign     Worried About Running Out of Food in the Last Year: Never true     Ran Out of Food in the Last Year: Never true   Transportation Needs: No  Transportation Needs (1/4/2023)    PRAPARE - Transportation     Lack of Transportation (Medical): No     Lack of Transportation (Non-Medical): No   Physical Activity: Patient Declined (1/4/2023)    Exercise Vital Sign     Days of Exercise per Week: Patient declined     Minutes of Exercise per Session: Patient declined   Stress: Patient Declined (1/4/2023)    [employer] [place] [employer] - Occupational Stress Questionnaire     Feeling of Stress : Patient declined   Housing Stability: Unknown (1/4/2023)    Housing Stability Vital Sign     Unable to Pay for Housing in the Last Year: No     Unstable Housing in the Last Year: No

## 2025-07-14 DIAGNOSIS — M25.569 KNEE PAIN, UNSPECIFIED CHRONICITY, UNSPECIFIED LATERALITY: Primary | ICD-10-CM

## 2025-07-21 ENCOUNTER — HOSPITAL ENCOUNTER (OUTPATIENT)
Dept: RADIOLOGY | Facility: HOSPITAL | Age: 70
Discharge: HOME | End: 2025-07-21
Attending: ORTHOPAEDIC SURGERY
Payer: MEDICARE

## 2025-07-21 ENCOUNTER — OFFICE VISIT (OUTPATIENT)
Dept: ORTHOPEDICS | Facility: CLINIC | Age: 70
End: 2025-07-21
Payer: MEDICARE

## 2025-07-21 VITALS — BODY MASS INDEX: 34.98 KG/M2 | HEIGHT: 67 IN | RESPIRATION RATE: 18 BRPM | WEIGHT: 222.88 LBS

## 2025-07-21 DIAGNOSIS — Z96.651 STATUS POST TOTAL KNEE REPLACEMENT USING CEMENT, RIGHT: ICD-10-CM

## 2025-07-21 DIAGNOSIS — M25.569 KNEE PAIN, UNSPECIFIED CHRONICITY, UNSPECIFIED LATERALITY: Primary | ICD-10-CM

## 2025-07-21 DIAGNOSIS — M25.569 KNEE PAIN, UNSPECIFIED CHRONICITY, UNSPECIFIED LATERALITY: ICD-10-CM

## 2025-07-21 PROCEDURE — 4010F ACE/ARB THERAPY RXD/TAKEN: CPT | Mod: CPTII,S$GLB,, | Performed by: ORTHOPAEDIC SURGERY

## 2025-07-21 PROCEDURE — 3008F BODY MASS INDEX DOCD: CPT | Mod: CPTII,S$GLB,, | Performed by: ORTHOPAEDIC SURGERY

## 2025-07-21 PROCEDURE — 73564 X-RAY EXAM KNEE 4 OR MORE: CPT | Mod: 26,50,, | Performed by: RADIOLOGY

## 2025-07-21 PROCEDURE — 99999 PR PBB SHADOW E&M-EST. PATIENT-LVL IV: CPT | Mod: PBBFAC,,, | Performed by: ORTHOPAEDIC SURGERY

## 2025-07-21 PROCEDURE — 3044F HG A1C LEVEL LT 7.0%: CPT | Mod: CPTII,S$GLB,, | Performed by: ORTHOPAEDIC SURGERY

## 2025-07-21 PROCEDURE — 73564 X-RAY EXAM KNEE 4 OR MORE: CPT | Mod: TC,50,PO

## 2025-07-21 PROCEDURE — 1125F AMNT PAIN NOTED PAIN PRSNT: CPT | Mod: CPTII,S$GLB,, | Performed by: ORTHOPAEDIC SURGERY

## 2025-07-21 PROCEDURE — 99024 POSTOP FOLLOW-UP VISIT: CPT | Mod: S$GLB,,, | Performed by: ORTHOPAEDIC SURGERY

## 2025-07-21 PROCEDURE — 1159F MED LIST DOCD IN RCRD: CPT | Mod: CPTII,S$GLB,, | Performed by: ORTHOPAEDIC SURGERY

## 2025-07-21 PROCEDURE — 1101F PT FALLS ASSESS-DOCD LE1/YR: CPT | Mod: CPTII,S$GLB,, | Performed by: ORTHOPAEDIC SURGERY

## 2025-07-21 PROCEDURE — 3288F FALL RISK ASSESSMENT DOCD: CPT | Mod: CPTII,S$GLB,, | Performed by: ORTHOPAEDIC SURGERY

## 2025-07-21 NOTE — PROGRESS NOTES
Past Medical History:   Diagnosis Date    Angio-edema     Asthma     Diabetes mellitus     Eczema     Hypertension     mild    Obesity     PONV (postoperative nausea and vomiting)     Slow to wake up after anesthesia     Urticaria        Past Surgical History:   Procedure Laterality Date    ADENOIDECTOMY      BONY PELVIS SURGERY       SECTION      X 2    FIXATION KYPHOPLASTY N/A 2018    Procedure: Kyphoplasty;  Surgeon: Martinez Morejon MD;  Location: [facility] OR;  Service: Pain Management;  Laterality: N/A;  L1     HYSTERECTOMY      KNEE CARTILAGE SURGERY Left     ROBOTIC ARTHROPLASTY, KNEE Left 1/3/2023    Procedure: ROBOTIC ARTHROPLASTY, KNEE, TOTAL;  Surgeon: Estevan Monteiro MD;  Location: [facility] OR;  Service: Orthopedics;  Laterality: Left;    ROBOTIC ARTHROPLASTY, KNEE Right 2025    Procedure: ROBOTIC ARTHROPLASTY, KNEE, TOTAL;  Surgeon: Estevan Monteiro MD;  Location: [facility] OR;  Service: Orthopedics;  Laterality: Right;    TONSILLECTOMY      VAGINOPLASTY         Current Outpatient Medications   Medication Sig    acetaminophen (TYLENOL) 500 MG tablet Take 2 tablets (1,000 mg total) by mouth every 8 (eight) hours as needed for Pain.    acyclovir (ZOVIRAX) 400 MG tablet Take 400 mg by mouth every 8 (eight) hours.    aspirin (ECOTRIN) 81 MG EC tablet Take 1 tablet (81 mg total) by mouth once daily.    atorvastatin (LIPITOR) 10 MG tablet Take 10 mg by mouth every evening.    azithromycin (Z-BAYLEE) 250 MG tablet Take 250 mg by mouth.    clobetasol 0.05% (TEMOVATE) 0.05 % Oint Apply sparingly to rash bid x 2-3 weeks.  Avoid face and groin. (Patient not taking: Reported on 2025)    clotrimazole-betamethasone 1-0.05% (LOTRISONE) cream Apply topically. (Patient not taking: Reported on 2025)    ergocalciferol, vitamin D2, (VITAMIN D ORAL) Take by mouth.    fluconazole (DIFLUCAN) 150 MG Tab Take by mouth. (Patient not taking: Reported on 2025)    gabapentin (NEURONTIN) 100 MG capsule Take  1 capsule (100 mg total) by mouth 2 (two) times daily. for 15 days    hydrOXYzine HCL (ATARAX) 25 MG tablet Take 25 mg by mouth nightly as needed.    ibuprofen (ADVIL,MOTRIN) 600 MG tablet Take 1 tablet (600 mg total) by mouth every 6 (six) hours as needed for Pain.    metformin (GLUCOPHAGE) 500 MG tablet     MOUNJARO 15 mg/0.5 mL PnIj     nystatin (MYCOSTATIN) powder  (Patient not taking: Reported on 2025)    olmesartan (BENICAR) 20 MG tablet     ondansetron (ZOFRAN) 4 MG tablet Take 1 tablet (4 mg total) by mouth every 6 (six) hours as needed for Nausea.    oxyCODONE-acetaminophen (PERCOCET) 5-325 mg per tablet Take 1 tablet by mouth every 6 (six) hours as needed for Pain.    polyethylene glycol (GLYCOLAX) 17 gram PwPk Take 17 g by mouth 2 (two) times daily.    promethazine-dextromethorphan (PROMETHAZINE-DM) 6.25-15 mg/5 mL Syrp Take by mouth.    SYNTHROID 150 mcg tablet 135 mcg.    triamterene-hydrochlorothiazide 37.5-25 mg (DYAZIDE) 37.5-25 mg per capsule      No current facility-administered medications for this visit.       Review of patient's allergies indicates:   Allergen Reactions    Levothyroxine Swelling     GENERIC ONLY / PT CAN TAKE SYNTHROID, face/tongue swelling    Levothyroxine sodium Swelling       Family History   Problem Relation Name Age of Onset    Breast cancer Mother  42         at 59yo from breast ca    Angioedema Daughter      Allergies Daughter      Asthma Maternal Uncle      Allergies Son      Allergic rhinitis Son      Eczema Son      Immunodeficiency Neg Hx      Ovarian cancer Neg Hx         Social History[1]    Chief Complaint:   No chief complaint on file.      Date of surgery:  May 6, 2025    History of present illness:  70-year-old female underwent right robotic total knee arthroplasty.  Patient has a lot more difficulty postoperatively with pain after this knee than the other one.  She is doing pretty well.  A little tight but making good progress with physical therapy.   Doing it on her own now.  Rates the pain is a 4/10.    Review of Systems:  Musculoskeletal:  See HPI      Physical Examination:    Vital Signs:    There were no vitals filed for this visit.      There is no height or weight on file to calculate BMI.    This a well-developed, well nourished patient in no acute distress.  They are alert and oriented and cooperative to examination.  Pt. walks without an antalgic gait.      Examination of the right knee shows healed surgical incision.  No erythema drainage.  Mild swelling.  Range of motion is about 3-110 degrees.  Stable to varus and valgus stress.  Negative instability with drawer exam.    X-rays:  4 views of both knees are ordered and reviewed which show well-aligned bilateral total knee arthroplasty without complication     Assessment::  Status post [place] Fabien total knee arthroplasty    Plan:  I reviewed the x-ray with her today.  Continue the physical therapy.  Follow up in 9 months with x-rays of both knees for annual exam.    This note was created using M [employer]al voice recognition software that occasionally misinterpreted phrases or words.               [1]   Social History  Socioeconomic History    Marital status:    Tobacco Use    Smoking status: Never    Smokeless tobacco: Never   Substance and Sexual Activity    Alcohol use: Yes     Comment: seldom    Drug use: Never    Sexual activity: Not Currently     Partners: Male     Birth control/protection: See Surgical Hx     Social Drivers of Health     Financial Resource Strain: Patient Declined (1/4/2023)    Overall Financial Resource Strain (CARDIA)     Difficulty of Paying Living Expenses: Patient declined   Food Insecurity: No Food Insecurity (1/4/2023)    Hunger Vital Sign     Worried About Running Out of Food in the Last Year: Never true     Ran Out of Food in the Last Year: Never true   Transportation Needs: No Transportation Needs (1/4/2023)    PRAPARE - Transportation     Lack of Transportation  (Medical): No     Lack of Transportation (Non-Medical): No   Physical Activity: Patient Declined (1/4/2023)    Exercise Vital Sign     Days of Exercise per Week: Patient declined     Minutes of Exercise per Session: Patient declined   Stress: Patient Declined (1/4/2023)    [employer] [place] [employer] - Occupational Stress Questionnaire     Feeling of Stress : Patient declined   Housing Stability: Unknown (1/4/2023)    Housing Stability Vital Sign     Unable to Pay for Housing in the Last Year: No     Unstable Housing in the Last Year: No

## 2025-08-08 ENCOUNTER — PATIENT MESSAGE (OUTPATIENT)
Dept: ORTHOPEDICS | Facility: CLINIC | Age: 70
End: 2025-08-08
Payer: MEDICARE

## 2025-08-08 DIAGNOSIS — Z96.651 STATUS POST TOTAL KNEE REPLACEMENT USING CEMENT, RIGHT: Primary | ICD-10-CM

## 2025-08-08 DIAGNOSIS — Z96.651 STATUS POST TOTAL KNEE REPLACEMENT USING CEMENT, RIGHT: ICD-10-CM

## 2025-08-08 RX ORDER — MELOXICAM 15 MG/1
15 TABLET ORAL DAILY
Qty: 30 TABLET | Refills: 0 | Status: SHIPPED | OUTPATIENT
Start: 2025-08-08

## 2025-08-11 RX ORDER — MELOXICAM 15 MG/1
15 TABLET ORAL DAILY
Qty: 30 TABLET | Refills: 2 | Status: SHIPPED | OUTPATIENT
Start: 2025-08-11

## (undated) DEVICE — STRAP OR TABLE 5IN X 72IN

## (undated) DEVICE — GLOVE SURG ULTRA TOUCH 8

## (undated) DEVICE — ELECTRODE REM PLYHSV RETURN 9

## (undated) DEVICE — PENCIL SMK EVAC CONNECTOR 10FT

## (undated) DEVICE — TOGA FLYTE PEEL AWAY XLARGE

## (undated) DEVICE — BLADE SURG CARBON STEEL #10

## (undated) DEVICE — APPLICATOR CHLORAPREP ORN 26ML

## (undated) DEVICE — WRAP PROTECTIVE LEG POS STRL

## (undated) DEVICE — PIN BONE 4 X 140MM STERILE
Type: IMPLANTABLE DEVICE | Site: KNEE | Status: NON-FUNCTIONAL
Removed: 2023-01-03

## (undated) DEVICE — DRAPE THREE-QTR REINF 53X77IN

## (undated) DEVICE — KIT CHECKPOINT MAKO

## (undated) DEVICE — PACK CUSTOM UNIV BASIN SLI

## (undated) DEVICE — PIN FIXATION BONE 140X3.2MM
Type: IMPLANTABLE DEVICE | Site: KNEE | Status: NON-FUNCTIONAL
Removed: 2025-05-06

## (undated) DEVICE — PADDING CAST SPECIALIST 6X4YD

## (undated) DEVICE — UNDERGLOVES BIOGEL PI SIZE 7.5

## (undated) DEVICE — SYR 10CC LUER LOCK

## (undated) DEVICE — ADHESIVE DERMABOND ADVANCED

## (undated) DEVICE — DRESSING AQUACEL AG ADV 3.5X12

## (undated) DEVICE — INTERPULSE SET

## (undated) DEVICE — NDL BOX COUNTER

## (undated) DEVICE — SPONGE LAP 18X18 PREWASHED

## (undated) DEVICE — Device

## (undated) DEVICE — CATH URETHRAL RED RUBBER 18FR

## (undated) DEVICE — SLEEVE SCD EXPRESS KNEE MEDIUM

## (undated) DEVICE — NDL SPINAL 18GX3.5 SPINOCAN

## (undated) DEVICE — TUBE SUCTION YANKAUER HI CAP

## (undated) DEVICE — BLADE SURG CARBON STEEL SZ11

## (undated) DEVICE — ALCOHOL RUBBING 70% ISO 4OZ

## (undated) DEVICE — LINER SUCTION 3000CC

## (undated) DEVICE — DRESSING GAUZE OIL EMUL 3X8

## (undated) DEVICE — TOWEL OR DISP STRL BLUE 4/PK

## (undated) DEVICE — BLADE MAKO STANDARD

## (undated) DEVICE — CONTAINER SPECIMEN STRL 4OZ

## (undated) DEVICE — PADDING WYTEX UNDRCST 6INX4YD

## (undated) DEVICE — NDL SPINAL SPINOCAN 22GX3.5

## (undated) DEVICE — SYR 50CC LL

## (undated) DEVICE — BLADE SAG DUAL 18MMX1.27MMX90M

## (undated) DEVICE — DRAPE STERI U-SHAPED 47X51IN

## (undated) DEVICE — SUT STRATAFIX PDS 1 CTX 18IN

## (undated) DEVICE — YANKAUER FLEX NO VENT HI CAP

## (undated) DEVICE — KIT TRIATHLON TIBIAL SIZER

## (undated) DEVICE — TOURNIQUET SB QC DP 34X4IN

## (undated) DEVICE — MANIFOLD 4 PORT

## (undated) DEVICE — COVER TABLE 44X90 STERILE

## (undated) DEVICE — NDL SAFETY 25G X 1.5 ECLIPSE

## (undated) DEVICE — BOWL STERILE LARGE 32OZ

## (undated) DEVICE — DRAPE MINOR PROCEDURE

## (undated) DEVICE — BLADE TONGUE DEPRESSOR STRL

## (undated) DEVICE — SUT VICRYL PLUS 2-0 CT1 18

## (undated) DEVICE — DRESSING AQUACEL AG FOAM 4X4

## (undated) DEVICE — DRAPE INCISE IOBAN 2 23X17IN

## (undated) DEVICE — KIT VIZADISC KNEE TRACKING

## (undated) DEVICE — DRAPE ORTH SPLIT 77X108IN

## (undated) DEVICE — BLADE MAKO NARROW

## (undated) DEVICE — KIT DRAPE RIO ONE PIECE W/POCK

## (undated) DEVICE — KIT TRIATHLON CR FEM PREP SZ3

## (undated) DEVICE — SKINMARKER W/RULER DEVON

## (undated) DEVICE — SOL WATER STRL IRR 1000ML

## (undated) DEVICE — WRAP KNEE ACCU THERM GEL PACK

## (undated) DEVICE — SOL NACL IRR 1000ML BTL

## (undated) DEVICE — UNDERGLOVES BIOGEL PI SZ 7 LF

## (undated) DEVICE — KIT TRIATHLON CR TIB PREP SZ4

## (undated) DEVICE — DRAPE STERI INSTRUMENT 1018

## (undated) DEVICE — GLOVE SENSICARE PI ALOE 7.5

## (undated) DEVICE — UNDERGLOVE BIOGEL PI SZ 6.5 LF

## (undated) DEVICE — GLOVE SURG ULTRA TOUCH 7

## (undated) DEVICE — PAD ABDOMINAL STERILE 8X10IN

## (undated) DEVICE — DRAPE C ARM 42 X 120 10/BX

## (undated) DEVICE — SUT STRATAFIX SPRL PS-2 3-0

## (undated) DEVICE — SET DECANTER MEDICHOICE

## (undated) DEVICE — TAPE SILK 3IN

## (undated) DEVICE — SCRUB 10% POVIDONE IODINE 4OZ

## (undated) DEVICE — BANDAGE ESMARK ELASTIC ST 6X9

## (undated) DEVICE — SEE MEDLINE ITEM 152622

## (undated) DEVICE — PACK LOWER EXTREMITY

## (undated) DEVICE — UNDERGLOVES BIOGEL PI SIZE 8

## (undated) DEVICE — PIN BONE 3.2X110MM
Type: IMPLANTABLE DEVICE | Site: KNEE | Status: NON-FUNCTIONAL
Removed: 2025-05-06

## (undated) DEVICE — SPONGE BULKEE II ABSRB 6X6.75

## (undated) DEVICE — BANDAGE ACE DOUBLE STER 6IN

## (undated) DEVICE — ELECTRODE BLD ULTRA 4IN STRL

## (undated) DEVICE — ALCOHOL 70% ISOP RUBBING 4OZ

## (undated) DEVICE — PACK SIRUS BASIC V SURG STRL

## (undated) DEVICE — SYS LABEL CORRECT MED

## (undated) DEVICE — GLOVE SENSICARE PI GRN 8

## (undated) DEVICE — GLOVE SURGEONS ULTRA TOUCH 6.5

## (undated) DEVICE — BLADE SAG DUAL 25MM

## (undated) DEVICE — GLOVE SURG ULTRA TOUCH 7.5

## (undated) DEVICE — SUT 2/0 18IN COATED VICRYL

## (undated) DEVICE — PAD ABD 8X10 STERILE

## (undated) DEVICE — BNDG COFLEX FOAM LF2 ST 6X5YD

## (undated) DEVICE — NDL ECLIPSE SAFETY 18GX1-1/2IN

## (undated) DEVICE — ELECTRODE MEGADYNE RETURN DUAL

## (undated) DEVICE — GOWN TOGA SYS PEELWY ZIP 2 XL

## (undated) DEVICE — SOL IRR NACL .9% 3000ML

## (undated) DEVICE — DRESSING MEPILEX 4X12IN

## (undated) DEVICE — KIT LEG POSITIONER DISPOSABLES

## (undated) DEVICE — PACK BASIC

## (undated) DEVICE — SOL NORMAL USPCA 0.9%

## (undated) DEVICE — BANDAGE ADHESIVE

## (undated) DEVICE — SOL POVIDONE PREP IODINE 4 OZ

## (undated) DEVICE — BANDAGE MATRIX HK LOOP 6IN 5YD

## (undated) DEVICE — CUBE COLD THERAPY POLAR CARE

## (undated) DEVICE — DECANTER FLUID TRNSF WHITE 9IN

## (undated) DEVICE — SOL 9P NACL IRR PIC IL

## (undated) DEVICE — SLEEVE SCD EXPRESS CALF MEDIUM